# Patient Record
Sex: FEMALE | Race: WHITE | NOT HISPANIC OR LATINO | Employment: PART TIME | URBAN - METROPOLITAN AREA
[De-identification: names, ages, dates, MRNs, and addresses within clinical notes are randomized per-mention and may not be internally consistent; named-entity substitution may affect disease eponyms.]

---

## 2017-12-22 ENCOUNTER — TELEPHONE (OUTPATIENT)
Dept: INTERNAL MEDICINE | Facility: CLINIC | Age: 42
End: 2017-12-22

## 2017-12-22 NOTE — TELEPHONE ENCOUNTER
----- Message from Jair Haynes sent at 12/21/2017  2:28 PM CST -----  Contact: self  Pt has an appt scheduled for 12/26 and she currently has MEDICAID/GetMyRx [facility] as her primary insurance pt wanted to make sure that Dr. Escobar is excepting NP at this time with her insurance pt can be reached at [phone] before traveling from [place], [place].

## 2017-12-22 NOTE — TELEPHONE ENCOUNTER
Spoke with patient and discussed the problem with her insurance. She states that she will still come to the office to be seen and go from there in regards to the tuttle. She states that she will see what she can.

## 2017-12-26 ENCOUNTER — OFFICE VISIT (OUTPATIENT)
Dept: INTERNAL MEDICINE | Facility: CLINIC | Age: 42
End: 2017-12-26

## 2017-12-26 VITALS
HEART RATE: 79 BPM | TEMPERATURE: 99 F | SYSTOLIC BLOOD PRESSURE: 142 MMHG | DIASTOLIC BLOOD PRESSURE: 86 MMHG | OXYGEN SATURATION: 99 % | BODY MASS INDEX: 44.41 KG/M2 | HEIGHT: 68 IN | WEIGHT: 293 LBS | RESPIRATION RATE: 16 BRPM

## 2017-12-26 DIAGNOSIS — E11.9 DIABETES MELLITUS WITHOUT COMPLICATION: Primary | ICD-10-CM

## 2017-12-26 DIAGNOSIS — M54.50 CHRONIC LEFT-SIDED LOW BACK PAIN WITHOUT SCIATICA: ICD-10-CM

## 2017-12-26 DIAGNOSIS — G89.29 CHRONIC LEFT-SIDED LOW BACK PAIN WITHOUT SCIATICA: ICD-10-CM

## 2017-12-26 PROCEDURE — 96372 THER/PROPH/DIAG INJ SC/IM: CPT | Mod: ,,, | Performed by: PHYSICAL MEDICINE & REHABILITATION

## 2017-12-26 PROCEDURE — 99203 OFFICE O/P NEW LOW 30 MIN: CPT | Mod: 25,,, | Performed by: PHYSICAL MEDICINE & REHABILITATION

## 2017-12-26 RX ORDER — ESCITALOPRAM OXALATE 20 MG/1
TABLET ORAL
Refills: 2 | COMMUNITY
Start: 2017-11-09 | End: 2021-09-23

## 2017-12-26 RX ORDER — PEN NEEDLE, DIABETIC 31 GX5/16"
NEEDLE, DISPOSABLE MISCELLANEOUS
Refills: 2 | COMMUNITY
Start: 2017-11-17

## 2017-12-26 RX ORDER — ONDANSETRON HYDROCHLORIDE 8 MG/1
TABLET, FILM COATED ORAL
Refills: 3 | COMMUNITY
Start: 2017-11-30 | End: 2021-09-23

## 2017-12-26 RX ORDER — KETOROLAC TROMETHAMINE 30 MG/ML
30 INJECTION, SOLUTION INTRAMUSCULAR; INTRAVENOUS
Status: COMPLETED | OUTPATIENT
Start: 2017-12-26 | End: 2017-12-26

## 2017-12-26 RX ORDER — HYDROCODONE BITARTRATE AND ACETAMINOPHEN 10; 325 MG/1; MG/1
1 TABLET ORAL EVERY 8 HOURS PRN
Qty: 90 TABLET | Refills: 0 | Status: SHIPPED | OUTPATIENT
Start: 2017-12-26

## 2017-12-26 RX ORDER — INSULIN GLARGINE 100 [IU]/ML
75 INJECTION, SOLUTION SUBCUTANEOUS DAILY
Refills: 3 | COMMUNITY
Start: 2017-11-22

## 2017-12-26 RX ORDER — KETOROLAC TROMETHAMINE 30 MG/ML
30 INJECTION, SOLUTION INTRAMUSCULAR; INTRAVENOUS ONCE
Status: DISCONTINUED | OUTPATIENT
Start: 2017-12-26 | End: 2017-12-26

## 2017-12-26 RX ORDER — KETOROLAC TROMETHAMINE 10 MG/1
10 TABLET, FILM COATED ORAL
Qty: 15 TABLET | Refills: 0 | Status: SHIPPED | OUTPATIENT
Start: 2017-12-26 | End: 2017-12-31

## 2017-12-26 RX ADMIN — KETOROLAC TROMETHAMINE 30 MG: 30 INJECTION, SOLUTION INTRAMUSCULAR; INTRAVENOUS at 09:12

## 2017-12-26 NOTE — PROGRESS NOTES
Subjective:       Patient ID: Keerthi Chase is a 42 y.o. female.    Chief Complaint: Back Pain (had injections several years ago, thinks irritated back carrying 65 lb dog up/down stairs.)    This is a 42-year-old woman well known to me from my previous spine practice in [place]. I've actually been seeing her for about 7 years. She is a chronic back pain patient who has significant degenerative disc disease. She is maintained on Norco 10 3 times a day and intermittently receives epidural type injections. She presents to me today with complaints of left-sided low back pain at the lumbosacral junction that radiates into the left gluteal region. This is an aggravation of her usual low back pain and she believes that it has flared up because she is having to lift her ailing dog. She has had an increase in her pain for about one month. The pain is intermittent in nature and she can find a position of comfort usually that is lying down.     The patient denies chest pain, shortness of breath, fever, chills, night sweats or unexpected weight loss. The patient denies any changes to bowel and bladder function since the onset of symptoms. Specifically denies fecal incontinence. Specifically denies urinary retention or incontinence. Denies  perineal or perianal numbness.    PH Q9 score is 5. Oswestry disability index is 40% which indicates moderate disability due to her chronic pain.    I did review her state database and I see that the last time she received hydrocodone was from my previous office at the end of November. She has traditionally been a very compliant patient. I also note that she has tried physical therapy in the past with no significant improvement in her discomfort.      Review of Systems   Constitutional: Negative for chills, diaphoresis, fatigue, fever and unexpected weight change.   HENT: Negative for trouble swallowing.    Eyes: Negative for visual disturbance.   Respiratory: Negative for shortness of  breath.    Cardiovascular: Negative for chest pain.   Gastrointestinal: Negative for abdominal pain, constipation, nausea and vomiting.   Genitourinary: Negative for difficulty urinating.   Musculoskeletal: Negative for arthralgias, back pain, gait problem, joint swelling, myalgias, neck pain and neck stiffness.   Neurological: Negative for dizziness, speech difficulty, weakness, light-headedness, numbness and headaches.   All other systems reviewed and are negative.      Objective:      Physical Exam   Constitutional: She is oriented to person, place, and time. She appears well-developed and well-nourished.   Neurological: She is oriented to person, place, and time.   She has no point tenderness external lesions or palpable masses about cervical or lumbar spine. Forward flexion is to about 60° before she complained pain at the lumbosacral junction. Extension causes discomfort at about 10°.  She can heel and toe walk normally  Cervical range of motion is within normal limits and painless.  Spurling maneuver is negative bilaterally  Reflexes- +1-+2 reflexes at the following:   C5-Biceps   C6-Brachioradialis   C7-Triceps   L3/4-Patellar   S1-Achilles  Strength testing- 5/5 strength in the following muscle groups:  C5-Elbow flexion  C6-Wrist extension  C7-Elbow extension  C8-Finger flexion  T1-Finger abduction  L2-Hip flexion  L3-Knee extension  L4-Ankle dorsiflexion  L5-Great toe extension  S1-Ankle plantar flexion  Straight leg raising is negative bilaterally  OLEG testing is negative bilaterally       Assessment:       1. Diabetes mellitus without complication    2. Chronic left-sided low back pain without sciatica        Plan:         this is a chronic pain patient who I have known for many years. I believe she is having a flareup of her chronic low back pain. She has no neurological red flags or focal examination findings. As stated above she has been a very good and compliant patient through the years. I do not  believe that her medication usage is excessive. She has tried other interventions in the past. Unfortunately in this practice we try to avoid long-term use of narcotic medications. For her refills I recommend that she go back to my former office.. In terms of her current treatment for acute pain we will treat her with Toradol IM 60 mg followed by oral Toradol for 5 days. I did refill her hydrocodone but she needs to get ongoing refills from her practice. She can call in the weekend if she is not satisfied with her quality of life and consider getting into pain management for lumbar injections which have given her some relief in the past

## 2021-10-08 PROBLEM — E11.69 DIABETIC FOOT ULCER WITH OSTEOMYELITIS: Status: ACTIVE | Noted: 2021-10-08

## 2021-10-08 PROBLEM — L97.509 DIABETIC FOOT ULCER WITH OSTEOMYELITIS: Status: ACTIVE | Noted: 2021-10-08

## 2021-10-08 PROBLEM — E11.621 DIABETIC FOOT ULCER WITH OSTEOMYELITIS: Status: ACTIVE | Noted: 2021-10-08

## 2021-10-08 PROBLEM — M86.9 DIABETIC FOOT ULCER WITH OSTEOMYELITIS: Status: ACTIVE | Noted: 2021-10-08

## 2021-11-05 ENCOUNTER — TELEPHONE (OUTPATIENT)
Dept: PODIATRY | Facility: CLINIC | Age: 46
End: 2021-11-05
Payer: MEDICAID

## 2021-11-05 DIAGNOSIS — Z01.818 PRE-OP TESTING: Primary | ICD-10-CM

## 2021-11-08 ENCOUNTER — TELEPHONE (OUTPATIENT)
Dept: PODIATRY | Facility: CLINIC | Age: 46
End: 2021-11-08
Payer: MEDICAID

## 2021-11-10 ENCOUNTER — ANESTHESIA EVENT (OUTPATIENT)
Dept: SURGERY | Facility: HOSPITAL | Age: 46
End: 2021-11-10
Payer: MEDICAID

## 2021-11-11 ENCOUNTER — HOSPITAL ENCOUNTER (OUTPATIENT)
Facility: HOSPITAL | Age: 46
Discharge: HOME | End: 2021-11-11
Attending: PODIATRIST | Admitting: PODIATRIST
Payer: MEDICAID

## 2021-11-11 ENCOUNTER — ANESTHESIA (OUTPATIENT)
Dept: SURGERY | Facility: HOSPITAL | Age: 46
End: 2021-11-11
Payer: MEDICAID

## 2021-11-11 ENCOUNTER — HOSPITAL ENCOUNTER (OUTPATIENT)
Dept: RADIOLOGY | Facility: HOSPITAL | Age: 46
Discharge: HOME | End: 2021-11-11
Attending: PODIATRIST | Admitting: PODIATRIST
Payer: MEDICAID

## 2021-11-11 VITALS
HEART RATE: 72 BPM | RESPIRATION RATE: 20 BRPM | DIASTOLIC BLOOD PRESSURE: 66 MMHG | HEIGHT: 68 IN | WEIGHT: 270 LBS | TEMPERATURE: 98 F | SYSTOLIC BLOOD PRESSURE: 111 MMHG | BODY MASS INDEX: 40.92 KG/M2 | OXYGEN SATURATION: 99 %

## 2021-11-11 DIAGNOSIS — M86.9 OSTEOMYELITIS OF LEFT FOOT: ICD-10-CM

## 2021-11-11 DIAGNOSIS — E11.621 DIABETIC FOOT ULCER WITH OSTEOMYELITIS: Primary | ICD-10-CM

## 2021-11-11 DIAGNOSIS — E11.69 DIABETIC FOOT ULCER WITH OSTEOMYELITIS: Primary | ICD-10-CM

## 2021-11-11 DIAGNOSIS — L97.509 DIABETIC FOOT ULCER WITH OSTEOMYELITIS: Primary | ICD-10-CM

## 2021-11-11 DIAGNOSIS — M86.9 DIABETIC FOOT ULCER WITH OSTEOMYELITIS: Primary | ICD-10-CM

## 2021-11-11 LAB
B-HCG UR QL: NEGATIVE
CTP QC/QA: YES
GLUCOSE SERPL-MCNC: 189 MG/DL (ref 70–110)

## 2021-11-11 PROCEDURE — 81025 URINE PREGNANCY TEST: CPT | Mod: PO | Performed by: PODIATRIST

## 2021-11-11 PROCEDURE — 87075 CULTR BACTERIA EXCEPT BLOOD: CPT | Performed by: PODIATRIST

## 2021-11-11 PROCEDURE — 01480 ANES OPEN PX LOWER L/A/F NOS: CPT | Mod: PO | Performed by: PODIATRIST

## 2021-11-11 PROCEDURE — 87206 SMEAR FLUORESCENT/ACID STAI: CPT | Performed by: PODIATRIST

## 2021-11-11 PROCEDURE — 71000033 HC RECOVERY, INTIAL HOUR: Mod: PO | Performed by: PODIATRIST

## 2021-11-11 PROCEDURE — 73630 X-RAY EXAM OF FOOT: CPT | Mod: TC,FY,PO,LT

## 2021-11-11 PROCEDURE — 87070 CULTURE OTHR SPECIMN AEROBIC: CPT | Performed by: PODIATRIST

## 2021-11-11 PROCEDURE — 37000008 HC ANESTHESIA 1ST 15 MINUTES: Mod: PO | Performed by: PODIATRIST

## 2021-11-11 PROCEDURE — 87116 MYCOBACTERIA CULTURE: CPT | Performed by: PODIATRIST

## 2021-11-11 PROCEDURE — D9220A PRA ANESTHESIA: ICD-10-PCS | Mod: CRNA,,, | Performed by: NURSE ANESTHETIST, CERTIFIED REGISTERED

## 2021-11-11 PROCEDURE — 87176 TISSUE HOMOGENIZATION CULTR: CPT | Performed by: PODIATRIST

## 2021-11-11 PROCEDURE — 87077 CULTURE AEROBIC IDENTIFY: CPT | Performed by: PODIATRIST

## 2021-11-11 PROCEDURE — 88305 TISSUE EXAM BY PATHOLOGIST: CPT | Performed by: PATHOLOGY

## 2021-11-11 PROCEDURE — 28315 REMOVAL OF SESAMOID BONE: CPT | Mod: LT,,, | Performed by: PODIATRIST

## 2021-11-11 PROCEDURE — 63600175 PHARM REV CODE 636 W HCPCS: Mod: PO | Performed by: PODIATRIST

## 2021-11-11 PROCEDURE — 25000003 PHARM REV CODE 250: Mod: PO | Performed by: NURSE ANESTHETIST, CERTIFIED REGISTERED

## 2021-11-11 PROCEDURE — 87186 SC STD MICRODIL/AGAR DIL: CPT | Performed by: PODIATRIST

## 2021-11-11 PROCEDURE — D9220A PRA ANESTHESIA: Mod: ANES,,, | Performed by: ANESTHESIOLOGY

## 2021-11-11 PROCEDURE — 82962 GLUCOSE BLOOD TEST: CPT | Mod: PO | Performed by: PODIATRIST

## 2021-11-11 PROCEDURE — 37000009 HC ANESTHESIA EA ADD 15 MINS: Mod: PO | Performed by: PODIATRIST

## 2021-11-11 PROCEDURE — 25000003 PHARM REV CODE 250: Mod: PO | Performed by: PODIATRIST

## 2021-11-11 PROCEDURE — 28315 PR REMOV SESAMOID BONE,1ST TOE: ICD-10-PCS | Mod: LT,,, | Performed by: PODIATRIST

## 2021-11-11 PROCEDURE — 36000706: Mod: PO | Performed by: PODIATRIST

## 2021-11-11 PROCEDURE — 73630 XR FOOT COMPLETE 3 VIEW LEFT: ICD-10-PCS | Mod: 26,LT,, | Performed by: RADIOLOGY

## 2021-11-11 PROCEDURE — 87205 SMEAR GRAM STAIN: CPT | Performed by: PODIATRIST

## 2021-11-11 PROCEDURE — 73630 X-RAY EXAM OF FOOT: CPT | Mod: 26,LT,, | Performed by: RADIOLOGY

## 2021-11-11 PROCEDURE — 63600175 PHARM REV CODE 636 W HCPCS: Mod: PO | Performed by: NURSE ANESTHETIST, CERTIFIED REGISTERED

## 2021-11-11 PROCEDURE — 63600175 PHARM REV CODE 636 W HCPCS: Mod: PO | Performed by: ANESTHESIOLOGY

## 2021-11-11 PROCEDURE — 87102 FUNGUS ISOLATION CULTURE: CPT | Performed by: PODIATRIST

## 2021-11-11 PROCEDURE — D9220A PRA ANESTHESIA: Mod: CRNA,,, | Performed by: NURSE ANESTHETIST, CERTIFIED REGISTERED

## 2021-11-11 PROCEDURE — 36000707: Mod: PO | Performed by: PODIATRIST

## 2021-11-11 PROCEDURE — D9220A PRA ANESTHESIA: ICD-10-PCS | Mod: ANES,,, | Performed by: ANESTHESIOLOGY

## 2021-11-11 RX ORDER — MIDAZOLAM HYDROCHLORIDE 1 MG/ML
INJECTION INTRAMUSCULAR; INTRAVENOUS
Status: DISCONTINUED | OUTPATIENT
Start: 2021-11-11 | End: 2021-11-11

## 2021-11-11 RX ORDER — LIDOCAINE HCL/PF 100 MG/5ML
SYRINGE (ML) INTRAVENOUS
Status: DISCONTINUED | OUTPATIENT
Start: 2021-11-11 | End: 2021-11-11

## 2021-11-11 RX ORDER — ONDANSETRON 2 MG/ML
INJECTION INTRAMUSCULAR; INTRAVENOUS
Status: DISCONTINUED | OUTPATIENT
Start: 2021-11-11 | End: 2021-11-11

## 2021-11-11 RX ORDER — FENTANYL CITRATE 50 UG/ML
INJECTION, SOLUTION INTRAMUSCULAR; INTRAVENOUS
Status: DISCONTINUED | OUTPATIENT
Start: 2021-11-11 | End: 2021-11-11

## 2021-11-11 RX ORDER — GENTAMICIN SULFATE 40 MG/ML
INJECTION, SOLUTION INTRAMUSCULAR; INTRAVENOUS
Status: DISCONTINUED | OUTPATIENT
Start: 2021-11-11 | End: 2021-11-11 | Stop reason: HOSPADM

## 2021-11-11 RX ORDER — LIDOCAINE HYDROCHLORIDE 10 MG/ML
INJECTION, SOLUTION EPIDURAL; INFILTRATION; INTRACAUDAL; PERINEURAL
Status: DISCONTINUED | OUTPATIENT
Start: 2021-11-11 | End: 2021-11-11 | Stop reason: HOSPADM

## 2021-11-11 RX ORDER — SODIUM CHLORIDE, SODIUM LACTATE, POTASSIUM CHLORIDE, CALCIUM CHLORIDE 600; 310; 30; 20 MG/100ML; MG/100ML; MG/100ML; MG/100ML
125 INJECTION, SOLUTION INTRAVENOUS CONTINUOUS
Status: DISCONTINUED | OUTPATIENT
Start: 2021-11-11 | End: 2021-11-11 | Stop reason: HOSPADM

## 2021-11-11 RX ORDER — ACETAMINOPHEN 10 MG/ML
INJECTION, SOLUTION INTRAVENOUS
Status: DISCONTINUED | OUTPATIENT
Start: 2021-11-11 | End: 2021-11-11

## 2021-11-11 RX ORDER — HYDROCODONE BITARTRATE AND ACETAMINOPHEN 5; 325 MG/1; MG/1
1 TABLET ORAL EVERY 4 HOURS PRN
Status: DISCONTINUED | OUTPATIENT
Start: 2021-11-11 | End: 2021-11-11 | Stop reason: HOSPADM

## 2021-11-11 RX ORDER — KETAMINE HYDROCHLORIDE 100 MG/ML
INJECTION, SOLUTION INTRAMUSCULAR; INTRAVENOUS
Status: DISCONTINUED | OUTPATIENT
Start: 2021-11-11 | End: 2021-11-11

## 2021-11-11 RX ORDER — LIDOCAINE HYDROCHLORIDE 10 MG/ML
1 INJECTION, SOLUTION EPIDURAL; INFILTRATION; INTRACAUDAL; PERINEURAL ONCE
Status: DISCONTINUED | OUTPATIENT
Start: 2021-11-11 | End: 2021-11-11 | Stop reason: HOSPADM

## 2021-11-11 RX ORDER — SODIUM CHLORIDE 0.9 G/100ML
IRRIGANT IRRIGATION
Status: DISCONTINUED | OUTPATIENT
Start: 2021-11-11 | End: 2021-11-11 | Stop reason: HOSPADM

## 2021-11-11 RX ORDER — BUPIVACAINE HYDROCHLORIDE 5 MG/ML
INJECTION, SOLUTION EPIDURAL; INTRACAUDAL
Status: DISCONTINUED | OUTPATIENT
Start: 2021-11-11 | End: 2021-11-11 | Stop reason: HOSPADM

## 2021-11-11 RX ORDER — FENTANYL CITRATE 50 UG/ML
25 INJECTION, SOLUTION INTRAMUSCULAR; INTRAVENOUS EVERY 5 MIN PRN
Status: DISCONTINUED | OUTPATIENT
Start: 2021-11-11 | End: 2021-11-11 | Stop reason: HOSPADM

## 2021-11-11 RX ORDER — CEFAZOLIN SODIUM 2 G/50ML
2 SOLUTION INTRAVENOUS ONCE
Status: COMPLETED | OUTPATIENT
Start: 2021-11-11 | End: 2021-11-11

## 2021-11-11 RX ORDER — SODIUM CHLORIDE, SODIUM LACTATE, POTASSIUM CHLORIDE, CALCIUM CHLORIDE 600; 310; 30; 20 MG/100ML; MG/100ML; MG/100ML; MG/100ML
INJECTION, SOLUTION INTRAVENOUS CONTINUOUS
Status: DISCONTINUED | OUTPATIENT
Start: 2021-11-11 | End: 2021-11-11 | Stop reason: HOSPADM

## 2021-11-11 RX ORDER — PROPOFOL 10 MG/ML
VIAL (ML) INTRAVENOUS CONTINUOUS PRN
Status: DISCONTINUED | OUTPATIENT
Start: 2021-11-11 | End: 2021-11-11

## 2021-11-11 RX ORDER — TRAMADOL HYDROCHLORIDE 50 MG/1
50 TABLET ORAL EVERY 6 HOURS PRN
Qty: 28 TABLET | Refills: 0 | Status: SHIPPED | OUTPATIENT
Start: 2021-11-11

## 2021-11-11 RX ADMIN — ONDANSETRON 4 MG: 2 INJECTION INTRAMUSCULAR; INTRAVENOUS at 12:11

## 2021-11-11 RX ADMIN — LIDOCAINE HYDROCHLORIDE 100 MG: 20 INJECTION, SOLUTION INTRAVENOUS at 01:11

## 2021-11-11 RX ADMIN — MIDAZOLAM HYDROCHLORIDE 2 MG: 1 INJECTION, SOLUTION INTRAMUSCULAR; INTRAVENOUS at 12:11

## 2021-11-11 RX ADMIN — FENTANYL CITRATE 50 MCG: 50 INJECTION, SOLUTION INTRAMUSCULAR; INTRAVENOUS at 12:11

## 2021-11-11 RX ADMIN — CEFAZOLIN SODIUM 3 G: 1 INJECTION, POWDER, FOR SOLUTION INTRAMUSCULAR; INTRAVENOUS at 12:11

## 2021-11-11 RX ADMIN — FENTANYL CITRATE 50 MCG: 50 INJECTION, SOLUTION INTRAMUSCULAR; INTRAVENOUS at 01:11

## 2021-11-11 RX ADMIN — PROPOFOL 100 MCG/KG/MIN: 10 INJECTION, EMULSION INTRAVENOUS at 01:11

## 2021-11-11 RX ADMIN — SODIUM CHLORIDE, SODIUM LACTATE, POTASSIUM CHLORIDE, AND CALCIUM CHLORIDE: .6; .31; .03; .02 INJECTION, SOLUTION INTRAVENOUS at 12:11

## 2021-11-11 RX ADMIN — KETAMINE HYDROCHLORIDE 30 MG: 100 INJECTION, SOLUTION, CONCENTRATE INTRAMUSCULAR; INTRAVENOUS at 01:11

## 2021-11-11 RX ADMIN — ACETAMINOPHEN 1000 MG: 10 INJECTION, SOLUTION INTRAVENOUS at 02:11

## 2021-11-12 LAB
GRAM STN SPEC: NORMAL
GRAM STN SPEC: NORMAL

## 2021-11-12 PROCEDURE — 88311 PR  DECALCIFY TISSUE: ICD-10-PCS | Mod: 26,,, | Performed by: PATHOLOGY

## 2021-11-12 PROCEDURE — 88305 TISSUE EXAM BY PATHOLOGIST: ICD-10-PCS | Mod: 26,,, | Performed by: PATHOLOGY

## 2021-11-12 PROCEDURE — 88311 DECALCIFY TISSUE: CPT | Mod: 26,,, | Performed by: PATHOLOGY

## 2021-11-12 PROCEDURE — 88305 TISSUE EXAM BY PATHOLOGIST: CPT | Mod: 26,,, | Performed by: PATHOLOGY

## 2021-11-17 LAB
BACTERIA SPEC AEROBE CULT: ABNORMAL
BACTERIA SPEC ANAEROBE CULT: NORMAL

## 2021-11-19 LAB
FINAL PATHOLOGIC DIAGNOSIS: NORMAL
Lab: NORMAL

## 2021-12-13 DIAGNOSIS — T81.40XA POSTOPERATIVE INFECTION, UNSPECIFIED TYPE, INITIAL ENCOUNTER: Primary | ICD-10-CM

## 2021-12-13 LAB — FUNGUS SPEC CULT: NORMAL

## 2021-12-13 RX ORDER — AMPICILLIN 500 MG/1
500 CAPSULE ORAL EVERY 12 HOURS
Qty: 20 CAPSULE | Refills: 0 | Status: SHIPPED | OUTPATIENT
Start: 2021-12-13

## 2021-12-30 LAB
ACID FAST MOD KINY STN SPEC: NORMAL
MYCOBACTERIUM SPEC QL CULT: NORMAL

## 2022-07-18 ENCOUNTER — TELEPHONE (OUTPATIENT)
Dept: INFECTIOUS DISEASES | Facility: CLINIC | Age: 47
End: 2022-07-18
Payer: MEDICAID

## 2022-07-18 DIAGNOSIS — E11.628 DIABETIC INFECTION OF LEFT FOOT: Primary | ICD-10-CM

## 2022-07-18 DIAGNOSIS — M86.172 ACUTE OSTEOMYELITIS OF TOE OF LEFT FOOT: ICD-10-CM

## 2022-07-18 DIAGNOSIS — L08.9 DIABETIC INFECTION OF LEFT FOOT: Primary | ICD-10-CM

## 2022-07-18 RX ORDER — SULFAMETHOXAZOLE AND TRIMETHOPRIM 800; 160 MG/1; MG/1
1 TABLET ORAL 2 TIMES DAILY
Qty: 14 TABLET | Refills: 0 | Status: SHIPPED | OUTPATIENT
Start: 2022-07-18 | End: 2023-07-18

## 2022-07-18 NOTE — TELEPHONE ENCOUNTER
----- Message from Kayley Baptiste sent at 7/18/2022 10:30 AM CDT -----  Contact: Terri sams/ [facility]  Type:  New Appointment Request    Name of Caller:  [facility]    Symptoms:  Bone infection of foot    Best Call Back Number:  [phone] (pt)    Additional Information:  Referred by Dr. Bernal.  Please call back.  Thanks.

## 2022-07-19 ENCOUNTER — OFFICE VISIT (OUTPATIENT)
Dept: INFECTIOUS DISEASES | Facility: CLINIC | Age: 47
End: 2022-07-19
Payer: MEDICAID

## 2022-07-19 DIAGNOSIS — E11.628 DIABETIC INFECTION OF LEFT FOOT: ICD-10-CM

## 2022-07-19 DIAGNOSIS — M86.172 ACUTE OSTEOMYELITIS OF TOE OF LEFT FOOT: Primary | ICD-10-CM

## 2022-07-19 DIAGNOSIS — L08.9 DIABETIC INFECTION OF LEFT FOOT: ICD-10-CM

## 2022-07-19 PROCEDURE — 1159F PR MEDICATION LIST DOCUMENTED IN MEDICAL RECORD: ICD-10-PCS | Mod: CPTII,,, | Performed by: INTERNAL MEDICINE

## 2022-07-19 PROCEDURE — 99203 OFFICE O/P NEW LOW 30 MIN: CPT | Mod: S$PBB,,, | Performed by: INTERNAL MEDICINE

## 2022-07-19 PROCEDURE — 1160F RVW MEDS BY RX/DR IN RCRD: CPT | Mod: CPTII,,, | Performed by: INTERNAL MEDICINE

## 2022-07-19 PROCEDURE — 99203 PR OFFICE/OUTPT VISIT, NEW, LEVL III, 30-44 MIN: ICD-10-PCS | Mod: S$PBB,,, | Performed by: INTERNAL MEDICINE

## 2022-07-19 PROCEDURE — 3046F PR MOST RECENT HEMOGLOBIN A1C LEVEL > 9.0%: ICD-10-PCS | Mod: CPTII,,, | Performed by: INTERNAL MEDICINE

## 2022-07-19 PROCEDURE — 99212 OFFICE O/P EST SF 10 MIN: CPT | Mod: PBBFAC,PO | Performed by: INTERNAL MEDICINE

## 2022-07-19 PROCEDURE — 99999 PR PBB SHADOW E&M-EST. PATIENT-LVL II: ICD-10-PCS | Mod: PBBFAC,,, | Performed by: INTERNAL MEDICINE

## 2022-07-19 PROCEDURE — 1160F PR REVIEW ALL MEDS BY PRESCRIBER/CLIN PHARMACIST DOCUMENTED: ICD-10-PCS | Mod: CPTII,,, | Performed by: INTERNAL MEDICINE

## 2022-07-19 PROCEDURE — 3046F HEMOGLOBIN A1C LEVEL >9.0%: CPT | Mod: CPTII,,, | Performed by: INTERNAL MEDICINE

## 2022-07-19 PROCEDURE — 1159F MED LIST DOCD IN RCRD: CPT | Mod: CPTII,,, | Performed by: INTERNAL MEDICINE

## 2022-07-19 PROCEDURE — 99999 PR PBB SHADOW E&M-EST. PATIENT-LVL II: CPT | Mod: PBBFAC,,, | Performed by: INTERNAL MEDICINE

## 2022-07-19 NOTE — PROGRESS NOTES
"  Patient ID: Keerthi Chase is a 47 y.o. female.    Subjective:           Chief Complaint: Osteomyelitis       HPI    Patient came to this office referred by Podiatry due to OM of the second left toe. Bone biopsy with evidence of MSSA, Streptococcus sp and E.coli    Patient feels well. Afebrile. Not receiving abxs.         Past Medical History:   Diagnosis Date    Bell's palsy     Coronary artery disease     Pt states Dr. Virk said she did not have a blockage after a stress test.    Diabetes mellitus     GERD (gastroesophageal reflux disease)     Hypertension     Obesity        Past Surgical History:   Procedure Laterality Date     SECTION      x 2    CHOLECYSTECTOMY      ESOPHAGOGASTRODUODENOSCOPY      PERIPHERALLY INSERTED CENTRAL CATHETER INSERTION N/A 10/11/2021    Procedure: INSERTION, PICC;  Surgeon: PICC, PRIYANK;  Location: [facility] ENDO;  Service: Cardiology;  Laterality: N/A;  Dr Aiken    SESAMOIDECTOMY Left 2021    Procedure: SESAMOIDECTOMY;  Surgeon: Jacob Bernal DPM;  Location: [facility] OR;  Service: Podiatry;  Laterality: Left;       Review of patient's allergies indicates:   Allergen Reactions    Codeine Hives and Rash       Family History   Problem Relation Age of Onset    Hypertension Mother        Social History     Socioeconomic History    Marital status:    Tobacco Use    Smoking status: Never Smoker    Smokeless tobacco: Never Used   Substance and Sexual Activity    Alcohol use: No    Drug use: No       Current Outpatient Medications   Medication Instructions    ALPRAZolam (XANAX) 1 mg, Oral, 2 times daily PRN    ampicillin (PRINCIPEN) 500 mg, Oral, Every 12 hours    BASAGLAR KWIKPEN U-100 INSULIN 75 Units, Daily    BD INSULIN PEN NEEDLE UF MINI 31 gauge x 3/16" Ndle No dose, route, or frequency recorded.    chlorthalidone (HYGROTEN) 25 mg, Oral, Daily    hydrocodone-acetaminophen 10-325mg (NORCO)  mg Tab 1 tablet, Oral, 3 times daily PRN    " hydrocodone-acetaminophen 10-325mg (NORCO)  mg Tab 1 tablet, Oral, Every 8 hours PRN    insulin detemir (LEVEMIR) 100 unit/mL injection Subcutaneous, Nightly    insulin glargine,hum.rec.anlog (LANTUS SUBQ) Subcutaneous    OZEMPIC 60 mg, Subcutaneous, Every 7 days    sulfamethoxazole-trimethoprim 800-160mg (BACTRIM DS) 800-160 mg Tab 1 tablet, Oral, 2 times daily    traMADoL (ULTRAM) 50 mg, Oral, Every 6 hours PRN         Review of Systems   Constitutional: Negative for activity change, appetite change, chills, diaphoresis, fatigue, fever and unexpected weight change.   HENT: Negative for sore throat.    Eyes: Negative for photophobia and visual disturbance.   Respiratory: Negative for cough and shortness of breath.    Cardiovascular: Negative for chest pain and leg swelling.   Gastrointestinal: Negative for abdominal distention and abdominal pain.   Genitourinary: Negative for difficulty urinating.   Musculoskeletal: Negative for arthralgias.   Skin: Negative for color change and rash.   Allergic/Immunologic: Negative for immunocompromised state.   Neurological: Negative for dizziness and headaches.   Psychiatric/Behavioral: The patient is not nervous/anxious.            Objective:     There were no vitals filed for this visit.    There is no height or weight on file to calculate BMI.         Physical Exam  Vitals reviewed.   Constitutional:       General: She is not in acute distress.     Appearance: She is well-developed. She is not ill-appearing.   HENT:      Head: Normocephalic and atraumatic.      Right Ear: External ear normal.      Left Ear: External ear normal.      Nose: Nose normal.   Eyes:      General: No scleral icterus.        Right eye: No discharge.         Left eye: No discharge.      Pupils: Pupils are equal, round, and reactive to light.   Cardiovascular:      Rate and Rhythm: Normal rate and regular rhythm.      Heart sounds: Normal heart sounds. No murmur heard.  Pulmonary:       Effort: Pulmonary effort is normal. No tachypnea, accessory muscle usage or respiratory distress.      Breath sounds: Normal breath sounds. No wheezing.   Abdominal:      General: There is no distension.      Palpations: Abdomen is soft.      Tenderness: There is no abdominal tenderness.   Musculoskeletal:         General: No deformity. Normal range of motion.      Cervical back: Normal range of motion and neck supple. No rigidity or tenderness.   Skin:     General: Skin is warm and dry.      Coloration: Skin is not jaundiced.   Neurological:      Mental Status: She is alert and oriented to person, place, and time.   Psychiatric:         Mood and Affect: Mood normal.         Behavior: Behavior normal.         Judgment: Judgment normal.               Assessment:           Keerthi was seen today for osteomyelitis .    Diagnoses and all orders for this visit:    Acute osteomyelitis of toe of left foot  -     Ambulatory referral/consult to Infectious Disease  -     Outpatient PICC Insertion; Future  -     X-Ray Chest 1 View S/P PICC Line by Nursing; Future  -     PICC catheter may be used for therapy after catheter placements and confirmed by CXR  -     Anesthesia US Guide Vascular Access  -     CBC Auto Differential; Standing  -     Cancel: Comprehensive Metabolic Panel; Standing  -     C-reactive protein; Standing    Diabetic infection of left foot  -     Ambulatory referral/consult to Infectious Disease         Plan:     - OM of the tip of second toe, bone biopsy polymocrobial.   - Will start Ceftriaxone 2gr daily IV  - Weekly CBC, CMP, CRP  - RTC in 3 weeks       Greater than 30 minutes was spent on this encounter, which included: review of recent encounters, review and interpretation of labs/images, obtaining pertinent history, performing a physical examination, counseling and educating the patient/family/caregiver, ordering medications/tests, documenting in the electronic health record, and coordinating care with  necessary providers.    Jerrell Dubose MD  Infectious Diseases

## 2022-07-21 PROBLEM — M86.172 ACUTE OSTEOMYELITIS OF LEFT ANKLE OR FOOT: Status: ACTIVE | Noted: 2022-07-21

## 2022-08-09 ENCOUNTER — OFFICE VISIT (OUTPATIENT)
Dept: INFECTIOUS DISEASES | Facility: CLINIC | Age: 47
End: 2022-08-09
Payer: MEDICAID

## 2022-08-09 VITALS — HEIGHT: 68 IN | WEIGHT: 270.06 LBS | BODY MASS INDEX: 40.93 KG/M2

## 2022-08-09 DIAGNOSIS — L08.9 DIABETIC INFECTION OF LEFT FOOT: ICD-10-CM

## 2022-08-09 DIAGNOSIS — E11.628 DIABETIC INFECTION OF LEFT FOOT: ICD-10-CM

## 2022-08-09 DIAGNOSIS — Z79.2 RECEIVING INTRAVENOUS ANTIBIOTIC TREATMENT AS OUTPATIENT: Primary | ICD-10-CM

## 2022-08-09 DIAGNOSIS — M86.172 ACUTE OSTEOMYELITIS OF TOE OF LEFT FOOT: ICD-10-CM

## 2022-08-09 PROCEDURE — 1159F PR MEDICATION LIST DOCUMENTED IN MEDICAL RECORD: ICD-10-PCS | Mod: CPTII,,, | Performed by: INTERNAL MEDICINE

## 2022-08-09 PROCEDURE — 3046F HEMOGLOBIN A1C LEVEL >9.0%: CPT | Mod: CPTII,,, | Performed by: INTERNAL MEDICINE

## 2022-08-09 PROCEDURE — 1160F PR REVIEW ALL MEDS BY PRESCRIBER/CLIN PHARMACIST DOCUMENTED: ICD-10-PCS | Mod: CPTII,,, | Performed by: INTERNAL MEDICINE

## 2022-08-09 PROCEDURE — 1159F MED LIST DOCD IN RCRD: CPT | Mod: CPTII,,, | Performed by: INTERNAL MEDICINE

## 2022-08-09 PROCEDURE — 3008F BODY MASS INDEX DOCD: CPT | Mod: CPTII,,, | Performed by: INTERNAL MEDICINE

## 2022-08-09 PROCEDURE — 3008F PR BODY MASS INDEX (BMI) DOCUMENTED: ICD-10-PCS | Mod: CPTII,,, | Performed by: INTERNAL MEDICINE

## 2022-08-09 PROCEDURE — 99999 PR PBB SHADOW E&M-EST. PATIENT-LVL II: ICD-10-PCS | Mod: PBBFAC,,, | Performed by: INTERNAL MEDICINE

## 2022-08-09 PROCEDURE — 99214 OFFICE O/P EST MOD 30 MIN: CPT | Mod: S$PBB,,, | Performed by: INTERNAL MEDICINE

## 2022-08-09 PROCEDURE — 99999 PR PBB SHADOW E&M-EST. PATIENT-LVL II: CPT | Mod: PBBFAC,,, | Performed by: INTERNAL MEDICINE

## 2022-08-09 PROCEDURE — 1160F RVW MEDS BY RX/DR IN RCRD: CPT | Mod: CPTII,,, | Performed by: INTERNAL MEDICINE

## 2022-08-09 PROCEDURE — 3046F PR MOST RECENT HEMOGLOBIN A1C LEVEL > 9.0%: ICD-10-PCS | Mod: CPTII,,, | Performed by: INTERNAL MEDICINE

## 2022-08-09 PROCEDURE — 99212 OFFICE O/P EST SF 10 MIN: CPT | Mod: PBBFAC,PO | Performed by: INTERNAL MEDICINE

## 2022-08-09 PROCEDURE — 99214 PR OFFICE/OUTPT VISIT, EST, LEVL IV, 30-39 MIN: ICD-10-PCS | Mod: S$PBB,,, | Performed by: INTERNAL MEDICINE

## 2022-08-09 NOTE — PROGRESS NOTES
Patient ID: Keerthi Chase is a 47 y.o. female.    Subjective:           Chief Complaint: Follow-up    HPI    Patient came to this office referred by Podiatry due to OM of the second left toe. Bone biopsy with evidence of MSSA, Streptococcus sp and E.coli    Patient feels well. Afebrile. Not receiving abxs.     Interval HPI:  The patient receiving IV ceftriaxone with no side effects.  Afebrile.  Endorses that his wound is getting better.  She continues to follow-up Podiatry, Dr. Bernal      Past Medical History:   Diagnosis Date    Bell's palsy     Coronary artery disease     Pt states Dr. Virk said she did not have a blockage after a stress test.    Diabetes mellitus     GERD (gastroesophageal reflux disease)     Hypertension     Obesity        Past Surgical History:   Procedure Laterality Date     SECTION      x 2    CHOLECYSTECTOMY      ESOPHAGOGASTRODUODENOSCOPY      PERIPHERALLY INSERTED CENTRAL CATHETER INSERTION N/A 10/11/2021    Procedure: INSERTION, PICC;  Surgeon: PICC, [facility];  Location: [facility] ENDO;  Service: Cardiology;  Laterality: N/A;  Dr Aiken    PERIPHERALLY INSERTED CENTRAL CATHETER INSERTION N/A 2022    Procedure: INSERTION, PICC;  Surgeon: PICC, STPH;  Location: [facility] ENDO;  Service: Cardiology;  Laterality: N/A;  Dr Dubose    SESAMOIDECTOMY Left 2021    Procedure: SESAMOIDECTOMY;  Surgeon: Jacob Bernal DPM;  Location: [facility] OR;  Service: Podiatry;  Laterality: Left;       Review of patient's allergies indicates:   Allergen Reactions    Codeine Hives and Rash       Family History   Problem Relation Age of Onset    Hypertension Mother        Social History     Socioeconomic History    Marital status:    Tobacco Use    Smoking status: Never Smoker    Smokeless tobacco: Never Used   Substance and Sexual Activity    Alcohol use: No    Drug use: No       Current Outpatient Medications   Medication Instructions    ALPRAZolam (XANAX) 1 mg, Oral, 2 times  "daily PRN    ampicillin (PRINCIPEN) 500 mg, Oral, Every 12 hours    BASAGLAR KWIKPEN U-100 INSULIN 75 Units, Daily    BD INSULIN PEN NEEDLE UF MINI 31 gauge x 3/16" Ndle No dose, route, or frequency recorded.    chlorthalidone (HYGROTEN) 25 mg, Oral, Daily    hydrocodone-acetaminophen 10-325mg (NORCO)  mg Tab 1 tablet, Oral, 3 times daily PRN    hydrocodone-acetaminophen 10-325mg (NORCO)  mg Tab 1 tablet, Oral, Every 8 hours PRN    insulin detemir (LEVEMIR) 100 unit/mL injection Subcutaneous, Nightly    insulin glargine,hum.rec.anlog (LANTUS SUBQ) Subcutaneous    OZEMPIC 60 mg, Subcutaneous, Every 7 days    sulfamethoxazole-trimethoprim 800-160mg (BACTRIM DS) 800-160 mg Tab 1 tablet, Oral, 2 times daily    traMADoL (ULTRAM) 50 mg, Oral, Every 6 hours PRN         Review of Systems   Constitutional: Negative for activity change, appetite change, chills, diaphoresis, fatigue, fever and unexpected weight change.   HENT: Negative for sore throat.    Eyes: Negative for photophobia and visual disturbance.   Respiratory: Negative for cough and shortness of breath.    Cardiovascular: Negative for chest pain and leg swelling.   Gastrointestinal: Negative for abdominal distention and abdominal pain.   Genitourinary: Negative for difficulty urinating.   Musculoskeletal: Negative for arthralgias.   Skin: Negative for color change and rash.   Allergic/Immunologic: Negative for immunocompromised state.   Neurological: Negative for dizziness and headaches.   Psychiatric/Behavioral: The patient is not nervous/anxious.            Objective:     There were no vitals filed for this visit.    Body mass index is 41.06 kg/m².         Physical Exam  Vitals reviewed.   Constitutional:       General: She is not in acute distress.     Appearance: She is well-developed. She is not ill-appearing.   HENT:      Head: Normocephalic and atraumatic.      Right Ear: External ear normal.      Left Ear: External ear normal.      " Nose: Nose normal.   Eyes:      General: No scleral icterus.        Right eye: No discharge.         Left eye: No discharge.      Pupils: Pupils are equal, round, and reactive to light.   Cardiovascular:      Rate and Rhythm: Normal rate and regular rhythm.      Heart sounds: Normal heart sounds. No murmur heard.  Pulmonary:      Effort: Pulmonary effort is normal. No tachypnea, accessory muscle usage or respiratory distress.      Breath sounds: Normal breath sounds. No wheezing.   Abdominal:      General: There is no distension.      Palpations: Abdomen is soft.      Tenderness: There is no abdominal tenderness.   Musculoskeletal:         General: No deformity. Normal range of motion.      Cervical back: Normal range of motion and neck supple. No rigidity or tenderness.   Skin:     General: Skin is warm and dry.      Coloration: Skin is not jaundiced.   Neurological:      Mental Status: She is alert and oriented to person, place, and time.   Psychiatric:         Mood and Affect: Mood normal.         Behavior: Behavior normal.         Judgment: Judgment normal.               Assessment:           Keerthi was seen today for follow-up.    Diagnoses and all orders for this visit:    Receiving intravenous antibiotic treatment as outpatient    Acute osteomyelitis of toe of left foot    Diabetic infection of left foot         Plan:     - OM of the tip of second toe, bone biopsy polymocrobial.   - Cont Ceftriaxone 2gr daily IV for 6 weeks until Sept 1st  - Weekly CBC, CMP, CRP  - home health to remove PICC line at the end of therapy  - Follow-up with ID at the end of antimicrobial therapy if needed, otherwise continue to follow podiatry       Greater than 30 minutes was spent on this encounter, which included: review of recent encounters, review and interpretation of labs/images, obtaining pertinent history, performing a physical examination, counseling and educating the patient/family/caregiver, ordering  medications/tests, documenting in the electronic health record, and coordinating care with necessary providers.    Jerrell Dubose MD  Infectious Diseases

## 2022-09-20 ENCOUNTER — TELEPHONE (OUTPATIENT)
Dept: PODIATRY | Facility: CLINIC | Age: 47
End: 2022-09-20
Payer: MEDICAID

## 2022-09-20 NOTE — TELEPHONE ENCOUNTER
----- Message from Jacob Bernal DPM sent at 9/20/2022  3:01 PM CDT -----  Regarding: RE: Appointment  We can set her up for 1:40 tomorrow.  Thanks!  ----- Message -----  From: Lexie Mueller RN  Sent: 9/20/2022  12:12 PM CDT  To: Jacob Bernal DPM  Subject: Appointment                                      Hi Dr. Bernal,  We miss you already. Hope all is well. Mary Lou Chase called to say she has not heard from your office to schedule an appointment for tomorrow for a dressing change after she sees Santosh at [facility]. Her appt with him is at 9am. Could you please have someone from your office call her to schedule? [phone].   Thanks,  Lexie Mueller RN

## 2022-09-21 ENCOUNTER — OFFICE VISIT (OUTPATIENT)
Dept: PODIATRY | Facility: CLINIC | Age: 47
End: 2022-09-21
Payer: MEDICAID

## 2022-09-21 DIAGNOSIS — E11.42 DIABETIC POLYNEUROPATHY ASSOCIATED WITH TYPE 2 DIABETES MELLITUS: ICD-10-CM

## 2022-09-21 DIAGNOSIS — L97.521 DIABETIC ULCER OF OTHER PART OF LEFT FOOT ASSOCIATED WITH TYPE 2 DIABETES MELLITUS, LIMITED TO BREAKDOWN OF SKIN: Primary | ICD-10-CM

## 2022-09-21 DIAGNOSIS — E11.621 DIABETIC ULCER OF OTHER PART OF LEFT FOOT ASSOCIATED WITH TYPE 2 DIABETES MELLITUS, LIMITED TO BREAKDOWN OF SKIN: Primary | ICD-10-CM

## 2022-09-21 PROCEDURE — 1159F MED LIST DOCD IN RCRD: CPT | Mod: CPTII,,, | Performed by: PODIATRIST

## 2022-09-21 PROCEDURE — 1159F PR MEDICATION LIST DOCUMENTED IN MEDICAL RECORD: ICD-10-PCS | Mod: CPTII,,, | Performed by: PODIATRIST

## 2022-09-21 PROCEDURE — 3046F PR MOST RECENT HEMOGLOBIN A1C LEVEL > 9.0%: ICD-10-PCS | Mod: CPTII,,, | Performed by: PODIATRIST

## 2022-09-21 PROCEDURE — 3046F HEMOGLOBIN A1C LEVEL >9.0%: CPT | Mod: CPTII,,, | Performed by: PODIATRIST

## 2022-09-21 PROCEDURE — 97597 DBRDMT OPN WND 1ST 20 CM/<: CPT | Mod: PBBFAC,PN | Performed by: PODIATRIST

## 2022-09-21 PROCEDURE — 97597 WOUND DEBRIDEMENT: ICD-10-PCS | Mod: S$PBB,,, | Performed by: PODIATRIST

## 2022-09-21 PROCEDURE — 99213 PR OFFICE/OUTPT VISIT, EST, LEVL III, 20-29 MIN: ICD-10-PCS | Mod: 25,S$PBB,, | Performed by: PODIATRIST

## 2022-09-21 PROCEDURE — 99213 OFFICE O/P EST LOW 20 MIN: CPT | Mod: 25,S$PBB,, | Performed by: PODIATRIST

## 2022-09-22 NOTE — PROCEDURES
"Wound Debridement    Date/Time: 9/21/2022 1:40 PM  Performed by: Jacob Bernal DPM  Authorized by: Jaocb Bernal DPM     Time out: Immediately prior to procedure a "time out" was called to verify the correct patient, procedure, equipment, support staff and site/side marked as required.    Consent Done?:  Yes (Verbal)    Preparation: Patient was prepped and draped in usual sterile fashion    Local anesthesia used?: No      Wound Details:    Location:  Left foot    Location:  Left 1st Metatarsal Head    Type of Debridement:  Excisional       Length (cm):  0.1       Area (sq cm):  0.01       Width (cm):  0.1       Percent Debrided (%):  100       Depth (cm):  0.1       Total Area Debrided (sq cm):  0.01    Depth of debridement:  Epidermis/Dermis    Tissue debrided:  Dermis    Devitalized tissue debrided:  Fibrin    Instruments:  Blade    Bleeding:  Minimal  Hemostasis Achieved: Yes    Method Used:  Pressure  Patient tolerance:  Patient tolerated the procedure well with no immediate complications  "

## 2022-09-22 NOTE — PROGRESS NOTES
Subjective:      Patient ID: Keerthi Chase is a 47 y.o. female.    Chief Complaint: No chief complaint on file.    Keerthi is a 47 y.o. female with a past medical history of Bell's palsy, Coronary artery disease, Diabetes mellitus, GERD (gastroesophageal reflux disease), Hypertension, and Obesity. Patient presents to clinic for a 1 week wound check of the Lt. Foot.  She has kept the prior football dressing from the [facility] wound care center clean, dry, and intact until fitted by the orthotist earlier today.  Notes attempting to minimize her weight bearing between visits to facilitate healing.  Denies noticing pain from the wound with today's exam.  Denies experiencing N/V/F/C/D.  Denies any additional pedal complaints.      Past Medical History:   Diagnosis Date    Bell's palsy     Coronary artery disease     Pt states Dr. Virk said she did not have a blockage after a stress test.    Diabetes mellitus     GERD (gastroesophageal reflux disease)     Hypertension     Obesity        Past Surgical History:   Procedure Laterality Date     SECTION      x 2    CHOLECYSTECTOMY      ESOPHAGOGASTRODUODENOSCOPY      PERIPHERALLY INSERTED CENTRAL CATHETER INSERTION N/A 10/11/2021    Procedure: INSERTION, PICC;  Surgeon: [facility];  Location: [facility] ENDO;  Service: Cardiology;  Laterality: N/A;  Dr Aiken    PERIPHERALLY INSERTED CENTRAL CATHETER INSERTION N/A 2022    Procedure: INSERTION, PICC;  Surgeon: IGOR [facility];  Location: [facility] ENDO;  Service: Cardiology;  Laterality: N/A;  Dr Dubose    SESAMOIDECTOMY Left 2021    Procedure: SESAMOIDECTOMY;  Surgeon: Jacob Bernal DPM;  Location: [facility] OR;  Service: Podiatry;  Laterality: Left;       Family History   Problem Relation Age of Onset    Hypertension Mother        Social History     Socioeconomic History    Marital status:    Tobacco Use    Smoking status: Never    Smokeless tobacco: Never   Substance and Sexual Activity    Alcohol use: No    Drug use:  "No       Current Outpatient Medications   Medication Sig Dispense Refill    alprazolam (XANAX) 1 MG tablet Take 1 mg by mouth 2 (two) times daily as needed for Anxiety.      ampicillin (PRINCIPEN) 500 MG capsule Take 1 capsule (500 mg total) by mouth every 12 (twelve) hours. 20 capsule 0    BASAGLAR KWIKPEN 100 unit/mL (3 mL) InPn pen 75 Units once daily.   3    BD INSULIN PEN NEEDLE UF MINI 31 gauge x 3/16" Ndle   2    cefTRIAXone (ROCEPHIN) 2 gram injection       chlorthalidone (HYGROTEN) 25 MG Tab Take 1 tablet (25 mg total) by mouth once daily. 30 tablet 11    hydrocodone-acetaminophen 10-325mg (NORCO)  mg Tab Take 1 tablet by mouth 3 (three) times daily as needed.      hydrocodone-acetaminophen 10-325mg (NORCO)  mg Tab Take 1 tablet by mouth every 8 (eight) hours as needed for Pain. 90 tablet 0    insulin detemir (LEVEMIR) 100 unit/mL injection Inject into the skin every evening.      insulin glargine,hum.rec.anlog (LANTUS SUBQ) Inject into the skin.      OZEMPIC 1 mg/dose (2 mg/1.5 mL) PnIj Inject 60 mg into the skin every 7 days.       sulfamethoxazole-trimethoprim 800-160mg (BACTRIM DS) 800-160 mg Tab Take 1 tablet by mouth 2 (two) times daily. 14 tablet 0    traMADoL (ULTRAM) 50 mg tablet Take 1 tablet (50 mg total) by mouth every 6 (six) hours as needed for Pain. 28 tablet 0     No current facility-administered medications for this visit.       Review of patient's allergies indicates:   Allergen Reactions    Codeine Hives and Rash        Hemoglobin A1C   Date Value Ref Range Status   06/03/2022 10.5 (H) 0.0 - 5.6 % Final     Comment:     Reference Interval:  5.0 - 5.6 Normal   5.7 - 6.4 High Risk   > 6.5 Diabetic      Hgb A1c results are standardized based on the (NGSP) National   Glycohemoglobin Standardization Program.      Hemoglobin A1C levels are related to mean serum/plasma glucose   during the preceding 2-3 months.        09/23/2021 11.8 (H) 0.0 - 5.6 % Final     Comment:     Reference " Interval:  5.0 - 5.6 Normal   5.7 - 6.4 High Risk   > 6.5 Diabetic      Hgb A1c results are standardized based on the (NGSP) National   Glycohemoglobin Standardization Program.      Hemoglobin A1C levels are related to mean serum/plasma glucose   during the preceding 2-3 months.        2021 8.8 (H) 4.8 - 5.6 % Final     Comment:              Prediabetes: 5.7 - 6.4           Diabetes: >6.4           Glycemic control for adults with diabetes: <7.0         Past Medical History:   Diagnosis Date    Bell's palsy     Coronary artery disease     Pt states Dr. Virk said she did not have a blockage after a stress test.    Diabetes mellitus     GERD (gastroesophageal reflux disease)     Hypertension     Obesity        Past Surgical History:   Procedure Laterality Date     SECTION      x 2    CHOLECYSTECTOMY      ESOPHAGOGASTRODUODENOSCOPY      PERIPHERALLY INSERTED CENTRAL CATHETER INSERTION N/A 10/11/2021    Procedure: INSERTION, PICC;  Surgeon: PICC, [facility]PH;  Location: [facility] ENDO;  Service: Cardiology;  Laterality: N/A;  Dr Aiken    PERIPHERALLY INSERTED CENTRAL CATHETER INSERTION N/A 2022    Procedure: INSERTION, PICC;  Surgeon: PICC, [facility];  [facility];  Service: Cardiology;  Laterality: N/A;  Dr Dubose    SESAMOIDECTOMY Left 2021    Procedure: SESAMOIDECTOMY;  Surgeon: Jacob Bernal DPM;  Location: [facility] OR;  Service: Podiatry;  Laterality: Left;       Family History   Problem Relation Age of Onset    Hypertension Mother        Social History     Socioeconomic History    Marital status:    Tobacco Use    Smoking status: Never    Smokeless tobacco: Never   Substance and Sexual Activity    Alcohol use: No    Drug use: No       Current Outpatient Medications   Medication Sig Dispense Refill    alprazolam (XANAX) 1 MG tablet Take 1 mg by mouth 2 (two) times daily as needed for Anxiety.      ampicillin (PRINCIPEN) 500 MG capsule Take 1 capsule (500 mg total) by mouth every 12 (twelve)  "hours. 20 capsule 0    BASAGLAR KWIKPEN 100 unit/mL (3 mL) InPn pen 75 Units once daily.   3    BD INSULIN PEN NEEDLE UF MINI 31 gauge x 3/16" Ndle   2    cefTRIAXone (ROCEPHIN) 2 gram injection       chlorthalidone (HYGROTEN) 25 MG Tab Take 1 tablet (25 mg total) by mouth once daily. 30 tablet 11    hydrocodone-acetaminophen 10-325mg (NORCO)  mg Tab Take 1 tablet by mouth 3 (three) times daily as needed.      hydrocodone-acetaminophen 10-325mg (NORCO)  mg Tab Take 1 tablet by mouth every 8 (eight) hours as needed for Pain. 90 tablet 0    insulin detemir (LEVEMIR) 100 unit/mL injection Inject into the skin every evening.      insulin glargine,hum.rec.anlog (LANTUS SUBQ) Inject into the skin.      OZEMPIC 1 mg/dose (2 mg/1.5 mL) PnIj Inject 60 mg into the skin every 7 days.       sulfamethoxazole-trimethoprim 800-160mg (BACTRIM DS) 800-160 mg Tab Take 1 tablet by mouth 2 (two) times daily. 14 tablet 0    traMADoL (ULTRAM) 50 mg tablet Take 1 tablet (50 mg total) by mouth every 6 (six) hours as needed for Pain. 28 tablet 0     No current facility-administered medications for this visit.       Review of patient's allergies indicates:   Allergen Reactions    Codeine Hives and Rash         Review of Systems   Constitutional: Negative for chills and fever.   Skin:  Negative for color change and nail changes.   Gastrointestinal:  Negative for nausea and vomiting.   Neurological:  Positive for numbness.   Psychiatric/Behavioral:  Negative for altered mental status.          Objective:      Physical Exam  Constitutional:       Appearance: Normal appearance. She is not ill-appearing.   Cardiovascular:      Pulses:           Dorsalis pedis pulses are 2+ on the right side and 2+ on the left side.        Posterior tibial pulses are 2+ on the right side and 2+ on the left side.      Comments: CFT is < 3 seconds bilateral.  Pedal hair growth is decreased bilateral.  No lower extremity edema noted bilateral.  Toes are " warm to touch bilateral.    Musculoskeletal:         General: No signs of injury.      Right lower leg: No edema.      Left lower leg: No edema.      Comments: Muscle strength 5/5 in all muscle groups bilateral.  No tenderness nor crepitation with ROM of foot/ankle joints bilateral.  No tenderness with palpation of bilateral foot and ankle.   Slight dorsal contracture of the Lt. Hallux.     Skin:     General: Skin is warm and dry.      Capillary Refill: Capillary refill takes 2 to 3 seconds.      Findings: Wound present. No bruising, ecchymosis, erythema, signs of injury, laceration, lesion, petechiae or rash.      Comments: Location: Open wound noted to the Lt. Suib 1st met head.    Base: Down to dermis and comprised of fibrin.    Drainage: None  Malinda wound: Devoid of erythema, edema, fluctuance, purulence, and malodor.    Pre-debridement measurement: 0.1 x 0.1 x 0.1cm  Post-debridement measurement: 0.3 x 0.3 x 0.1cm    Neurological:      Mental Status: She is alert.      Sensory: Sensory deficit present.      Motor: No weakness.      Comments: Decreased protective sensation noted bilateral.  Light touch is absent bilateral.               Assessment:       Encounter Diagnoses   Name Primary?    Diabetic ulcer of other part of left foot associated with type 2 diabetes mellitus, limited to breakdown of skin Yes    Diabetic polyneuropathy associated with type 2 diabetes mellitus          Plan:       Diagnoses and all orders for this visit:    Diabetic ulcer of other part of left foot associated with type 2 diabetes mellitus, limited to breakdown of skin  -     Wound Debridement    Diabetic polyneuropathy associated with type 2 diabetes mellitus    I counseled the patient on her conditions, their implications and medical management.    With the patient's verbal consent, I performed a selective excisional debridement of the Lt. Foot.  See attached procedure note.      The wound base was covered with justine, offloaded  with felt with an aperture, and a football dressing was applied.    Advised to keep the dressing CDI x 1 week.    Advised to rest and elevate the affected extremity.    Instructed to minimize weight bearing to facilitate wound healing.    Advised to ambulate only in the postoperative shoe/boot.    Follow up in about 1 week (around 9/28/2022).    Jacob Bernal DPM

## 2022-09-28 ENCOUNTER — OFFICE VISIT (OUTPATIENT)
Dept: PODIATRY | Facility: CLINIC | Age: 47
End: 2022-09-28
Payer: MEDICAID

## 2022-09-28 VITALS — WEIGHT: 270.06 LBS | HEIGHT: 68 IN | BODY MASS INDEX: 40.93 KG/M2

## 2022-09-28 DIAGNOSIS — L97.521 DIABETIC ULCER OF OTHER PART OF LEFT FOOT ASSOCIATED WITH TYPE 2 DIABETES MELLITUS, LIMITED TO BREAKDOWN OF SKIN: Primary | ICD-10-CM

## 2022-09-28 DIAGNOSIS — E11.621 DIABETIC ULCER OF OTHER PART OF LEFT FOOT ASSOCIATED WITH TYPE 2 DIABETES MELLITUS, LIMITED TO BREAKDOWN OF SKIN: Primary | ICD-10-CM

## 2022-09-28 DIAGNOSIS — E11.42 DIABETIC POLYNEUROPATHY ASSOCIATED WITH TYPE 2 DIABETES MELLITUS: ICD-10-CM

## 2022-09-28 PROCEDURE — 97597 DBRDMT OPN WND 1ST 20 CM/<: CPT | Mod: PBBFAC,PN | Performed by: PODIATRIST

## 2022-09-28 PROCEDURE — 97597 WOUND DEBRIDEMENT: ICD-10-PCS | Mod: S$PBB,,, | Performed by: PODIATRIST

## 2022-09-28 PROCEDURE — 3008F BODY MASS INDEX DOCD: CPT | Mod: CPTII,,, | Performed by: PODIATRIST

## 2022-09-28 PROCEDURE — 99212 OFFICE O/P EST SF 10 MIN: CPT | Mod: PBBFAC,PN | Performed by: PODIATRIST

## 2022-09-28 PROCEDURE — 99999 PR PBB SHADOW E&M-EST. PATIENT-LVL II: CPT | Mod: PBBFAC,,, | Performed by: PODIATRIST

## 2022-09-28 PROCEDURE — 1159F MED LIST DOCD IN RCRD: CPT | Mod: CPTII,,, | Performed by: PODIATRIST

## 2022-09-28 PROCEDURE — 99999 PR PBB SHADOW E&M-EST. PATIENT-LVL II: ICD-10-PCS | Mod: PBBFAC,,, | Performed by: PODIATRIST

## 2022-09-28 PROCEDURE — 1159F PR MEDICATION LIST DOCUMENTED IN MEDICAL RECORD: ICD-10-PCS | Mod: CPTII,,, | Performed by: PODIATRIST

## 2022-09-28 PROCEDURE — 99499 UNLISTED E&M SERVICE: CPT | Mod: S$PBB,,, | Performed by: PODIATRIST

## 2022-09-28 PROCEDURE — 3008F PR BODY MASS INDEX (BMI) DOCUMENTED: ICD-10-PCS | Mod: CPTII,,, | Performed by: PODIATRIST

## 2022-09-28 PROCEDURE — 3046F HEMOGLOBIN A1C LEVEL >9.0%: CPT | Mod: CPTII,,, | Performed by: PODIATRIST

## 2022-09-28 PROCEDURE — 99499 NO LOS: ICD-10-PCS | Mod: S$PBB,,, | Performed by: PODIATRIST

## 2022-09-28 PROCEDURE — 3046F PR MOST RECENT HEMOGLOBIN A1C LEVEL > 9.0%: ICD-10-PCS | Mod: CPTII,,, | Performed by: PODIATRIST

## 2022-09-28 NOTE — PROCEDURES
"Wound Debridement    Date/Time: 9/28/2022 11:00 AM  Performed by: Jacob Bernal DPM  Authorized by: Jacob Bernal DPM     Time out: Immediately prior to procedure a "time out" was called to verify the correct patient, procedure, equipment, support staff and site/side marked as required.    Consent Done?:  Yes (Verbal)    Preparation: Patient was prepped and draped in usual sterile fashion    Local anesthesia used?: No      Wound Details:    Location:  Left foot    Location:  Left 1st Metatarsal Head    Type of Debridement:  Excisional       Length (cm):  0.1       Area (sq cm):  0.01       Width (cm):  0.1       Percent Debrided (%):  100       Depth (cm):  0.1       Total Area Debrided (sq cm):  0.01    Depth of debridement:  Epidermis/Dermis    Tissue debrided:  Dermis    Devitalized tissue debrided:  Fibrin    Instruments:  Blade    Bleeding:  Minimal  Hemostasis Achieved: Yes    Method Used:  Pressure  Patient tolerance:  Patient tolerated the procedure well with no immediate complications  "

## 2022-09-28 NOTE — PROGRESS NOTES
Subjective:      Patient ID: Keerthi Chase is a 47 y.o. female.    Chief Complaint: Wound Care    Keerthi is a 47 y.o. female with a past medical history of Bell's palsy, Coronary artery disease, Diabetes mellitus, GERD (gastroesophageal reflux disease), Hypertension, and Obesity. Patient presents to clinic for a 1 week wound check of the Lt. Foot.  Has kept the prior football dressing clean, dry, and intact for the past week.  Continues to minimize weight bearing to facilitate healing.  Denies noticing pain from the wound with today's exam.  Denies experiencing N/V/F/C/D.  Denies any additional pedal complaints.      Past Medical History:   Diagnosis Date    Bell's palsy     Coronary artery disease     Pt states Dr. Virk said she did not have a blockage after a stress test.    Diabetes mellitus     GERD (gastroesophageal reflux disease)     Hypertension     Obesity        Past Surgical History:   Procedure Laterality Date     SECTION      x 2    CHOLECYSTECTOMY      ESOPHAGOGASTRODUODENOSCOPY      PERIPHERALLY INSERTED CENTRAL CATHETER INSERTION N/A 10/11/2021    Procedure: INSERTION, PICC;  Surgeon: PRIYANK COSTA;  Location: [facility] ENDO;  Service: Cardiology;  Laterality: N/A;  Dr Aiken    PERIPHERALLY INSERTED CENTRAL CATHETER INSERTION N/A 2022    Procedure: INSERTION, PICC;  Surgeon: PICTATI, PRIYANK;  [facility];  Service: Cardiology;  Laterality: N/A;  Dr Dubose    SESAMOIDECTOMY Left 2021    Procedure: SESAMOIDECTOMY;  Surgeon: Jacob Bernal DPM;  Location: [facility] OR;  Service: Podiatry;  Laterality: Left;       Family History   Problem Relation Age of Onset    Hypertension Mother        Social History     Socioeconomic History    Marital status:    Tobacco Use    Smoking status: Never    Smokeless tobacco: Never   Substance and Sexual Activity    Alcohol use: No    Drug use: No       Current Outpatient Medications   Medication Sig Dispense Refill    alprazolam (XANAX) 1 MG tablet  "Take 1 mg by mouth 2 (two) times daily as needed for Anxiety.      ampicillin (PRINCIPEN) 500 MG capsule Take 1 capsule (500 mg total) by mouth every 12 (twelve) hours. 20 capsule 0    BASAGLAR KWIKPEN 100 unit/mL (3 mL) InPn pen 75 Units once daily.   3    BD INSULIN PEN NEEDLE UF MINI 31 gauge x 3/16" Ndle   2    cefTRIAXone (ROCEPHIN) 2 gram injection       hydrocodone-acetaminophen 10-325mg (NORCO)  mg Tab Take 1 tablet by mouth 3 (three) times daily as needed.      hydrocodone-acetaminophen 10-325mg (NORCO)  mg Tab Take 1 tablet by mouth every 8 (eight) hours as needed for Pain. 90 tablet 0    insulin detemir (LEVEMIR) 100 unit/mL injection Inject into the skin every evening.      insulin glargine,hum.rec.anlog (LANTUS SUBQ) Inject into the skin.      OZEMPIC 1 mg/dose (2 mg/1.5 mL) PnIj Inject 60 mg into the skin every 7 days.       sulfamethoxazole-trimethoprim 800-160mg (BACTRIM DS) 800-160 mg Tab Take 1 tablet by mouth 2 (two) times daily. 14 tablet 0    traMADoL (ULTRAM) 50 mg tablet Take 1 tablet (50 mg total) by mouth every 6 (six) hours as needed for Pain. 28 tablet 0    chlorthalidone (HYGROTEN) 25 MG Tab Take 1 tablet (25 mg total) by mouth once daily. 30 tablet 11     No current facility-administered medications for this visit.       Review of patient's allergies indicates:   Allergen Reactions    Codeine Hives and Rash        Hemoglobin A1C   Date Value Ref Range Status   06/03/2022 10.5 (H) 0.0 - 5.6 % Final     Comment:     Reference Interval:  5.0 - 5.6 Normal   5.7 - 6.4 High Risk   > 6.5 Diabetic      Hgb A1c results are standardized based on the (NGSP) National   Glycohemoglobin Standardization Program.      Hemoglobin A1C levels are related to mean serum/plasma glucose   during the preceding 2-3 months.        09/23/2021 11.8 (H) 0.0 - 5.6 % Final     Comment:     Reference Interval:  5.0 - 5.6 Normal   5.7 - 6.4 High Risk   > 6.5 Diabetic      Hgb A1c results are standardized " based on the (NGSP) National   Glycohemoglobin Standardization Program.      Hemoglobin A1C levels are related to mean serum/plasma glucose   during the preceding 2-3 months.        2021 8.8 (H) 4.8 - 5.6 % Final     Comment:              Prediabetes: 5.7 - 6.4           Diabetes: >6.4           Glycemic control for adults with diabetes: <7.0         Past Medical History:   Diagnosis Date    Bell's palsy     Coronary artery disease     Pt states Dr. Virk said she did not have a blockage after a stress test.    Diabetes mellitus     GERD (gastroesophageal reflux disease)     Hypertension     Obesity        Past Surgical History:   Procedure Laterality Date     SECTION      x 2    CHOLECYSTECTOMY      ESOPHAGOGASTRODUODENOSCOPY      PERIPHERALLY INSERTED CENTRAL CATHETER INSERTION N/A 10/11/2021    Procedure: INSERTION, PICC;  Surgeon: PICC, [facility];  Location: [facility] ENDO;  Service: Cardiology;  Laterality: N/A;  Dr Aiken    PERIPHERALLY INSERTED CENTRAL CATHETER INSERTION N/A 2022    Procedure: INSERTION, PICC;  Surgeon: PICC, [facility];  Location: [facility] ENDO;  Service: Cardiology;  Laterality: N/A;  Dr Dubose    SESAMOIDECTOMY Left 2021    Procedure: SESAMOIDECTOMY;  Surgeon: Jacob Bernal DPM;  Location: [facility] OR;  Service: Podiatry;  Laterality: Left;       Family History   Problem Relation Age of Onset    Hypertension Mother        Social History     Socioeconomic History    Marital status:    Tobacco Use    Smoking status: Never    Smokeless tobacco: Never   Substance and Sexual Activity    Alcohol use: No    Drug use: No       Current Outpatient Medications   Medication Sig Dispense Refill    alprazolam (XANAX) 1 MG tablet Take 1 mg by mouth 2 (two) times daily as needed for Anxiety.      ampicillin (PRINCIPEN) 500 MG capsule Take 1 capsule (500 mg total) by mouth every 12 (twelve) hours. 20 capsule 0    BASAGLAR KWIKPEN 100 unit/mL (3 mL) InPn pen 75 Units once daily.   3    BD INSULIN  "PEN NEEDLE UF MINI 31 gauge x 3/16" Ndle   2    cefTRIAXone (ROCEPHIN) 2 gram injection       hydrocodone-acetaminophen 10-325mg (NORCO)  mg Tab Take 1 tablet by mouth 3 (three) times daily as needed.      hydrocodone-acetaminophen 10-325mg (NORCO)  mg Tab Take 1 tablet by mouth every 8 (eight) hours as needed for Pain. 90 tablet 0    insulin detemir (LEVEMIR) 100 unit/mL injection Inject into the skin every evening.      insulin glargine,hum.rec.anlog (LANTUS SUBQ) Inject into the skin.      OZEMPIC 1 mg/dose (2 mg/1.5 mL) PnIj Inject 60 mg into the skin every 7 days.       sulfamethoxazole-trimethoprim 800-160mg (BACTRIM DS) 800-160 mg Tab Take 1 tablet by mouth 2 (two) times daily. 14 tablet 0    traMADoL (ULTRAM) 50 mg tablet Take 1 tablet (50 mg total) by mouth every 6 (six) hours as needed for Pain. 28 tablet 0    chlorthalidone (HYGROTEN) 25 MG Tab Take 1 tablet (25 mg total) by mouth once daily. 30 tablet 11     No current facility-administered medications for this visit.       Review of patient's allergies indicates:   Allergen Reactions    Codeine Hives and Rash         Review of Systems   Constitutional: Negative for chills and fever.   Skin:  Negative for color change and nail changes.   Gastrointestinal:  Negative for nausea and vomiting.   Neurological:  Positive for numbness.   Psychiatric/Behavioral:  Negative for altered mental status.          Objective:      Physical Exam  Constitutional:       Appearance: Normal appearance. She is not ill-appearing.   Cardiovascular:      Pulses:           Dorsalis pedis pulses are 2+ on the right side and 2+ on the left side.        Posterior tibial pulses are 2+ on the right side and 2+ on the left side.      Comments: CFT is < 3 seconds bilateral.  Pedal hair growth is decreased bilateral.  No lower extremity edema noted bilateral.  Toes are warm to touch bilateral.    Musculoskeletal:         General: No signs of injury.      Right lower leg: No " edema.      Left lower leg: No edema.      Comments: Muscle strength 5/5 in all muscle groups bilateral.  No tenderness nor crepitation with ROM of foot/ankle joints bilateral.  No tenderness with palpation of bilateral foot and ankle.   Slight dorsal contracture of the Lt. Hallux.     Skin:     General: Skin is warm and dry.      Capillary Refill: Capillary refill takes 2 to 3 seconds.      Findings: Wound present. No bruising, ecchymosis, erythema, signs of injury, laceration, lesion, petechiae or rash.      Comments: Location: Open wound noted to the Lt. Sub 1st met head.    Base: Down to dermis and comprised of fibrin.    Drainage: None  Malinda wound: Devoid of erythema, edema, fluctuance, purulence, and malodor.    Pre-debridement measurement: 0.1 x 0.1 x 0.1cm  Post-debridement measurement: 0.3 x 0.3 x 0.1cm    Neurological:      Mental Status: She is alert.      Sensory: Sensory deficit present.      Motor: No weakness.      Comments: Decreased protective sensation noted bilateral.  Light touch is absent bilateral.               Assessment:       Encounter Diagnoses   Name Primary?    Diabetic ulcer of other part of left foot associated with type 2 diabetes mellitus, limited to breakdown of skin Yes    Diabetic polyneuropathy associated with type 2 diabetes mellitus          Plan:       Keerthi was seen today for wound care.    Diagnoses and all orders for this visit:    Diabetic ulcer of other part of left foot associated with type 2 diabetes mellitus, limited to breakdown of skin    Diabetic polyneuropathy associated with type 2 diabetes mellitus    I counseled the patient on her conditions, their implications and medical management.    With the patient's verbal consent, I performed a selective excisional debridement of the Lt. Foot.  See attached procedure note.      The wound base was covered with silver alginate, offloaded with felt with an aperture, and a football dressing was applied.    Advised to keep  the dressing CDI x 1 week.    Advised to rest and elevate the affected extremity.    Instructed to minimize weight bearing to facilitate wound healing.    Advised to ambulate only in the postoperative shoe/boot.    Follow up in about 1 week (around 10/5/2022).    Jacob Bernal DPM

## 2022-10-07 ENCOUNTER — OFFICE VISIT (OUTPATIENT)
Dept: PODIATRY | Facility: CLINIC | Age: 47
End: 2022-10-07
Payer: MEDICAID

## 2022-10-07 VITALS — WEIGHT: 270.06 LBS | BODY MASS INDEX: 40.93 KG/M2 | HEIGHT: 68 IN

## 2022-10-07 DIAGNOSIS — E11.621 DIABETIC ULCER OF OTHER PART OF LEFT FOOT ASSOCIATED WITH TYPE 2 DIABETES MELLITUS, LIMITED TO BREAKDOWN OF SKIN: Primary | ICD-10-CM

## 2022-10-07 DIAGNOSIS — E11.42 DIABETIC POLYNEUROPATHY ASSOCIATED WITH TYPE 2 DIABETES MELLITUS: ICD-10-CM

## 2022-10-07 DIAGNOSIS — L97.521 DIABETIC ULCER OF OTHER PART OF LEFT FOOT ASSOCIATED WITH TYPE 2 DIABETES MELLITUS, LIMITED TO BREAKDOWN OF SKIN: Primary | ICD-10-CM

## 2022-10-07 PROCEDURE — 99499 UNLISTED E&M SERVICE: CPT | Mod: S$PBB,,, | Performed by: PODIATRIST

## 2022-10-07 PROCEDURE — 3046F PR MOST RECENT HEMOGLOBIN A1C LEVEL > 9.0%: ICD-10-PCS | Mod: CPTII,,, | Performed by: PODIATRIST

## 2022-10-07 PROCEDURE — 99212 OFFICE O/P EST SF 10 MIN: CPT | Mod: PBBFAC,PN,25 | Performed by: PODIATRIST

## 2022-10-07 PROCEDURE — 87075 CULTR BACTERIA EXCEPT BLOOD: CPT | Performed by: PODIATRIST

## 2022-10-07 PROCEDURE — 87077 CULTURE AEROBIC IDENTIFY: CPT | Mod: 59 | Performed by: PODIATRIST

## 2022-10-07 PROCEDURE — 97597 WOUND DEBRIDEMENT: ICD-10-PCS | Mod: S$PBB,,, | Performed by: PODIATRIST

## 2022-10-07 PROCEDURE — 97597 DBRDMT OPN WND 1ST 20 CM/<: CPT | Mod: PBBFAC,PN | Performed by: PODIATRIST

## 2022-10-07 PROCEDURE — 99499 NO LOS: ICD-10-PCS | Mod: S$PBB,,, | Performed by: PODIATRIST

## 2022-10-07 PROCEDURE — 87070 CULTURE OTHR SPECIMN AEROBIC: CPT | Performed by: PODIATRIST

## 2022-10-07 PROCEDURE — 3008F PR BODY MASS INDEX (BMI) DOCUMENTED: ICD-10-PCS | Mod: CPTII,,, | Performed by: PODIATRIST

## 2022-10-07 PROCEDURE — 3046F HEMOGLOBIN A1C LEVEL >9.0%: CPT | Mod: CPTII,,, | Performed by: PODIATRIST

## 2022-10-07 PROCEDURE — 1159F MED LIST DOCD IN RCRD: CPT | Mod: CPTII,,, | Performed by: PODIATRIST

## 2022-10-07 PROCEDURE — 1159F PR MEDICATION LIST DOCUMENTED IN MEDICAL RECORD: ICD-10-PCS | Mod: CPTII,,, | Performed by: PODIATRIST

## 2022-10-07 PROCEDURE — 87186 SC STD MICRODIL/AGAR DIL: CPT | Mod: 59 | Performed by: PODIATRIST

## 2022-10-07 PROCEDURE — 3008F BODY MASS INDEX DOCD: CPT | Mod: CPTII,,, | Performed by: PODIATRIST

## 2022-10-07 PROCEDURE — 99999 PR PBB SHADOW E&M-EST. PATIENT-LVL II: ICD-10-PCS | Mod: PBBFAC,,, | Performed by: PODIATRIST

## 2022-10-07 PROCEDURE — 99999 PR PBB SHADOW E&M-EST. PATIENT-LVL II: CPT | Mod: PBBFAC,,, | Performed by: PODIATRIST

## 2022-10-07 NOTE — PROGRESS NOTES
Subjective:      Patient ID: Keerthi Chase is a 47 y.o. female.    Chief Complaint: Wound Care    Keerthi is a 47 y.o. female with a past medical history of Bell's palsy, Coronary artery disease, Diabetes mellitus, GERD (gastroesophageal reflux disease), Hypertension, and Obesity. Patient presents to clinic for a 1 week wound check of the Lt. Foot.  Has kept the prior football dressing clean, dry, and intact for the past week.  Continues to minimize weight bearing to facilitate healing.  Denies noticing pain from the wound with today's exam.  Denies experiencing N/V/F/C/D.  Denies any additional pedal complaints.      Past Medical History:   Diagnosis Date    Bell's palsy     Coronary artery disease     Pt states Dr. Virk said she did not have a blockage after a stress test.    Diabetes mellitus     GERD (gastroesophageal reflux disease)     Hypertension     Obesity        Past Surgical History:   Procedure Laterality Date     SECTION      x 2    CHOLECYSTECTOMY      ESOPHAGOGASTRODUODENOSCOPY      PERIPHERALLY INSERTED CENTRAL CATHETER INSERTION N/A 10/11/2021    Procedure: INSERTION, PICC;  Surgeon: PRIYANK COSTA;  Location: [facility] ENDO;  Service: Cardiology;  Laterality: N/A;  Dr Aiken    PERIPHERALLY INSERTED CENTRAL CATHETER INSERTION N/A 2022    Procedure: INSERTION, PICC;  Surgeon: PICTATI, PRIYANK;  [facility] END[facility];  Service: Cardiology;  Laterality: N/A;  Dr Dubose    SESAMOIDECTOMY Left 2021    Procedure: SESAMOIDECTOMY;  Surgeon: Jacob Bernal DPM;  Location: [facility] OR;  Service: Podiatry;  Laterality: Left;       Family History   Problem Relation Age of Onset    Hypertension Mother        Social History     Socioeconomic History    Marital status:    Tobacco Use    Smoking status: Never    Smokeless tobacco: Never   Substance and Sexual Activity    Alcohol use: No    Drug use: No       Current Outpatient Medications   Medication Sig Dispense Refill    alprazolam (XANAX) 1 MG tablet  "Take 1 mg by mouth 2 (two) times daily as needed for Anxiety.      ampicillin (PRINCIPEN) 500 MG capsule Take 1 capsule (500 mg total) by mouth every 12 (twelve) hours. 20 capsule 0    BASAGLAR KWIKPEN 100 unit/mL (3 mL) InPn pen 75 Units once daily.   3    BD INSULIN PEN NEEDLE UF MINI 31 gauge x 3/16" Ndle   2    chlorthalidone (HYGROTEN) 25 MG Tab Take 1 tablet (25 mg total) by mouth once daily. 30 tablet 11    hydrocodone-acetaminophen 10-325mg (NORCO)  mg Tab Take 1 tablet by mouth 3 (three) times daily as needed.      hydrocodone-acetaminophen 10-325mg (NORCO)  mg Tab Take 1 tablet by mouth every 8 (eight) hours as needed for Pain. 90 tablet 0    insulin detemir (LEVEMIR) 100 unit/mL injection Inject into the skin every evening.      insulin glargine,hum.rec.anlog (LANTUS SUBQ) Inject into the skin.      OZEMPIC 1 mg/dose (2 mg/1.5 mL) PnIj Inject 60 mg into the skin every 7 days.       sulfamethoxazole-trimethoprim 800-160mg (BACTRIM DS) 800-160 mg Tab Take 1 tablet by mouth 2 (two) times daily. 14 tablet 0    traMADoL (ULTRAM) 50 mg tablet Take 1 tablet (50 mg total) by mouth every 6 (six) hours as needed for Pain. 28 tablet 0     No current facility-administered medications for this visit.       Review of patient's allergies indicates:   Allergen Reactions    Codeine Hives and Rash        Hemoglobin A1C   Date Value Ref Range Status   06/03/2022 10.5 (H) 0.0 - 5.6 % Final     Comment:     Reference Interval:  5.0 - 5.6 Normal   5.7 - 6.4 High Risk   > 6.5 Diabetic      Hgb A1c results are standardized based on the (NGSP) National   Glycohemoglobin Standardization Program.      Hemoglobin A1C levels are related to mean serum/plasma glucose   during the preceding 2-3 months.        09/23/2021 11.8 (H) 0.0 - 5.6 % Final     Comment:     Reference Interval:  5.0 - 5.6 Normal   5.7 - 6.4 High Risk   > 6.5 Diabetic      Hgb A1c results are standardized based on the (NGSP) National   Glycohemoglobin " "Standardization Program.      Hemoglobin A1C levels are related to mean serum/plasma glucose   during the preceding 2-3 months.        2021 8.8 (H) 4.8 - 5.6 % Final     Comment:              Prediabetes: 5.7 - 6.4           Diabetes: >6.4           Glycemic control for adults with diabetes: <7.0         Past Medical History:   Diagnosis Date    Bell's palsy     Coronary artery disease     Pt states DrDaquan Virk said she did not have a blockage after a stress test.    Diabetes mellitus     GERD (gastroesophageal reflux disease)     Hypertension     Obesity        Past Surgical History:   Procedure Laterality Date     SECTION      x 2    CHOLECYSTECTOMY      ESOPHAGOGASTRODUODENOSCOPY      PERIPHERALLY INSERTED CENTRAL CATHETER INSERTION N/A 10/11/2021    Procedure: INSERTION, PICC;  Surgeon: PICC, [facility];  Location: [facility] ENDO;  Service: Cardiology;  Laterality: N/A;  Dr Aiken    PERIPHERALLY INSERTED CENTRAL CATHETER INSERTION N/A 2022    Procedure: INSERTION, PICC;  Surgeon: PICC, [facility];  Location: [facility] ENDO;  Service: Cardiology;  Laterality: N/A;  Dr Dubose    SESAMOIDECTOMY Left 2021    Procedure: SESAMOIDECTOMY;  Surgeon: Jacob Bernal DPM;  Location: [facility] OR;  Service: Podiatry;  Laterality: Left;       Family History   Problem Relation Age of Onset    Hypertension Mother        Social History     Socioeconomic History    Marital status:    Tobacco Use    Smoking status: Never    Smokeless tobacco: Never   Substance and Sexual Activity    Alcohol use: No    Drug use: No       Current Outpatient Medications   Medication Sig Dispense Refill    alprazolam (XANAX) 1 MG tablet Take 1 mg by mouth 2 (two) times daily as needed for Anxiety.      ampicillin (PRINCIPEN) 500 MG capsule Take 1 capsule (500 mg total) by mouth every 12 (twelve) hours. 20 capsule 0    BASAGLAR KWIKPEN 100 unit/mL (3 mL) InPn pen 75 Units once daily.   3    BD INSULIN PEN NEEDLE UF MINI 31 gauge x 3/16" Ndle   2    " chlorthalidone (HYGROTEN) 25 MG Tab Take 1 tablet (25 mg total) by mouth once daily. 30 tablet 11    hydrocodone-acetaminophen 10-325mg (NORCO)  mg Tab Take 1 tablet by mouth 3 (three) times daily as needed.      hydrocodone-acetaminophen 10-325mg (NORCO)  mg Tab Take 1 tablet by mouth every 8 (eight) hours as needed for Pain. 90 tablet 0    insulin detemir (LEVEMIR) 100 unit/mL injection Inject into the skin every evening.      insulin glargine,hum.rec.anlog (LANTUS SUBQ) Inject into the skin.      OZEMPIC 1 mg/dose (2 mg/1.5 mL) PnIj Inject 60 mg into the skin every 7 days.       sulfamethoxazole-trimethoprim 800-160mg (BACTRIM DS) 800-160 mg Tab Take 1 tablet by mouth 2 (two) times daily. 14 tablet 0    traMADoL (ULTRAM) 50 mg tablet Take 1 tablet (50 mg total) by mouth every 6 (six) hours as needed for Pain. 28 tablet 0     No current facility-administered medications for this visit.       Review of patient's allergies indicates:   Allergen Reactions    Codeine Hives and Rash         Review of Systems   Constitutional: Negative for chills and fever.   Skin:  Negative for color change and nail changes.   Gastrointestinal:  Negative for nausea and vomiting.   Neurological:  Positive for numbness.   Psychiatric/Behavioral:  Negative for altered mental status.          Objective:      Physical Exam  Constitutional:       Appearance: Normal appearance. She is not ill-appearing.   Cardiovascular:      Pulses:           Dorsalis pedis pulses are 2+ on the right side and 2+ on the left side.        Posterior tibial pulses are 2+ on the right side and 2+ on the left side.      Comments: CFT is < 3 seconds bilateral.  Pedal hair growth is decreased bilateral.  No lower extremity edema noted bilateral.  Toes are warm to touch bilateral.    Musculoskeletal:         General: No signs of injury.      Right lower leg: No edema.      Left lower leg: No edema.      Comments: Muscle strength 5/5 in all muscle groups  bilateral.  No tenderness nor crepitation with ROM of foot/ankle joints bilateral.  No tenderness with palpation of bilateral foot and ankle.   Slight dorsal contracture of the Lt. Hallux.     Skin:     General: Skin is warm and dry.      Capillary Refill: Capillary refill takes 2 to 3 seconds.      Findings: Wound present. No bruising, ecchymosis, erythema, signs of injury, laceration, lesion, petechiae or rash.      Comments: Location: Open wound noted to the Lt. Sub 1st met head.    Base: Down to dermis and comprised of fibrin.    Drainage: None  Malinda wound: Devoid of erythema, edema, fluctuance, purulence, and malodor.    Pre-debridement measurement: 0.4 x 0.4 x 0.2cm  Post-debridement measurement: 0.6 x 0.6 x 0.2cm    Neurological:      Mental Status: She is alert.      Sensory: Sensory deficit present.      Motor: No weakness.      Comments: Decreased protective sensation noted bilateral.  Light touch is absent bilateral.               Assessment:       Encounter Diagnoses   Name Primary?    Diabetic ulcer of other part of left foot associated with type 2 diabetes mellitus, limited to breakdown of skin Yes    Diabetic polyneuropathy associated with type 2 diabetes mellitus            Plan:       Keerthi was seen today for wound care.    Diagnoses and all orders for this visit:    Diabetic ulcer of other part of left foot associated with type 2 diabetes mellitus, limited to breakdown of skin  -     Aerobic culture  -     Culture, Anaerobic    Diabetic polyneuropathy associated with type 2 diabetes mellitus      I counseled the patient on her conditions, their implications and medical management.    Performed a selective excisional debridement of the Lt. Foot.  See attached procedure note.      Wound cultures were obtained on account of increased wound dimensions.      The wound base was covered with iodosorb ointment, offloaded with felt with an aperture, and a football dressing was applied.    Advised to keep  the dressing CDI x 1 week.    Advised to rest and elevate the affected extremity.    Instructed to minimize weight bearing to facilitate wound healing.    Advised to ambulate only in the postoperative shoe/boot.    RTC in 1 week for follow up.    Jacob Bernal DPM

## 2022-10-07 NOTE — PROCEDURES
"Wound Debridement    Date/Time: 10/7/2022 11:40 AM  Performed by: Jacob Bernal DPM  Authorized by: Jacob Bernal DPM     Time out: Immediately prior to procedure a "time out" was called to verify the correct patient, procedure, equipment, support staff and site/side marked as required.    Consent Done?:  Yes (Verbal)    Preparation: Patient was prepped and draped in usual sterile fashion    Local anesthesia used?: No      Wound Details:    Location:  Left foot    Location:  Left 1st Metatarsal Head    Type of Debridement:  Excisional       Length (cm):  0.4       Area (sq cm):  0.16       Width (cm):  0.4       Percent Debrided (%):  100       Depth (cm):  0.2       Total Area Debrided (sq cm):  0.16    Depth of debridement:  Epidermis/Dermis    Tissue debrided:  Dermis    Devitalized tissue debrided:  Fibrin    Instruments:  Blade    Bleeding:  Minimal  Hemostasis Achieved: Yes    Method Used:  Pressure  Patient tolerance:  Patient tolerated the procedure well with no immediate complications  "

## 2022-10-10 DIAGNOSIS — E11.628 DIABETIC INFECTION OF LEFT FOOT: Primary | ICD-10-CM

## 2022-10-10 DIAGNOSIS — L08.9 DIABETIC INFECTION OF LEFT FOOT: Primary | ICD-10-CM

## 2022-10-10 LAB
BACTERIA SPEC AEROBE CULT: ABNORMAL
BACTERIA SPEC AEROBE CULT: ABNORMAL

## 2022-10-10 RX ORDER — SULFAMETHOXAZOLE AND TRIMETHOPRIM 800; 160 MG/1; MG/1
1 TABLET ORAL 2 TIMES DAILY
Qty: 14 TABLET | Refills: 0 | Status: SHIPPED | OUTPATIENT
Start: 2022-10-10 | End: 2023-07-18

## 2022-10-12 LAB — BACTERIA SPEC ANAEROBE CULT: NORMAL

## 2022-10-13 ENCOUNTER — OFFICE VISIT (OUTPATIENT)
Dept: PODIATRY | Facility: CLINIC | Age: 47
End: 2022-10-13
Payer: MEDICAID

## 2022-10-13 VITALS — WEIGHT: 270.06 LBS | BODY MASS INDEX: 40.93 KG/M2 | HEIGHT: 68 IN

## 2022-10-13 DIAGNOSIS — L97.521 DIABETIC ULCER OF OTHER PART OF LEFT FOOT ASSOCIATED WITH TYPE 2 DIABETES MELLITUS, LIMITED TO BREAKDOWN OF SKIN: Primary | ICD-10-CM

## 2022-10-13 DIAGNOSIS — E11.42 DIABETIC POLYNEUROPATHY ASSOCIATED WITH TYPE 2 DIABETES MELLITUS: ICD-10-CM

## 2022-10-13 DIAGNOSIS — E11.621 DIABETIC ULCER OF OTHER PART OF LEFT FOOT ASSOCIATED WITH TYPE 2 DIABETES MELLITUS, LIMITED TO BREAKDOWN OF SKIN: Primary | ICD-10-CM

## 2022-10-13 PROCEDURE — 3046F PR MOST RECENT HEMOGLOBIN A1C LEVEL > 9.0%: ICD-10-PCS | Mod: CPTII,,, | Performed by: PODIATRIST

## 2022-10-13 PROCEDURE — 99499 NO LOS: ICD-10-PCS | Mod: S$PBB,,, | Performed by: PODIATRIST

## 2022-10-13 PROCEDURE — 99999 PR PBB SHADOW E&M-EST. PATIENT-LVL III: CPT | Mod: PBBFAC,,, | Performed by: PODIATRIST

## 2022-10-13 PROCEDURE — 3046F HEMOGLOBIN A1C LEVEL >9.0%: CPT | Mod: CPTII,,, | Performed by: PODIATRIST

## 2022-10-13 PROCEDURE — 99213 OFFICE O/P EST LOW 20 MIN: CPT | Mod: PBBFAC,PN | Performed by: PODIATRIST

## 2022-10-13 PROCEDURE — 1159F MED LIST DOCD IN RCRD: CPT | Mod: CPTII,,, | Performed by: PODIATRIST

## 2022-10-13 PROCEDURE — 99499 UNLISTED E&M SERVICE: CPT | Mod: S$PBB,,, | Performed by: PODIATRIST

## 2022-10-13 PROCEDURE — 97597 DBRDMT OPN WND 1ST 20 CM/<: CPT | Mod: PBBFAC,PN | Performed by: PODIATRIST

## 2022-10-13 PROCEDURE — 97597 WOUND DEBRIDEMENT: ICD-10-PCS | Mod: S$PBB,,, | Performed by: PODIATRIST

## 2022-10-13 PROCEDURE — 1159F PR MEDICATION LIST DOCUMENTED IN MEDICAL RECORD: ICD-10-PCS | Mod: CPTII,,, | Performed by: PODIATRIST

## 2022-10-13 PROCEDURE — 99999 PR PBB SHADOW E&M-EST. PATIENT-LVL III: ICD-10-PCS | Mod: PBBFAC,,, | Performed by: PODIATRIST

## 2022-10-13 NOTE — PROGRESS NOTES
Subjective:      Patient ID: Keerthi Chase is a 47 y.o. female.    Chief Complaint: Wound Check    Patient presents to clinic for a 1 week wound check of the Lt. Foot.  Denies pedal pain with today's exam.  Has kept the prior football dressing clean, dry, and intact for the past week.  Continues to minimize weight bearing to facilitate healing.  Denies experiencing N/V/F/C/D.  Denies any additional pedal complaints.      Past Medical History:   Diagnosis Date    Bell's palsy     Coronary artery disease     Pt states Dr. Virk said she did not have a blockage after a stress test.    Diabetes mellitus     GERD (gastroesophageal reflux disease)     Hypertension     Obesity        Past Surgical History:   Procedure Laterality Date     SECTION      x 2    CHOLECYSTECTOMY      ESOPHAGOGASTRODUODENOSCOPY      PERIPHERALLY INSERTED CENTRAL CATHETER INSERTION N/A 10/11/2021    Procedure: INSERTION, PICC;  Surgeon: PICC, [facility];  Location: [facility] ENDO;  Service: Cardiology;  Laterality: N/A;  Dr Aiken    PERIPHERALLY INSERTED CENTRAL CATHETER INSERTION N/A 2022    Procedure: INSERTION, PICC;  Surgeon: PICC, [facility];  Location: [facility] ENDO;  Service: Cardiology;  Laterality: N/A;  Dr Dubose    SESAMOIDECTOMY Left 2021    Procedure: SESAMOIDECTOMY;  Surgeon: Jacob Bernal DPM;  Location: [facility] OR;  Service: Podiatry;  Laterality: Left;       Family History   Problem Relation Age of Onset    Hypertension Mother        Social History     Socioeconomic History    Marital status:    Tobacco Use    Smoking status: Never    Smokeless tobacco: Never   Substance and Sexual Activity    Alcohol use: No    Drug use: No       Current Outpatient Medications   Medication Sig Dispense Refill    alprazolam (XANAX) 1 MG tablet Take 1 mg by mouth 2 (two) times daily as needed for Anxiety.      ampicillin (PRINCIPEN) 500 MG capsule Take 1 capsule (500 mg total) by mouth every 12 (twelve) hours. 20 capsule 0    BASAGLAR  "KWIKPEN 100 unit/mL (3 mL) InPn pen 75 Units once daily.   3    BD INSULIN PEN NEEDLE UF MINI 31 gauge x 3/16" Ndle   2    hydrocodone-acetaminophen 10-325mg (NORCO)  mg Tab Take 1 tablet by mouth 3 (three) times daily as needed.      hydrocodone-acetaminophen 10-325mg (NORCO)  mg Tab Take 1 tablet by mouth every 8 (eight) hours as needed for Pain. 90 tablet 0    insulin detemir (LEVEMIR) 100 unit/mL injection Inject into the skin every evening.      insulin glargine,hum.rec.anlog (LANTUS SUBQ) Inject into the skin.      OZEMPIC 1 mg/dose (2 mg/1.5 mL) PnIj Inject 60 mg into the skin every 7 days.       sulfamethoxazole-trimethoprim 800-160mg (BACTRIM DS) 800-160 mg Tab Take 1 tablet by mouth 2 (two) times daily. 14 tablet 0    sulfamethoxazole-trimethoprim 800-160mg (BACTRIM DS) 800-160 mg Tab Take 1 tablet by mouth 2 (two) times daily. 14 tablet 0    traMADoL (ULTRAM) 50 mg tablet Take 1 tablet (50 mg total) by mouth every 6 (six) hours as needed for Pain. 28 tablet 0    chlorthalidone (HYGROTEN) 25 MG Tab Take 1 tablet (25 mg total) by mouth once daily. 30 tablet 11     No current facility-administered medications for this visit.       Review of patient's allergies indicates:   Allergen Reactions    Codeine Hives and Rash        Hemoglobin A1C   Date Value Ref Range Status   06/03/2022 10.5 (H) 0.0 - 5.6 % Final     Comment:     Reference Interval:  5.0 - 5.6 Normal   5.7 - 6.4 High Risk   > 6.5 Diabetic      Hgb A1c results are standardized based on the (NGSP) National   Glycohemoglobin Standardization Program.      Hemoglobin A1C levels are related to mean serum/plasma glucose   during the preceding 2-3 months.        09/23/2021 11.8 (H) 0.0 - 5.6 % Final     Comment:     Reference Interval:  5.0 - 5.6 Normal   5.7 - 6.4 High Risk   > 6.5 Diabetic      Hgb A1c results are standardized based on the (NGSP) National   Glycohemoglobin Standardization Program.      Hemoglobin A1C levels are related to " "mean serum/plasma glucose   during the preceding 2-3 months.        2021 8.8 (H) 4.8 - 5.6 % Final     Comment:              Prediabetes: 5.7 - 6.4           Diabetes: >6.4           Glycemic control for adults with diabetes: <7.0         Past Medical History:   Diagnosis Date    Bell's palsy     Coronary artery disease     Pt states Dr. Virk said she did not have a blockage after a stress test.    Diabetes mellitus     GERD (gastroesophageal reflux disease)     Hypertension     Obesity        Past Surgical History:   Procedure Laterality Date     SECTION      x 2    CHOLECYSTECTOMY      ESOPHAGOGASTRODUODENOSCOPY      PERIPHERALLY INSERTED CENTRAL CATHETER INSERTION N/A 10/11/2021    Procedure: INSERTION, PICC;  Surgeon: PICC, [facility];  Location: [facility] ENDO;  Service: Cardiology;  Laterality: N/A;  Dr Aiken    PERIPHERALLY INSERTED CENTRAL CATHETER INSERTION N/A 2022    Procedure: INSERTION, PICC;  Surgeon: PICC, [facility];  Location: [facility] ENDO;  Service: Cardiology;  Laterality: N/A;  Dr Dubose    SESAMOIDECTOMY Left 2021    Procedure: SESAMOIDECTOMY;  Surgeon: Jacob Bernal DPM;  Location: [facility] OR;  Service: Podiatry;  Laterality: Left;       Family History   Problem Relation Age of Onset    Hypertension Mother        Social History     Socioeconomic History    Marital status:    Tobacco Use    Smoking status: Never    Smokeless tobacco: Never   Substance and Sexual Activity    Alcohol use: No    Drug use: No       Current Outpatient Medications   Medication Sig Dispense Refill    alprazolam (XANAX) 1 MG tablet Take 1 mg by mouth 2 (two) times daily as needed for Anxiety.      ampicillin (PRINCIPEN) 500 MG capsule Take 1 capsule (500 mg total) by mouth every 12 (twelve) hours. 20 capsule 0    BASAGLAR KWIKPEN 100 unit/mL (3 mL) InPn pen 75 Units once daily.   3    BD INSULIN PEN NEEDLE UF MINI 31 gauge x 3/16" Ndle   2    hydrocodone-acetaminophen 10-325mg (NORCO)  mg Tab Take 1 " tablet by mouth 3 (three) times daily as needed.      hydrocodone-acetaminophen 10-325mg (NORCO)  mg Tab Take 1 tablet by mouth every 8 (eight) hours as needed for Pain. 90 tablet 0    insulin detemir (LEVEMIR) 100 unit/mL injection Inject into the skin every evening.      insulin glargine,hum.rec.anlog (LANTUS SUBQ) Inject into the skin.      OZEMPIC 1 mg/dose (2 mg/1.5 mL) PnIj Inject 60 mg into the skin every 7 days.       sulfamethoxazole-trimethoprim 800-160mg (BACTRIM DS) 800-160 mg Tab Take 1 tablet by mouth 2 (two) times daily. 14 tablet 0    sulfamethoxazole-trimethoprim 800-160mg (BACTRIM DS) 800-160 mg Tab Take 1 tablet by mouth 2 (two) times daily. 14 tablet 0    traMADoL (ULTRAM) 50 mg tablet Take 1 tablet (50 mg total) by mouth every 6 (six) hours as needed for Pain. 28 tablet 0    chlorthalidone (HYGROTEN) 25 MG Tab Take 1 tablet (25 mg total) by mouth once daily. 30 tablet 11     No current facility-administered medications for this visit.       Review of patient's allergies indicates:   Allergen Reactions    Codeine Hives and Rash         Review of Systems   Constitutional: Negative for chills and fever.   Skin:  Negative for color change and nail changes.   Gastrointestinal:  Negative for nausea and vomiting.   Neurological:  Positive for numbness.   Psychiatric/Behavioral:  Negative for altered mental status.          Objective:      Physical Exam  Constitutional:       Appearance: Normal appearance. She is not ill-appearing.   Cardiovascular:      Pulses:           Dorsalis pedis pulses are 2+ on the right side and 2+ on the left side.        Posterior tibial pulses are 2+ on the right side and 2+ on the left side.      Comments: CFT is < 3 seconds bilateral.  Pedal hair growth is decreased bilateral.  No lower extremity edema noted bilateral.  Toes are warm to touch bilateral.    Musculoskeletal:         General: No signs of injury.      Right lower leg: No edema.      Left lower leg: No  edema.      Comments: Muscle strength 5/5 in all muscle groups bilateral.  No tenderness nor crepitation with ROM of foot/ankle joints bilateral.  No tenderness with palpation of bilateral foot and ankle.   Slight dorsal contracture of the Lt. Hallux.     Skin:     General: Skin is warm and dry.      Capillary Refill: Capillary refill takes 2 to 3 seconds.      Findings: Wound present. No bruising, ecchymosis, erythema, signs of injury, laceration, lesion, petechiae or rash.      Comments: Location: Open wound noted to the Lt. Sub 1st met head.    Base: Down to dermis and comprised of fibrin.    Drainage: None  Malinda wound: Devoid of erythema, edema, fluctuance, purulence, and malodor.    Pre-debridement measurement: 0.1 x 0.1 x 0.2cm  Post-debridement measurement: 0.3 x 0.3 x 0.2cm    Neurological:      Mental Status: She is alert.      Sensory: Sensory deficit present.      Motor: No weakness.      Comments: Decreased protective sensation noted bilateral.  Light touch is absent bilateral.               Assessment:       Encounter Diagnoses   Name Primary?    Diabetic ulcer of other part of left foot associated with type 2 diabetes mellitus, limited to breakdown of skin Yes    Diabetic polyneuropathy associated with type 2 diabetes mellitus              Plan:       Keerthi was seen today for wound check.    Diagnoses and all orders for this visit:    Diabetic ulcer of other part of left foot associated with type 2 diabetes mellitus, limited to breakdown of skin  -     Wound Debridement    Diabetic polyneuropathy associated with type 2 diabetes mellitus        I counseled the patient on her conditions, their implications and medical management.    Performed a selective excisional debridement of the Lt. Foot.  See attached procedure note.      The wound base was covered with iodosorb ointment, offloaded with felt with an aperture, and a football dressing was applied.    Advised to keep the dressing CDI x 1  week.    Advised to rest and elevate the affected extremity.    Instructed to minimize weight bearing to facilitate wound healing.    Advised to ambulate only in the postoperative shoe/boot.    RTC in 1 week for follow up.    Jacob Bernal DPM

## 2022-10-13 NOTE — PROCEDURES
"Wound Debridement    Date/Time: 10/13/2022 10:20 AM  Performed by: Jacob Bernal DPM  Authorized by: Jacob Bernal DPM     Time out: Immediately prior to procedure a "time out" was called to verify the correct patient, procedure, equipment, support staff and site/side marked as required.    Consent Done?:  Yes (Verbal)    Preparation: Patient was prepped and draped in usual sterile fashion    Local anesthesia used?: No      Wound Details:    Location:  Left foot    Location:  Left 1st Metatarsal Head    Type of Debridement:  Excisional       Length (cm):  0.1       Area (sq cm):  0.01       Width (cm):  0.1       Percent Debrided (%):  100       Depth (cm):  0.1       Total Area Debrided (sq cm):  0.01    Depth of debridement:  Epidermis/Dermis    Tissue debrided:  Dermis    Devitalized tissue debrided:  Fibrin    Instruments:  Blade    Bleeding:  Minimal  Hemostasis Achieved: Yes    Method Used:  Pressure  Patient tolerance:  Patient tolerated the procedure well with no immediate complications  "

## 2022-10-21 ENCOUNTER — OFFICE VISIT (OUTPATIENT)
Dept: PODIATRY | Facility: CLINIC | Age: 47
End: 2022-10-21
Payer: MEDICAID

## 2022-10-21 DIAGNOSIS — E11.42 DIABETIC POLYNEUROPATHY ASSOCIATED WITH TYPE 2 DIABETES MELLITUS: ICD-10-CM

## 2022-10-21 DIAGNOSIS — E11.621 DIABETIC ULCER OF OTHER PART OF LEFT FOOT ASSOCIATED WITH TYPE 2 DIABETES MELLITUS, LIMITED TO BREAKDOWN OF SKIN: Primary | ICD-10-CM

## 2022-10-21 DIAGNOSIS — L97.521 DIABETIC ULCER OF OTHER PART OF LEFT FOOT ASSOCIATED WITH TYPE 2 DIABETES MELLITUS, LIMITED TO BREAKDOWN OF SKIN: Primary | ICD-10-CM

## 2022-10-21 PROCEDURE — 99212 OFFICE O/P EST SF 10 MIN: CPT | Mod: PBBFAC,PN | Performed by: PODIATRIST

## 2022-10-21 PROCEDURE — 3046F HEMOGLOBIN A1C LEVEL >9.0%: CPT | Mod: CPTII,,, | Performed by: PODIATRIST

## 2022-10-21 PROCEDURE — 99999 PR PBB SHADOW E&M-EST. PATIENT-LVL II: CPT | Mod: PBBFAC,,, | Performed by: PODIATRIST

## 2022-10-21 PROCEDURE — 1159F PR MEDICATION LIST DOCUMENTED IN MEDICAL RECORD: ICD-10-PCS | Mod: CPTII,,, | Performed by: PODIATRIST

## 2022-10-21 PROCEDURE — 99212 OFFICE O/P EST SF 10 MIN: CPT | Mod: S$PBB,,, | Performed by: PODIATRIST

## 2022-10-21 PROCEDURE — 1159F MED LIST DOCD IN RCRD: CPT | Mod: CPTII,,, | Performed by: PODIATRIST

## 2022-10-21 PROCEDURE — 99999 PR PBB SHADOW E&M-EST. PATIENT-LVL II: ICD-10-PCS | Mod: PBBFAC,,, | Performed by: PODIATRIST

## 2022-10-21 PROCEDURE — 99212 PR OFFICE/OUTPT VISIT, EST, LEVL II, 10-19 MIN: ICD-10-PCS | Mod: S$PBB,,, | Performed by: PODIATRIST

## 2022-10-21 PROCEDURE — 3046F PR MOST RECENT HEMOGLOBIN A1C LEVEL > 9.0%: ICD-10-PCS | Mod: CPTII,,, | Performed by: PODIATRIST

## 2022-10-21 NOTE — PROGRESS NOTES
Subjective:      Patient ID: Keerthi Chase is a 47 y.o. female.    Chief Complaint: No chief complaint on file.    Patient presents to clinic for a 1 week wound check of the Lt. Foot.  Denies pedal pain with today's exam.  Has kept the prior football dressing clean, dry, and intact for the past week.  Continues to minimize weight bearing to facilitate healing.  Denies experiencing N/V/F/C/D.  Denies any additional pedal complaints.      Past Medical History:   Diagnosis Date    Bell's palsy     Coronary artery disease     Pt states Dr. Virk said she did not have a blockage after a stress test.    Diabetes mellitus     GERD (gastroesophageal reflux disease)     Hypertension     Obesity        Past Surgical History:   Procedure Laterality Date     SECTION      x 2    CHOLECYSTECTOMY      ESOPHAGOGASTRODUODENOSCOPY      PERIPHERALLY INSERTED CENTRAL CATHETER INSERTION N/A 10/11/2021    Procedure: INSERTION, PICC;  Surgeon: PICC, [facility];  Location: [facility] ENDO;  Service: Cardiology;  Laterality: N/A;  Dr Aiken    PERIPHERALLY INSERTED CENTRAL CATHETER INSERTION N/A 2022    Procedure: INSERTION, PICC;  Surgeon: PICC, [facility];  [facility];  Service: Cardiology;  Laterality: N/A;  Dr Dubose    SESAMOIDECTOMY Left 2021    Procedure: SESAMOIDECTOMY;  Surgeon: Jacob Bernal DPM;  Location: [facility] OR;  Service: Podiatry;  Laterality: Left;       Family History   Problem Relation Age of Onset    Hypertension Mother        Social History     Socioeconomic History    Marital status:    Tobacco Use    Smoking status: Never    Smokeless tobacco: Never   Substance and Sexual Activity    Alcohol use: No    Drug use: No       Current Outpatient Medications   Medication Sig Dispense Refill    alprazolam (XANAX) 1 MG tablet Take 1 mg by mouth 2 (two) times daily as needed for Anxiety.      ampicillin (PRINCIPEN) 500 MG capsule Take 1 capsule (500 mg total) by mouth every 12 (twelve) hours. 20 capsule 0     "BASAGLAR KWIKPEN 100 unit/mL (3 mL) InPn pen 75 Units once daily.   3    BD INSULIN PEN NEEDLE UF MINI 31 gauge x 3/16" Ndle   2    hydrocodone-acetaminophen 10-325mg (NORCO)  mg Tab Take 1 tablet by mouth 3 (three) times daily as needed.      hydrocodone-acetaminophen 10-325mg (NORCO)  mg Tab Take 1 tablet by mouth every 8 (eight) hours as needed for Pain. 90 tablet 0    insulin detemir (LEVEMIR) 100 unit/mL injection Inject into the skin every evening.      insulin glargine,hum.rec.anlog (LANTUS SUBQ) Inject into the skin.      OZEMPIC 1 mg/dose (2 mg/1.5 mL) PnIj Inject 60 mg into the skin every 7 days.       sulfamethoxazole-trimethoprim 800-160mg (BACTRIM DS) 800-160 mg Tab Take 1 tablet by mouth 2 (two) times daily. 14 tablet 0    sulfamethoxazole-trimethoprim 800-160mg (BACTRIM DS) 800-160 mg Tab Take 1 tablet by mouth 2 (two) times daily. 14 tablet 0    traMADoL (ULTRAM) 50 mg tablet Take 1 tablet (50 mg total) by mouth every 6 (six) hours as needed for Pain. 28 tablet 0    chlorthalidone (HYGROTEN) 25 MG Tab Take 1 tablet (25 mg total) by mouth once daily. 30 tablet 11     No current facility-administered medications for this visit.       Review of patient's allergies indicates:   Allergen Reactions    Codeine Hives and Rash        Hemoglobin A1C   Date Value Ref Range Status   06/03/2022 10.5 (H) 0.0 - 5.6 % Final     Comment:     Reference Interval:  5.0 - 5.6 Normal   5.7 - 6.4 High Risk   > 6.5 Diabetic      Hgb A1c results are standardized based on the (NGSP) National   Glycohemoglobin Standardization Program.      Hemoglobin A1C levels are related to mean serum/plasma glucose   during the preceding 2-3 months.        09/23/2021 11.8 (H) 0.0 - 5.6 % Final     Comment:     Reference Interval:  5.0 - 5.6 Normal   5.7 - 6.4 High Risk   > 6.5 Diabetic      Hgb A1c results are standardized based on the (NGSP) National   Glycohemoglobin Standardization Program.      Hemoglobin A1C levels are " "related to mean serum/plasma glucose   during the preceding 2-3 months.        2021 8.8 (H) 4.8 - 5.6 % Final     Comment:              Prediabetes: 5.7 - 6.4           Diabetes: >6.4           Glycemic control for adults with diabetes: <7.0         Past Medical History:   Diagnosis Date    Bell's palsy     Coronary artery disease     Pt states Dr. Virk said she did not have a blockage after a stress test.    Diabetes mellitus     GERD (gastroesophageal reflux disease)     Hypertension     Obesity        Past Surgical History:   Procedure Laterality Date     SECTION      x 2    CHOLECYSTECTOMY      ESOPHAGOGASTRODUODENOSCOPY      PERIPHERALLY INSERTED CENTRAL CATHETER INSERTION N/A 10/11/2021    Procedure: INSERTION, PICC;  Surgeon: PICC, [facility];  [facility];  Service: Cardiology;  Laterality: N/A;  Dr Aiken    PERIPHERALLY INSERTED CENTRAL CATHETER INSERTION N/A 2022    Procedure: INSERTION, PICC;  Surgeon: PICC, [facility];  Location: [facility] END[facility];  Service: Cardiology;  Laterality: N/A;  Dr Dubose    SESAMOIDECTOMY Left 2021    Procedure: SESAMOIDECTOMY;  Surgeon: Jacob Bernal DPM;  Location: [facility] OR;  Service: Podiatry;  Laterality: Left;       Family History   Problem Relation Age of Onset    Hypertension Mother        Social History     Socioeconomic History    Marital status:    Tobacco Use    Smoking status: Never    Smokeless tobacco: Never   Substance and Sexual Activity    Alcohol use: No    Drug use: No       Current Outpatient Medications   Medication Sig Dispense Refill    alprazolam (XANAX) 1 MG tablet Take 1 mg by mouth 2 (two) times daily as needed for Anxiety.      ampicillin (PRINCIPEN) 500 MG capsule Take 1 capsule (500 mg total) by mouth every 12 (twelve) hours. 20 capsule 0    BASAGLAR KWIKPEN 100 unit/mL (3 mL) InPn pen 75 Units once daily.   3    BD INSULIN PEN NEEDLE UF MINI 31 gauge x 3/16" Ndle   2    hydrocodone-acetaminophen 10-325mg (NORCO)  mg " Tab Take 1 tablet by mouth 3 (three) times daily as needed.      hydrocodone-acetaminophen 10-325mg (NORCO)  mg Tab Take 1 tablet by mouth every 8 (eight) hours as needed for Pain. 90 tablet 0    insulin detemir (LEVEMIR) 100 unit/mL injection Inject into the skin every evening.      insulin glargine,hum.rec.anlog (LANTUS SUBQ) Inject into the skin.      OZEMPIC 1 mg/dose (2 mg/1.5 mL) PnIj Inject 60 mg into the skin every 7 days.       sulfamethoxazole-trimethoprim 800-160mg (BACTRIM DS) 800-160 mg Tab Take 1 tablet by mouth 2 (two) times daily. 14 tablet 0    sulfamethoxazole-trimethoprim 800-160mg (BACTRIM DS) 800-160 mg Tab Take 1 tablet by mouth 2 (two) times daily. 14 tablet 0    traMADoL (ULTRAM) 50 mg tablet Take 1 tablet (50 mg total) by mouth every 6 (six) hours as needed for Pain. 28 tablet 0    chlorthalidone (HYGROTEN) 25 MG Tab Take 1 tablet (25 mg total) by mouth once daily. 30 tablet 11     No current facility-administered medications for this visit.       Review of patient's allergies indicates:   Allergen Reactions    Codeine Hives and Rash         Review of Systems   Constitutional: Negative for chills and fever.   Skin:  Negative for color change and nail changes.   Gastrointestinal:  Negative for nausea and vomiting.   Neurological:  Positive for numbness.   Psychiatric/Behavioral:  Negative for altered mental status.          Objective:      Physical Exam  Constitutional:       Appearance: Normal appearance. She is not ill-appearing.   Cardiovascular:      Pulses:           Dorsalis pedis pulses are 2+ on the right side and 2+ on the left side.        Posterior tibial pulses are 2+ on the right side and 2+ on the left side.      Comments: CFT is < 3 seconds bilateral.  Pedal hair growth is decreased bilateral.  No lower extremity edema noted bilateral.  Toes are warm to touch bilateral.    Musculoskeletal:         General: No signs of injury.      Right lower leg: No edema.      Left  lower leg: No edema.      Comments: Muscle strength 5/5 in all muscle groups bilateral.  No tenderness nor crepitation with ROM of foot/ankle joints bilateral.  No tenderness with palpation of bilateral foot and ankle.   Slight dorsal contracture of the Lt. Hallux.     Skin:     General: Skin is warm and dry.      Capillary Refill: Capillary refill takes 2 to 3 seconds.      Findings: No bruising, ecchymosis, erythema, signs of injury, laceration, lesion, petechiae, rash or wound.      Comments: Fragile epithelium noted to the Lt. Sub 1st met head.       Neurological:      Mental Status: She is alert.      Sensory: Sensory deficit present.      Motor: No weakness.      Comments: Decreased protective sensation noted bilateral.  Light touch is absent bilateral.               Assessment:       Encounter Diagnoses   Name Primary?    Diabetic ulcer of other part of left foot associated with type 2 diabetes mellitus, limited to breakdown of skin Yes    Diabetic polyneuropathy associated with type 2 diabetes mellitus              Plan:       Diagnoses and all orders for this visit:    Diabetic ulcer of other part of left foot associated with type 2 diabetes mellitus, limited to breakdown of skin    Diabetic polyneuropathy associated with type 2 diabetes mellitus        I counseled the patient on her conditions, their implications and medical management.    No wound debridement performed, as the site is fully epithelialized.    Advised to begin applying triple antibiotic ointment and a padded bandage to the site daily for the next two weeks to protect the new skin.    Patient to ambulate only in her hoka shoe gear with the custom molded orthotics to offload the prior area of ulceration.    To call if skin at the site appears to be breaking down or appears infected.    To continue keeping the area covered while bathing.     RTC in 2 weeks for a final follow up.    Jacob Bernal DPM

## 2022-11-04 ENCOUNTER — OFFICE VISIT (OUTPATIENT)
Dept: PODIATRY | Facility: CLINIC | Age: 47
End: 2022-11-04
Payer: MEDICAID

## 2022-11-04 VITALS — WEIGHT: 270.06 LBS | BODY MASS INDEX: 40.93 KG/M2 | HEIGHT: 68 IN

## 2022-11-04 DIAGNOSIS — Z87.898 HISTORY OF ULCERATION: Primary | ICD-10-CM

## 2022-11-04 DIAGNOSIS — E11.42 DIABETIC POLYNEUROPATHY ASSOCIATED WITH TYPE 2 DIABETES MELLITUS: ICD-10-CM

## 2022-11-04 PROCEDURE — 99212 PR OFFICE/OUTPT VISIT, EST, LEVL II, 10-19 MIN: ICD-10-PCS | Mod: S$PBB,,, | Performed by: PODIATRIST

## 2022-11-04 PROCEDURE — 99212 OFFICE O/P EST SF 10 MIN: CPT | Mod: S$PBB,,, | Performed by: PODIATRIST

## 2022-11-04 PROCEDURE — 99213 OFFICE O/P EST LOW 20 MIN: CPT | Mod: PBBFAC,PN | Performed by: PODIATRIST

## 2022-11-04 PROCEDURE — 3008F BODY MASS INDEX DOCD: CPT | Mod: CPTII,,, | Performed by: PODIATRIST

## 2022-11-04 PROCEDURE — 1159F PR MEDICATION LIST DOCUMENTED IN MEDICAL RECORD: ICD-10-PCS | Mod: CPTII,,, | Performed by: PODIATRIST

## 2022-11-04 PROCEDURE — 3008F PR BODY MASS INDEX (BMI) DOCUMENTED: ICD-10-PCS | Mod: CPTII,,, | Performed by: PODIATRIST

## 2022-11-04 PROCEDURE — 99999 PR PBB SHADOW E&M-EST. PATIENT-LVL III: ICD-10-PCS | Mod: PBBFAC,,, | Performed by: PODIATRIST

## 2022-11-04 PROCEDURE — 1159F MED LIST DOCD IN RCRD: CPT | Mod: CPTII,,, | Performed by: PODIATRIST

## 2022-11-04 PROCEDURE — 3046F HEMOGLOBIN A1C LEVEL >9.0%: CPT | Mod: CPTII,,, | Performed by: PODIATRIST

## 2022-11-04 PROCEDURE — 3046F PR MOST RECENT HEMOGLOBIN A1C LEVEL > 9.0%: ICD-10-PCS | Mod: CPTII,,, | Performed by: PODIATRIST

## 2022-11-04 PROCEDURE — 99999 PR PBB SHADOW E&M-EST. PATIENT-LVL III: CPT | Mod: PBBFAC,,, | Performed by: PODIATRIST

## 2022-11-06 NOTE — PROGRESS NOTES
Subjective:      Patient ID: Keerthi Chase is a 47 y.o. female.    Chief Complaint: wound care f/u    Patient presents to clinic for a 2 week wound check of the Lt. Foot.  Denies pedal pain with today's exam.  Has been keeping the site covered with a padded bandage and wearing her Hoka shoes with the custom molded orthotics.  Relates seeing a small amount of blood on a prior bandage, this past week, but has noted no other signs of ulceration.  She has been weight bearing to tolerance.  Denies any additional pedal complaints.      Past Medical History:   Diagnosis Date    Bell's palsy     Coronary artery disease     Pt states Dr. Virk said she did not have a blockage after a stress test.    Diabetes mellitus     GERD (gastroesophageal reflux disease)     Hypertension     Obesity        Past Surgical History:   Procedure Laterality Date     SECTION      x 2    CHOLECYSTECTOMY      ESOPHAGOGASTRODUODENOSCOPY      PERIPHERALLY INSERTED CENTRAL CATHETER INSERTION N/A 10/11/2021    Procedure: INSERTION, PICC;  Surgeon: PICC, [facility];  Location: [facility] ENDO;  Service: Cardiology;  Laterality: N/A;  Dr Aiken    PERIPHERALLY INSERTED CENTRAL CATHETER INSERTION N/A 2022    Procedure: INSERTION, PICC;  Surgeon: PICC, [facility];  Location: [facility] END[facility];  Service: Cardiology;  Laterality: N/A;  Dr Dubose    SESAMOIDECTOMY Left 2021    Procedure: SESAMOIDECTOMY;  Surgeon: Jacob Bernal DPM;  Location: [facility] OR;  Service: Podiatry;  Laterality: Left;       Family History   Problem Relation Age of Onset    Hypertension Mother        Social History     Socioeconomic History    Marital status:    Tobacco Use    Smoking status: Never    Smokeless tobacco: Never   Substance and Sexual Activity    Alcohol use: No    Drug use: No       Current Outpatient Medications   Medication Sig Dispense Refill    alprazolam (XANAX) 1 MG tablet Take 1 mg by mouth 2 (two) times daily as needed for Anxiety.      ampicillin (PRINCIPEN)  "500 MG capsule Take 1 capsule (500 mg total) by mouth every 12 (twelve) hours. 20 capsule 0    BASAGLAR KWIKPEN 100 unit/mL (3 mL) InPn pen 75 Units once daily.   3    BD INSULIN PEN NEEDLE UF MINI 31 gauge x 3/16" Ndle   2    hydrocodone-acetaminophen 10-325mg (NORCO)  mg Tab Take 1 tablet by mouth 3 (three) times daily as needed.      hydrocodone-acetaminophen 10-325mg (NORCO)  mg Tab Take 1 tablet by mouth every 8 (eight) hours as needed for Pain. 90 tablet 0    insulin detemir (LEVEMIR) 100 unit/mL injection Inject into the skin every evening.      insulin glargine,hum.rec.anlog (LANTUS SUBQ) Inject into the skin.      OZEMPIC 1 mg/dose (2 mg/1.5 mL) PnIj Inject 60 mg into the skin every 7 days.       sulfamethoxazole-trimethoprim 800-160mg (BACTRIM DS) 800-160 mg Tab Take 1 tablet by mouth 2 (two) times daily. 14 tablet 0    sulfamethoxazole-trimethoprim 800-160mg (BACTRIM DS) 800-160 mg Tab Take 1 tablet by mouth 2 (two) times daily. 14 tablet 0    traMADoL (ULTRAM) 50 mg tablet Take 1 tablet (50 mg total) by mouth every 6 (six) hours as needed for Pain. 28 tablet 0    chlorthalidone (HYGROTEN) 25 MG Tab Take 1 tablet (25 mg total) by mouth once daily. 30 tablet 11     No current facility-administered medications for this visit.       Review of patient's allergies indicates:   Allergen Reactions    Codeine Hives and Rash        Hemoglobin A1C   Date Value Ref Range Status   06/03/2022 10.5 (H) 0.0 - 5.6 % Final     Comment:     Reference Interval:  5.0 - 5.6 Normal   5.7 - 6.4 High Risk   > 6.5 Diabetic      Hgb A1c results are standardized based on the (NGSP) National   Glycohemoglobin Standardization Program.      Hemoglobin A1C levels are related to mean serum/plasma glucose   during the preceding 2-3 months.        09/23/2021 11.8 (H) 0.0 - 5.6 % Final     Comment:     Reference Interval:  5.0 - 5.6 Normal   5.7 - 6.4 High Risk   > 6.5 Diabetic      Hgb A1c results are standardized based on the " (NGSP) National   Glycohemoglobin Standardization Program.      Hemoglobin A1C levels are related to mean serum/plasma glucose   during the preceding 2-3 months.        2021 8.8 (H) 4.8 - 5.6 % Final     Comment:              Prediabetes: 5.7 - 6.4           Diabetes: >6.4           Glycemic control for adults with diabetes: <7.0         Past Medical History:   Diagnosis Date    Bell's palsy     Coronary artery disease     Pt states Dr. Virk said she did not have a blockage after a stress test.    Diabetes mellitus     GERD (gastroesophageal reflux disease)     Hypertension     Obesity        Past Surgical History:   Procedure Laterality Date     SECTION      x 2    CHOLECYSTECTOMY      ESOPHAGOGASTRODUODENOSCOPY      PERIPHERALLY INSERTED CENTRAL CATHETER INSERTION N/A 10/11/2021    Procedure: INSERTION, PICC;  Surgeon: PICC, [facility];  Location: [facility] END[facility];  Service: Cardiology;  Laterality: N/A;  Dr Aiken    PERIPHERALLY INSERTED CENTRAL CATHETER INSERTION N/A 2022    Procedure: INSERTION, PICC;  Surgeon: PICC, [facility];  Location: [facility] END[facility];  Service: Cardiology;  Laterality: N/A;  Dr Dubose    SESAMOIDECTOMY Left 2021    Procedure: SESAMOIDECTOMY;  Surgeon: Jacob Bernal DPM;  Location: [facility] OR;  Service: Podiatry;  Laterality: Left;       Family History   Problem Relation Age of Onset    Hypertension Mother        Social History     Socioeconomic History    Marital status:    Tobacco Use    Smoking status: Never    Smokeless tobacco: Never   Substance and Sexual Activity    Alcohol use: No    Drug use: No       Current Outpatient Medications   Medication Sig Dispense Refill    alprazolam (XANAX) 1 MG tablet Take 1 mg by mouth 2 (two) times daily as needed for Anxiety.      ampicillin (PRINCIPEN) 500 MG capsule Take 1 capsule (500 mg total) by mouth every 12 (twelve) hours. 20 capsule 0    BASAGLAR KWIKPEN 100 unit/mL (3 mL) InPn pen 75 Units once daily.   3    BD INSULIN PEN NEEDLE UF  "MINI 31 gauge x 3/16" Ndle   2    hydrocodone-acetaminophen 10-325mg (NORCO)  mg Tab Take 1 tablet by mouth 3 (three) times daily as needed.      hydrocodone-acetaminophen 10-325mg (NORCO)  mg Tab Take 1 tablet by mouth every 8 (eight) hours as needed for Pain. 90 tablet 0    insulin detemir (LEVEMIR) 100 unit/mL injection Inject into the skin every evening.      insulin glargine,hum.rec.anlog (LANTUS SUBQ) Inject into the skin.      OZEMPIC 1 mg/dose (2 mg/1.5 mL) PnIj Inject 60 mg into the skin every 7 days.       sulfamethoxazole-trimethoprim 800-160mg (BACTRIM DS) 800-160 mg Tab Take 1 tablet by mouth 2 (two) times daily. 14 tablet 0    sulfamethoxazole-trimethoprim 800-160mg (BACTRIM DS) 800-160 mg Tab Take 1 tablet by mouth 2 (two) times daily. 14 tablet 0    traMADoL (ULTRAM) 50 mg tablet Take 1 tablet (50 mg total) by mouth every 6 (six) hours as needed for Pain. 28 tablet 0    chlorthalidone (HYGROTEN) 25 MG Tab Take 1 tablet (25 mg total) by mouth once daily. 30 tablet 11     No current facility-administered medications for this visit.       Review of patient's allergies indicates:   Allergen Reactions    Codeine Hives and Rash         Review of Systems   Constitutional: Negative for chills and fever.   Skin:  Negative for color change and nail changes.   Gastrointestinal:  Negative for nausea and vomiting.   Neurological:  Positive for numbness.   Psychiatric/Behavioral:  Negative for altered mental status.          Objective:      Physical Exam  Constitutional:       Appearance: Normal appearance. She is not ill-appearing.   Cardiovascular:      Pulses:           Dorsalis pedis pulses are 2+ on the right side and 2+ on the left side.        Posterior tibial pulses are 2+ on the right side and 2+ on the left side.      Comments: CFT is < 3 seconds bilateral.  Pedal hair growth is decreased bilateral.  No lower extremity edema noted bilateral.  Toes are warm to touch bilateral.  "   Musculoskeletal:         General: No signs of injury.      Right lower leg: No edema.      Left lower leg: No edema.      Comments: Muscle strength 5/5 in all muscle groups bilateral.  No tenderness nor crepitation with ROM of foot/ankle joints bilateral.  No tenderness with palpation of bilateral foot and ankle.   Slight dorsal contracture of the Lt. Hallux.     Skin:     General: Skin is warm and dry.      Capillary Refill: Capillary refill takes 2 to 3 seconds.      Findings: No bruising, ecchymosis, erythema, signs of injury, laceration, lesion, petechiae, rash or wound.      Comments: Mature epithelium noted to the Lt. Sub 1st met head.       Neurological:      Mental Status: She is alert.      Sensory: Sensory deficit present.      Motor: No weakness.      Comments: Decreased protective sensation noted bilateral.  Light touch is absent bilateral.               Assessment:       Encounter Diagnoses   Name Primary?    History of ulceration Yes    Diabetic polyneuropathy associated with type 2 diabetes mellitus              Plan:       Keerthi was seen today for wound care f/u.    Diagnoses and all orders for this visit:    History of ulceration    Diabetic polyneuropathy associated with type 2 diabetes mellitus        I counseled the patient on her conditions, their implications and medical management.    No wound debridement performed, as the site remains fully epithelialized.    Patient to ambulate only in her hoka shoe gear with the custom molded orthotics to offload the prior area of ulceration.    To call if skin at the site appears to be breaking down or appears infected.     RTC in 4 weeks for a follow up.    Jacob Bernal DPM

## 2022-12-05 ENCOUNTER — OFFICE VISIT (OUTPATIENT)
Dept: PODIATRY | Facility: CLINIC | Age: 47
End: 2022-12-05
Payer: MEDICAID

## 2022-12-05 VITALS — WEIGHT: 270.06 LBS | HEIGHT: 68 IN | BODY MASS INDEX: 40.93 KG/M2

## 2022-12-05 DIAGNOSIS — Z87.898 HISTORY OF ULCERATION: Primary | ICD-10-CM

## 2022-12-05 DIAGNOSIS — E11.42 DIABETIC POLYNEUROPATHY ASSOCIATED WITH TYPE 2 DIABETES MELLITUS: ICD-10-CM

## 2022-12-05 PROCEDURE — 99212 OFFICE O/P EST SF 10 MIN: CPT | Mod: S$PBB,,, | Performed by: PODIATRIST

## 2022-12-05 PROCEDURE — 3046F PR MOST RECENT HEMOGLOBIN A1C LEVEL > 9.0%: ICD-10-PCS | Mod: CPTII,,, | Performed by: PODIATRIST

## 2022-12-05 PROCEDURE — 1159F MED LIST DOCD IN RCRD: CPT | Mod: CPTII,,, | Performed by: PODIATRIST

## 2022-12-05 PROCEDURE — 99999 PR PBB SHADOW E&M-EST. PATIENT-LVL III: ICD-10-PCS | Mod: PBBFAC,,, | Performed by: PODIATRIST

## 2022-12-05 PROCEDURE — 1159F PR MEDICATION LIST DOCUMENTED IN MEDICAL RECORD: ICD-10-PCS | Mod: CPTII,,, | Performed by: PODIATRIST

## 2022-12-05 PROCEDURE — 99213 OFFICE O/P EST LOW 20 MIN: CPT | Mod: PBBFAC,PN | Performed by: PODIATRIST

## 2022-12-05 PROCEDURE — 99212 PR OFFICE/OUTPT VISIT, EST, LEVL II, 10-19 MIN: ICD-10-PCS | Mod: S$PBB,,, | Performed by: PODIATRIST

## 2022-12-05 PROCEDURE — 3046F HEMOGLOBIN A1C LEVEL >9.0%: CPT | Mod: CPTII,,, | Performed by: PODIATRIST

## 2022-12-05 PROCEDURE — 3008F PR BODY MASS INDEX (BMI) DOCUMENTED: ICD-10-PCS | Mod: CPTII,,, | Performed by: PODIATRIST

## 2022-12-05 PROCEDURE — 3008F BODY MASS INDEX DOCD: CPT | Mod: CPTII,,, | Performed by: PODIATRIST

## 2022-12-05 PROCEDURE — 99999 PR PBB SHADOW E&M-EST. PATIENT-LVL III: CPT | Mod: PBBFAC,,, | Performed by: PODIATRIST

## 2022-12-05 NOTE — PROGRESS NOTES
Subjective:      Patient ID: Keerthi Chase is a 47 y.o. female.    Chief Complaint: Wound Care    Patient presents to clinic for a 1 month wound check of the Lt. Foot.  Denies pedal pain with today's exam.  Has been keeping the site covered with a padded bandage and wearing her Hoka shoes with the custom molded orthotics.  Denies seeing drainage from the area.  Reports continued callus build up to the area albeit less when compared to times past.  She has been weight bearing to tolerance.  Denies any additional pedal complaints.      Past Medical History:   Diagnosis Date    Bell's palsy     Coronary artery disease     Pt states Dr. Virk said she did not have a blockage after a stress test.    Diabetes mellitus     GERD (gastroesophageal reflux disease)     Hypertension     Obesity        Past Surgical History:   Procedure Laterality Date     SECTION      x 2    CHOLECYSTECTOMY      ESOPHAGOGASTRODUODENOSCOPY      PERIPHERALLY INSERTED CENTRAL CATHETER INSERTION N/A 10/11/2021    Procedure: INSERTION, PICC;  Surgeon: PICC, [facility];  Location: [facility] ENDO;  Service: Cardiology;  Laterality: N/A;  Dr Aiken    PERIPHERALLY INSERTED CENTRAL CATHETER INSERTION N/A 2022    Procedure: INSERTION, PICC;  Surgeon: PICC, [facility];  Location: [facility] ENDO;  Service: Cardiology;  Laterality: N/A;  Dr Dubose    SESAMOIDECTOMY Left 2021    Procedure: SESAMOIDECTOMY;  Surgeon: Jacob Bernal DPM;  Location: [facility] OR;  Service: Podiatry;  Laterality: Left;       Family History   Problem Relation Age of Onset    Hypertension Mother        Social History     Socioeconomic History    Marital status:    Tobacco Use    Smoking status: Never    Smokeless tobacco: Never   Substance and Sexual Activity    Alcohol use: No    Drug use: No       Current Outpatient Medications   Medication Sig Dispense Refill    alprazolam (XANAX) 1 MG tablet Take 1 mg by mouth 2 (two) times daily as needed for Anxiety.      ampicillin  "(PRINCIPEN) 500 MG capsule Take 1 capsule (500 mg total) by mouth every 12 (twelve) hours. 20 capsule 0    BASAGLAR KWIKPEN 100 unit/mL (3 mL) InPn pen 75 Units once daily.   3    BD INSULIN PEN NEEDLE UF MINI 31 gauge x 3/16" Ndle   2    hydrocodone-acetaminophen 10-325mg (NORCO)  mg Tab Take 1 tablet by mouth 3 (three) times daily as needed.      hydrocodone-acetaminophen 10-325mg (NORCO)  mg Tab Take 1 tablet by mouth every 8 (eight) hours as needed for Pain. 90 tablet 0    insulin detemir (LEVEMIR) 100 unit/mL injection Inject into the skin every evening.      insulin glargine,hum.rec.anlog (LANTUS SUBQ) Inject into the skin.      OZEMPIC 1 mg/dose (2 mg/1.5 mL) PnIj Inject 60 mg into the skin every 7 days.       sulfamethoxazole-trimethoprim 800-160mg (BACTRIM DS) 800-160 mg Tab Take 1 tablet by mouth 2 (two) times daily. 14 tablet 0    sulfamethoxazole-trimethoprim 800-160mg (BACTRIM DS) 800-160 mg Tab Take 1 tablet by mouth 2 (two) times daily. 14 tablet 0    traMADoL (ULTRAM) 50 mg tablet Take 1 tablet (50 mg total) by mouth every 6 (six) hours as needed for Pain. 28 tablet 0    chlorthalidone (HYGROTEN) 25 MG Tab Take 1 tablet (25 mg total) by mouth once daily. 30 tablet 11     No current facility-administered medications for this visit.       Review of patient's allergies indicates:   Allergen Reactions    Codeine Hives and Rash        Hemoglobin A1C   Date Value Ref Range Status   06/03/2022 10.5 (H) 0.0 - 5.6 % Final     Comment:     Reference Interval:  5.0 - 5.6 Normal   5.7 - 6.4 High Risk   > 6.5 Diabetic      Hgb A1c results are standardized based on the (NGSP) National   Glycohemoglobin Standardization Program.      Hemoglobin A1C levels are related to mean serum/plasma glucose   during the preceding 2-3 months.        09/23/2021 11.8 (H) 0.0 - 5.6 % Final     Comment:     Reference Interval:  5.0 - 5.6 Normal   5.7 - 6.4 High Risk   > 6.5 Diabetic      Hgb A1c results are standardized " based on the (NGSP) National   Glycohemoglobin Standardization Program.      Hemoglobin A1C levels are related to mean serum/plasma glucose   during the preceding 2-3 months.        2021 8.8 (H) 4.8 - 5.6 % Final     Comment:              Prediabetes: 5.7 - 6.4           Diabetes: >6.4           Glycemic control for adults with diabetes: <7.0         Past Medical History:   Diagnosis Date    Bell's palsy     Coronary artery disease     Pt states Dr. Virk said she did not have a blockage after a stress test.    Diabetes mellitus     GERD (gastroesophageal reflux disease)     Hypertension     Obesity        Past Surgical History:   Procedure Laterality Date     SECTION      x 2    CHOLECYSTECTOMY      ESOPHAGOGASTRODUODENOSCOPY      PERIPHERALLY INSERTED CENTRAL CATHETER INSERTION N/A 10/11/2021    Procedure: INSERTION, PICC;  Surgeon: PICC, [facility];  Location: [facility] ENDO;  Service: Cardiology;  Laterality: N/A;  Dr Aiken    PERIPHERALLY INSERTED CENTRAL CATHETER INSERTION N/A 2022    Procedure: INSERTION, PICC;  Surgeon: PICC, [facility];  Location: [facility] ENDO;  Service: Cardiology;  Laterality: N/A;  Dr Dubose    SESAMOIDECTOMY Left 2021    Procedure: SESAMOIDECTOMY;  Surgeon: Jacob Bernal DPM;  Location: [facility] OR;  Service: Podiatry;  Laterality: Left;       Family History   Problem Relation Age of Onset    Hypertension Mother        Social History     Socioeconomic History    Marital status:    Tobacco Use    Smoking status: Never    Smokeless tobacco: Never   Substance and Sexual Activity    Alcohol use: No    Drug use: No       Current Outpatient Medications   Medication Sig Dispense Refill    alprazolam (XANAX) 1 MG tablet Take 1 mg by mouth 2 (two) times daily as needed for Anxiety.      ampicillin (PRINCIPEN) 500 MG capsule Take 1 capsule (500 mg total) by mouth every 12 (twelve) hours. 20 capsule 0    BASAGLAR KWIKPEN 100 unit/mL (3 mL) InPn pen 75 Units once daily.   3    BD INSULIN  "PEN NEEDLE UF MINI 31 gauge x 3/16" Ndle   2    hydrocodone-acetaminophen 10-325mg (NORCO)  mg Tab Take 1 tablet by mouth 3 (three) times daily as needed.      hydrocodone-acetaminophen 10-325mg (NORCO)  mg Tab Take 1 tablet by mouth every 8 (eight) hours as needed for Pain. 90 tablet 0    insulin detemir (LEVEMIR) 100 unit/mL injection Inject into the skin every evening.      insulin glargine,hum.rec.anlog (LANTUS SUBQ) Inject into the skin.      OZEMPIC 1 mg/dose (2 mg/1.5 mL) PnIj Inject 60 mg into the skin every 7 days.       sulfamethoxazole-trimethoprim 800-160mg (BACTRIM DS) 800-160 mg Tab Take 1 tablet by mouth 2 (two) times daily. 14 tablet 0    sulfamethoxazole-trimethoprim 800-160mg (BACTRIM DS) 800-160 mg Tab Take 1 tablet by mouth 2 (two) times daily. 14 tablet 0    traMADoL (ULTRAM) 50 mg tablet Take 1 tablet (50 mg total) by mouth every 6 (six) hours as needed for Pain. 28 tablet 0    chlorthalidone (HYGROTEN) 25 MG Tab Take 1 tablet (25 mg total) by mouth once daily. 30 tablet 11     No current facility-administered medications for this visit.       Review of patient's allergies indicates:   Allergen Reactions    Codeine Hives and Rash         Review of Systems   Constitutional: Negative for chills and fever.   Skin:  Negative for color change and nail changes.   Gastrointestinal:  Negative for nausea and vomiting.   Neurological:  Positive for numbness.   Psychiatric/Behavioral:  Negative for altered mental status.          Objective:      Physical Exam  Constitutional:       Appearance: Normal appearance. She is not ill-appearing.   Cardiovascular:      Pulses:           Dorsalis pedis pulses are 2+ on the right side and 2+ on the left side.        Posterior tibial pulses are 2+ on the right side and 2+ on the left side.      Comments: CFT is < 3 seconds bilateral.  Pedal hair growth is decreased bilateral.  No lower extremity edema noted bilateral.  Toes are warm to touch bilateral.  "   Musculoskeletal:         General: No signs of injury.      Right lower leg: No edema.      Left lower leg: No edema.      Comments: Muscle strength 5/5 in all muscle groups bilateral.  No tenderness nor crepitation with ROM of foot/ankle joints bilateral.  No tenderness with palpation of bilateral foot and ankle.   Slight dorsal contracture of the Lt. Hallux.     Skin:     General: Skin is warm and dry.      Capillary Refill: Capillary refill takes 2 to 3 seconds.      Findings: No bruising, ecchymosis, erythema, signs of injury, laceration, lesion, petechiae, rash or wound.      Comments: Mature epithelium noted to the Lt. Sub 1st met head.  Soft, macerated callus noted to the site.  No underlying wound noted.     Neurological:      Mental Status: She is alert.      Sensory: Sensory deficit present.      Motor: No weakness.      Comments: Decreased protective sensation noted bilateral.  Light touch is absent bilateral.               Assessment:       Encounter Diagnoses   Name Primary?    History of ulceration Yes    Diabetic polyneuropathy associated with type 2 diabetes mellitus                Plan:       Keerthi was seen today for wound care.    Diagnoses and all orders for this visit:    History of ulceration    Diabetic polyneuropathy associated with type 2 diabetes mellitus          I counseled the patient on her conditions, their implications and medical management.    No wound debridement performed, as the site remains fully epithelialized.    Being that the skin is appearing macerated, she was advised to apply betadine to the site twice daily.  To then apply moisturizer prior to going to bed.    Patient to ambulate only in her hoka shoe gear with the custom molded orthotics to offload the prior area of ulceration.    To call if skin at the site appears to be breaking down or appears infected.     RTC in 2 months for a follow up.    Jacob Bernal DPM

## 2023-01-27 ENCOUNTER — TELEPHONE (OUTPATIENT)
Dept: PODIATRY | Facility: CLINIC | Age: 48
End: 2023-01-27
Payer: MEDICAID

## 2023-01-27 NOTE — TELEPHONE ENCOUNTER
----- Message from Jennifer Meneses sent at 1/27/2023  2:22 PM CST -----  Regarding: PATIENT CALL BACK  Contact: PATIENT  Name of Who is Calling: RAMON GIORDANO [[id number]]       What is the request in detail: Patient is requesting to reschedule her wound appt from 2/6/2023 to 2/15/2023. Please advise!         Can the clinic reply by [facility]: NO        What Number to Call Back if not in [facility]: [phone]

## 2023-01-27 NOTE — TELEPHONE ENCOUNTER
Called and spoke with pt and informed her that her appointment has been rescheduled per her request. Pt acknowledged understanding.

## 2023-02-15 ENCOUNTER — OFFICE VISIT (OUTPATIENT)
Dept: PODIATRY | Facility: CLINIC | Age: 48
End: 2023-02-15
Payer: MEDICAID

## 2023-02-15 VITALS — WEIGHT: 270.06 LBS | HEIGHT: 68 IN | BODY MASS INDEX: 40.93 KG/M2

## 2023-02-15 DIAGNOSIS — E11.621 DIABETIC ULCER OF OTHER PART OF LEFT FOOT ASSOCIATED WITH TYPE 2 DIABETES MELLITUS, WITH FAT LAYER EXPOSED: Primary | ICD-10-CM

## 2023-02-15 DIAGNOSIS — L97.522 DIABETIC ULCER OF OTHER PART OF LEFT FOOT ASSOCIATED WITH TYPE 2 DIABETES MELLITUS, WITH FAT LAYER EXPOSED: Primary | ICD-10-CM

## 2023-02-15 DIAGNOSIS — E11.42 DIABETIC POLYNEUROPATHY ASSOCIATED WITH TYPE 2 DIABETES MELLITUS: ICD-10-CM

## 2023-02-15 PROCEDURE — 99213 OFFICE O/P EST LOW 20 MIN: CPT | Mod: 25,S$PBB,, | Performed by: PODIATRIST

## 2023-02-15 PROCEDURE — 3008F PR BODY MASS INDEX (BMI) DOCUMENTED: ICD-10-PCS | Mod: CPTII,,, | Performed by: PODIATRIST

## 2023-02-15 PROCEDURE — 99213 PR OFFICE/OUTPT VISIT, EST, LEVL III, 20-29 MIN: ICD-10-PCS | Mod: 25,S$PBB,, | Performed by: PODIATRIST

## 2023-02-15 PROCEDURE — 11042 DBRDMT SUBQ TIS 1ST 20SQCM/<: CPT | Mod: PBBFAC,PN | Performed by: PODIATRIST

## 2023-02-15 PROCEDURE — 99999 PR PBB SHADOW E&M-EST. PATIENT-LVL II: CPT | Mod: PBBFAC,,, | Performed by: PODIATRIST

## 2023-02-15 PROCEDURE — 99999 PR PBB SHADOW E&M-EST. PATIENT-LVL II: ICD-10-PCS | Mod: PBBFAC,,, | Performed by: PODIATRIST

## 2023-02-15 PROCEDURE — 1159F MED LIST DOCD IN RCRD: CPT | Mod: CPTII,,, | Performed by: PODIATRIST

## 2023-02-15 PROCEDURE — 99212 OFFICE O/P EST SF 10 MIN: CPT | Mod: PBBFAC,PN | Performed by: PODIATRIST

## 2023-02-15 PROCEDURE — 1159F PR MEDICATION LIST DOCUMENTED IN MEDICAL RECORD: ICD-10-PCS | Mod: CPTII,,, | Performed by: PODIATRIST

## 2023-02-15 PROCEDURE — 87070 CULTURE OTHR SPECIMN AEROBIC: CPT | Performed by: PODIATRIST

## 2023-02-15 PROCEDURE — 3008F BODY MASS INDEX DOCD: CPT | Mod: CPTII,,, | Performed by: PODIATRIST

## 2023-02-15 PROCEDURE — 87075 CULTR BACTERIA EXCEPT BLOOD: CPT | Performed by: PODIATRIST

## 2023-02-15 PROCEDURE — 11042 WOUND DEBRIDEMENT: ICD-10-PCS | Mod: S$PBB,,, | Performed by: PODIATRIST

## 2023-02-15 NOTE — PROGRESS NOTES
Subjective:      Patient ID: Keerthi Chase is a 47 y.o. female.    Chief Complaint: Wound Care (Left bunion/ball area of the foot rt 5th toe)      Patient presents to clinic for a 2 month wound check of the Lt. Foot.  States she have been spending more time on her feet, at work, with a new area of blistering that has developed along the prior site of ulceration.  Denies this being a source of pain.  She has been applying medihoney to the area on a daily basis.  She has also been wearing her orthotics.  She suspects this device is not fully offloading the area.  Denies noticing signs of infection to the site.    Denies any additional pedal complaints.      Past Medical History:   Diagnosis Date    Bell's palsy     Coronary artery disease     Pt states Dr. Virk said she did not have a blockage after a stress test.    Diabetes mellitus     GERD (gastroesophageal reflux disease)     Hypertension     Obesity        Past Surgical History:   Procedure Laterality Date     SECTION      x 2    CHOLECYSTECTOMY      ESOPHAGOGASTRODUODENOSCOPY      PERIPHERALLY INSERTED CENTRAL CATHETER INSERTION N/A 10/11/2021    Procedure: INSERTION, PICC;  Surgeon: PRIYANK COSTA;  Location: [facility] ENDO;  Service: Cardiology;  Laterality: N/A;  Dr Aiken    PERIPHERALLY INSERTED CENTRAL CATHETER INSERTION N/A 2022    Procedure: INSERTION, PICC;  Surgeon: PRIYANK COSTA;  Location: [facility] ENDO;  Service: Cardiology;  Laterality: N/A;  Dr Dubose    SESAMOIDECTOMY Left 2021    Procedure: SESAMOIDECTOMY;  Surgeon: Jacob Bernal DPM;  Location: [facility] OR;  Service: Podiatry;  Laterality: Left;       Family History   Problem Relation Age of Onset    Hypertension Mother        Social History     Socioeconomic History    Marital status:    Tobacco Use    Smoking status: Never    Smokeless tobacco: Never   Substance and Sexual Activity    Alcohol use: No    Drug use: No       Current Outpatient Medications   Medication Sig Dispense  "Refill    alprazolam (XANAX) 1 MG tablet Take 1 mg by mouth 2 (two) times daily as needed for Anxiety.      ampicillin (PRINCIPEN) 500 MG capsule Take 1 capsule (500 mg total) by mouth every 12 (twelve) hours. 20 capsule 0    BASAGLAR KWIKPEN 100 unit/mL (3 mL) InPn pen 75 Units once daily.   3    BD INSULIN PEN NEEDLE UF MINI 31 gauge x 3/16" Ndle   2    hydrocodone-acetaminophen 10-325mg (NORCO)  mg Tab Take 1 tablet by mouth 3 (three) times daily as needed.      hydrocodone-acetaminophen 10-325mg (NORCO)  mg Tab Take 1 tablet by mouth every 8 (eight) hours as needed for Pain. 90 tablet 0    insulin detemir (LEVEMIR) 100 unit/mL injection Inject into the skin every evening.      insulin glargine,hum.rec.anlog (LANTUS SUBQ) Inject into the skin.      OZEMPIC 1 mg/dose (2 mg/1.5 mL) PnIj Inject 60 mg into the skin every 7 days.       sulfamethoxazole-trimethoprim 800-160mg (BACTRIM DS) 800-160 mg Tab Take 1 tablet by mouth 2 (two) times daily. 14 tablet 0    sulfamethoxazole-trimethoprim 800-160mg (BACTRIM DS) 800-160 mg Tab Take 1 tablet by mouth 2 (two) times daily. 14 tablet 0    traMADoL (ULTRAM) 50 mg tablet Take 1 tablet (50 mg total) by mouth every 6 (six) hours as needed for Pain. 28 tablet 0    chlorthalidone (HYGROTEN) 25 MG Tab Take 1 tablet (25 mg total) by mouth once daily. 30 tablet 11     No current facility-administered medications for this visit.       Review of patient's allergies indicates:   Allergen Reactions    Codeine Hives and Rash        Hemoglobin A1C   Date Value Ref Range Status   06/03/2022 10.5 (H) 0.0 - 5.6 % Final     Comment:     Reference Interval:  5.0 - 5.6 Normal   5.7 - 6.4 High Risk   > 6.5 Diabetic      Hgb A1c results are standardized based on the (NGSP) National   Glycohemoglobin Standardization Program.      Hemoglobin A1C levels are related to mean serum/plasma glucose   during the preceding 2-3 months.        09/23/2021 11.8 (H) 0.0 - 5.6 % Final     Comment: "     Reference Interval:  5.0 - 5.6 Normal   5.7 - 6.4 High Risk   > 6.5 Diabetic      Hgb A1c results are standardized based on the (NGSP) National   Glycohemoglobin Standardization Program.      Hemoglobin A1C levels are related to mean serum/plasma glucose   during the preceding 2-3 months.        2021 8.8 (H) 4.8 - 5.6 % Final     Comment:              Prediabetes: 5.7 - 6.4           Diabetes: >6.4           Glycemic control for adults with diabetes: <7.0         Past Medical History:   Diagnosis Date    Bell's palsy     Coronary artery disease     Pt states Dr. Virk said she did not have a blockage after a stress test.    Diabetes mellitus     GERD (gastroesophageal reflux disease)     Hypertension     Obesity        Past Surgical History:   Procedure Laterality Date     SECTION      x 2    CHOLECYSTECTOMY      ESOPHAGOGASTRODUODENOSCOPY      PERIPHERALLY INSERTED CENTRAL CATHETER INSERTION N/A 10/11/2021    Procedure: INSERTION, PICC;  Surgeon: PICC, [facility];  Location: [facility] ENDO;  Service: Cardiology;  Laterality: N/A;  Dr Aiken    PERIPHERALLY INSERTED CENTRAL CATHETER INSERTION N/A 2022    Procedure: INSERTION, PICC;  Surgeon: PICC, [facility];  Location: [facility] ENDO;  Service: Cardiology;  Laterality: N/A;  Dr Dubose    SESAMOIDECTOMY Left 2021    Procedure: SESAMOIDECTOMY;  Surgeon: Jacob Bernal DPM;  Location: [facility] OR;  Service: Podiatry;  Laterality: Left;       Family History   Problem Relation Age of Onset    Hypertension Mother        Social History     Socioeconomic History    Marital status:    Tobacco Use    Smoking status: Never    Smokeless tobacco: Never   Substance and Sexual Activity    Alcohol use: No    Drug use: No       Current Outpatient Medications   Medication Sig Dispense Refill    alprazolam (XANAX) 1 MG tablet Take 1 mg by mouth 2 (two) times daily as needed for Anxiety.      ampicillin (PRINCIPEN) 500 MG capsule Take 1 capsule (500 mg total) by mouth every  "12 (twelve) hours. 20 capsule 0    BASAGLAR KWIKPEN 100 unit/mL (3 mL) InPn pen 75 Units once daily.   3    BD INSULIN PEN NEEDLE UF MINI 31 gauge x 3/16" Ndle   2    hydrocodone-acetaminophen 10-325mg (NORCO)  mg Tab Take 1 tablet by mouth 3 (three) times daily as needed.      hydrocodone-acetaminophen 10-325mg (NORCO)  mg Tab Take 1 tablet by mouth every 8 (eight) hours as needed for Pain. 90 tablet 0    insulin detemir (LEVEMIR) 100 unit/mL injection Inject into the skin every evening.      insulin glargine,hum.rec.anlog (LANTUS SUBQ) Inject into the skin.      OZEMPIC 1 mg/dose (2 mg/1.5 mL) PnIj Inject 60 mg into the skin every 7 days.       sulfamethoxazole-trimethoprim 800-160mg (BACTRIM DS) 800-160 mg Tab Take 1 tablet by mouth 2 (two) times daily. 14 tablet 0    sulfamethoxazole-trimethoprim 800-160mg (BACTRIM DS) 800-160 mg Tab Take 1 tablet by mouth 2 (two) times daily. 14 tablet 0    traMADoL (ULTRAM) 50 mg tablet Take 1 tablet (50 mg total) by mouth every 6 (six) hours as needed for Pain. 28 tablet 0    chlorthalidone (HYGROTEN) 25 MG Tab Take 1 tablet (25 mg total) by mouth once daily. 30 tablet 11     No current facility-administered medications for this visit.       Review of patient's allergies indicates:   Allergen Reactions    Codeine Hives and Rash         Review of Systems   Constitutional: Negative for chills and fever.   Skin:  Negative for color change and nail changes.   Gastrointestinal:  Negative for nausea and vomiting.   Neurological:  Positive for numbness.   Psychiatric/Behavioral:  Negative for altered mental status.          Objective:      Physical Exam  Constitutional:       Appearance: Normal appearance. She is not ill-appearing.   Cardiovascular:      Pulses:           Dorsalis pedis pulses are 2+ on the right side and 2+ on the left side.        Posterior tibial pulses are 2+ on the right side and 2+ on the left side.      Comments: CFT is < 3 seconds bilateral.  " Pedal hair growth is decreased bilateral.  No lower extremity edema noted bilateral.  Toes are warm to touch bilateral.    Musculoskeletal:         General: No signs of injury.      Right lower leg: No edema.      Left lower leg: No edema.      Comments: Muscle strength 5/5 in all muscle groups bilateral.  No tenderness nor crepitation with ROM of foot/ankle joints bilateral.  No tenderness with palpation of bilateral foot and ankle.   Slight dorsal contracture of the Lt. Hallux.     Skin:     General: Skin is warm and dry.      Capillary Refill: Capillary refill takes 2 to 3 seconds.      Findings: Wound present. No bruising, ecchymosis, erythema, signs of injury, laceration, lesion, petechiae or rash.      Comments: Location: Open wound noted to the Lt. Sub 1st met head.  Base: Down to subQ and comprised of fibrin.    Drainage: None  Malinda wound: Devoid of erythema, edema, fluctuance, purulence, and malodor.   Pre-debridement measurement: 0.6 x 0.6 x 0.3cm  Post-debridement measurement: 0.8 x 0.8 x 0.3cm     Neurological:      Mental Status: She is alert.      Sensory: Sensory deficit present.      Motor: No weakness.      Comments: Decreased protective sensation noted bilateral.  Light touch is absent bilateral.               Assessment:       Encounter Diagnoses   Name Primary?    Diabetic ulcer of other part of left foot associated with type 2 diabetes mellitus, with fat layer exposed Yes    Diabetic polyneuropathy associated with type 2 diabetes mellitus                  Plan:       Keerthi was seen today for wound care.    Diagnoses and all orders for this visit:    Diabetic ulcer of other part of left foot associated with type 2 diabetes mellitus, with fat layer exposed  -     Aerobic culture  -     Culture, Anaerobic  -     Wound Debridement    Diabetic polyneuropathy associated with type 2 diabetes mellitus              I counseled the patient on her conditions, their implications and medical  management.    Discussed with patient the associated risks and benefits associated with a selective excisional debridement of the Lt. Foot.  Consent was read, signed, and witnessed.  See attached procedure note.      Wound cultures were obtained.    The wound base was covered with iodosorb ointment, offloaded with felt with an aperture, and a football dressing was applied.    Advised to keep the dressing CDI until she can have her orthotic adjusted.  Explained she needs an additional donut hole applied to the contact layer of the orthotic.      Advised to rest and elevate the affected extremity.    Instructed to minimize weight bearing to facilitate wound healing.    Advised to ambulate only in the postoperative shoe/boot.    Discussed optimal hydration and protein consumption and how they facilitate wound healing.      RTC in 2 weeks for follow up.    Jacob Bernal DPM

## 2023-02-16 NOTE — PROCEDURES
"Wound Debridement    Date/Time: 2/15/2023 10:00 AM  Performed by: Jacob Bernal DPM  Authorized by: Jacob Bernal DPM     Time out: Immediately prior to procedure a "time out" was called to verify the correct patient, procedure, equipment, support staff and site/side marked as required.    Consent Done?:  Yes (Written)    Preparation: Patient was prepped and draped in usual sterile fashion    Local anesthesia used?: No      Wound Details:    Location:  Left foot    Location:  Left 1st Metatarsal Head    Type of Debridement:  Excisional       Length (cm):  0.6       Area (sq cm):  0.36       Width (cm):  0.6       Percent Debrided (%):  100       Depth (cm):  0.3       Total Area Debrided (sq cm):  0.36    Depth of debridement:  Subcutaneous tissue    Tissue debrided:  Subcutaneous    Devitalized tissue debrided:  Fibrin    Instruments:  Blade    Bleeding:  Minimal  Hemostasis Achieved: Yes    Method Used:  Pressure  Patient tolerance:  Patient tolerated the procedure well with no immediate complications  "

## 2023-02-17 ENCOUNTER — TELEPHONE (OUTPATIENT)
Dept: PODIATRY | Facility: CLINIC | Age: 48
End: 2023-02-17
Payer: MEDICAID

## 2023-02-17 LAB — BACTERIA SPEC AEROBE CULT: NORMAL

## 2023-02-17 NOTE — TELEPHONE ENCOUNTER
----- Message from Jacob Bernal DPM sent at 2/17/2023 12:18 PM CST -----  Please let the patient know initial cultures are negative for infection.    ----- Message -----  From: [facility]  Sent: 2/16/2023   7:35 AM CST  To: Jacob Bernal DPM

## 2023-02-20 ENCOUNTER — TELEPHONE (OUTPATIENT)
Dept: PODIATRY | Facility: CLINIC | Age: 48
End: 2023-02-20
Payer: MEDICAID

## 2023-02-20 LAB — BACTERIA SPEC ANAEROBE CULT: NORMAL

## 2023-02-20 NOTE — TELEPHONE ENCOUNTER
----- Message from Jacob Bernal DPM sent at 2/20/2023 11:07 AM CST -----  Please let the patient know initial cultures are negative for infection.  Thanks!  ----- Message -----  From: [facility]  Sent: 2/16/2023   7:35 AM CST  To: Jacob Bernal DPM

## 2023-03-01 ENCOUNTER — OFFICE VISIT (OUTPATIENT)
Dept: PODIATRY | Facility: CLINIC | Age: 48
End: 2023-03-01
Payer: MEDICAID

## 2023-03-01 VITALS — WEIGHT: 270.06 LBS | HEIGHT: 68 IN | BODY MASS INDEX: 40.93 KG/M2

## 2023-03-01 DIAGNOSIS — E11.621 DIABETIC ULCER OF OTHER PART OF LEFT FOOT ASSOCIATED WITH TYPE 2 DIABETES MELLITUS, WITH FAT LAYER EXPOSED: Primary | ICD-10-CM

## 2023-03-01 DIAGNOSIS — E11.42 DIABETIC POLYNEUROPATHY ASSOCIATED WITH TYPE 2 DIABETES MELLITUS: ICD-10-CM

## 2023-03-01 DIAGNOSIS — L97.522 DIABETIC ULCER OF OTHER PART OF LEFT FOOT ASSOCIATED WITH TYPE 2 DIABETES MELLITUS, WITH FAT LAYER EXPOSED: Primary | ICD-10-CM

## 2023-03-01 PROCEDURE — 99999 PR PBB SHADOW E&M-EST. PATIENT-LVL I: ICD-10-PCS | Mod: PBBFAC,,, | Performed by: PODIATRIST

## 2023-03-01 PROCEDURE — 99999 PR PBB SHADOW E&M-EST. PATIENT-LVL I: CPT | Mod: PBBFAC,,, | Performed by: PODIATRIST

## 2023-03-01 PROCEDURE — 99499 NO LOS: ICD-10-PCS | Mod: S$PBB,,, | Performed by: PODIATRIST

## 2023-03-01 PROCEDURE — 99211 OFF/OP EST MAY X REQ PHY/QHP: CPT | Mod: PBBFAC,PN | Performed by: PODIATRIST

## 2023-03-01 PROCEDURE — 99499 UNLISTED E&M SERVICE: CPT | Mod: S$PBB,,, | Performed by: PODIATRIST

## 2023-03-01 PROCEDURE — 1159F PR MEDICATION LIST DOCUMENTED IN MEDICAL RECORD: ICD-10-PCS | Mod: CPTII,,, | Performed by: PODIATRIST

## 2023-03-01 PROCEDURE — 3008F PR BODY MASS INDEX (BMI) DOCUMENTED: ICD-10-PCS | Mod: CPTII,,, | Performed by: PODIATRIST

## 2023-03-01 PROCEDURE — 11042 WOUND DEBRIDEMENT: ICD-10-PCS | Mod: S$PBB,,, | Performed by: PODIATRIST

## 2023-03-01 PROCEDURE — 3008F BODY MASS INDEX DOCD: CPT | Mod: CPTII,,, | Performed by: PODIATRIST

## 2023-03-01 PROCEDURE — 1159F MED LIST DOCD IN RCRD: CPT | Mod: CPTII,,, | Performed by: PODIATRIST

## 2023-03-01 PROCEDURE — 11042 DBRDMT SUBQ TIS 1ST 20SQCM/<: CPT | Mod: PBBFAC,PN | Performed by: PODIATRIST

## 2023-03-01 NOTE — PROCEDURES
"Wound Debridement    Date/Time: 3/1/2023 10:00 AM  Performed by: Jacob Bernal DPM  Authorized by: Jacob Bernal DPM     Time out: Immediately prior to procedure a "time out" was called to verify the correct patient, procedure, equipment, support staff and site/side marked as required.    Consent Done?:  Yes (Verbal)    Preparation: Patient was prepped and draped in usual sterile fashion    Local anesthesia used?: No      Wound Details:    Location:  Left foot    Location:  Left 1st Metatarsal Head    Type of Debridement:  Excisional       Length (cm):  0.4       Area (sq cm):  0.16       Width (cm):  0.4       Percent Debrided (%):  100       Depth (cm):  0.3       Total Area Debrided (sq cm):  0.16    Depth of debridement:  Subcutaneous tissue    Tissue debrided:  Subcutaneous    Devitalized tissue debrided:  Fibrin    Instruments:  Blade    Bleeding:  Minimal  Hemostasis Achieved: Yes    Method Used:  Pressure  Patient tolerance:  Patient tolerated the procedure well with no immediate complications  "

## 2023-03-01 NOTE — PROGRESS NOTES
Subjective:      Patient ID: Keerthi Chase is a 47 y.o. female.    Chief Complaint: No chief complaint on file.      Patient presents to clinic for a 2 week wound check of the Lt. Foot.  Denies experiencing pain from the affected extremity with today's exam. Has kept the prior football dressing clean, dry, and intact for the past two weeks.  Notes ambulation to tolerance while in the postop shoe.  Relates having the orthotic modified to eliminate pressure at the site.  Denies experiencing N/V/F/C/D.  Denies any additional pedal complaints.      Past Medical History:   Diagnosis Date    Bell's palsy     Coronary artery disease     Pt states Dr. Virk said she did not have a blockage after a stress test.    Diabetes mellitus     GERD (gastroesophageal reflux disease)     Hypertension     Obesity        Past Surgical History:   Procedure Laterality Date     SECTION      x 2    CHOLECYSTECTOMY      ESOPHAGOGASTRODUODENOSCOPY      PERIPHERALLY INSERTED CENTRAL CATHETER INSERTION N/A 10/11/2021    Procedure: INSERTION, PICC;  Surgeon: PICC, [facility];  Location: [facility] ENDO;  Service: Cardiology;  Laterality: N/A;  Dr Aiken    PERIPHERALLY INSERTED CENTRAL CATHETER INSERTION N/A 2022    Procedure: INSERTION, PICC;  Surgeon: PICC, [facility];  Location: [facility] ENDO;  Service: Cardiology;  Laterality: N/A;  Dr Dubose    SESAMOIDECTOMY Left 2021    Procedure: SESAMOIDECTOMY;  Surgeon: Jacob Bernal DPM;  Location: [facility] OR;  Service: Podiatry;  Laterality: Left;       Family History   Problem Relation Age of Onset    Hypertension Mother        Social History     Socioeconomic History    Marital status:    Tobacco Use    Smoking status: Never    Smokeless tobacco: Never   Substance and Sexual Activity    Alcohol use: No    Drug use: No       Current Outpatient Medications   Medication Sig Dispense Refill    alprazolam (XANAX) 1 MG tablet Take 1 mg by mouth 2 (two) times daily as needed for Anxiety.       "ampicillin (PRINCIPEN) 500 MG capsule Take 1 capsule (500 mg total) by mouth every 12 (twelve) hours. 20 capsule 0    BASAGLAR KWIKPEN 100 unit/mL (3 mL) InPn pen 75 Units once daily.   3    BD INSULIN PEN NEEDLE UF MINI 31 gauge x 3/16" Ndle   2    chlorthalidone (HYGROTEN) 25 MG Tab Take 1 tablet (25 mg total) by mouth once daily. 30 tablet 11    hydrocodone-acetaminophen 10-325mg (NORCO)  mg Tab Take 1 tablet by mouth 3 (three) times daily as needed.      hydrocodone-acetaminophen 10-325mg (NORCO)  mg Tab Take 1 tablet by mouth every 8 (eight) hours as needed for Pain. 90 tablet 0    insulin detemir (LEVEMIR) 100 unit/mL injection Inject into the skin every evening.      insulin glargine,hum.rec.anlog (LANTUS SUBQ) Inject into the skin.      OZEMPIC 1 mg/dose (2 mg/1.5 mL) PnIj Inject 60 mg into the skin every 7 days.       sulfamethoxazole-trimethoprim 800-160mg (BACTRIM DS) 800-160 mg Tab Take 1 tablet by mouth 2 (two) times daily. 14 tablet 0    sulfamethoxazole-trimethoprim 800-160mg (BACTRIM DS) 800-160 mg Tab Take 1 tablet by mouth 2 (two) times daily. 14 tablet 0    traMADoL (ULTRAM) 50 mg tablet Take 1 tablet (50 mg total) by mouth every 6 (six) hours as needed for Pain. 28 tablet 0     No current facility-administered medications for this visit.       Review of patient's allergies indicates:   Allergen Reactions    Codeine Hives and Rash        Hemoglobin A1C   Date Value Ref Range Status   06/03/2022 10.5 (H) 0.0 - 5.6 % Final     Comment:     Reference Interval:  5.0 - 5.6 Normal   5.7 - 6.4 High Risk   > 6.5 Diabetic      Hgb A1c results are standardized based on the (NGSP) National   Glycohemoglobin Standardization Program.      Hemoglobin A1C levels are related to mean serum/plasma glucose   during the preceding 2-3 months.        09/23/2021 11.8 (H) 0.0 - 5.6 % Final     Comment:     Reference Interval:  5.0 - 5.6 Normal   5.7 - 6.4 High Risk   > 6.5 Diabetic      Hgb A1c results are " standardized based on the (NGSP) National   Glycohemoglobin Standardization Program.      Hemoglobin A1C levels are related to mean serum/plasma glucose   during the preceding 2-3 months.        2021 8.8 (H) 4.8 - 5.6 % Final     Comment:              Prediabetes: 5.7 - 6.4           Diabetes: >6.4           Glycemic control for adults with diabetes: <7.0         Past Medical History:   Diagnosis Date    Bell's palsy     Coronary artery disease     Pt states Dr. Virk said she did not have a blockage after a stress test.    Diabetes mellitus     GERD (gastroesophageal reflux disease)     Hypertension     Obesity        Past Surgical History:   Procedure Laterality Date     SECTION      x 2    CHOLECYSTECTOMY      ESOPHAGOGASTRODUODENOSCOPY      PERIPHERALLY INSERTED CENTRAL CATHETER INSERTION N/A 10/11/2021    Procedure: INSERTION, PICC;  Surgeon: PICC, [facility];  Location: [facility] ENDO;  Service: Cardiology;  Laterality: N/A;  Dr Aiken    PERIPHERALLY INSERTED CENTRAL CATHETER INSERTION N/A 2022    Procedure: INSERTION, PICC;  Surgeon: PICC, [facility];  Location: [facility] ENDO;  Service: Cardiology;  Laterality: N/A;  Dr Dubose    SESAMOIDECTOMY Left 2021    Procedure: SESAMOIDECTOMY;  Surgeon: Jacob Bernal DPM;  Location: [facility] OR;  Service: Podiatry;  Laterality: Left;       Family History   Problem Relation Age of Onset    Hypertension Mother        Social History     Socioeconomic History    Marital status:    Tobacco Use    Smoking status: Never    Smokeless tobacco: Never   Substance and Sexual Activity    Alcohol use: No    Drug use: No       Current Outpatient Medications   Medication Sig Dispense Refill    alprazolam (XANAX) 1 MG tablet Take 1 mg by mouth 2 (two) times daily as needed for Anxiety.      ampicillin (PRINCIPEN) 500 MG capsule Take 1 capsule (500 mg total) by mouth every 12 (twelve) hours. 20 capsule 0    BASAGLAR KWIKPEN 100 unit/mL (3 mL) InPn pen 75 Units once daily.   3  "   BD INSULIN PEN NEEDLE UF MINI 31 gauge x 3/16" Ndle   2    chlorthalidone (HYGROTEN) 25 MG Tab Take 1 tablet (25 mg total) by mouth once daily. 30 tablet 11    hydrocodone-acetaminophen 10-325mg (NORCO)  mg Tab Take 1 tablet by mouth 3 (three) times daily as needed.      hydrocodone-acetaminophen 10-325mg (NORCO)  mg Tab Take 1 tablet by mouth every 8 (eight) hours as needed for Pain. 90 tablet 0    insulin detemir (LEVEMIR) 100 unit/mL injection Inject into the skin every evening.      insulin glargine,hum.rec.anlog (LANTUS SUBQ) Inject into the skin.      OZEMPIC 1 mg/dose (2 mg/1.5 mL) PnIj Inject 60 mg into the skin every 7 days.       sulfamethoxazole-trimethoprim 800-160mg (BACTRIM DS) 800-160 mg Tab Take 1 tablet by mouth 2 (two) times daily. 14 tablet 0    sulfamethoxazole-trimethoprim 800-160mg (BACTRIM DS) 800-160 mg Tab Take 1 tablet by mouth 2 (two) times daily. 14 tablet 0    traMADoL (ULTRAM) 50 mg tablet Take 1 tablet (50 mg total) by mouth every 6 (six) hours as needed for Pain. 28 tablet 0     No current facility-administered medications for this visit.       Review of patient's allergies indicates:   Allergen Reactions    Codeine Hives and Rash         Review of Systems   Constitutional: Negative for chills and fever.   Skin:  Negative for color change and nail changes.   Gastrointestinal:  Negative for nausea and vomiting.   Neurological:  Positive for numbness.   Psychiatric/Behavioral:  Negative for altered mental status.          Objective:      Physical Exam  Constitutional:       Appearance: Normal appearance. She is not ill-appearing.   Cardiovascular:      Pulses:           Dorsalis pedis pulses are 2+ on the right side and 2+ on the left side.        Posterior tibial pulses are 2+ on the right side and 2+ on the left side.      Comments: CFT is < 3 seconds bilateral.  Pedal hair growth is decreased bilateral.  No lower extremity edema noted bilateral.  Toes are warm to touch " bilateral.    Musculoskeletal:         General: No signs of injury.      Right lower leg: No edema.      Left lower leg: No edema.      Comments: Muscle strength 5/5 in all muscle groups bilateral.  No tenderness nor crepitation with ROM of foot/ankle joints bilateral.  No tenderness with palpation of bilateral foot and ankle.   Slight dorsal contracture of the Lt. Hallux.     Skin:     General: Skin is warm and dry.      Capillary Refill: Capillary refill takes 2 to 3 seconds.      Findings: Wound present. No bruising, ecchymosis, erythema, signs of injury, laceration, lesion, petechiae or rash.      Comments: Location: Open wound noted to the Lt. Sub 1st met head.  Base: Down to subQ and comprised of fibrin.    Drainage: None  Malinda wound: Devoid of erythema, edema, fluctuance, purulence, and malodor.   Pre-debridement measurement: 0.4 x 0.4 x 0.3cm  Post-debridement measurement: 0.6 x 0.6 x 0.3cm     Neurological:      Mental Status: She is alert.      Sensory: Sensory deficit present.      Motor: No weakness.      Comments: Decreased protective sensation noted bilateral.  Light touch is absent bilateral.               Assessment:       Encounter Diagnoses   Name Primary?    Diabetic ulcer of other part of left foot associated with type 2 diabetes mellitus, with fat layer exposed Yes    Diabetic polyneuropathy associated with type 2 diabetes mellitus                    Plan:       Diagnoses and all orders for this visit:    Diabetic ulcer of other part of left foot associated with type 2 diabetes mellitus, with fat layer exposed    Diabetic polyneuropathy associated with type 2 diabetes mellitus                I counseled the patient on her conditions, their implications and medical management.    Performed a selective excisional debridement of the Lt. Foot.  See attached procedure note.      The wound base was covered with silver alginate, offloaded with felt with an aperture, and a football dressing was  applied.    Advised to keep the dressing CDI x 2 weeks.    Advised to rest and elevate the affected extremity.    Instructed to minimize weight bearing to facilitate wound healing.    Advised to ambulate only in the postoperative shoe/boot.    RTC in 2 weeks for follow up.    Jacbo Bernal DPM

## 2023-03-15 ENCOUNTER — OFFICE VISIT (OUTPATIENT)
Dept: PODIATRY | Facility: CLINIC | Age: 48
End: 2023-03-15
Payer: MEDICAID

## 2023-03-15 VITALS — HEIGHT: 68 IN | WEIGHT: 270.06 LBS | BODY MASS INDEX: 40.93 KG/M2

## 2023-03-15 DIAGNOSIS — E11.42 DIABETIC POLYNEUROPATHY ASSOCIATED WITH TYPE 2 DIABETES MELLITUS: ICD-10-CM

## 2023-03-15 DIAGNOSIS — E11.621 DIABETIC ULCER OF OTHER PART OF LEFT FOOT ASSOCIATED WITH TYPE 2 DIABETES MELLITUS, WITH FAT LAYER EXPOSED: Primary | ICD-10-CM

## 2023-03-15 DIAGNOSIS — L97.522 DIABETIC ULCER OF OTHER PART OF LEFT FOOT ASSOCIATED WITH TYPE 2 DIABETES MELLITUS, WITH FAT LAYER EXPOSED: Primary | ICD-10-CM

## 2023-03-15 PROCEDURE — 99499 NO LOS: ICD-10-PCS | Mod: S$PBB,,, | Performed by: PODIATRIST

## 2023-03-15 PROCEDURE — 1159F MED LIST DOCD IN RCRD: CPT | Mod: CPTII,,, | Performed by: PODIATRIST

## 2023-03-15 PROCEDURE — 99212 OFFICE O/P EST SF 10 MIN: CPT | Mod: PBBFAC,PN,25 | Performed by: PODIATRIST

## 2023-03-15 PROCEDURE — 3008F PR BODY MASS INDEX (BMI) DOCUMENTED: ICD-10-PCS | Mod: CPTII,,, | Performed by: PODIATRIST

## 2023-03-15 PROCEDURE — 99499 UNLISTED E&M SERVICE: CPT | Mod: S$PBB,,, | Performed by: PODIATRIST

## 2023-03-15 PROCEDURE — 1159F PR MEDICATION LIST DOCUMENTED IN MEDICAL RECORD: ICD-10-PCS | Mod: CPTII,,, | Performed by: PODIATRIST

## 2023-03-15 PROCEDURE — 99999 PR PBB SHADOW E&M-EST. PATIENT-LVL II: ICD-10-PCS | Mod: PBBFAC,,, | Performed by: PODIATRIST

## 2023-03-15 PROCEDURE — 11042 DBRDMT SUBQ TIS 1ST 20SQCM/<: CPT | Mod: PBBFAC,PN | Performed by: PODIATRIST

## 2023-03-15 PROCEDURE — 99999 PR PBB SHADOW E&M-EST. PATIENT-LVL II: CPT | Mod: PBBFAC,,, | Performed by: PODIATRIST

## 2023-03-15 PROCEDURE — 11042 WOUND DEBRIDEMENT: ICD-10-PCS | Mod: S$PBB,,, | Performed by: PODIATRIST

## 2023-03-15 PROCEDURE — 3008F BODY MASS INDEX DOCD: CPT | Mod: CPTII,,, | Performed by: PODIATRIST

## 2023-03-15 NOTE — PROGRESS NOTES
Subjective:      Patient ID: Keerthi Chase is a 47 y.o. female.    Chief Complaint: Diabetes Mellitus and Wound Care      Patient presents to clinic for a 2 week wound check of the Lt. Foot.  Denies experiencing pain from the affected extremity with today's exam. Has kept the prior football dressing clean, dry, and intact for the past two weeks.  Notes ambulation to tolerance while in the postop shoe.  Relates having the orthotic modified to eliminate pressure at the site.  Denies experiencing N/V/F/C/D.  Denies any additional pedal complaints.      Past Medical History:   Diagnosis Date    Bell's palsy     Coronary artery disease     Pt states Dr. Virk said she did not have a blockage after a stress test.    Diabetes mellitus     GERD (gastroesophageal reflux disease)     Hypertension     Obesity        Past Surgical History:   Procedure Laterality Date     SECTION      x 2    CHOLECYSTECTOMY      ESOPHAGOGASTRODUODENOSCOPY      PERIPHERALLY INSERTED CENTRAL CATHETER INSERTION N/A 10/11/2021    Procedure: INSERTION, PICC;  Surgeon: PICC, [facility];  Location: [facility] ENDO;  Service: Cardiology;  Laterality: N/A;  Dr Aiken    PERIPHERALLY INSERTED CENTRAL CATHETER INSERTION N/A 2022    Procedure: INSERTION, PICC;  Surgeon: PICC, [facility];  Location: [facility] ENDO;  Service: Cardiology;  Laterality: N/A;  Dr Dubose    SESAMOIDECTOMY Left 2021    Procedure: SESAMOIDECTOMY;  Surgeon: Jacob Bernal DPM;  Location: [facility] OR;  Service: Podiatry;  Laterality: Left;       Family History   Problem Relation Age of Onset    Hypertension Mother        Social History     Socioeconomic History    Marital status:    Tobacco Use    Smoking status: Never    Smokeless tobacco: Never   Substance and Sexual Activity    Alcohol use: No    Drug use: No       Current Outpatient Medications   Medication Sig Dispense Refill    alprazolam (XANAX) 1 MG tablet Take 1 mg by mouth 2 (two) times daily as needed  "for Anxiety.      ampicillin (PRINCIPEN) 500 MG capsule Take 1 capsule (500 mg total) by mouth every 12 (twelve) hours. 20 capsule 0    BASAGLAR KWIKPEN 100 unit/mL (3 mL) InPn pen 75 Units once daily.   3    BD INSULIN PEN NEEDLE UF MINI 31 gauge x 3/16" Ndle   2    hydrocodone-acetaminophen 10-325mg (NORCO)  mg Tab Take 1 tablet by mouth 3 (three) times daily as needed.      hydrocodone-acetaminophen 10-325mg (NORCO)  mg Tab Take 1 tablet by mouth every 8 (eight) hours as needed for Pain. 90 tablet 0    insulin detemir (LEVEMIR) 100 unit/mL injection Inject into the skin every evening.      insulin glargine,hum.rec.anlog (LANTUS SUBQ) Inject into the skin.      OZEMPIC 1 mg/dose (2 mg/1.5 mL) PnIj Inject 60 mg into the skin every 7 days.       sulfamethoxazole-trimethoprim 800-160mg (BACTRIM DS) 800-160 mg Tab Take 1 tablet by mouth 2 (two) times daily. 14 tablet 0    sulfamethoxazole-trimethoprim 800-160mg (BACTRIM DS) 800-160 mg Tab Take 1 tablet by mouth 2 (two) times daily. 14 tablet 0    traMADoL (ULTRAM) 50 mg tablet Take 1 tablet (50 mg total) by mouth every 6 (six) hours as needed for Pain. 28 tablet 0    chlorthalidone (HYGROTEN) 25 MG Tab Take 1 tablet (25 mg total) by mouth once daily. 30 tablet 11     No current facility-administered medications for this visit.       Review of patient's allergies indicates:   Allergen Reactions    Codeine Hives and Rash        Hemoglobin A1C   Date Value Ref Range Status   06/03/2022 10.5 (H) 0.0 - 5.6 % Final     Comment:     Reference Interval:  5.0 - 5.6 Normal   5.7 - 6.4 High Risk   > 6.5 Diabetic      Hgb A1c results are standardized based on the (NGSP) National   Glycohemoglobin Standardization Program.      Hemoglobin A1C levels are related to mean serum/plasma glucose   during the preceding 2-3 months.        09/23/2021 11.8 (H) 0.0 - 5.6 % Final     Comment:     Reference Interval:  5.0 - 5.6 Normal   5.7 - 6.4 High Risk   > 6.5 " Diabetic      Hgb A1c results are standardized based on the (NGSP) National   Glycohemoglobin Standardization Program.      Hemoglobin A1C levels are related to mean serum/plasma glucose   during the preceding 2-3 months.        2021 8.8 (H) 4.8 - 5.6 % Final     Comment:              Prediabetes: 5.7 - 6.4           Diabetes: >6.4           Glycemic control for adults with diabetes: <7.0         Past Medical History:   Diagnosis Date    Bell's palsy     Coronary artery disease     Pt states Dr. Virk said she did not have a blockage after a stress test.    Diabetes mellitus     GERD (gastroesophageal reflux disease)     Hypertension     Obesity        Past Surgical History:   Procedure Laterality Date     SECTION      x 2    CHOLECYSTECTOMY      ESOPHAGOGASTRODUODENOSCOPY      PERIPHERALLY INSERTED CENTRAL CATHETER INSERTION N/A 10/11/2021    Procedure: INSERTION, PICC;  Surgeon: PICC, [facility];  Location: [facility] ENDO;  Service: Cardiology;  Laterality: N/A;  Dr Aiken    PERIPHERALLY INSERTED CENTRAL CATHETER INSERTION N/A 2022    Procedure: INSERTION, PICC;  Surgeon: PICC, [facility];  Location: [facility] ENDO;  Service: Cardiology;  Laterality: N/A;  Dr Dubose    SESAMOIDECTOMY Left 2021    Procedure: SESAMOIDECTOMY;  Surgeon: Jacob Bernal DPM;  Location: [facility] OR;  Service: Podiatry;  Laterality: Left;       Family History   Problem Relation Age of Onset    Hypertension Mother        Social History     Socioeconomic History    Marital status:    Tobacco Use    Smoking status: Never    Smokeless tobacco: Never   Substance and Sexual Activity    Alcohol use: No    Drug use: No       Current Outpatient Medications   Medication Sig Dispense Refill    alprazolam (XANAX) 1 MG tablet Take 1 mg by mouth 2 (two) times daily as needed for Anxiety.      ampicillin (PRINCIPEN) 500 MG capsule Take 1 capsule (500 mg total) by mouth every 12 (twelve) hours. 20 capsule 0    BASAGLAR KWIKPEN  "100 unit/mL (3 mL) InPn pen 75 Units once daily.   3    BD INSULIN PEN NEEDLE UF MINI 31 gauge x 3/16" Ndle   2    hydrocodone-acetaminophen 10-325mg (NORCO)  mg Tab Take 1 tablet by mouth 3 (three) times daily as needed.      hydrocodone-acetaminophen 10-325mg (NORCO)  mg Tab Take 1 tablet by mouth every 8 (eight) hours as needed for Pain. 90 tablet 0    insulin detemir (LEVEMIR) 100 unit/mL injection Inject into the skin every evening.      insulin glargine,hum.rec.anlog (LANTUS SUBQ) Inject into the skin.      OZEMPIC 1 mg/dose (2 mg/1.5 mL) PnIj Inject 60 mg into the skin every 7 days.       sulfamethoxazole-trimethoprim 800-160mg (BACTRIM DS) 800-160 mg Tab Take 1 tablet by mouth 2 (two) times daily. 14 tablet 0    sulfamethoxazole-trimethoprim 800-160mg (BACTRIM DS) 800-160 mg Tab Take 1 tablet by mouth 2 (two) times daily. 14 tablet 0    traMADoL (ULTRAM) 50 mg tablet Take 1 tablet (50 mg total) by mouth every 6 (six) hours as needed for Pain. 28 tablet 0    chlorthalidone (HYGROTEN) 25 MG Tab Take 1 tablet (25 mg total) by mouth once daily. 30 tablet 11     No current facility-administered medications for this visit.       Review of patient's allergies indicates:   Allergen Reactions    Codeine Hives and Rash         Review of Systems   Constitutional: Negative for chills and fever.   Skin:  Negative for color change and nail changes.   Gastrointestinal:  Negative for nausea and vomiting.   Neurological:  Positive for numbness.   Psychiatric/Behavioral:  Negative for altered mental status.          Objective:      Physical Exam  Constitutional:       Appearance: Normal appearance. She is not ill-appearing.   Cardiovascular:      Pulses:           Dorsalis pedis pulses are 2+ on the right side and 2+ on the left side.        Posterior tibial pulses are 2+ on the right side and 2+ on the left side.      Comments: CFT is < 3 seconds bilateral.  Pedal hair growth is decreased bilateral.  " No lower extremity edema noted bilateral.  Toes are warm to touch bilateral.    Musculoskeletal:         General: No signs of injury.      Right lower leg: No edema.      Left lower leg: No edema.      Comments: Muscle strength 5/5 in all muscle groups bilateral.  No tenderness nor crepitation with ROM of foot/ankle joints bilateral.  No tenderness with palpation of bilateral foot and ankle.   Slight dorsal contracture of the Lt. Hallux.     Skin:     General: Skin is warm and dry.      Capillary Refill: Capillary refill takes 2 to 3 seconds.      Findings: Wound present. No bruising, ecchymosis, erythema, signs of injury, laceration, lesion, petechiae or rash.      Comments: Location: Open wound noted to the Lt. Sub 1st met head.  Base: Down to subQ and comprised of fibrin.    Drainage: None  Malinda wound: Devoid of erythema, edema, fluctuance, purulence, and malodor.   Pre-debridement measurement: 0.2 x 0.2 x 0.3cm  Post-debridement measurement: 0.4 x 0.4 x 0.3cm     Neurological:      Mental Status: She is alert.      Sensory: Sensory deficit present.      Motor: No weakness.      Comments: Decreased protective sensation noted bilateral.  Light touch is absent bilateral.               Assessment:       Encounter Diagnoses   Name Primary?    Diabetic ulcer of other part of left foot associated with type 2 diabetes mellitus, with fat layer exposed Yes    Diabetic polyneuropathy associated with type 2 diabetes mellitus                    Plan:       Keerthi was seen today for diabetes mellitus and wound care.    Diagnoses and all orders for this visit:    Diabetic ulcer of other part of left foot associated with type 2 diabetes mellitus, with fat layer exposed    Diabetic polyneuropathy associated with type 2 diabetes mellitus      I counseled the patient on her conditions, their implications and medical management.    Performed a selective excisional debridement of the Lt. Foot.  See attached procedure note.       The wound base was covered with silver alginate, offloaded with felt with an aperture, and a football dressing was applied.    Advised to keep the dressing CDI x 2 weeks.    Advised to rest and elevate the affected extremity.    Instructed to minimize weight bearing to facilitate wound healing.    Advised to ambulate only in the postoperative shoe/boot.    RTC in 2 weeks for follow up.    Jacob Bernal DPM

## 2023-03-15 NOTE — PROCEDURES
"Wound Debridement    Date/Time: 3/15/2023 9:20 AM  Performed by: Jacob Bernal DPM  Authorized by: Jacob Bernal DPM     Time out: Immediately prior to procedure a "time out" was called to verify the correct patient, procedure, equipment, support staff and site/side marked as required.    Consent Done?:  Yes (Verbal)  Local anesthesia used?: No      Wound Details:    Location:  Left foot    Location:  Left 1st Metatarsal Head    Type of Debridement:  Excisional       Length (cm):  0.2       Area (sq cm):  0.04       Width (cm):  0.2       Percent Debrided (%):  100       Depth (cm):  0.3       Total Area Debrided (sq cm):  0.04    Depth of debridement:  Subcutaneous tissue    Tissue debrided:  Subcutaneous    Devitalized tissue debrided:  Fibrin    Instruments:  Blade  Bleeding:  Minimal  Patient tolerance:  Patient tolerated the procedure well with no immediate complications  "

## 2023-03-30 ENCOUNTER — TELEPHONE (OUTPATIENT)
Dept: PODIATRY | Facility: CLINIC | Age: 48
End: 2023-03-30
Payer: MEDICAID

## 2023-03-30 NOTE — TELEPHONE ENCOUNTER
----- Message from Ruddy Holly sent at 3/30/2023  1:53 PM CDT -----      Name of Who is Calling:PT          What is the request in detail:PT is requesting a call back to discuss her not being able to make the appointment scheduled for tomorrow. Please be Advised!          Can the clinic reply by [facility]:no          What Number to Call Back if not in [phone]

## 2023-04-03 ENCOUNTER — TELEPHONE (OUTPATIENT)
Dept: PODIATRY | Facility: CLINIC | Age: 48
End: 2023-04-03
Payer: MEDICAID

## 2023-04-03 NOTE — TELEPHONE ENCOUNTER
----- Message from Gini Mae sent at 4/3/2023  8:07 AM CDT -----  Type: Needs Medical Advice  Who Called:  pt  Best Call Back Number: [phone]    Additional Information: Pt's appt was canceled due to Doctor being in surgery and she would like an appt ASAP. Please call back to advise, thanks!

## 2023-04-04 ENCOUNTER — OFFICE VISIT (OUTPATIENT)
Dept: PODIATRY | Facility: CLINIC | Age: 48
End: 2023-04-04
Payer: MEDICAID

## 2023-04-04 VITALS — WEIGHT: 270.06 LBS | BODY MASS INDEX: 40.93 KG/M2 | HEIGHT: 68 IN

## 2023-04-04 DIAGNOSIS — E11.42 DIABETIC POLYNEUROPATHY ASSOCIATED WITH TYPE 2 DIABETES MELLITUS: ICD-10-CM

## 2023-04-04 DIAGNOSIS — L97.521 DIABETIC ULCER OF OTHER PART OF LEFT FOOT ASSOCIATED WITH TYPE 2 DIABETES MELLITUS, LIMITED TO BREAKDOWN OF SKIN: Primary | ICD-10-CM

## 2023-04-04 DIAGNOSIS — E11.621 DIABETIC ULCER OF OTHER PART OF LEFT FOOT ASSOCIATED WITH TYPE 2 DIABETES MELLITUS, LIMITED TO BREAKDOWN OF SKIN: Primary | ICD-10-CM

## 2023-04-04 PROCEDURE — 99213 OFFICE O/P EST LOW 20 MIN: CPT | Mod: PBBFAC,PN,25 | Performed by: PODIATRIST

## 2023-04-04 PROCEDURE — 99499 UNLISTED E&M SERVICE: CPT | Mod: S$PBB,,, | Performed by: PODIATRIST

## 2023-04-04 PROCEDURE — 99999 PR PBB SHADOW E&M-EST. PATIENT-LVL III: ICD-10-PCS | Mod: PBBFAC,,, | Performed by: PODIATRIST

## 2023-04-04 PROCEDURE — 97597 WOUND DEBRIDEMENT: ICD-10-PCS | Mod: S$PBB,,, | Performed by: PODIATRIST

## 2023-04-04 PROCEDURE — 3008F BODY MASS INDEX DOCD: CPT | Mod: CPTII,,, | Performed by: PODIATRIST

## 2023-04-04 PROCEDURE — 1159F PR MEDICATION LIST DOCUMENTED IN MEDICAL RECORD: ICD-10-PCS | Mod: CPTII,,, | Performed by: PODIATRIST

## 2023-04-04 PROCEDURE — 1159F MED LIST DOCD IN RCRD: CPT | Mod: CPTII,,, | Performed by: PODIATRIST

## 2023-04-04 PROCEDURE — 99499 NO LOS: ICD-10-PCS | Mod: S$PBB,,, | Performed by: PODIATRIST

## 2023-04-04 PROCEDURE — 97597 DBRDMT OPN WND 1ST 20 CM/<: CPT | Mod: PBBFAC,PN | Performed by: PODIATRIST

## 2023-04-04 PROCEDURE — 3008F PR BODY MASS INDEX (BMI) DOCUMENTED: ICD-10-PCS | Mod: CPTII,,, | Performed by: PODIATRIST

## 2023-04-04 PROCEDURE — 99999 PR PBB SHADOW E&M-EST. PATIENT-LVL III: CPT | Mod: PBBFAC,,, | Performed by: PODIATRIST

## 2023-04-04 NOTE — PROGRESS NOTES
Subjective:      Patient ID: Keerthi Chase is a 47 y.o. female.    Chief Complaint: Wound Care      Patient presents to clinic for a 2.5 week wound check of the Lt. Foot.  Denies experiencing pain from the affected extremity with today's exam. Has kept the prior football dressing clean, dry, and intact.  Notes ambulation to tolerance while in the postop shoe.  Denies experiencing N/V/F/C/D.  Denies any additional pedal complaints.      Past Medical History:   Diagnosis Date    Bell's palsy     Coronary artery disease     Pt states Dr. Virk said she did not have a blockage after a stress test.    Diabetes mellitus     GERD (gastroesophageal reflux disease)     Hypertension     Obesity        Past Surgical History:   Procedure Laterality Date     SECTION      x 2    CHOLECYSTECTOMY      ESOPHAGOGASTRODUODENOSCOPY      PERIPHERALLY INSERTED CENTRAL CATHETER INSERTION N/A 10/11/2021    Procedure: INSERTION, PICC;  Surgeon: PICC, [facility];  Location: [facility] ENDO;  Service: Cardiology;  Laterality: N/A;  Dr Aiken    PERIPHERALLY INSERTED CENTRAL CATHETER INSERTION N/A 2022    Procedure: INSERTION, PICC;  Surgeon: PICC, [facility];  Location: [facility] ENDO;  Service: Cardiology;  Laterality: N/A;  Dr Dubose    SESAMOIDECTOMY Left 2021    Procedure: SESAMOIDECTOMY;  Surgeon: Jacob Bernal DPM;  Location: [facility] OR;  Service: Podiatry;  Laterality: Left;       Family History   Problem Relation Age of Onset    Hypertension Mother        Social History     Socioeconomic History    Marital status:    Tobacco Use    Smoking status: Never    Smokeless tobacco: Never   Substance and Sexual Activity    Alcohol use: No    Drug use: No       Current Outpatient Medications   Medication Sig Dispense Refill    alprazolam (XANAX) 1 MG tablet Take 1 mg by mouth 2 (two) times daily as needed for Anxiety.      ampicillin (PRINCIPEN) 500 MG capsule Take 1 capsule (500 mg total) by mouth every 12 (twelve) hours. 20 capsule 0     "BASAGLAR KWIKPEN 100 unit/mL (3 mL) InPn pen 75 Units once daily.   3    BD INSULIN PEN NEEDLE UF MINI 31 gauge x 3/16" Ndle   2    hydrocodone-acetaminophen 10-325mg (NORCO)  mg Tab Take 1 tablet by mouth 3 (three) times daily as needed.      hydrocodone-acetaminophen 10-325mg (NORCO)  mg Tab Take 1 tablet by mouth every 8 (eight) hours as needed for Pain. 90 tablet 0    insulin detemir (LEVEMIR) 100 unit/mL injection Inject into the skin every evening.      insulin glargine,hum.rec.anlog (LANTUS SUBQ) Inject into the skin.      OZEMPIC 1 mg/dose (2 mg/1.5 mL) PnIj Inject 60 mg into the skin every 7 days.       sulfamethoxazole-trimethoprim 800-160mg (BACTRIM DS) 800-160 mg Tab Take 1 tablet by mouth 2 (two) times daily. 14 tablet 0    sulfamethoxazole-trimethoprim 800-160mg (BACTRIM DS) 800-160 mg Tab Take 1 tablet by mouth 2 (two) times daily. 14 tablet 0    traMADoL (ULTRAM) 50 mg tablet Take 1 tablet (50 mg total) by mouth every 6 (six) hours as needed for Pain. 28 tablet 0    chlorthalidone (HYGROTEN) 25 MG Tab Take 1 tablet (25 mg total) by mouth once daily. 30 tablet 11     No current facility-administered medications for this visit.       Review of patient's allergies indicates:   Allergen Reactions    Codeine Hives and Rash        Hemoglobin A1C   Date Value Ref Range Status   06/03/2022 10.5 (H) 0.0 - 5.6 % Final     Comment:     Reference Interval:  5.0 - 5.6 Normal   5.7 - 6.4 High Risk   > 6.5 Diabetic      Hgb A1c results are standardized based on the (NGSP) National   Glycohemoglobin Standardization Program.      Hemoglobin A1C levels are related to mean serum/plasma glucose   during the preceding 2-3 months.        09/23/2021 11.8 (H) 0.0 - 5.6 % Final     Comment:     Reference Interval:  5.0 - 5.6 Normal   5.7 - 6.4 High Risk   > 6.5 Diabetic      Hgb A1c results are standardized based on the (NGSP) National   Glycohemoglobin Standardization Program.      Hemoglobin A1C levels are " "related to mean serum/plasma glucose   during the preceding 2-3 months.        2021 8.8 (H) 4.8 - 5.6 % Final     Comment:              Prediabetes: 5.7 - 6.4           Diabetes: >6.4           Glycemic control for adults with diabetes: <7.0         Past Medical History:   Diagnosis Date    Bell's palsy     Coronary artery disease     Pt states Dr. Virk said she did not have a blockage after a stress test.    Diabetes mellitus     GERD (gastroesophageal reflux disease)     Hypertension     Obesity        Past Surgical History:   Procedure Laterality Date     SECTION      x 2    CHOLECYSTECTOMY      ESOPHAGOGASTRODUODENOSCOPY      PERIPHERALLY INSERTED CENTRAL CATHETER INSERTION N/A 10/11/2021    Procedure: INSERTION, PICC;  Surgeon: PICC, [facility];  [facility];  Service: Cardiology;  Laterality: N/A;  Dr Aiken    PERIPHERALLY INSERTED CENTRAL CATHETER INSERTION N/A 2022    Procedure: INSERTION, PICC;  Surgeon: PICC, [facility];  Location: [facility] END[facility];  Service: Cardiology;  Laterality: N/A;  Dr Dubose    SESAMOIDECTOMY Left 2021    Procedure: SESAMOIDECTOMY;  Surgeon: Jacob Bernal DPM;  Location: [facility] OR;  Service: Podiatry;  Laterality: Left;       Family History   Problem Relation Age of Onset    Hypertension Mother        Social History     Socioeconomic History    Marital status:    Tobacco Use    Smoking status: Never    Smokeless tobacco: Never   Substance and Sexual Activity    Alcohol use: No    Drug use: No       Current Outpatient Medications   Medication Sig Dispense Refill    alprazolam (XANAX) 1 MG tablet Take 1 mg by mouth 2 (two) times daily as needed for Anxiety.      ampicillin (PRINCIPEN) 500 MG capsule Take 1 capsule (500 mg total) by mouth every 12 (twelve) hours. 20 capsule 0    BASAGLAR KWIKPEN 100 unit/mL (3 mL) InPn pen 75 Units once daily.   3    BD INSULIN PEN NEEDLE UF MINI 31 gauge x 3/16" Ndle   2    hydrocodone-acetaminophen 10-325mg (NORCO)  mg " Tab Take 1 tablet by mouth 3 (three) times daily as needed.      hydrocodone-acetaminophen 10-325mg (NORCO)  mg Tab Take 1 tablet by mouth every 8 (eight) hours as needed for Pain. 90 tablet 0    insulin detemir (LEVEMIR) 100 unit/mL injection Inject into the skin every evening.      insulin glargine,hum.rec.anlog (LANTUS SUBQ) Inject into the skin.      OZEMPIC 1 mg/dose (2 mg/1.5 mL) PnIj Inject 60 mg into the skin every 7 days.       sulfamethoxazole-trimethoprim 800-160mg (BACTRIM DS) 800-160 mg Tab Take 1 tablet by mouth 2 (two) times daily. 14 tablet 0    sulfamethoxazole-trimethoprim 800-160mg (BACTRIM DS) 800-160 mg Tab Take 1 tablet by mouth 2 (two) times daily. 14 tablet 0    traMADoL (ULTRAM) 50 mg tablet Take 1 tablet (50 mg total) by mouth every 6 (six) hours as needed for Pain. 28 tablet 0    chlorthalidone (HYGROTEN) 25 MG Tab Take 1 tablet (25 mg total) by mouth once daily. 30 tablet 11     No current facility-administered medications for this visit.       Review of patient's allergies indicates:   Allergen Reactions    Codeine Hives and Rash         Review of Systems   Constitutional: Negative for chills and fever.   Skin:  Negative for color change and nail changes.   Gastrointestinal:  Negative for nausea and vomiting.   Neurological:  Positive for numbness.   Psychiatric/Behavioral:  Negative for altered mental status.          Objective:      Physical Exam  Constitutional:       Appearance: Normal appearance. She is not ill-appearing.   Cardiovascular:      Pulses:           Dorsalis pedis pulses are 2+ on the right side and 2+ on the left side.        Posterior tibial pulses are 2+ on the right side and 2+ on the left side.      Comments: CFT is < 3 seconds bilateral.  Pedal hair growth is decreased bilateral.  No lower extremity edema noted bilateral.  Toes are warm to touch bilateral.    Musculoskeletal:         General: No signs of injury.      Right lower leg: No edema.      Left  lower leg: No edema.      Comments: Muscle strength 5/5 in all muscle groups bilateral.  No tenderness nor crepitation with ROM of foot/ankle joints bilateral.  No tenderness with palpation of bilateral foot and ankle.   Slight dorsal contracture of the Lt. Hallux.     Skin:     General: Skin is warm and dry.      Capillary Refill: Capillary refill takes 2 to 3 seconds.      Findings: Wound present. No bruising, ecchymosis, erythema, signs of injury, laceration, lesion, petechiae or rash.      Comments: Location: Open wound noted to the Lt. Sub 1st met head.  Base: Down to dermis and comprised of fibrin.    Drainage: None  [place] wound: Devoid of erythema, edema, fluctuance, purulence, and malodor.   Pre-debridement measurement: 0.1 x 0.1 x 0.1cm  Post-debridement measurement: 0.3 x 0.3 x 0.1cm     Neurological:      Mental Status: She is alert.      Sensory: Sensory deficit present.      Motor: No weakness.      Comments: Decreased protective sensation noted bilateral.  Light touch is absent bilateral.               Assessment:       Encounter Diagnoses   Name Primary?    Diabetic ulcer of other part of left foot associated with type 2 diabetes mellitus, limited to breakdown of skin Yes    Diabetic polyneuropathy associated with type 2 diabetes mellitus                      Plan:       Keerthi was seen today for wound care.    Diagnoses and all orders for this visit:    Diabetic ulcer of other part of left foot associated with type 2 diabetes mellitus, limited to breakdown of skin  -     Wound Debridement    Diabetic polyneuropathy associated with type 2 diabetes mellitus        I counseled the patient on her conditions, their implications and medical management.    Performed a selective excisional debridement of the Lt. Foot.  See attached procedure note.      The wound base was covered with silver alginate, offloaded with felt with an aperture, and a football dressing was applied.    Advised to keep the dressing  CDI x 1 week.  May remove the dressing on her own, as I anticipate full healing in one week.      Advised to rest and elevate the affected extremity.    Instructed to minimize weight bearing to facilitate wound healing.    Advised to ambulate only in the postoperative shoe/boot.    May transition back into casual shoe gear with the orthotic in one week.    Advised to keep the site protected with a padded bandage once daily, starting next week, for a total of two weeks.    Recommend keeping the site dry while showering each day x 3 weeks.    RTC in 3 weeks for a follow up.      Jacob Bernal DPM

## 2023-04-04 NOTE — PROCEDURES
"Wound Debridement    Date/Time: 4/4/2023 2:00 PM  Performed by: Jacob Bernal DPM  Authorized by: Jacob Bernal DPM     Time out: Immediately prior to procedure a "time out" was called to verify the correct patient, procedure, equipment, support staff and site/side marked as required.    Consent Done?:  Yes (Verbal)  Local anesthesia used?: No      Wound Details:    Location:  Left foot    Location:  Left 1st Metatarsal Head    Type of Debridement:  Excisional       Length (cm):  0.1       Area (sq cm):  0.01       Width (cm):  0.1       Percent Debrided (%):  100       Depth (cm):  0.1       Total Area Debrided (sq cm):  0.01    Depth of debridement:  Epidermis/Dermis    Tissue debrided:  Dermis    Devitalized tissue debrided:  Fibrin    Instruments:  Blade  Bleeding:  Minimal  Hemostasis Achieved: Yes  Method Used:  Pressure  Patient tolerance:  Patient tolerated the procedure well with no immediate complications  "

## 2023-05-03 ENCOUNTER — OFFICE VISIT (OUTPATIENT)
Dept: PODIATRY | Facility: CLINIC | Age: 48
End: 2023-05-03
Payer: MEDICAID

## 2023-05-03 VITALS — HEIGHT: 68 IN | WEIGHT: 270 LBS | BODY MASS INDEX: 40.92 KG/M2

## 2023-05-03 DIAGNOSIS — Z87.2 HEALED ULCER OF LEFT FOOT: Primary | ICD-10-CM

## 2023-05-03 DIAGNOSIS — E11.42 DIABETIC POLYNEUROPATHY ASSOCIATED WITH TYPE 2 DIABETES MELLITUS: ICD-10-CM

## 2023-05-03 PROCEDURE — 1159F PR MEDICATION LIST DOCUMENTED IN MEDICAL RECORD: ICD-10-PCS | Mod: CPTII,,, | Performed by: PODIATRIST

## 2023-05-03 PROCEDURE — 99999 PR PBB SHADOW E&M-EST. PATIENT-LVL III: ICD-10-PCS | Mod: PBBFAC,,, | Performed by: PODIATRIST

## 2023-05-03 PROCEDURE — 3008F PR BODY MASS INDEX (BMI) DOCUMENTED: ICD-10-PCS | Mod: CPTII,,, | Performed by: PODIATRIST

## 2023-05-03 PROCEDURE — 99212 PR OFFICE/OUTPT VISIT, EST, LEVL II, 10-19 MIN: ICD-10-PCS | Mod: S$PBB,,, | Performed by: PODIATRIST

## 2023-05-03 PROCEDURE — 3008F BODY MASS INDEX DOCD: CPT | Mod: CPTII,,, | Performed by: PODIATRIST

## 2023-05-03 PROCEDURE — 99212 OFFICE O/P EST SF 10 MIN: CPT | Mod: S$PBB,,, | Performed by: PODIATRIST

## 2023-05-03 PROCEDURE — 99213 OFFICE O/P EST LOW 20 MIN: CPT | Mod: PBBFAC,PN | Performed by: PODIATRIST

## 2023-05-03 PROCEDURE — 99999 PR PBB SHADOW E&M-EST. PATIENT-LVL III: CPT | Mod: PBBFAC,,, | Performed by: PODIATRIST

## 2023-05-03 PROCEDURE — 1159F MED LIST DOCD IN RCRD: CPT | Mod: CPTII,,, | Performed by: PODIATRIST

## 2023-05-03 RX ORDER — SEMAGLUTIDE 1.34 MG/ML
1 INJECTION, SOLUTION SUBCUTANEOUS
COMMUNITY
Start: 2023-04-04

## 2023-05-03 RX ORDER — PEN NEEDLE, DIABETIC 29 G X1/2"
NEEDLE, DISPOSABLE MISCELLANEOUS
COMMUNITY
Start: 2023-04-04

## 2023-05-03 RX ORDER — INSULIN GLARGINE 100 [IU]/ML
INJECTION, SOLUTION SUBCUTANEOUS
COMMUNITY
Start: 2023-04-26

## 2023-05-03 NOTE — PROGRESS NOTES
Subjective:      Patient ID: Keerthi Chase is a 47 y.o. female.    Chief Complaint: Wound Check (L foot )      Patient presents to clinic for a 4 week wound check of the Lt. Foot.  Denies experiencing pain from the affected extremity with today's exam. Has kept the prior football dressing clean, dry, and intact for the past three weeks.  Notes ambulation to tolerance while in the postop shoe.  Denies experiencing N/V/F/C/D.  Denies any additional pedal complaints.      Past Medical History:   Diagnosis Date    Bell's palsy     Coronary artery disease     Pt states Dr. Virk said she did not have a blockage after a stress test.    Diabetes mellitus     GERD (gastroesophageal reflux disease)     Hypertension     Obesity        Past Surgical History:   Procedure Laterality Date     SECTION      x 2    CHOLECYSTECTOMY      ESOPHAGOGASTRODUODENOSCOPY      PERIPHERALLY INSERTED CENTRAL CATHETER INSERTION N/A 10/11/2021    Procedure: INSERTION, PICC;  Surgeon: PICC, [facility];  Location: [facility] ENDO;  Service: Cardiology;  Laterality: N/A;  Dr Aiken    PERIPHERALLY INSERTED CENTRAL CATHETER INSERTION N/A 2022    Procedure: INSERTION, PICC;  Surgeon: PICC, [facility];  Location: [facility] ENDO;  Service: Cardiology;  Laterality: N/A;  Dr Dubose    SESAMOIDECTOMY Left 2021    Procedure: SESAMOIDECTOMY;  Surgeon: Jacob Bernal DPM;  Location: [facility] OR;  Service: Podiatry;  Laterality: Left;       Family History   Problem Relation Age of Onset    Hypertension Mother        Social History     Socioeconomic History    Marital status:    Tobacco Use    Smoking status: Never    Smokeless tobacco: Never   Substance and Sexual Activity    Alcohol use: No    Drug use: No       Current Outpatient Medications   Medication Sig Dispense Refill    alprazolam (XANAX) 1 MG tablet Take 1 mg by mouth 2 (two) times daily as needed for Anxiety.      ampicillin (PRINCIPEN) 500 MG capsule Take 1 capsule (500 mg total) by mouth every  "12 (twelve) hours. 20 capsule 0    BASAGLAR KWIKPEN 100 unit/mL (3 mL) InPn pen 75 Units once daily.   3    BD INSULIN PEN NEEDLE UF MINI 31 gauge x 3/16" Ndle   2    BD INSULIN SYRINGE ULTRA-FINE 1 mL 31 gauge x  Syrg use as directed      hydrocodone-acetaminophen 10-325mg (NORCO)  mg Tab Take 1 tablet by mouth 3 (three) times daily as needed.      hydrocodone-acetaminophen 10-325mg (NORCO)  mg Tab Take 1 tablet by mouth every 8 (eight) hours as needed for Pain. 90 tablet 0    insulin detemir (LEVEMIR) 100 unit/mL injection Inject into the skin every evening.      insulin glargine,hum.rec.anlog (LANTUS SUBQ) Inject into the skin.      LANTUS U-100 INSULIN 100 unit/mL injection SMARTSI Unit(s) SUB-Q Every Night      OZEMPIC 1 mg/dose (2 mg/1.5 mL) PnIj Inject 60 mg into the skin every 7 days.       OZEMPIC 1 mg/dose (4 mg/3 mL) Inject 1 mg into the skin every 7 days.      sulfamethoxazole-trimethoprim 800-160mg (BACTRIM DS) 800-160 mg Tab Take 1 tablet by mouth 2 (two) times daily. 14 tablet 0    sulfamethoxazole-trimethoprim 800-160mg (BACTRIM DS) 800-160 mg Tab Take 1 tablet by mouth 2 (two) times daily. 14 tablet 0    traMADoL (ULTRAM) 50 mg tablet Take 1 tablet (50 mg total) by mouth every 6 (six) hours as needed for Pain. 28 tablet 0    chlorthalidone (HYGROTEN) 25 MG Tab Take 1 tablet (25 mg total) by mouth once daily. 30 tablet 11     No current facility-administered medications for this visit.       Review of patient's allergies indicates:   Allergen Reactions    Codeine Hives and Rash        Hemoglobin A1C   Date Value Ref Range Status   2022 10.5 (H) 0.0 - 5.6 % Final     Comment:     Reference Interval:  5.0 - 5.6 Normal   5.7 - 6.4 High Risk   > 6.5 Diabetic      Hgb A1c results are standardized based on the (NGSP) National   Glycohemoglobin Standardization Program.      Hemoglobin A1C levels are related to mean serum/plasma glucose   during the preceding 2-3 months.      "   2021 11.8 (H) 0.0 - 5.6 % Final     Comment:     Reference Interval:  5.0 - 5.6 Normal   5.7 - 6.4 High Risk   > 6.5 Diabetic      Hgb A1c results are standardized based on the (NGSP) National   Glycohemoglobin Standardization Program.      Hemoglobin A1C levels are related to mean serum/plasma glucose   during the preceding 2-3 months.        2021 8.8 (H) 4.8 - 5.6 % Final     Comment:              Prediabetes: 5.7 - 6.4           Diabetes: >6.4           Glycemic control for adults with diabetes: <7.0         Past Medical History:   Diagnosis Date    Bell's palsy     Coronary artery disease     Pt states Dr. Virk said she did not have a blockage after a stress test.    Diabetes mellitus     GERD (gastroesophageal reflux disease)     Hypertension     Obesity        Past Surgical History:   Procedure Laterality Date     SECTION      x 2    CHOLECYSTECTOMY      ESOPHAGOGASTRODUODENOSCOPY      PERIPHERALLY INSERTED CENTRAL CATHETER INSERTION N/A 10/11/2021    Procedure: INSERTION, PICC;  Surgeon: PICC, [facility];  Location: [facility] ENDO;  Service: Cardiology;  Laterality: N/A;  Dr Aiken    PERIPHERALLY INSERTED CENTRAL CATHETER INSERTION N/A 2022    Procedure: INSERTION, PICC;  Surgeon: PICC, [facility];  Location: [facility];  Service: Cardiology;  Laterality: N/A;  Dr Dubose    SESAMOIDECTOMY Left 2021    Procedure: SESAMOIDECTOMY;  Surgeon: Jacob Bernal DPM;  Location: [facility] OR;  Service: Podiatry;  Laterality: Left;       Family History   Problem Relation Age of Onset    Hypertension Mother        Social History     Socioeconomic History    Marital status:    Tobacco Use    Smoking status: Never    Smokeless tobacco: Never   Substance and Sexual Activity    Alcohol use: No    Drug use: No       Current Outpatient Medications   Medication Sig Dispense Refill    alprazolam (XANAX) 1 MG tablet Take 1 mg by mouth 2 (two) times daily as needed for Anxiety.      ampicillin (PRINCIPEN) 500 MG  "capsule Take 1 capsule (500 mg total) by mouth every 12 (twelve) hours. 20 capsule 0    BASAGLAR KWIKPEN 100 unit/mL (3 mL) InPn pen 75 Units once daily.   3    BD INSULIN PEN NEEDLE UF MINI 31 gauge x 3/16" Ndle   2    BD INSULIN SYRINGE ULTRA-FINE 1 mL 31 gauge x /16 Syrg use as directed      hydrocodone-acetaminophen 10-325mg (NORCO)  mg Tab Take 1 tablet by mouth 3 (three) times daily as needed.      hydrocodone-acetaminophen 10-325mg (NORCO)  mg Tab Take 1 tablet by mouth every 8 (eight) hours as needed for Pain. 90 tablet 0    insulin detemir (LEVEMIR) 100 unit/mL injection Inject into the skin every evening.      insulin glargine,hum.rec.anlog (LANTUS SUBQ) Inject into the skin.      LANTUS U-100 INSULIN 100 unit/mL injection SMARTSI Unit(s) SUB-Q Every Night      OZEMPIC 1 mg/dose (2 mg/1.5 mL) PnIj Inject 60 mg into the skin every 7 days.       OZEMPIC 1 mg/dose (4 mg/3 mL) Inject 1 mg into the skin every 7 days.      sulfamethoxazole-trimethoprim 800-160mg (BACTRIM DS) 800-160 mg Tab Take 1 tablet by mouth 2 (two) times daily. 14 tablet 0    sulfamethoxazole-trimethoprim 800-160mg (BACTRIM DS) 800-160 mg Tab Take 1 tablet by mouth 2 (two) times daily. 14 tablet 0    traMADoL (ULTRAM) 50 mg tablet Take 1 tablet (50 mg total) by mouth every 6 (six) hours as needed for Pain. 28 tablet 0    chlorthalidone (HYGROTEN) 25 MG Tab Take 1 tablet (25 mg total) by mouth once daily. 30 tablet 11     No current facility-administered medications for this visit.       Review of patient's allergies indicates:   Allergen Reactions    Codeine Hives and Rash         Review of Systems   Constitutional: Negative for chills and fever.   Skin:  Negative for color change and nail changes.   Gastrointestinal:  Negative for nausea and vomiting.   Neurological:  Positive for numbness.   Psychiatric/Behavioral:  Negative for altered mental status.          Objective:      Physical Exam  Constitutional:       " Appearance: Normal appearance. She is not ill-appearing.   Cardiovascular:      Pulses:           Dorsalis pedis pulses are 2+ on the right side and 2+ on the left side.        Posterior tibial pulses are 2+ on the right side and 2+ on the left side.      Comments: CFT is < 3 seconds bilateral.  Pedal hair growth is decreased bilateral.  No lower extremity edema noted bilateral.  Toes are warm to touch bilateral.    Musculoskeletal:         General: No signs of injury.      Right lower leg: No edema.      Left lower leg: No edema.      Comments: Muscle strength 5/5 in all muscle groups bilateral.  No tenderness nor crepitation with ROM of foot/ankle joints bilateral.  No tenderness with palpation of bilateral foot and ankle.   Slight dorsal contracture of the Lt. Hallux.     Skin:     General: Skin is warm and dry.      Capillary Refill: Capillary refill takes 2 to 3 seconds.      Findings: No bruising, ecchymosis, erythema, signs of injury, laceration, lesion, petechiae, rash or wound.      Comments: Maturing epithelium noted to the Lt. Sub 1st met head.     Neurological:      Mental Status: She is alert.      Sensory: Sensory deficit present.      Motor: No weakness.      Comments: Decreased protective sensation noted bilateral.  Light touch is absent bilateral.               Assessment:       Encounter Diagnoses   Name Primary?    Healed ulcer of left foot Yes    Diabetic polyneuropathy associated with type 2 diabetes mellitus                      Plan:       Keerthi was seen today for wound check.    Diagnoses and all orders for this visit:    Healed ulcer of left foot    Diabetic polyneuropathy associated with type 2 diabetes mellitus        I counseled the patient on her conditions, their implications and medical management.    No wound debridement performed, as the sites appear healed with today's exam.     Advised to apply betadine and a padded band aid to the prior area of ulceration once daily x 1 final  week to protect the new skin.     May resume her normal showering routine but discouraged from soaking/scrubbing the affected limb x 2 weeks.     May transition back into casual shoe gear with activity to tolerance.     RTC in 4 weeks to ensure sufficient healing.      Jacob Bernal DPM

## 2023-06-05 ENCOUNTER — PATIENT MESSAGE (OUTPATIENT)
Dept: PODIATRY | Facility: CLINIC | Age: 48
End: 2023-06-05
Payer: MEDICAID

## 2023-06-15 ENCOUNTER — OFFICE VISIT (OUTPATIENT)
Dept: PODIATRY | Facility: CLINIC | Age: 48
End: 2023-06-15
Payer: MEDICAID

## 2023-06-15 VITALS — WEIGHT: 270.06 LBS | HEIGHT: 68 IN | BODY MASS INDEX: 40.93 KG/M2

## 2023-06-15 DIAGNOSIS — E11.42 DIABETIC POLYNEUROPATHY ASSOCIATED WITH TYPE 2 DIABETES MELLITUS: ICD-10-CM

## 2023-06-15 DIAGNOSIS — Z87.2 HEALED ULCER OF LEFT FOOT: Primary | ICD-10-CM

## 2023-06-15 PROCEDURE — 99212 OFFICE O/P EST SF 10 MIN: CPT | Mod: PBBFAC,PN | Performed by: PODIATRIST

## 2023-06-15 PROCEDURE — 3008F BODY MASS INDEX DOCD: CPT | Mod: CPTII,,, | Performed by: PODIATRIST

## 2023-06-15 PROCEDURE — 99999 PR PBB SHADOW E&M-EST. PATIENT-LVL II: CPT | Mod: PBBFAC,,, | Performed by: PODIATRIST

## 2023-06-15 PROCEDURE — 99212 PR OFFICE/OUTPT VISIT, EST, LEVL II, 10-19 MIN: ICD-10-PCS | Mod: S$PBB,,, | Performed by: PODIATRIST

## 2023-06-15 PROCEDURE — 99212 OFFICE O/P EST SF 10 MIN: CPT | Mod: S$PBB,,, | Performed by: PODIATRIST

## 2023-06-15 PROCEDURE — 1159F MED LIST DOCD IN RCRD: CPT | Mod: CPTII,,, | Performed by: PODIATRIST

## 2023-06-15 PROCEDURE — 99999 PR PBB SHADOW E&M-EST. PATIENT-LVL II: ICD-10-PCS | Mod: PBBFAC,,, | Performed by: PODIATRIST

## 2023-06-15 PROCEDURE — 3008F PR BODY MASS INDEX (BMI) DOCUMENTED: ICD-10-PCS | Mod: CPTII,,, | Performed by: PODIATRIST

## 2023-06-15 PROCEDURE — 1159F PR MEDICATION LIST DOCUMENTED IN MEDICAL RECORD: ICD-10-PCS | Mod: CPTII,,, | Performed by: PODIATRIST

## 2023-06-15 NOTE — PROGRESS NOTES
Subjective:      Patient ID: Keerthi Chase is a 48 y.o. female.    Chief Complaint: Foot Pain and Diabetes Mellitus      Patient presents to clinic for a 6 week wound check of the Lt. Foot.  Denies experiencing pain from the affected extremity with today's exam. Notes keeping the prior site of ulceration covered with a band aid daily.  Denies noticing any new signs of breakdown to the area.  She has consistently worn the custom molded orthotics with the donut hole. Denies any additional pedal complaints.      Past Medical History:   Diagnosis Date    Bell's palsy     Coronary artery disease     Pt states Dr. Virk said she did not have a blockage after a stress test.    Diabetes mellitus     GERD (gastroesophageal reflux disease)     Hypertension     Obesity        Past Surgical History:   Procedure Laterality Date     SECTION      x 2    CHOLECYSTECTOMY      ESOPHAGOGASTRODUODENOSCOPY      PERIPHERALLY INSERTED CENTRAL CATHETER INSERTION N/A 10/11/2021    Procedure: INSERTION, PICC;  Surgeon: PICC, STPH;  Location: [facility] ENDO;  Service: Cardiology;  Laterality: N/A;  Dr Aiken    PERIPHERALLY INSERTED CENTRAL CATHETER INSERTION N/A 2022    Procedure: INSERTION, PICC;  Surgeon: PICC, STPH;  Location: [facility] ENDO;  Service: Cardiology;  Laterality: N/A;  Dr Dubose    SESAMOIDECTOMY Left 2021    Procedure: SESAMOIDECTOMY;  Surgeon: Jacob Bernal DPM;  Location: [facility] OR;  Service: Podiatry;  Laterality: Left;       Family History   Problem Relation Age of Onset    Hypertension Mother        Social History     Socioeconomic History    Marital status:    Tobacco Use    Smoking status: Never    Smokeless tobacco: Never   Substance and Sexual Activity    Alcohol use: No    Drug use: No       Current Outpatient Medications   Medication Sig Dispense Refill    alprazolam (XANAX) 1 MG tablet Take 1 mg by mouth 2 (two) times daily as needed for Anxiety.      ampicillin (PRINCIPEN) 500 MG capsule Take  "1 capsule (500 mg total) by mouth every 12 (twelve) hours. 20 capsule 0    BASAGLAR KWIKPEN 100 unit/mL (3 mL) InPn pen 75 Units once daily.   3    BD INSULIN PEN NEEDLE UF MINI 31 gauge x 3/16" Ndle   2    BD INSULIN SYRINGE ULTRA-FINE 1 mL 31 gauge x  Syrg use as directed      chlorthalidone (HYGROTEN) 25 MG Tab Take 1 tablet (25 mg total) by mouth once daily. 30 tablet 11    hydrocodone-acetaminophen 10-325mg (NORCO)  mg Tab Take 1 tablet by mouth 3 (three) times daily as needed.      hydrocodone-acetaminophen 10-325mg (NORCO)  mg Tab Take 1 tablet by mouth every 8 (eight) hours as needed for Pain. 90 tablet 0    insulin detemir (LEVEMIR) 100 unit/mL injection Inject into the skin every evening.      insulin glargine,hum.rec.anlog (LANTUS SUBQ) Inject into the skin.      LANTUS U-100 INSULIN 100 unit/mL injection SMARTSI Unit(s) SUB-Q Every Night      OZEMPIC 1 mg/dose (2 mg/1.5 mL) PnIj Inject 60 mg into the skin every 7 days.       OZEMPIC 1 mg/dose (4 mg/3 mL) Inject 1 mg into the skin every 7 days.      sulfamethoxazole-trimethoprim 800-160mg (BACTRIM DS) 800-160 mg Tab Take 1 tablet by mouth 2 (two) times daily. 14 tablet 0    sulfamethoxazole-trimethoprim 800-160mg (BACTRIM DS) 800-160 mg Tab Take 1 tablet by mouth 2 (two) times daily. 14 tablet 0    traMADoL (ULTRAM) 50 mg tablet Take 1 tablet (50 mg total) by mouth every 6 (six) hours as needed for Pain. 28 tablet 0     No current facility-administered medications for this visit.       Review of patient's allergies indicates:   Allergen Reactions    Codeine Hives and Rash        Hemoglobin A1C   Date Value Ref Range Status   2022 10.5 (H) 0.0 - 5.6 % Final     Comment:     Reference Interval:  5.0 - 5.6 Normal   5.7 - 6.4 High Risk   > 6.5 Diabetic      Hgb A1c results are standardized based on the (NGSP) National   Glycohemoglobin Standardization Program.      Hemoglobin A1C levels are related to mean serum/plasma glucose "   during the preceding 2-3 months.        2021 11.8 (H) 0.0 - 5.6 % Final     Comment:     Reference Interval:  5.0 - 5.6 Normal   5.7 - 6.4 High Risk   > 6.5 Diabetic      Hgb A1c results are standardized based on the (NGSP) National   Glycohemoglobin Standardization Program.      Hemoglobin A1C levels are related to mean serum/plasma glucose   during the preceding 2-3 months.        2021 8.8 (H) 4.8 - 5.6 % Final     Comment:              Prediabetes: 5.7 - 6.4           Diabetes: >6.4           Glycemic control for adults with diabetes: <7.0         Past Medical History:   Diagnosis Date    Bell's palsy     Coronary artery disease     Pt states Dr. Virk said she did not have a blockage after a stress test.    Diabetes mellitus     GERD (gastroesophageal reflux disease)     Hypertension     Obesity        Past Surgical History:   Procedure Laterality Date     SECTION      x 2    CHOLECYSTECTOMY      ESOPHAGOGASTRODUODENOSCOPY      PERIPHERALLY INSERTED CENTRAL CATHETER INSERTION N/A 10/11/2021    Procedure: INSERTION, PICC;  Surgeon: IGOR, STTAVIA;  Location: [facility] ENDO;  Service: Cardiology;  Laterality: N/A;  Dr Aiken    PERIPHERALLY INSERTED CENTRAL CATHETER INSERTION N/A 2022    Procedure: INSERTION, PICC;  Surgeon: PICC, PRIYANK;  Location: [facility] ENDO;  Service: Cardiology;  Laterality: N/A;  Dr Dubose    SESAMOIDECTOMY Left 2021    Procedure: SESAMOIDECTOMY;  Surgeon: Jacob Bernal DPM;  Location: [facility] OR;  Service: Podiatry;  Laterality: Left;       Family History   Problem Relation Age of Onset    Hypertension Mother        Social History     Socioeconomic History    Marital status:    Tobacco Use    Smoking status: Never    Smokeless tobacco: Never   Substance and Sexual Activity    Alcohol use: No    Drug use: No       Current Outpatient Medications   Medication Sig Dispense Refill    alprazolam (XANAX) 1 MG tablet Take 1 mg by mouth 2 (two) times daily as needed for  "Anxiety.      ampicillin (PRINCIPEN) 500 MG capsule Take 1 capsule (500 mg total) by mouth every 12 (twelve) hours. 20 capsule 0    BASAGLAR KWIKPEN 100 unit/mL (3 mL) InPn pen 75 Units once daily.   3    BD INSULIN PEN NEEDLE UF MINI 31 gauge x 3/16" Ndle   2    BD INSULIN SYRINGE ULTRA-FINE 1 mL 31 gauge x 5/16 Syrg use as directed      chlorthalidone (HYGROTEN) 25 MG Tab Take 1 tablet (25 mg total) by mouth once daily. 30 tablet 11    hydrocodone-acetaminophen 10-325mg (NORCO)  mg Tab Take 1 tablet by mouth 3 (three) times daily as needed.      hydrocodone-acetaminophen 10-325mg (NORCO)  mg Tab Take 1 tablet by mouth every 8 (eight) hours as needed for Pain. 90 tablet 0    insulin detemir (LEVEMIR) 100 unit/mL injection Inject into the skin every evening.      insulin glargine,hum.rec.anlog (LANTUS SUBQ) Inject into the skin.      LANTUS U-100 INSULIN 100 unit/mL injection SMARTSI Unit(s) SUB-Q Every Night      OZEMPIC 1 mg/dose (2 mg/1.5 mL) PnIj Inject 60 mg into the skin every 7 days.       OZEMPIC 1 mg/dose (4 mg/3 mL) Inject 1 mg into the skin every 7 days.      sulfamethoxazole-trimethoprim 800-160mg (BACTRIM DS) 800-160 mg Tab Take 1 tablet by mouth 2 (two) times daily. 14 tablet 0    sulfamethoxazole-trimethoprim 800-160mg (BACTRIM DS) 800-160 mg Tab Take 1 tablet by mouth 2 (two) times daily. 14 tablet 0    traMADoL (ULTRAM) 50 mg tablet Take 1 tablet (50 mg total) by mouth every 6 (six) hours as needed for Pain. 28 tablet 0     No current facility-administered medications for this visit.       Review of patient's allergies indicates:   Allergen Reactions    Codeine Hives and Rash         Review of Systems   Constitutional: Negative for chills and fever.   Skin:  Negative for color change and nail changes.   Gastrointestinal:  Negative for nausea and vomiting.   Neurological:  Positive for numbness.   Psychiatric/Behavioral:  Negative for altered mental status.          Objective:    "   Physical Exam  Constitutional:       Appearance: Normal appearance. She is not ill-appearing.   Cardiovascular:      Pulses:           Dorsalis pedis pulses are 2+ on the right side and 2+ on the left side.        Posterior tibial pulses are 2+ on the right side and 2+ on the left side.      Comments: CFT is < 3 seconds bilateral.  Pedal hair growth is decreased bilateral.  No lower extremity edema noted bilateral.  Toes are warm to touch bilateral.    Musculoskeletal:         General: No signs of injury.      Right lower leg: No edema.      Left lower leg: No edema.      Comments: Muscle strength 5/5 in all muscle groups bilateral.  No tenderness nor crepitation with ROM of foot/ankle joints bilateral.  No tenderness with palpation of bilateral foot and ankle.   Slight dorsal contracture of the Lt. Hallux.     Skin:     General: Skin is warm and dry.      Capillary Refill: Capillary refill takes 2 to 3 seconds.      Findings: No bruising, ecchymosis, erythema, signs of injury, laceration, lesion, petechiae, rash or wound.      Comments: Mature epithelium noted to the Lt. Sub 1st met head.     Neurological:      Mental Status: She is alert.      Sensory: Sensory deficit present.      Motor: No weakness.      Comments: Decreased protective sensation noted bilateral.  Light touch is absent bilateral.               Assessment:       Encounter Diagnoses   Name Primary?    Healed ulcer of left foot Yes    Diabetic polyneuropathy associated with type 2 diabetes mellitus                        Plan:       Keerthi was seen today for foot pain and diabetes mellitus.    Diagnoses and all orders for this visit:    Healed ulcer of left foot    Diabetic polyneuropathy associated with type 2 diabetes mellitus          I counseled the patient on her conditions, their implications and medical management.    No wound debridement performed, as the site remains healed with today's exam.     May resume her normal showering routine  but discouraged from soaking/scrubbing the affected limb x 2 weeks.     To continue use of casual shoe gear with the custom molded orthotics with activity to tolerance.    Advised to inspect the plantar skin of the Lt. Foot daily for any new signs of shearing/breakdown.     RTC prn.    Jacob Bernal DPM

## 2023-07-11 ENCOUNTER — TELEPHONE (OUTPATIENT)
Dept: PODIATRY | Facility: CLINIC | Age: 48
End: 2023-07-11
Payer: MEDICAID

## 2023-07-11 NOTE — TELEPHONE ENCOUNTER
----- Message from Rajesh Ayala sent at 7/11/2023 11:44 AM CDT -----  Type:  Sooner Apoointment Request    Caller is requesting a sooner appointment.  Caller declined first available appointment listed below.  Caller will not accept being placed on the waitlist and is requesting a message be sent to doctor.  Name of Caller:RAMON GIORDANO [[id number]]    When is the first available appointment?10/12/23    Symptoms:right big  toe infected / small hole and draining color is red and purple     Would the patient rather a call back or a response via [facility]?  Call back     Best Call Back Number: [phone] (mobile)     Additional Information:

## 2023-07-14 ENCOUNTER — OFFICE VISIT (OUTPATIENT)
Dept: PODIATRY | Facility: CLINIC | Age: 48
End: 2023-07-14
Payer: MEDICAID

## 2023-07-14 VITALS — HEIGHT: 68 IN | BODY MASS INDEX: 40.92 KG/M2 | WEIGHT: 270 LBS

## 2023-07-14 DIAGNOSIS — E11.42 DIABETIC POLYNEUROPATHY ASSOCIATED WITH TYPE 2 DIABETES MELLITUS: ICD-10-CM

## 2023-07-14 DIAGNOSIS — S91.139A: Primary | ICD-10-CM

## 2023-07-14 PROCEDURE — 1159F PR MEDICATION LIST DOCUMENTED IN MEDICAL RECORD: ICD-10-PCS | Mod: CPTII,,, | Performed by: PODIATRIST

## 2023-07-14 PROCEDURE — 3008F BODY MASS INDEX DOCD: CPT | Mod: CPTII,,, | Performed by: PODIATRIST

## 2023-07-14 PROCEDURE — 87070 CULTURE OTHR SPECIMN AEROBIC: CPT | Performed by: PODIATRIST

## 2023-07-14 PROCEDURE — 11042 DBRDMT SUBQ TIS 1ST 20SQCM/<: CPT | Mod: PBBFAC,PN | Performed by: PODIATRIST

## 2023-07-14 PROCEDURE — 11042 WOUND DEBRIDEMENT: ICD-10-PCS | Mod: S$PBB,,, | Performed by: PODIATRIST

## 2023-07-14 PROCEDURE — 99213 PR OFFICE/OUTPT VISIT, EST, LEVL III, 20-29 MIN: ICD-10-PCS | Mod: 25,S$PBB,, | Performed by: PODIATRIST

## 2023-07-14 PROCEDURE — 1159F MED LIST DOCD IN RCRD: CPT | Mod: CPTII,,, | Performed by: PODIATRIST

## 2023-07-14 PROCEDURE — 87186 SC STD MICRODIL/AGAR DIL: CPT | Performed by: PODIATRIST

## 2023-07-14 PROCEDURE — 99213 OFFICE O/P EST LOW 20 MIN: CPT | Mod: PBBFAC,PN,25 | Performed by: PODIATRIST

## 2023-07-14 PROCEDURE — 87075 CULTR BACTERIA EXCEPT BLOOD: CPT | Performed by: PODIATRIST

## 2023-07-14 PROCEDURE — 99213 OFFICE O/P EST LOW 20 MIN: CPT | Mod: 25,S$PBB,, | Performed by: PODIATRIST

## 2023-07-14 PROCEDURE — 99999 PR PBB SHADOW E&M-EST. PATIENT-LVL III: CPT | Mod: PBBFAC,,, | Performed by: PODIATRIST

## 2023-07-14 PROCEDURE — 3008F PR BODY MASS INDEX (BMI) DOCUMENTED: ICD-10-PCS | Mod: CPTII,,, | Performed by: PODIATRIST

## 2023-07-14 PROCEDURE — 99999 PR PBB SHADOW E&M-EST. PATIENT-LVL III: ICD-10-PCS | Mod: PBBFAC,,, | Performed by: PODIATRIST

## 2023-07-14 PROCEDURE — 87077 CULTURE AEROBIC IDENTIFY: CPT | Performed by: PODIATRIST

## 2023-07-16 NOTE — PROCEDURES
"Wound Debridement    Date/Time: 7/14/2023 10:00 AM  Performed by: Jacob Bernal DPM  Authorized by: Jacob Bernal DPM     Time out: Immediately prior to procedure a "time out" was called to verify the correct patient, procedure, equipment, support staff and site/side marked as required.    Consent Done?:  Yes (Verbal)  Local anesthesia used?: No      Wound Details:    Location:  Right foot    Location:  Right 1st Toe    Type of Debridement:  Excisional       Length (cm):  0.8       Area (sq cm):  0.24       Width (cm):  0.3       Percent Debrided (%):  100       Depth (cm):  1       Total Area Debrided (sq cm):  0.24    Depth of debridement:  Subcutaneous tissue    Tissue debrided:  Subcutaneous    Devitalized tissue debrided:  Fibrin    Instruments:  Curette  Bleeding:  Minimal  Hemostasis Achieved: Yes  Method Used:  Pressure  Patient tolerance:  Patient tolerated the procedure well with no immediate complications  "

## 2023-07-16 NOTE — PROGRESS NOTES
Subjective:      Patient ID: Keerthi Chase is a 48 y.o. female.    Chief Complaint: Wound Care  Patient presents to clinic with the chief complaint of a puncture wound to the plantar aspect of the Rt. Great toe.  She denies experiencing pain from the digit with today's exam, likely on account of neuropathy.  Suspects she had a rock in her shoe that led to subsequent ulcer formation.  States this has been present for the past two weeks.  Notes being concerned on account of erythema to the dorsum of the joint.  Denies noticing purulent drainage from the digit.  Denies experiencing N/V/F/C/D.   Denies any additional pedal complaints.      Past Medical History:   Diagnosis Date    Bell's palsy     Coronary artery disease     Pt states Dr. Virk said she did not have a blockage after a stress test.    Diabetes mellitus     GERD (gastroesophageal reflux disease)     Hypertension     Obesity        Past Surgical History:   Procedure Laterality Date     SECTION      x 2    CHOLECYSTECTOMY      ESOPHAGOGASTRODUODENOSCOPY      PERIPHERALLY INSERTED CENTRAL CATHETER INSERTION N/A 10/11/2021    Procedure: INSERTION, PICC;  Surgeon: PRIYANK COSTA;  Location: [facility] ENDO;  Service: Cardiology;  Laterality: N/A;  Dr Aiken    PERIPHERALLY INSERTED CENTRAL CATHETER INSERTION N/A 2022    Procedure: INSERTION, PICC;  Surgeon: PICC, PRIYANK;  Location: [facility] ENDO;  Service: Cardiology;  Laterality: N/A;  Dr Dubose    SESAMOIDECTOMY Left 2021    Procedure: SESAMOIDECTOMY;  Surgeon: Jacob Bernal DPM;  Location: [facility] OR;  Service: Podiatry;  Laterality: Left;       Family History   Problem Relation Age of Onset    Hypertension Mother        Social History     Socioeconomic History    Marital status:    Tobacco Use    Smoking status: Never    Smokeless tobacco: Never   Substance and Sexual Activity    Alcohol use: No    Drug use: No       Current Outpatient Medications   Medication Sig Dispense Refill    alprazolam  "(XANAX) 1 MG tablet Take 1 mg by mouth 2 (two) times daily as needed for Anxiety.      BASAGLAR KWIKPEN 100 unit/mL (3 mL) InPn pen 75 Units once daily.   3    BD INSULIN PEN NEEDLE UF MINI 31 gauge x 3/16" Ndle   2    BD INSULIN SYRINGE ULTRA-FINE 1 mL 31 gauge x 5/16 Syrg use as directed      hydrocodone-acetaminophen 10-325mg (NORCO)  mg Tab Take 1 tablet by mouth 3 (three) times daily as needed.      hydrocodone-acetaminophen 10-325mg (NORCO)  mg Tab Take 1 tablet by mouth every 8 (eight) hours as needed for Pain. 90 tablet 0    insulin detemir (LEVEMIR) 100 unit/mL injection Inject into the skin every evening.      insulin glargine,hum.rec.anlog (LANTUS SUBQ) Inject into the skin.      LANTUS U-100 INSULIN 100 unit/mL injection SMARTSI Unit(s) SUB-Q Every Night      OZEMPIC 1 mg/dose (2 mg/1.5 mL) PnIj Inject 60 mg into the skin every 7 days.       OZEMPIC 1 mg/dose (4 mg/3 mL) Inject 1 mg into the skin every 7 days.      sulfamethoxazole-trimethoprim 800-160mg (BACTRIM DS) 800-160 mg Tab Take 1 tablet by mouth 2 (two) times daily. 14 tablet 0    traMADoL (ULTRAM) 50 mg tablet Take 1 tablet (50 mg total) by mouth every 6 (six) hours as needed for Pain. 28 tablet 0    ampicillin (PRINCIPEN) 500 MG capsule Take 1 capsule (500 mg total) by mouth every 12 (twelve) hours. (Patient not taking: Reported on 2023) 20 capsule 0    chlorthalidone (HYGROTEN) 25 MG Tab Take 1 tablet (25 mg total) by mouth once daily. 30 tablet 11    sulfamethoxazole-trimethoprim 800-160mg (BACTRIM DS) 800-160 mg Tab Take 1 tablet by mouth 2 (two) times daily. (Patient not taking: Reported on 2023) 14 tablet 0     No current facility-administered medications for this visit.       Review of patient's allergies indicates:   Allergen Reactions    Codeine Hives and Rash        Hemoglobin A1C   Date Value Ref Range Status   2022 10.5 (H) 0.0 - 5.6 % Final     Comment:     Reference Interval:  5.0 - 5.6 Normal   5.7 " - 6.4 High Risk   > 6.5 Diabetic      Hgb A1c results are standardized based on the (NGSP) National   Glycohemoglobin Standardization Program.      Hemoglobin A1C levels are related to mean serum/plasma glucose   during the preceding 2-3 months.        2021 11.8 (H) 0.0 - 5.6 % Final     Comment:     Reference Interval:  5.0 - 5.6 Normal   5.7 - 6.4 High Risk   > 6.5 Diabetic      Hgb A1c results are standardized based on the (NGSP) National   Glycohemoglobin Standardization Program.      Hemoglobin A1C levels are related to mean serum/plasma glucose   during the preceding 2-3 months.        2021 8.8 (H) 4.8 - 5.6 % Final     Comment:              Prediabetes: 5.7 - 6.4           Diabetes: >6.4           Glycemic control for adults with diabetes: <7.0         Past Medical History:   Diagnosis Date    Bell's palsy     Coronary artery disease     Pt states Dr. Virk said she did not have a blockage after a stress test.    Diabetes mellitus     GERD (gastroesophageal reflux disease)     Hypertension     Obesity        Past Surgical History:   Procedure Laterality Date     SECTION      x 2    CHOLECYSTECTOMY      ESOPHAGOGASTRODUODENOSCOPY      PERIPHERALLY INSERTED CENTRAL CATHETER INSERTION N/A 10/11/2021    Procedure: INSERTION, PICC;  Surgeon: PICC, STPH;  Location: [facility] ENDO;  Service: Cardiology;  Laterality: N/A;  Dr Aiken    PERIPHERALLY INSERTED CENTRAL CATHETER INSERTION N/A 2022    Procedure: INSERTION, PICC;  Surgeon: PICC, STPH;  Location: [facility] ENDO;  Service: Cardiology;  Laterality: N/A;  Dr Dubose    SESAMOIDECTOMY Left 2021    Procedure: SESAMOIDECTOMY;  Surgeon: Jacob Bernal DPM;  Location: [facility] OR;  Service: Podiatry;  Laterality: Left;       Family History   Problem Relation Age of Onset    Hypertension Mother        Social History     Socioeconomic History    Marital status:    Tobacco Use    Smoking status: Never    Smokeless tobacco: Never   Substance and  "Sexual Activity    Alcohol use: No    Drug use: No       Current Outpatient Medications   Medication Sig Dispense Refill    alprazolam (XANAX) 1 MG tablet Take 1 mg by mouth 2 (two) times daily as needed for Anxiety.      BASAGLAR KWIKPEN 100 unit/mL (3 mL) InPn pen 75 Units once daily.   3    BD INSULIN PEN NEEDLE UF MINI 31 gauge x 3/16" Ndle   2    BD INSULIN SYRINGE ULTRA-FINE 1 mL 31 gauge x 5/16 Syrg use as directed      hydrocodone-acetaminophen 10-325mg (NORCO)  mg Tab Take 1 tablet by mouth 3 (three) times daily as needed.      hydrocodone-acetaminophen 10-325mg (NORCO)  mg Tab Take 1 tablet by mouth every 8 (eight) hours as needed for Pain. 90 tablet 0    insulin detemir (LEVEMIR) 100 unit/mL injection Inject into the skin every evening.      insulin glargine,hum.rec.anlog (LANTUS SUBQ) Inject into the skin.      LANTUS U-100 INSULIN 100 unit/mL injection SMARTSI Unit(s) SUB-Q Every Night      OZEMPIC 1 mg/dose (2 mg/1.5 mL) PnIj Inject 60 mg into the skin every 7 days.       OZEMPIC 1 mg/dose (4 mg/3 mL) Inject 1 mg into the skin every 7 days.      sulfamethoxazole-trimethoprim 800-160mg (BACTRIM DS) 800-160 mg Tab Take 1 tablet by mouth 2 (two) times daily. 14 tablet 0    traMADoL (ULTRAM) 50 mg tablet Take 1 tablet (50 mg total) by mouth every 6 (six) hours as needed for Pain. 28 tablet 0    ampicillin (PRINCIPEN) 500 MG capsule Take 1 capsule (500 mg total) by mouth every 12 (twelve) hours. (Patient not taking: Reported on 2023) 20 capsule 0    chlorthalidone (HYGROTEN) 25 MG Tab Take 1 tablet (25 mg total) by mouth once daily. 30 tablet 11    sulfamethoxazole-trimethoprim 800-160mg (BACTRIM DS) 800-160 mg Tab Take 1 tablet by mouth 2 (two) times daily. (Patient not taking: Reported on 2023) 14 tablet 0     No current facility-administered medications for this visit.       Review of patient's allergies indicates:   Allergen Reactions    Codeine Hives and Rash         Review of " Systems   Constitutional: Negative for chills and fever.   Skin:  Positive for color change and poor wound healing. Negative for nail changes.   Gastrointestinal:  Negative for nausea and vomiting.   Neurological:  Positive for numbness.   Psychiatric/Behavioral:  Negative for altered mental status.          Objective:      Physical Exam  Constitutional:       Appearance: Normal appearance. She is not ill-appearing.   Cardiovascular:      Pulses:           Dorsalis pedis pulses are 2+ on the right side and 2+ on the left side.        Posterior tibial pulses are 2+ on the right side and 2+ on the left side.      Comments: CFT is < 3 seconds bilateral.  Pedal hair growth is decreased bilateral.  No lower extremity edema noted bilateral.  Toes are warm to touch bilateral.    Musculoskeletal:         General: No signs of injury.      Right lower leg: No edema.      Left lower leg: No edema.      Comments: Muscle strength 5/5 in all muscle groups bilateral.  No tenderness nor crepitation with ROM of foot/ankle joints bilateral.  No tenderness with palpation of bilateral foot and ankle.   Slight dorsal contracture of the Lt. Hallux.     Skin:     General: Skin is warm and dry.      Capillary Refill: Capillary refill takes 2 to 3 seconds.      Findings: Erythema and wound present. No bruising, ecchymosis, signs of injury, laceration, lesion, petechiae or rash.      Comments: Mild edema and erythema noted to the dorsum of the Rt. Hallux IP joint.    Location: Open wound noted to the plantar aspect of the Rt. Hallux.  Base: Down to subQ and comprised of fibrin.    Drainage: None  [place] wound: Devoid of erythema, edema, fluctuance, purulence, and malodor.    Pre-debridement measurement: 0.8 x 0.3 x 1cm.   Post-debridement measurement: 1 x 0.5 x 1cm    Neurological:      Mental Status: She is alert.      Sensory: Sensory deficit present.      Motor: No weakness.      Comments: Decreased protective sensation noted bilateral.   Light touch is absent bilateral.               Assessment:       Encounter Diagnoses   Name Primary?    Puncture wound of toe of right foot, initial encounter Yes    Diabetic polyneuropathy associated with type 2 diabetes mellitus                Plan:       Keerthi was seen today for wound care.    Diagnoses and all orders for this visit:    Puncture wound of toe of right foot, initial encounter  -     Aerobic culture  -     Culture, Anaerobic    Diabetic polyneuropathy associated with type 2 diabetes mellitus      I counseled the patient on her conditions, their implications and medical management.    Discussed with patient the associated risks and benefits associated with a selective excisional debridement of the Rt. foot.  See attached procedure note.      Wound cultures obtained.    The wound base was covered with iodosorb ointment, and a football dressing was applied.    Advised to keep the dressing CDI x 1 week.    Advised to rest and elevate the affected extremity.    Instructed to minimize weight bearing to facilitate wound healing.    Advised to ambulate only in the postoperative shoe/boot.    RTC in 1 week.    Jacob Bernal DPM

## 2023-07-18 DIAGNOSIS — S91.139A: Primary | ICD-10-CM

## 2023-07-18 LAB
BACTERIA SPEC AEROBE CULT: ABNORMAL

## 2023-07-18 RX ORDER — SULFAMETHOXAZOLE AND TRIMETHOPRIM 800; 160 MG/1; MG/1
1 TABLET ORAL 2 TIMES DAILY
Qty: 14 TABLET | Refills: 0 | Status: SHIPPED | OUTPATIENT
Start: 2023-07-18

## 2023-07-19 LAB — BACTERIA SPEC ANAEROBE CULT: NORMAL

## 2023-07-21 ENCOUNTER — TELEPHONE (OUTPATIENT)
Dept: PODIATRY | Facility: CLINIC | Age: 48
End: 2023-07-21
Payer: MEDICAID

## 2023-07-24 ENCOUNTER — TELEPHONE (OUTPATIENT)
Dept: PODIATRY | Facility: CLINIC | Age: 48
End: 2023-07-24
Payer: MEDICAID

## 2023-07-24 NOTE — TELEPHONE ENCOUNTER
----- Message from Randa Kruger sent at 7/24/2023  8:02 AM CDT -----  Type:  Same Day Appointment Request    Caller is requesting a same day appointment.  Caller declined first available appointment listed below.      Name of Caller:  pt   When is the first available appointment?  unk  Symptoms:  wound care   Best Call Back Number:  [phone] (    Additional Information:   requesting another appt today around 330 or 4pm because pt has work.. pt is off tomorrow and Friday if something else is open please advise thank you

## 2023-07-25 ENCOUNTER — OFFICE VISIT (OUTPATIENT)
Dept: PODIATRY | Facility: CLINIC | Age: 48
End: 2023-07-25
Payer: MEDICAID

## 2023-07-25 VITALS — WEIGHT: 270.06 LBS | HEIGHT: 68 IN | BODY MASS INDEX: 40.93 KG/M2

## 2023-07-25 DIAGNOSIS — E11.42 DIABETIC POLYNEUROPATHY ASSOCIATED WITH TYPE 2 DIABETES MELLITUS: ICD-10-CM

## 2023-07-25 DIAGNOSIS — S91.139D: Primary | ICD-10-CM

## 2023-07-25 PROCEDURE — 97597 DBRDMT OPN WND 1ST 20 CM/<: CPT | Mod: PBBFAC,PN | Performed by: PODIATRIST

## 2023-07-25 PROCEDURE — 99999 PR PBB SHADOW E&M-EST. PATIENT-LVL II: ICD-10-PCS | Mod: PBBFAC,,, | Performed by: PODIATRIST

## 2023-07-25 PROCEDURE — 99212 OFFICE O/P EST SF 10 MIN: CPT | Mod: PBBFAC,PN | Performed by: PODIATRIST

## 2023-07-25 PROCEDURE — 99999 PR PBB SHADOW E&M-EST. PATIENT-LVL II: CPT | Mod: PBBFAC,,, | Performed by: PODIATRIST

## 2023-07-25 PROCEDURE — 99499 NO LOS: ICD-10-PCS | Mod: S$PBB,,, | Performed by: PODIATRIST

## 2023-07-25 PROCEDURE — 97597 WOUND DEBRIDEMENT: ICD-10-PCS | Mod: S$PBB,,, | Performed by: PODIATRIST

## 2023-07-25 PROCEDURE — 99499 UNLISTED E&M SERVICE: CPT | Mod: S$PBB,,, | Performed by: PODIATRIST

## 2023-07-25 NOTE — PROGRESS NOTES
Subjective:      Patient ID: Keerthi Chase is a 48 y.o. female.    Chief Complaint: Wound Care  Patient presents to clinic for a 1 week wound check of the Rt. Great toe.  Denies experiencing pain from the digit with today's exam.  Has kept the prior football dressing clean, dry, and intact for the past week.  Notes ambulation while in the DARCO shoe only.  States she never informed verbally about the antibiotics that were ordered on account of positive cultures.  Denies experiencing N/V/F/C/D.   Denies any additional pedal complaints.      Past Medical History:   Diagnosis Date    Bell's palsy     Coronary artery disease     Pt states Dr. Virk said she did not have a blockage after a stress test.    Diabetes mellitus     GERD (gastroesophageal reflux disease)     Hypertension     Obesity        Past Surgical History:   Procedure Laterality Date     SECTION      x 2    CHOLECYSTECTOMY      ESOPHAGOGASTRODUODENOSCOPY      PERIPHERALLY INSERTED CENTRAL CATHETER INSERTION N/A 10/11/2021    Procedure: INSERTION, PICC;  Surgeon: PICC, [facility];  Location: [facility] ENDO;  Service: Cardiology;  Laterality: N/A;  Dr Aiken    PERIPHERALLY INSERTED CENTRAL CATHETER INSERTION N/A 2022    Procedure: INSERTION, PICC;  Surgeon: PICC, [facility];  Location: [facility] ENDO;  Service: Cardiology;  Laterality: N/A;  Dr Dubose    SESAMOIDECTOMY Left 2021    Procedure: SESAMOIDECTOMY;  Surgeon: Jacbo Bernal DPM;  Location: [facility] OR;  Service: Podiatry;  Laterality: Left;       Family History   Problem Relation Age of Onset    Hypertension Mother        Social History     Socioeconomic History    Marital status:    Tobacco Use    Smoking status: Never    Smokeless tobacco: Never   Substance and Sexual Activity    Alcohol use: No    Drug use: No       Current Outpatient Medications   Medication Sig Dispense Refill    alprazolam (XANAX) 1 MG tablet Take 1 mg by mouth 2 (two) times daily as needed for Anxiety.      ampicillin  "(PRINCIPEN) 500 MG capsule Take 1 capsule (500 mg total) by mouth every 12 (twelve) hours. (Patient not taking: Reported on 2023) 20 capsule 0    BASAGLAR KWIKPEN 100 unit/mL (3 mL) InPn pen 75 Units once daily.   3    BD INSULIN PEN NEEDLE UF MINI 31 gauge x 3/16" Ndle   2    BD INSULIN SYRINGE ULTRA-FINE 1 mL 31 gauge x 5/16 Syrg use as directed      chlorthalidone (HYGROTEN) 25 MG Tab Take 1 tablet (25 mg total) by mouth once daily. 30 tablet 11    hydrocodone-acetaminophen 10-325mg (NORCO)  mg Tab Take 1 tablet by mouth 3 (three) times daily as needed.      hydrocodone-acetaminophen 10-325mg (NORCO)  mg Tab Take 1 tablet by mouth every 8 (eight) hours as needed for Pain. 90 tablet 0    insulin detemir (LEVEMIR) 100 unit/mL injection Inject into the skin every evening.      insulin glargine,hum.rec.anlog (LANTUS SUBQ) Inject into the skin.      LANTUS U-100 INSULIN 100 unit/mL injection SMARTSI Unit(s) SUB-Q Every Night      OZEMPIC 1 mg/dose (2 mg/1.5 mL) PnIj Inject 60 mg into the skin every 7 days.       OZEMPIC 1 mg/dose (4 mg/3 mL) Inject 1 mg into the skin every 7 days.      sulfamethoxazole-trimethoprim 800-160mg (BACTRIM DS) 800-160 mg Tab Take 1 tablet by mouth 2 (two) times daily. 14 tablet 0    traMADoL (ULTRAM) 50 mg tablet Take 1 tablet (50 mg total) by mouth every 6 (six) hours as needed for Pain. 28 tablet 0     No current facility-administered medications for this visit.       Review of patient's allergies indicates:   Allergen Reactions    Codeine Hives and Rash        Hemoglobin A1C   Date Value Ref Range Status   2022 10.5 (H) 0.0 - 5.6 % Final     Comment:     Reference Interval:  5.0 - 5.6 Normal   5.7 - 6.4 High Risk   > 6.5 Diabetic      Hgb A1c results are standardized based on the (NGSP) National   Glycohemoglobin Standardization Program.      Hemoglobin A1C levels are related to mean serum/plasma glucose   during the preceding 2-3 months.        2021 11.8 " (H) 0.0 - 5.6 % Final     Comment:     Reference Interval:  5.0 - 5.6 Normal   5.7 - 6.4 High Risk   > 6.5 Diabetic      Hgb A1c results are standardized based on the (NGSP) National   Glycohemoglobin Standardization Program.      Hemoglobin A1C levels are related to mean serum/plasma glucose   during the preceding 2-3 months.        2021 8.8 (H) 4.8 - 5.6 % Final     Comment:              Prediabetes: 5.7 - 6.4           Diabetes: >6.4           Glycemic control for adults with diabetes: <7.0         Past Medical History:   Diagnosis Date    Bell's palsy     Coronary artery disease     Pt states Dr. Virk said she did not have a blockage after a stress test.    Diabetes mellitus     GERD (gastroesophageal reflux disease)     Hypertension     Obesity        Past Surgical History:   Procedure Laterality Date     SECTION      x 2    CHOLECYSTECTOMY      ESOPHAGOGASTRODUODENOSCOPY      PERIPHERALLY INSERTED CENTRAL CATHETER INSERTION N/A 10/11/2021    Procedure: INSERTION, PICC;  Surgeon: PICC, [facility];  Location: [facility] ENDO;  Service: Cardiology;  Laterality: N/A;  Dr Aiken    PERIPHERALLY INSERTED CENTRAL CATHETER INSERTION N/A 2022    Procedure: INSERTION, PICC;  Surgeon: PICC, [facility];  [facility] END[facility];  Service: Cardiology;  Laterality: N/A;  Dr Dubose    SESAMOIDECTOMY Left 2021    Procedure: SESAMOIDECTOMY;  Surgeon: Jacob Bernal DPM;  Location: [facility] OR;  Service: Podiatry;  Laterality: Left;       Family History   Problem Relation Age of Onset    Hypertension Mother        Social History     Socioeconomic History    Marital status:    Tobacco Use    Smoking status: Never    Smokeless tobacco: Never   Substance and Sexual Activity    Alcohol use: No    Drug use: No       Current Outpatient Medications   Medication Sig Dispense Refill    alprazolam (XANAX) 1 MG tablet Take 1 mg by mouth 2 (two) times daily as needed for Anxiety.      ampicillin (PRINCIPEN) 500 MG capsule Take 1  "capsule (500 mg total) by mouth every 12 (twelve) hours. (Patient not taking: Reported on 2023) 20 capsule 0    BASAGLAR KWIKPEN 100 unit/mL (3 mL) InPn pen 75 Units once daily.   3    BD INSULIN PEN NEEDLE UF MINI 31 gauge x 3/16" Ndle   2    BD INSULIN SYRINGE ULTRA-FINE 1 mL 31 gauge x 5/16 Syrg use as directed      chlorthalidone (HYGROTEN) 25 MG Tab Take 1 tablet (25 mg total) by mouth once daily. 30 tablet 11    hydrocodone-acetaminophen 10-325mg (NORCO)  mg Tab Take 1 tablet by mouth 3 (three) times daily as needed.      hydrocodone-acetaminophen 10-325mg (NORCO)  mg Tab Take 1 tablet by mouth every 8 (eight) hours as needed for Pain. 90 tablet 0    insulin detemir (LEVEMIR) 100 unit/mL injection Inject into the skin every evening.      insulin glargine,hum.rec.anlog (LANTUS SUBQ) Inject into the skin.      LANTUS U-100 INSULIN 100 unit/mL injection SMARTSI Unit(s) SUB-Q Every Night      OZEMPIC 1 mg/dose (2 mg/1.5 mL) PnIj Inject 60 mg into the skin every 7 days.       OZEMPIC 1 mg/dose (4 mg/3 mL) Inject 1 mg into the skin every 7 days.      sulfamethoxazole-trimethoprim 800-160mg (BACTRIM DS) 800-160 mg Tab Take 1 tablet by mouth 2 (two) times daily. 14 tablet 0    traMADoL (ULTRAM) 50 mg tablet Take 1 tablet (50 mg total) by mouth every 6 (six) hours as needed for Pain. 28 tablet 0     No current facility-administered medications for this visit.       Review of patient's allergies indicates:   Allergen Reactions    Codeine Hives and Rash         Review of Systems   Constitutional: Negative for chills and fever.   Skin:  Positive for color change and poor wound healing. Negative for nail changes.   Gastrointestinal:  Negative for nausea and vomiting.   Neurological:  Positive for numbness.   Psychiatric/Behavioral:  Negative for altered mental status.          Objective:      Physical Exam  Constitutional:       Appearance: Normal appearance. She is not ill-appearing.   Cardiovascular: "      Pulses:           Dorsalis pedis pulses are 2+ on the right side and 2+ on the left side.        Posterior tibial pulses are 2+ on the right side and 2+ on the left side.      Comments: CFT is < 3 seconds bilateral.  Pedal hair growth is decreased bilateral.  No lower extremity edema noted bilateral.  Toes are warm to touch bilateral.    Musculoskeletal:         General: No signs of injury.      Right lower leg: No edema.      Left lower leg: No edema.      Comments: Muscle strength 5/5 in all muscle groups bilateral.  No tenderness nor crepitation with ROM of foot/ankle joints bilateral.  No tenderness with palpation of bilateral foot and ankle.   Slight dorsal contracture of the Lt. Hallux.     Skin:     General: Skin is warm and dry.      Capillary Refill: Capillary refill takes 2 to 3 seconds.      Findings: Wound present. No bruising, ecchymosis, erythema, signs of injury, laceration, lesion, petechiae or rash.      Comments: Resolution of prior erythema to the dorsum of the Rt. Hallux IP joint.    Location: Open wound noted to the plantar aspect of the Rt. Hallux.  Base: Down to dermis and comprised of fibrin.    Drainage: None  Malinda wound: Devoid of erythema, edema, fluctuance, purulence, and malodor.    Pre-debridement measurement: 0.2 x 0.1 x 0.2cm.   Post-debridement measurement: 0.4 x 0.3 x 0.2cm    Neurological:      Mental Status: She is alert.      Sensory: Sensory deficit present.      Motor: No weakness.      Comments: Decreased protective sensation noted bilateral.  Light touch is absent bilateral.               Assessment:       Encounter Diagnoses   Name Primary?    Puncture wound of toe of right foot, subsequent encounter Yes    Diabetic polyneuropathy associated with type 2 diabetes mellitus                Plan:       Keerthi was seen today for wound care.    Diagnoses and all orders for this visit:    Puncture wound of toe of right foot, subsequent encounter    Diabetic polyneuropathy  associated with type 2 diabetes mellitus      I counseled the patient on her conditions, their implications and medical management.    Performed a selective excisional debridement of the Rt. foot.  See attached procedure note.      The wound base was covered with silver alginate, and a football dressing was applied.    Advised to keep the dressing CDI x 1 week.    Advised to rest and elevate the affected extremity.    Instructed to minimize weight bearing to facilitate wound healing.    Advised to ambulate only in the postoperative shoe/boot.    Advised to take Bactrim as ordered.    RTC in 1 week.    Jacob Bernal DPM

## 2023-07-25 NOTE — PROCEDURES
"Wound Debridement    Date/Time: 7/25/2023 9:20 AM  Performed by: Jacob Bernal DPM  Authorized by: Jacob Bernal DPM     Time out: Immediately prior to procedure a "time out" was called to verify the correct patient, procedure, equipment, support staff and site/side marked as required.    Consent Done?:  Yes (Verbal)  Local anesthesia used?: No      Wound Details:    Location:  Right foot    Location:  Right 1st Toe    Type of Debridement:  Excisional       Length (cm):  0.2       Area (sq cm):  0.02       Width (cm):  0.1       Percent Debrided (%):  100       Depth (cm):  0.2       Total Area Debrided (sq cm):  0.02    Depth of debridement:  Epidermis/Dermis    Tissue debrided:  Dermis    Devitalized tissue debrided:  Fibrin    Instruments:  Blade  Bleeding:  Minimal  Hemostasis Achieved: Yes  Method Used:  Pressure  Patient tolerance:  Patient tolerated the procedure well with no immediate complications  "

## 2023-08-03 ENCOUNTER — OFFICE VISIT (OUTPATIENT)
Dept: PODIATRY | Facility: CLINIC | Age: 48
End: 2023-08-03
Payer: MEDICAID

## 2023-08-03 VITALS — HEIGHT: 68 IN | BODY MASS INDEX: 40.92 KG/M2 | WEIGHT: 270 LBS

## 2023-08-03 DIAGNOSIS — S91.139D: Primary | ICD-10-CM

## 2023-08-03 DIAGNOSIS — E11.42 DIABETIC POLYNEUROPATHY ASSOCIATED WITH TYPE 2 DIABETES MELLITUS: ICD-10-CM

## 2023-08-03 PROCEDURE — 99999 PR PBB SHADOW E&M-EST. PATIENT-LVL III: CPT | Mod: PBBFAC,,, | Performed by: PODIATRIST

## 2023-08-03 PROCEDURE — 97597 DBRDMT OPN WND 1ST 20 CM/<: CPT | Mod: PBBFAC,PN | Performed by: PODIATRIST

## 2023-08-03 PROCEDURE — 97597 WOUND DEBRIDEMENT: ICD-10-PCS | Mod: S$PBB,,, | Performed by: PODIATRIST

## 2023-08-03 PROCEDURE — 99499 UNLISTED E&M SERVICE: CPT | Mod: S$PBB,,, | Performed by: PODIATRIST

## 2023-08-03 PROCEDURE — 99499 NO LOS: ICD-10-PCS | Mod: S$PBB,,, | Performed by: PODIATRIST

## 2023-08-03 PROCEDURE — 99213 OFFICE O/P EST LOW 20 MIN: CPT | Mod: PBBFAC,PN,25 | Performed by: PODIATRIST

## 2023-08-03 PROCEDURE — 99999 PR PBB SHADOW E&M-EST. PATIENT-LVL III: ICD-10-PCS | Mod: PBBFAC,,, | Performed by: PODIATRIST

## 2023-08-03 NOTE — PROCEDURES
"Wound Debridement    Date/Time: 8/3/2023 10:00 AM    Performed by: Jacob Brenal DPM  Authorized by: Jacob Bernal DPM    Time out: Immediately prior to procedure a "time out" was called to verify the correct patient, procedure, equipment, support staff and site/side marked as required.    Consent Done?:  Yes (Verbal)  Local anesthesia used?: No      Wound Details:    Location:  Right foot    Location:  Right 1st Toe    Type of Debridement:  Excisional       Length (cm):  0.1       Area (sq cm):  0.01       Width (cm):  0.1       Percent Debrided (%):  100       Depth (cm):  0.1       Total Area Debrided (sq cm):  0.01    Depth of debridement:  Epidermis/Dermis    Tissue debrided:  Dermis    Devitalized tissue debrided:  Fibrin    Instruments:  Blade  Bleeding:  Minimal  Hemostasis Achieved: Yes  Patient tolerance:  Patient tolerated the procedure well with no immediate complications    "

## 2023-08-03 NOTE — PROGRESS NOTES
Subjective:      Patient ID: Keerthi Chase is a 48 y.o. female.    Chief Complaint: Wound Check  Patient presents to clinic for a 1 week wound check of the Rt. Great toe.  Denies experiencing pain from the digit with today's exam.  Has kept the prior football dressing clean, dry, and intact for the past week.  Notes ambulation while in the DARCO shoe only.  Has been taking bactrim as instructed.  Denies experiencing N/V/F/C/D.   Denies any additional pedal complaints.      Past Medical History:   Diagnosis Date    Bell's palsy     Coronary artery disease     Pt states Dr. Virk said she did not have a blockage after a stress test.    Diabetes mellitus     GERD (gastroesophageal reflux disease)     Hypertension     Obesity        Past Surgical History:   Procedure Laterality Date     SECTION      x 2    CHOLECYSTECTOMY      ESOPHAGOGASTRODUODENOSCOPY      PERIPHERALLY INSERTED CENTRAL CATHETER INSERTION N/A 10/11/2021    Procedure: INSERTION, PICC;  Surgeon: PICC, [facility];  Location: [facility] ENDO;  Service: Cardiology;  Laterality: N/A;  Dr Aiken    PERIPHERALLY INSERTED CENTRAL CATHETER INSERTION N/A 2022    Procedure: INSERTION, PICC;  Surgeon: PICC, [facility];  Location: [facility] ENDO;  Service: Cardiology;  Laterality: N/A;  Dr Dubose    SESAMOIDECTOMY Left 2021    Procedure: SESAMOIDECTOMY;  Surgeon: Jacob Bernal DPM;  Location: [facility] OR;  Service: Podiatry;  Laterality: Left;       Family History   Problem Relation Age of Onset    Hypertension Mother        Social History     Socioeconomic History    Marital status:    Tobacco Use    Smoking status: Never    Smokeless tobacco: Never   Substance and Sexual Activity    Alcohol use: No    Drug use: No       Current Outpatient Medications   Medication Sig Dispense Refill    alprazolam (XANAX) 1 MG tablet Take 1 mg by mouth 2 (two) times daily as needed for Anxiety.      ampicillin (PRINCIPEN) 500 MG capsule Take 1 capsule (500 mg total) by mouth  "every 12 (twelve) hours. 20 capsule 0    BASAGLAR KWIKPEN 100 unit/mL (3 mL) InPn pen 75 Units once daily.   3    BD INSULIN PEN NEEDLE UF MINI 31 gauge x 3/16" Ndle   2    BD INSULIN SYRINGE ULTRA-FINE 1 mL 31 gauge x /16 Syrg use as directed      hydrocodone-acetaminophen 10-325mg (NORCO)  mg Tab Take 1 tablet by mouth 3 (three) times daily as needed.      hydrocodone-acetaminophen 10-325mg (NORCO)  mg Tab Take 1 tablet by mouth every 8 (eight) hours as needed for Pain. 90 tablet 0    insulin detemir (LEVEMIR) 100 unit/mL injection Inject into the skin every evening.      insulin glargine,hum.rec.anlog (LANTUS SUBQ) Inject into the skin.      LANTUS U-100 INSULIN 100 unit/mL injection SMARTSI Unit(s) SUB-Q Every Night      OZEMPIC 1 mg/dose (2 mg/1.5 mL) PnIj Inject 60 mg into the skin every 7 days.       OZEMPIC 1 mg/dose (4 mg/3 mL) Inject 1 mg into the skin every 7 days.      sulfamethoxazole-trimethoprim 800-160mg (BACTRIM DS) 800-160 mg Tab Take 1 tablet by mouth 2 (two) times daily. 14 tablet 0    traMADoL (ULTRAM) 50 mg tablet Take 1 tablet (50 mg total) by mouth every 6 (six) hours as needed for Pain. 28 tablet 0    chlorthalidone (HYGROTEN) 25 MG Tab Take 1 tablet (25 mg total) by mouth once daily. 30 tablet 11     No current facility-administered medications for this visit.       Review of patient's allergies indicates:   Allergen Reactions    Codeine Hives and Rash        Hemoglobin A1C   Date Value Ref Range Status   2022 10.5 (H) 0.0 - 5.6 % Final     Comment:     Reference Interval:  5.0 - 5.6 Normal   5.7 - 6.4 High Risk   > 6.5 Diabetic      Hgb A1c results are standardized based on the (NGSP) National   Glycohemoglobin Standardization Program.      Hemoglobin A1C levels are related to mean serum/plasma glucose   during the preceding 2-3 months.        2021 11.8 (H) 0.0 - 5.6 % Final     Comment:     Reference Interval:  5.0 - 5.6 Normal   5.7 - 6.4 High Risk   > 6.5 " Diabetic      Hgb A1c results are standardized based on the (NGSP) National   Glycohemoglobin Standardization Program.      Hemoglobin A1C levels are related to mean serum/plasma glucose   during the preceding 2-3 months.        2021 8.8 (H) 4.8 - 5.6 % Final     Comment:              Prediabetes: 5.7 - 6.4           Diabetes: >6.4           Glycemic control for adults with diabetes: <7.0         Past Medical History:   Diagnosis Date    Bell's palsy     Coronary artery disease     Pt states Dr. Virk said she did not have a blockage after a stress test.    Diabetes mellitus     GERD (gastroesophageal reflux disease)     Hypertension     Obesity        Past Surgical History:   Procedure Laterality Date     SECTION      x 2    CHOLECYSTECTOMY      ESOPHAGOGASTRODUODENOSCOPY      PERIPHERALLY INSERTED CENTRAL CATHETER INSERTION N/A 10/11/2021    Procedure: INSERTION, PICC;  Surgeon: PICC, [facility];  [facility];  Service: Cardiology;  Laterality: N/A;  Dr Aiken    PERIPHERALLY INSERTED CENTRAL CATHETER INSERTION N/A 2022    Procedure: INSERTION, PICC;  Surgeon: PICC, [facility];  Location: [facility] ENDO;  Service: Cardiology;  Laterality: N/A;  Dr Dubose    SESAMOIDECTOMY Left 2021    Procedure: SESAMOIDECTOMY;  Surgeon: Jacob Bernal DPM;  Location: [facility] OR;  Service: Podiatry;  Laterality: Left;       Family History   Problem Relation Age of Onset    Hypertension Mother        Social History     Socioeconomic History    Marital status:    Tobacco Use    Smoking status: Never    Smokeless tobacco: Never   Substance and Sexual Activity    Alcohol use: No    Drug use: No       Current Outpatient Medications   Medication Sig Dispense Refill    alprazolam (XANAX) 1 MG tablet Take 1 mg by mouth 2 (two) times daily as needed for Anxiety.      ampicillin (PRINCIPEN) 500 MG capsule Take 1 capsule (500 mg total) by mouth every 12 (twelve) hours. 20 capsule 0    BASAGLAR KWIKPEN 100 unit/mL (3 mL)  "InPn pen 75 Units once daily.   3    BD INSULIN PEN NEEDLE UF MINI 31 gauge x 3/16" Ndle   2    BD INSULIN SYRINGE ULTRA-FINE 1 mL 31 gauge x 5/16 Syrg use as directed      hydrocodone-acetaminophen 10-325mg (NORCO)  mg Tab Take 1 tablet by mouth 3 (three) times daily as needed.      hydrocodone-acetaminophen 10-325mg (NORCO)  mg Tab Take 1 tablet by mouth every 8 (eight) hours as needed for Pain. 90 tablet 0    insulin detemir (LEVEMIR) 100 unit/mL injection Inject into the skin every evening.      insulin glargine,hum.rec.anlog (LANTUS SUBQ) Inject into the skin.      LANTUS U-100 INSULIN 100 unit/mL injection SMARTSI Unit(s) SUB-Q Every Night      OZEMPIC 1 mg/dose (2 mg/1.5 mL) PnIj Inject 60 mg into the skin every 7 days.       OZEMPIC 1 mg/dose (4 mg/3 mL) Inject 1 mg into the skin every 7 days.      sulfamethoxazole-trimethoprim 800-160mg (BACTRIM DS) 800-160 mg Tab Take 1 tablet by mouth 2 (two) times daily. 14 tablet 0    traMADoL (ULTRAM) 50 mg tablet Take 1 tablet (50 mg total) by mouth every 6 (six) hours as needed for Pain. 28 tablet 0    chlorthalidone (HYGROTEN) 25 MG Tab Take 1 tablet (25 mg total) by mouth once daily. 30 tablet 11     No current facility-administered medications for this visit.       Review of patient's allergies indicates:   Allergen Reactions    Codeine Hives and Rash         Review of Systems   Constitutional: Negative for chills and fever.   Skin:  Positive for color change and poor wound healing. Negative for nail changes.   Gastrointestinal:  Negative for nausea and vomiting.   Neurological:  Positive for numbness.   Psychiatric/Behavioral:  Negative for altered mental status.            Objective:      Physical Exam  Constitutional:       Appearance: Normal appearance. She is not ill-appearing.   Cardiovascular:      Pulses:           Dorsalis pedis pulses are 2+ on the right side and 2+ on the left side.        Posterior tibial pulses are 2+ on the right side " and 2+ on the left side.      Comments: CFT is < 3 seconds bilateral.  Pedal hair growth is decreased bilateral.  No lower extremity edema noted bilateral.  Toes are warm to touch bilateral.    Musculoskeletal:         General: No signs of injury.      Right lower leg: No edema.      Left lower leg: No edema.      Comments: Muscle strength 5/5 in all muscle groups bilateral.  No tenderness nor crepitation with ROM of foot/ankle joints bilateral.  No tenderness with palpation of bilateral foot and ankle.   Slight dorsal contracture of the Lt. Hallux.     Skin:     General: Skin is warm and dry.      Capillary Refill: Capillary refill takes 2 to 3 seconds.      Findings: Wound present. No bruising, ecchymosis, erythema, signs of injury, laceration, lesion, petechiae or rash.      Comments:   Location: Open wound noted to the plantar aspect of the Rt. Hallux.  Base: Down to dermis and comprised of fibrin.    Drainage: None  Malinda wound: Devoid of erythema, edema, fluctuance, purulence, and malodor.    Pre-debridement measurement: 0.1 x 0.1 x 0.1cm.   Post-debridement measurement: 0.3 x 0.3 x 0.1cm    Neurological:      Mental Status: She is alert.      Sensory: Sensory deficit present.      Motor: No weakness.      Comments: Decreased protective sensation noted bilateral.  Light touch is absent bilateral.               Assessment:       Encounter Diagnoses   Name Primary?    Puncture wound of toe of right foot, subsequent encounter Yes    Diabetic polyneuropathy associated with type 2 diabetes mellitus                Plan:       Keerthi was seen today for wound check.    Diagnoses and all orders for this visit:    Puncture wound of toe of right foot, subsequent encounter    Diabetic polyneuropathy associated with type 2 diabetes mellitus      I counseled the patient on her conditions, their implications and medical management.    Performed a selective excisional debridement of the Rt. foot.  See attached procedure note.       The wound base was covered with antibiotic ointment and a band aid.      Advised to perform said dressing change daily for the next week.  Anticipate wound closure by then.    May transition back into casual shoe gear with activity to tolerance.      Advised to finish the current course of Bactrim as ordered.    RTC in 2 weeks for a follow up.    Jacob Bernal DPM

## 2023-08-16 ENCOUNTER — TELEPHONE (OUTPATIENT)
Dept: PODIATRY | Facility: CLINIC | Age: 48
End: 2023-08-16
Payer: MEDICAID

## 2023-08-16 NOTE — TELEPHONE ENCOUNTER
----- Message from Nicolas Garcia sent at 8/16/2023 10:36 AM CDT -----  Type:  Sooner Appointment Request    Caller is requesting a sooner appointment.  Caller declined first available appointment listed below.  Caller will not accept being placed on the waitlist and is requesting a message be sent to doctor.    Name of Caller:  pt  When is the first available appointment?  11/16 after the 8/23 appt--said she need to be seen sooner--please call and advise  Symptoms:  wound care   Best Call Back Number:  [phone] (  Additional Information:  pt said she will not be able to make it on 8/23--thank you

## 2023-08-21 ENCOUNTER — OFFICE VISIT (OUTPATIENT)
Dept: PODIATRY | Facility: CLINIC | Age: 48
End: 2023-08-21
Payer: MEDICAID

## 2023-08-21 ENCOUNTER — TELEPHONE (OUTPATIENT)
Dept: PODIATRY | Facility: CLINIC | Age: 48
End: 2023-08-21
Payer: MEDICAID

## 2023-08-21 VITALS — BODY MASS INDEX: 40.92 KG/M2 | WEIGHT: 270 LBS | HEIGHT: 68 IN

## 2023-08-21 DIAGNOSIS — S91.139D: Primary | ICD-10-CM

## 2023-08-21 DIAGNOSIS — E11.42 DIABETIC POLYNEUROPATHY ASSOCIATED WITH TYPE 2 DIABETES MELLITUS: ICD-10-CM

## 2023-08-21 PROCEDURE — 3008F BODY MASS INDEX DOCD: CPT | Mod: CPTII,,, | Performed by: PODIATRIST

## 2023-08-21 PROCEDURE — 87075 CULTR BACTERIA EXCEPT BLOOD: CPT | Performed by: PODIATRIST

## 2023-08-21 PROCEDURE — 11042 WOUND DEBRIDEMENT: ICD-10-PCS | Mod: S$PBB,,, | Performed by: PODIATRIST

## 2023-08-21 PROCEDURE — 99213 OFFICE O/P EST LOW 20 MIN: CPT | Mod: 25,S$PBB,, | Performed by: PODIATRIST

## 2023-08-21 PROCEDURE — 3008F PR BODY MASS INDEX (BMI) DOCUMENTED: ICD-10-PCS | Mod: CPTII,,, | Performed by: PODIATRIST

## 2023-08-21 PROCEDURE — 99999 PR PBB SHADOW E&M-EST. PATIENT-LVL II: CPT | Mod: PBBFAC,,, | Performed by: PODIATRIST

## 2023-08-21 PROCEDURE — 99213 PR OFFICE/OUTPT VISIT, EST, LEVL III, 20-29 MIN: ICD-10-PCS | Mod: 25,S$PBB,, | Performed by: PODIATRIST

## 2023-08-21 PROCEDURE — 87070 CULTURE OTHR SPECIMN AEROBIC: CPT | Performed by: PODIATRIST

## 2023-08-21 PROCEDURE — 99999 PR PBB SHADOW E&M-EST. PATIENT-LVL II: ICD-10-PCS | Mod: PBBFAC,,, | Performed by: PODIATRIST

## 2023-08-21 PROCEDURE — 11042 DBRDMT SUBQ TIS 1ST 20SQCM/<: CPT | Mod: PBBFAC,PN | Performed by: PODIATRIST

## 2023-08-21 PROCEDURE — 99212 OFFICE O/P EST SF 10 MIN: CPT | Mod: PBBFAC,PN,25 | Performed by: PODIATRIST

## 2023-08-21 PROCEDURE — 87077 CULTURE AEROBIC IDENTIFY: CPT | Performed by: PODIATRIST

## 2023-08-21 NOTE — TELEPHONE ENCOUNTER
----- Message from Nayla Walls sent at 8/21/2023  9:33 AM CDT -----  Contact: self  Type:  Sooner Appointment Request    Caller is requesting a sooner appointment.  Caller declined first available appointment listed below.  Caller will not accept being placed on the waitlist and is requesting a message be sent to doctor.  Name of Caller:Pt  When is the first available appointment? 8/23  Symptoms:Pt states infected and had to go to [place] [facility] 8/20   Would the patient rather a call back or a response via [facility]?  call  Best Call Back Number: [phone]    Pt states she has to work on Wed, 8/23 but is off today and tomorrow. ER dr didn't even look at it. Pt states it has been infected (red, painful) over the weekend...     Please call to advise... Thank you...

## 2023-08-23 NOTE — PROGRESS NOTES
Subjective:      Patient ID: Keerthi Chase is a 48 y.o. female.    Chief Complaint: Foot Problem (Infected right toe)  Patient presents to clinic for a three week wound check of the Rt. Great toe.  Notes deterioration of the wound since the last exam.  Relates experiencing sharp, 8/10, pain from the site with all weight bearing.  Symptoms are alleviated with rest.  Out of concern, she presented to [facility] ED, late last week, for antibiotics.  Notes the site was not fully assessed and was discharged with a tube of Mupirocin.  She has been taking an old Rx of cipro, however, this has done little to improve the appearance of the wound.  Denies experiencing N/V/F/C/D.   Denies any additional pedal complaints.      Past Medical History:   Diagnosis Date    Bell's palsy     Coronary artery disease     Pt states Dr. Virk said she did not have a blockage after a stress test.    Diabetes mellitus     GERD (gastroesophageal reflux disease)     Hypertension     Obesity        Past Surgical History:   Procedure Laterality Date     SECTION      x 2    CHOLECYSTECTOMY      ESOPHAGOGASTRODUODENOSCOPY      PERIPHERALLY INSERTED CENTRAL CATHETER INSERTION N/A 10/11/2021    Procedure: INSERTION, PICC;  Surgeon: IGOR, PRIYANK;  Location: [facility] ENDO;  Service: Cardiology;  Laterality: N/A;  Dr Aiken    PERIPHERALLY INSERTED CENTRAL CATHETER INSERTION N/A 2022    Procedure: INSERTION, PICC;  Surgeon: IGOR, STTAVIA;  Location: [facility] ENDO;  Service: Cardiology;  Laterality: N/A;  Dr Dubose    SESAMOIDECTOMY Left 2021    Procedure: SESAMOIDECTOMY;  Surgeon: Jacob Bernal DPM;  Location: [facility] OR;  Service: Podiatry;  Laterality: Left;       Family History   Problem Relation Age of Onset    Hypertension Mother        Social History     Socioeconomic History    Marital status:    Tobacco Use    Smoking status: Never    Smokeless tobacco: Never   Substance and Sexual Activity    Alcohol use: No    Drug use: No  "      Current Outpatient Medications   Medication Sig Dispense Refill    alprazolam (XANAX) 1 MG tablet Take 1 mg by mouth 2 (two) times daily as needed for Anxiety.      ampicillin (PRINCIPEN) 500 MG capsule Take 1 capsule (500 mg total) by mouth every 12 (twelve) hours. 20 capsule 0    BASAGLAR KWIKPEN 100 unit/mL (3 mL) InPn pen 75 Units once daily.   3    BD INSULIN PEN NEEDLE UF MINI 31 gauge x 3/16" Ndle   2    BD INSULIN SYRINGE ULTRA-FINE 1 mL 31 gauge x 5/16 Syrg use as directed      chlorthalidone (HYGROTEN) 25 MG Tab Take 1 tablet (25 mg total) by mouth once daily. 30 tablet 11    hydrocodone-acetaminophen 10-325mg (NORCO)  mg Tab Take 1 tablet by mouth 3 (three) times daily as needed.      hydrocodone-acetaminophen 10-325mg (NORCO)  mg Tab Take 1 tablet by mouth every 8 (eight) hours as needed for Pain. 90 tablet 0    insulin detemir (LEVEMIR) 100 unit/mL injection Inject into the skin every evening.      insulin glargine,hum.rec.anlog (LANTUS SUBQ) Inject into the skin.      LANTUS U-100 INSULIN 100 unit/mL injection SMARTSI Unit(s) SUB-Q Every Night      OZEMPIC 1 mg/dose (2 mg/1.5 mL) PnIj Inject 60 mg into the skin every 7 days.       OZEMPIC 1 mg/dose (4 mg/3 mL) Inject 1 mg into the skin every 7 days.      sulfamethoxazole-trimethoprim 800-160mg (BACTRIM DS) 800-160 mg Tab Take 1 tablet by mouth 2 (two) times daily. 14 tablet 0    traMADoL (ULTRAM) 50 mg tablet Take 1 tablet (50 mg total) by mouth every 6 (six) hours as needed for Pain. 28 tablet 0     No current facility-administered medications for this visit.       Review of patient's allergies indicates:   Allergen Reactions    Codeine Hives and Rash        Hemoglobin A1C   Date Value Ref Range Status   2022 10.5 (H) 0.0 - 5.6 % Final     Comment:     Reference Interval:  5.0 - 5.6 Normal   5.7 - 6.4 High Risk   > 6.5 Diabetic      Hgb A1c results are standardized based on the (NGSP) National   Glycohemoglobin " Standardization Program.      Hemoglobin A1C levels are related to mean serum/plasma glucose   during the preceding 2-3 months.        2021 11.8 (H) 0.0 - 5.6 % Final     Comment:     Reference Interval:  5.0 - 5.6 Normal   5.7 - 6.4 High Risk   > 6.5 Diabetic      Hgb A1c results are standardized based on the (NGSP) National   Glycohemoglobin Standardization Program.      Hemoglobin A1C levels are related to mean serum/plasma glucose   during the preceding 2-3 months.        2021 8.8 (H) 4.8 - 5.6 % Final     Comment:              Prediabetes: 5.7 - 6.4           Diabetes: >6.4           Glycemic control for adults with diabetes: <7.0         Past Medical History:   Diagnosis Date    Bell's palsy     Coronary artery disease     Pt states Dr. Vikr said she did not have a blockage after a stress test.    Diabetes mellitus     GERD (gastroesophageal reflux disease)     Hypertension     Obesity        Past Surgical History:   Procedure Laterality Date     SECTION      x 2    CHOLECYSTECTOMY      ESOPHAGOGASTRODUODENOSCOPY      PERIPHERALLY INSERTED CENTRAL CATHETER INSERTION N/A 10/11/2021    Procedure: INSERTION, PICC;  Surgeon: IGOR, STPH;  Location: [facility] ENDO;  Service: Cardiology;  Laterality: N/A;  Dr Aiken    PERIPHERALLY INSERTED CENTRAL CATHETER INSERTION N/A 2022    Procedure: INSERTION, PICC;  Surgeon: PICC, STPH;  Location: [facility] ENDO;  Service: Cardiology;  Laterality: N/A;  Dr Dubose    SESAMOIDECTOMY Left 2021    Procedure: SESAMOIDECTOMY;  Surgeon: Jacob Bernal DPM;  Location: [facility] OR;  Service: Podiatry;  Laterality: Left;       Family History   Problem Relation Age of Onset    Hypertension Mother        Social History     Socioeconomic History    Marital status:    Tobacco Use    Smoking status: Never    Smokeless tobacco: Never   Substance and Sexual Activity    Alcohol use: No    Drug use: No       Current Outpatient Medications   Medication Sig Dispense  "Refill    alprazolam (XANAX) 1 MG tablet Take 1 mg by mouth 2 (two) times daily as needed for Anxiety.      ampicillin (PRINCIPEN) 500 MG capsule Take 1 capsule (500 mg total) by mouth every 12 (twelve) hours. 20 capsule 0    BASAGLAR KWIKPEN 100 unit/mL (3 mL) InPn pen 75 Units once daily.   3    BD INSULIN PEN NEEDLE UF MINI 31 gauge x 3/16" Ndle   2    BD INSULIN SYRINGE ULTRA-FINE 1 mL 31 gauge x 5/16 Syrg use as directed      chlorthalidone (HYGROTEN) 25 MG Tab Take 1 tablet (25 mg total) by mouth once daily. 30 tablet 11    hydrocodone-acetaminophen 10-325mg (NORCO)  mg Tab Take 1 tablet by mouth 3 (three) times daily as needed.      hydrocodone-acetaminophen 10-325mg (NORCO)  mg Tab Take 1 tablet by mouth every 8 (eight) hours as needed for Pain. 90 tablet 0    insulin detemir (LEVEMIR) 100 unit/mL injection Inject into the skin every evening.      insulin glargine,hum.rec.anlog (LANTUS SUBQ) Inject into the skin.      LANTUS U-100 INSULIN 100 unit/mL injection SMARTSI Unit(s) SUB-Q Every Night      OZEMPIC 1 mg/dose (2 mg/1.5 mL) PnIj Inject 60 mg into the skin every 7 days.       OZEMPIC 1 mg/dose (4 mg/3 mL) Inject 1 mg into the skin every 7 days.      sulfamethoxazole-trimethoprim 800-160mg (BACTRIM DS) 800-160 mg Tab Take 1 tablet by mouth 2 (two) times daily. 14 tablet 0    traMADoL (ULTRAM) 50 mg tablet Take 1 tablet (50 mg total) by mouth every 6 (six) hours as needed for Pain. 28 tablet 0     No current facility-administered medications for this visit.       Review of patient's allergies indicates:   Allergen Reactions    Codeine Hives and Rash         Review of Systems   Constitutional: Negative for chills and fever.   Skin:  Positive for color change and poor wound healing. Negative for nail changes.   Gastrointestinal:  Negative for nausea and vomiting.   Neurological:  Positive for numbness.   Psychiatric/Behavioral:  Negative for altered mental status.            Objective:    "   Physical Exam  Constitutional:       Appearance: Normal appearance. She is not ill-appearing.   Cardiovascular:      Pulses:           Dorsalis pedis pulses are 2+ on the right side and 2+ on the left side.        Posterior tibial pulses are 2+ on the right side and 2+ on the left side.      Comments: CFT is < 3 seconds bilateral.  Pedal hair growth is decreased bilateral.  No lower extremity edema noted bilateral.  Toes are warm to touch bilateral.    Musculoskeletal:         General: Tenderness present. No signs of injury.      Right lower leg: No edema.      Left lower leg: No edema.      Comments: Muscle strength 5/5 in all muscle groups bilateral.  No tenderness nor crepitation with ROM of foot/ankle joints bilateral.  Pain with palpation to the plantar wound of the Rt. Hallux.  Slight dorsal contracture of the Lt. Hallux.     Skin:     General: Skin is warm and dry.      Capillary Refill: Capillary refill takes 2 to 3 seconds.      Findings: Wound present. No bruising, ecchymosis, erythema, signs of injury, laceration, lesion, petechiae or rash.      Comments:   Location: Open wound noted to the plantar aspect of the Rt. Hallux.  Base: Probes down to distal phalanx and comprised of fibrin.    Drainage: None  Chacorta wound: Fluctuance and scant seropurulent drainage noted in conjunction with malodor.  Mild edema and chacorta wound erythema noted.     Pre-debridement measurement: 3 x 2 x 1cm  Post-debridement measurement: 3.2 x 2.2 x 1cm   Neurological:      Mental Status: She is alert.      Sensory: Sensory deficit present.      Motor: No weakness.      Comments: Decreased protective sensation noted bilateral.  Light touch is absent bilateral.               Assessment:       Encounter Diagnoses   Name Primary?    Puncture wound of toe of right foot, subsequent encounter Yes    Diabetic polyneuropathy associated with type 2 diabetes mellitus                Plan:       Keerthi was seen today for foot  problem.    Diagnoses and all orders for this visit:    Puncture wound of toe of right foot, subsequent encounter  -     Aerobic culture  -     Culture, Anaerobic    Diabetic polyneuropathy associated with type 2 diabetes mellitus      I counseled the patient on her conditions, their implications and medical management.    Performed a selective excisional debridement of the Rt. foot.  See attached procedure note.      New wound cultures were obtained.    The wound base was packed with iodosorb ointment, and a football dressing was applied.    Advised to keep the dressing CDI x 1 week.    Advised to rest and elevate the affected extremity.    Instructed to minimize weight bearing to facilitate wound healing.    Advised to ambulate only in the postoperative shoe/boot.    If no improvement noted, will order a MRI at that time to exclude osteomyelitis of the digit.    RTC in 1 week.    Jacob Bernal DPM

## 2023-08-23 NOTE — PROCEDURES
"Wound Debridement    Date/Time: 8/21/2023 3:20 PM    Performed by: Jacob Bernal DPM  Authorized by: Jacob Bernal DPM    Time out: Immediately prior to procedure a "time out" was called to verify the correct patient, procedure, equipment, support staff and site/side marked as required.    Consent Done?:  Yes (Verbal)  Local anesthesia used?: No      Wound Details:    Location:  Right foot    Location:  Right 1st Toe    Type of Debridement:  Excisional       Length (cm):  3       Area (sq cm):  6       Width (cm):  2       Percent Debrided (%):  100       Depth (cm):  1       Total Area Debrided (sq cm):  6    Depth of debridement:  Subcutaneous tissue    Tissue debrided:  Subcutaneous    Devitalized tissue debrided:  Fibrin    Instruments:  Curette  Bleeding:  Minimal  Hemostasis Achieved: Yes  Method Used:  Pressure  Patient tolerance:  Patient tolerated the procedure well with no immediate complications    "

## 2023-08-24 ENCOUNTER — TELEPHONE (OUTPATIENT)
Dept: PODIATRY | Facility: CLINIC | Age: 48
End: 2023-08-24
Payer: MEDICAID

## 2023-08-24 DIAGNOSIS — E11.628 DIABETIC FOOT INFECTION: Primary | ICD-10-CM

## 2023-08-24 DIAGNOSIS — L08.9 DIABETIC FOOT INFECTION: Primary | ICD-10-CM

## 2023-08-24 LAB — BACTERIA SPEC AEROBE CULT: ABNORMAL

## 2023-08-24 RX ORDER — AMOXICILLIN AND CLAVULANATE POTASSIUM 875; 125 MG/1; MG/1
1 TABLET, FILM COATED ORAL EVERY 12 HOURS
Qty: 20 TABLET | Refills: 0 | Status: SHIPPED | OUTPATIENT
Start: 2023-08-24

## 2023-08-24 NOTE — TELEPHONE ENCOUNTER
----- Message from Jacob Bernal DPM sent at 8/24/2023  6:15 AM CDT -----  Please let the patient know cultures were positive for bacteria.  I have sent Augmentin to her pharmacy to cover her for this.    ----- Message -----  From: [facility] [employer] [facility]  Sent: 8/23/2023   7:41 AM CDT  To: Jacob Bernal DPM

## 2023-08-24 NOTE — TELEPHONE ENCOUNTER
Spoke with patient with culture results and informed her about new meds sent to pharmacy .    ----- Message from Jacob Bernal DPM sent at 8/24/2023  6:15 AM CDT -----  Please let the patient know cultures were positive for bacteria.  I have sent Augmentin to her pharmacy to cover her for this.    ----- Message -----  From: [facility] [employer] [facility]  Sent: 8/23/2023   7:41 AM CDT  To: Jacob Bernal DPM

## 2023-08-28 ENCOUNTER — OFFICE VISIT (OUTPATIENT)
Dept: PODIATRY | Facility: CLINIC | Age: 48
End: 2023-08-28
Payer: MEDICAID

## 2023-08-28 VITALS — WEIGHT: 270 LBS | BODY MASS INDEX: 40.92 KG/M2 | HEIGHT: 68 IN

## 2023-08-28 DIAGNOSIS — E11.42 DIABETIC POLYNEUROPATHY ASSOCIATED WITH TYPE 2 DIABETES MELLITUS: ICD-10-CM

## 2023-08-28 DIAGNOSIS — S91.139D: Primary | ICD-10-CM

## 2023-08-28 DIAGNOSIS — L08.9 DIABETIC FOOT INFECTION: ICD-10-CM

## 2023-08-28 DIAGNOSIS — E11.628 DIABETIC FOOT INFECTION: ICD-10-CM

## 2023-08-28 LAB — BACTERIA SPEC ANAEROBE CULT: NORMAL

## 2023-08-28 PROCEDURE — 99212 OFFICE O/P EST SF 10 MIN: CPT | Mod: PBBFAC,PN | Performed by: PODIATRIST

## 2023-08-28 PROCEDURE — 99999 PR PBB SHADOW E&M-EST. PATIENT-LVL II: ICD-10-PCS | Mod: PBBFAC,,, | Performed by: PODIATRIST

## 2023-08-28 PROCEDURE — 11042 WOUND DEBRIDEMENT: ICD-10-PCS | Mod: S$PBB,,, | Performed by: PODIATRIST

## 2023-08-28 PROCEDURE — 11042 DBRDMT SUBQ TIS 1ST 20SQCM/<: CPT | Mod: PBBFAC,PN | Performed by: PODIATRIST

## 2023-08-28 PROCEDURE — 99499 NO LOS: ICD-10-PCS | Mod: S$PBB,,, | Performed by: PODIATRIST

## 2023-08-28 PROCEDURE — 99999 PR PBB SHADOW E&M-EST. PATIENT-LVL II: CPT | Mod: PBBFAC,,, | Performed by: PODIATRIST

## 2023-08-28 PROCEDURE — 99499 UNLISTED E&M SERVICE: CPT | Mod: S$PBB,,, | Performed by: PODIATRIST

## 2023-08-28 RX ORDER — CIPROFLOXACIN 750 MG/1
750 TABLET, FILM COATED ORAL EVERY 12 HOURS
COMMUNITY
Start: 2023-08-21

## 2023-08-28 NOTE — PROCEDURES
"Wound Debridement    Date/Time: 8/28/2023 1:20 PM    Performed by: Jacob Bernal DPM  Authorized by: Jacob Bernal DPM    Time out: Immediately prior to procedure a "time out" was called to verify the correct patient, procedure, equipment, support staff and site/side marked as required.    Consent Done?:  Yes (Verbal)  Local anesthesia used?: No      Wound Details:    Location:  Right foot    Location:  Right 1st Toe    Type of Debridement:  Excisional       Length (cm):  2.5       Area (sq cm):  5       Width (cm):  2       Percent Debrided (%):  100       Depth (cm):  0.5       Total Area Debrided (sq cm):  5    Depth of debridement:  Subcutaneous tissue    Tissue debrided:  Subcutaneous    Devitalized tissue debrided:  Fibrin    Instruments:  Blade  Bleeding:  Minimal  Hemostasis Achieved: Yes  Method Used:  Pressure  Patient tolerance:  Patient tolerated the procedure well with no immediate complications    "

## 2023-08-28 NOTE — PROGRESS NOTES
Subjective:      Patient ID: Keerthi Chase is a 48 y.o. female.    Chief Complaint: No chief complaint on file.  Patient presents to clinic for a 1 week wound check of the Rt. Great toe.  Denies experiencing pain from the digit with today's exam.  Has kept the prior football dressing clean, dry, and intact for the past week.  Notes ambulation while in the DARCO shoe only.  Has been taking Augmentin as instructed.  Denies experiencing N/V/F/C/D.  Endorses having fever prior to obtaining the orthotics, but this was resolved with taking tylenol.   Denies any additional pedal complaints.      Past Medical History:   Diagnosis Date    Bell's palsy     Coronary artery disease     Pt states Dr. Virk said she did not have a blockage after a stress test.    Diabetes mellitus     GERD (gastroesophageal reflux disease)     Hypertension     Obesity        Past Surgical History:   Procedure Laterality Date     SECTION      x 2    CHOLECYSTECTOMY      ESOPHAGOGASTRODUODENOSCOPY      PERIPHERALLY INSERTED CENTRAL CATHETER INSERTION N/A 10/11/2021    Procedure: INSERTION, PICC;  Surgeon: PICC, STTAVIA;  Location: [facility] ENDO;  Service: Cardiology;  Laterality: N/A;  Dr Aiken    PERIPHERALLY INSERTED CENTRAL CATHETER INSERTION N/A 2022    Procedure: INSERTION, PICC;  Surgeon: PICC, STPH;  [facility] ENDO;  Service: Cardiology;  Laterality: N/A;  Dr Dubose    SESAMOIDECTOMY Left 2021    Procedure: SESAMOIDECTOMY;  Surgeon: Jacob Bernal DPM;  Location: [facility] OR;  Service: Podiatry;  Laterality: Left;       Family History   Problem Relation Age of Onset    Hypertension Mother        Social History     Socioeconomic History    Marital status:    Tobacco Use    Smoking status: Never    Smokeless tobacco: Never   Substance and Sexual Activity    Alcohol use: No    Drug use: No       Current Outpatient Medications   Medication Sig Dispense Refill    alprazolam (XANAX) 1 MG tablet Take 1 mg by mouth 2 (two)  "times daily as needed for Anxiety.      amoxicillin-clavulanate 875-125mg (AUGMENTIN) 875-125 mg per tablet Take 1 tablet by mouth every 12 (twelve) hours. 20 tablet 0    ampicillin (PRINCIPEN) 500 MG capsule Take 1 capsule (500 mg total) by mouth every 12 (twelve) hours. 20 capsule 0    BASAGLAR KWIKPEN 100 unit/mL (3 mL) InPn pen 75 Units once daily.   3    BD INSULIN PEN NEEDLE UF MINI 31 gauge x 3/16" Ndle   2    BD INSULIN SYRINGE ULTRA-FINE 1 mL 31 gauge x 5/16 Syrg use as directed      chlorthalidone (HYGROTEN) 25 MG Tab Take 1 tablet (25 mg total) by mouth once daily. 30 tablet 11    hydrocodone-acetaminophen 10-325mg (NORCO)  mg Tab Take 1 tablet by mouth 3 (three) times daily as needed.      hydrocodone-acetaminophen 10-325mg (NORCO)  mg Tab Take 1 tablet by mouth every 8 (eight) hours as needed for Pain. 90 tablet 0    insulin detemir (LEVEMIR) 100 unit/mL injection Inject into the skin every evening.      insulin glargine,hum.rec.anlog (LANTUS SUBQ) Inject into the skin.      LANTUS U-100 INSULIN 100 unit/mL injection SMARTSI Unit(s) SUB-Q Every Night      OZEMPIC 1 mg/dose (2 mg/1.5 mL) PnIj Inject 60 mg into the skin every 7 days.       OZEMPIC 1 mg/dose (4 mg/3 mL) Inject 1 mg into the skin every 7 days.      sulfamethoxazole-trimethoprim 800-160mg (BACTRIM DS) 800-160 mg Tab Take 1 tablet by mouth 2 (two) times daily. 14 tablet 0    traMADoL (ULTRAM) 50 mg tablet Take 1 tablet (50 mg total) by mouth every 6 (six) hours as needed for Pain. 28 tablet 0     No current facility-administered medications for this visit.       Review of patient's allergies indicates:   Allergen Reactions    Codeine Hives and Rash        Hemoglobin A1C   Date Value Ref Range Status   2022 10.5 (H) 0.0 - 5.6 % Final     Comment:     Reference Interval:  5.0 - 5.6 Normal   5.7 - 6.4 High Risk   > 6.5 Diabetic      Hgb A1c results are standardized based on the (NGSP) National   Glycohemoglobin " Standardization Program.      Hemoglobin A1C levels are related to mean serum/plasma glucose   during the preceding 2-3 months.        2021 11.8 (H) 0.0 - 5.6 % Final     Comment:     Reference Interval:  5.0 - 5.6 Normal   5.7 - 6.4 High Risk   > 6.5 Diabetic      Hgb A1c results are standardized based on the (NGSP) National   Glycohemoglobin Standardization Program.      Hemoglobin A1C levels are related to mean serum/plasma glucose   during the preceding 2-3 months.        2021 8.8 (H) 4.8 - 5.6 % Final     Comment:              Prediabetes: 5.7 - 6.4           Diabetes: >6.4           Glycemic control for adults with diabetes: <7.0         Past Medical History:   Diagnosis Date    Bell's palsy     Coronary artery disease     Pt states Dr. Virk said she did not have a blockage after a stress test.    Diabetes mellitus     GERD (gastroesophageal reflux disease)     Hypertension     Obesity        Past Surgical History:   Procedure Laterality Date     SECTION      x 2    CHOLECYSTECTOMY      ESOPHAGOGASTRODUODENOSCOPY      PERIPHERALLY INSERTED CENTRAL CATHETER INSERTION N/A 10/11/2021    Procedure: INSERTION, PICC;  Surgeon: IGOR, STPH;  Location: [facility] ENDO;  Service: Cardiology;  Laterality: N/A;  Dr Aiken    PERIPHERALLY INSERTED CENTRAL CATHETER INSERTION N/A 2022    Procedure: INSERTION, PICC;  Surgeon: PICC, STPH;  Location: [facility] ENDO;  Service: Cardiology;  Laterality: N/A;  Dr Dubose    SESAMOIDECTOMY Left 2021    Procedure: SESAMOIDECTOMY;  Surgeon: Jacob Bernal DPM;  Location: [facility] OR;  Service: Podiatry;  Laterality: Left;       Family History   Problem Relation Age of Onset    Hypertension Mother        Social History     Socioeconomic History    Marital status:    Tobacco Use    Smoking status: Never    Smokeless tobacco: Never   Substance and Sexual Activity    Alcohol use: No    Drug use: No       Current Outpatient Medications   Medication Sig Dispense  "Refill    alprazolam (XANAX) 1 MG tablet Take 1 mg by mouth 2 (two) times daily as needed for Anxiety.      amoxicillin-clavulanate 875-125mg (AUGMENTIN) 875-125 mg per tablet Take 1 tablet by mouth every 12 (twelve) hours. 20 tablet 0    ampicillin (PRINCIPEN) 500 MG capsule Take 1 capsule (500 mg total) by mouth every 12 (twelve) hours. 20 capsule 0    BASAGLAR KWIKPEN 100 unit/mL (3 mL) InPn pen 75 Units once daily.   3    BD INSULIN PEN NEEDLE UF MINI 31 gauge x 3/16" Ndle   2    BD INSULIN SYRINGE ULTRA-FINE 1 mL 31 gauge x 5/16 Syrg use as directed      chlorthalidone (HYGROTEN) 25 MG Tab Take 1 tablet (25 mg total) by mouth once daily. 30 tablet 11    hydrocodone-acetaminophen 10-325mg (NORCO)  mg Tab Take 1 tablet by mouth 3 (three) times daily as needed.      hydrocodone-acetaminophen 10-325mg (NORCO)  mg Tab Take 1 tablet by mouth every 8 (eight) hours as needed for Pain. 90 tablet 0    insulin detemir (LEVEMIR) 100 unit/mL injection Inject into the skin every evening.      insulin glargine,hum.rec.anlog (LANTUS SUBQ) Inject into the skin.      LANTUS U-100 INSULIN 100 unit/mL injection SMARTSI Unit(s) SUB-Q Every Night      OZEMPIC 1 mg/dose (2 mg/1.5 mL) PnIj Inject 60 mg into the skin every 7 days.       OZEMPIC 1 mg/dose (4 mg/3 mL) Inject 1 mg into the skin every 7 days.      sulfamethoxazole-trimethoprim 800-160mg (BACTRIM DS) 800-160 mg Tab Take 1 tablet by mouth 2 (two) times daily. 14 tablet 0    traMADoL (ULTRAM) 50 mg tablet Take 1 tablet (50 mg total) by mouth every 6 (six) hours as needed for Pain. 28 tablet 0     No current facility-administered medications for this visit.       Review of patient's allergies indicates:   Allergen Reactions    Codeine Hives and Rash         Review of Systems   Constitutional: Negative for chills and fever.   Skin:  Positive for color change and poor wound healing. Negative for nail changes.   Gastrointestinal:  Negative for nausea and vomiting. "   Neurological:  Positive for numbness.   Psychiatric/Behavioral:  Negative for altered mental status.            Objective:      Physical Exam  Constitutional:       Appearance: Normal appearance. She is not ill-appearing.   Cardiovascular:      Pulses:           Dorsalis pedis pulses are 2+ on the right side and 2+ on the left side.        Posterior tibial pulses are 2+ on the right side and 2+ on the left side.      Comments: CFT is < 3 seconds bilateral.  Pedal hair growth is decreased bilateral.  No lower extremity edema noted bilateral.  Toes are warm to touch bilateral.    Musculoskeletal:         General: No signs of injury.      Right lower leg: No edema.      Left lower leg: No edema.      Comments: Muscle strength 5/5 in all muscle groups bilateral.  No tenderness nor crepitation with ROM of foot/ankle joints bilateral.  No tenderness with palpation of bilateral foot and ankle.   Slight dorsal contracture of the Lt. Hallux.     Skin:     General: Skin is warm and dry.      Capillary Refill: Capillary refill takes 2 to 3 seconds.      Findings: Wound present. No bruising, ecchymosis, erythema, signs of injury, laceration, lesion, petechiae or rash.      Comments:   Location: Puncture wound noted to the plantar aspect of the Rt. Hallux.  Base: Down to subQ and comprised of fibrin.    Drainage: None  Malinda wound: Devoid of erythema, edema, fluctuance, purulence, and malodor.    Pre-debridement measurement: 2.5 x 2 x 0.5cm.   Post-debridement measurement: 2.7 x 2.2 x 0.5cm    Neurological:      Mental Status: She is alert.      Sensory: Sensory deficit present.      Motor: No weakness.      Comments: Decreased protective sensation noted bilateral.  Light touch is absent bilateral.               Assessment:       Encounter Diagnoses   Name Primary?    Puncture wound of toe of right foot, subsequent encounter Yes    Diabetic foot infection     Diabetic polyneuropathy associated with type 2 diabetes mellitus                 Plan:       Diagnoses and all orders for this visit:    Puncture wound of toe of right foot, subsequent encounter    Diabetic foot infection    Diabetic polyneuropathy associated with type 2 diabetes mellitus      I counseled the patient on her conditions, their implications and medical management.    Performed a selective excisional debridement of the Rt. foot.  See attached procedure note.     The wound base was covered with iodosorb ointment, and a football dressing was applied.    Advised to keep the dressing CDI x 1 week.    Advised to rest and elevate the affected extremity.    Instructed to minimize weight bearing to facilitate wound healing.    Advised to ambulate only in the postoperative shoe/boot.    To continue taking Augmentin as instructed.      RTC in 1 week.    Jacob Bernal DPM

## 2023-09-07 ENCOUNTER — OFFICE VISIT (OUTPATIENT)
Dept: PODIATRY | Facility: CLINIC | Age: 48
End: 2023-09-07
Payer: MEDICAID

## 2023-09-07 VITALS — HEIGHT: 68 IN | WEIGHT: 270 LBS | BODY MASS INDEX: 40.92 KG/M2

## 2023-09-07 DIAGNOSIS — S91.139D: Primary | ICD-10-CM

## 2023-09-07 DIAGNOSIS — E11.42 DIABETIC POLYNEUROPATHY ASSOCIATED WITH TYPE 2 DIABETES MELLITUS: ICD-10-CM

## 2023-09-07 PROCEDURE — 11042 WOUND DEBRIDEMENT: ICD-10-PCS | Mod: S$PBB,,, | Performed by: PODIATRIST

## 2023-09-07 PROCEDURE — 99999 PR PBB SHADOW E&M-EST. PATIENT-LVL III: CPT | Mod: PBBFAC,,, | Performed by: PODIATRIST

## 2023-09-07 PROCEDURE — 99999 PR PBB SHADOW E&M-EST. PATIENT-LVL III: ICD-10-PCS | Mod: PBBFAC,,, | Performed by: PODIATRIST

## 2023-09-07 PROCEDURE — 99499 NO LOS: ICD-10-PCS | Mod: S$PBB,,, | Performed by: PODIATRIST

## 2023-09-07 PROCEDURE — 99499 UNLISTED E&M SERVICE: CPT | Mod: S$PBB,,, | Performed by: PODIATRIST

## 2023-09-07 PROCEDURE — 99213 OFFICE O/P EST LOW 20 MIN: CPT | Mod: PBBFAC,PN | Performed by: PODIATRIST

## 2023-09-07 PROCEDURE — 11042 DBRDMT SUBQ TIS 1ST 20SQCM/<: CPT | Mod: PBBFAC,PN | Performed by: PODIATRIST

## 2023-09-07 NOTE — PROGRESS NOTES
Subjective:      Patient ID: Keerthi Chase is a 48 y.o. female.    Chief Complaint: Wound Check  Patient presents to clinic for a 1 week wound check of the Rt. Great toe.  Denies experiencing pain from the digit with today's exam.  Has kept the prior football dressing clean, dry, and intact for the past week.  Notes ambulation while in the DARCO shoe only.  Denies experiencing N/V/F/C/D. Denies any additional pedal complaints.      Past Medical History:   Diagnosis Date    Bell's palsy     Coronary artery disease     Pt states Dr. Virk said she did not have a blockage after a stress test.    Diabetes mellitus     GERD (gastroesophageal reflux disease)     Hypertension     Obesity        Past Surgical History:   Procedure Laterality Date     SECTION      x 2    CHOLECYSTECTOMY      ESOPHAGOGASTRODUODENOSCOPY      PERIPHERALLY INSERTED CENTRAL CATHETER INSERTION N/A 10/11/2021    Procedure: INSERTION, PICC;  Surgeon: PICC, STPH;  Location: [facility] ENDO;  Service: Cardiology;  Laterality: N/A;  Dr Aiken    PERIPHERALLY INSERTED CENTRAL CATHETER INSERTION N/A 2022    Procedure: INSERTION, PICC;  Surgeon: PICC, STPH;  Location: [facility] ENDO;  Service: Cardiology;  Laterality: N/A;  Dr Dubose    SESAMOIDECTOMY Left 2021    Procedure: SESAMOIDECTOMY;  Surgeon: Jacob Bernal DPM;  Location: [facility] OR;  Service: Podiatry;  Laterality: Left;       Family History   Problem Relation Age of Onset    Hypertension Mother        Social History     Socioeconomic History    Marital status:    Tobacco Use    Smoking status: Never    Smokeless tobacco: Never   Substance and Sexual Activity    Alcohol use: No    Drug use: No       Current Outpatient Medications   Medication Sig Dispense Refill    alprazolam (XANAX) 1 MG tablet Take 1 mg by mouth 2 (two) times daily as needed for Anxiety.      amoxicillin-clavulanate 875-125mg (AUGMENTIN) 875-125 mg per tablet Take 1 tablet by mouth every 12 (twelve) hours. 20  "tablet 0    ampicillin (PRINCIPEN) 500 MG capsule Take 1 capsule (500 mg total) by mouth every 12 (twelve) hours. 20 capsule 0    BASAGLAR KWIKPEN 100 unit/mL (3 mL) InPn pen 75 Units once daily.   3    BD INSULIN PEN NEEDLE UF MINI 31 gauge x 3/16" Ndle   2    BD INSULIN SYRINGE ULTRA-FINE 1 mL 31 gauge x 5/16 Syrg use as directed      ciprofloxacin HCl (CIPRO) 750 MG tablet Take 750 mg by mouth every 12 (twelve) hours.      hydrocodone-acetaminophen 10-325mg (NORCO)  mg Tab Take 1 tablet by mouth 3 (three) times daily as needed.      hydrocodone-acetaminophen 10-325mg (NORCO)  mg Tab Take 1 tablet by mouth every 8 (eight) hours as needed for Pain. 90 tablet 0    insulin detemir (LEVEMIR) 100 unit/mL injection Inject into the skin every evening.      insulin glargine,hum.rec.anlog (LANTUS SUBQ) Inject into the skin.      LANTUS U-100 INSULIN 100 unit/mL injection SMARTSI Unit(s) SUB-Q Every Night      OZEMPIC 1 mg/dose (2 mg/1.5 mL) PnIj Inject 60 mg into the skin every 7 days.       OZEMPIC 1 mg/dose (4 mg/3 mL) Inject 1 mg into the skin every 7 days.      sulfamethoxazole-trimethoprim 800-160mg (BACTRIM DS) 800-160 mg Tab Take 1 tablet by mouth 2 (two) times daily. 14 tablet 0    traMADoL (ULTRAM) 50 mg tablet Take 1 tablet (50 mg total) by mouth every 6 (six) hours as needed for Pain. 28 tablet 0    chlorthalidone (HYGROTEN) 25 MG Tab Take 1 tablet (25 mg total) by mouth once daily. 30 tablet 11     No current facility-administered medications for this visit.       Review of patient's allergies indicates:   Allergen Reactions    Codeine Hives and Rash        Hemoglobin A1C   Date Value Ref Range Status   2022 10.5 (H) 0.0 - 5.6 % Final     Comment:     Reference Interval:  5.0 - 5.6 Normal   5.7 - 6.4 High Risk   > 6.5 Diabetic      Hgb A1c results are standardized based on the (NGSP) National   Glycohemoglobin Standardization Program.      Hemoglobin A1C levels are related to mean " serum/plasma glucose   during the preceding 2-3 months.        2021 11.8 (H) 0.0 - 5.6 % Final     Comment:     Reference Interval:  5.0 - 5.6 Normal   5.7 - 6.4 High Risk   > 6.5 Diabetic      Hgb A1c results are standardized based on the (NGSP) National   Glycohemoglobin Standardization Program.      Hemoglobin A1C levels are related to mean serum/plasma glucose   during the preceding 2-3 months.        2021 8.8 (H) 4.8 - 5.6 % Final     Comment:              Prediabetes: 5.7 - 6.4           Diabetes: >6.4           Glycemic control for adults with diabetes: <7.0         Past Medical History:   Diagnosis Date    Bell's palsy     Coronary artery disease     Pt states Dr. Virk said she did not have a blockage after a stress test.    Diabetes mellitus     GERD (gastroesophageal reflux disease)     Hypertension     Obesity        Past Surgical History:   Procedure Laterality Date     SECTION      x 2    CHOLECYSTECTOMY      ESOPHAGOGASTRODUODENOSCOPY      PERIPHERALLY INSERTED CENTRAL CATHETER INSERTION N/A 10/11/2021    Procedure: INSERTION, PICC;  Surgeon: IGOR STTAVIA;  Location: [facility] ENDO;  Service: Cardiology;  Laterality: N/A;  Dr Aiken    PERIPHERALLY INSERTED CENTRAL CATHETER INSERTION N/A 2022    Procedure: INSERTION, PICC;  Surgeon: PICC, STPH;  Location: [facility] ENDO;  Service: Cardiology;  Laterality: N/A;  Dr Dubose    SESAMOIDECTOMY Left 2021    Procedure: SESAMOIDECTOMY;  Surgeon: Jacob Bernal DPM;  Location: [facility] OR;  Service: Podiatry;  Laterality: Left;       Family History   Problem Relation Age of Onset    Hypertension Mother        Social History     Socioeconomic History    Marital status:    Tobacco Use    Smoking status: Never    Smokeless tobacco: Never   Substance and Sexual Activity    Alcohol use: No    Drug use: No       Current Outpatient Medications   Medication Sig Dispense Refill    alprazolam (XANAX) 1 MG tablet Take 1 mg by mouth 2 (two) times  "daily as needed for Anxiety.      amoxicillin-clavulanate 875-125mg (AUGMENTIN) 875-125 mg per tablet Take 1 tablet by mouth every 12 (twelve) hours. 20 tablet 0    ampicillin (PRINCIPEN) 500 MG capsule Take 1 capsule (500 mg total) by mouth every 12 (twelve) hours. 20 capsule 0    BASAGLAR KWIKPEN 100 unit/mL (3 mL) InPn pen 75 Units once daily.   3    BD INSULIN PEN NEEDLE UF MINI 31 gauge x 3/16" Ndle   2    BD INSULIN SYRINGE ULTRA-FINE 1 mL 31 gauge x 5/16 Syrg use as directed      ciprofloxacin HCl (CIPRO) 750 MG tablet Take 750 mg by mouth every 12 (twelve) hours.      hydrocodone-acetaminophen 10-325mg (NORCO)  mg Tab Take 1 tablet by mouth 3 (three) times daily as needed.      hydrocodone-acetaminophen 10-325mg (NORCO)  mg Tab Take 1 tablet by mouth every 8 (eight) hours as needed for Pain. 90 tablet 0    insulin detemir (LEVEMIR) 100 unit/mL injection Inject into the skin every evening.      insulin glargine,hum.rec.anlog (LANTUS SUBQ) Inject into the skin.      LANTUS U-100 INSULIN 100 unit/mL injection SMARTSI Unit(s) SUB-Q Every Night      OZEMPIC 1 mg/dose (2 mg/1.5 mL) PnIj Inject 60 mg into the skin every 7 days.       OZEMPIC 1 mg/dose (4 mg/3 mL) Inject 1 mg into the skin every 7 days.      sulfamethoxazole-trimethoprim 800-160mg (BACTRIM DS) 800-160 mg Tab Take 1 tablet by mouth 2 (two) times daily. 14 tablet 0    traMADoL (ULTRAM) 50 mg tablet Take 1 tablet (50 mg total) by mouth every 6 (six) hours as needed for Pain. 28 tablet 0    chlorthalidone (HYGROTEN) 25 MG Tab Take 1 tablet (25 mg total) by mouth once daily. 30 tablet 11     No current facility-administered medications for this visit.       Review of patient's allergies indicates:   Allergen Reactions    Codeine Hives and Rash         Review of Systems   Constitutional: Negative for chills and fever.   Skin:  Positive for color change and poor wound healing. Negative for nail changes.   Gastrointestinal:  Negative for " nausea and vomiting.   Neurological:  Positive for numbness.   Psychiatric/Behavioral:  Negative for altered mental status.            Objective:      Physical Exam  Constitutional:       Appearance: Normal appearance. She is not ill-appearing.   Cardiovascular:      Pulses:           Dorsalis pedis pulses are 2+ on the right side and 2+ on the left side.        Posterior tibial pulses are 2+ on the right side and 2+ on the left side.      Comments: CFT is < 3 seconds bilateral.  Pedal hair growth is decreased bilateral.  No lower extremity edema noted bilateral.  Toes are warm to touch bilateral.    Musculoskeletal:         General: No signs of injury.      Right lower leg: No edema.      Left lower leg: No edema.      Comments: Muscle strength 5/5 in all muscle groups bilateral.  No tenderness nor crepitation with ROM of foot/ankle joints bilateral.  No tenderness with palpation of bilateral foot and ankle.   Slight dorsal contracture of the Lt. Hallux.     Skin:     General: Skin is warm and dry.      Capillary Refill: Capillary refill takes 2 to 3 seconds.      Findings: Wound present. No bruising, ecchymosis, erythema, signs of injury, laceration, lesion, petechiae or rash.      Comments:   Location: Puncture wound noted to the plantar aspect of the Rt. Hallux.  Base: Down to subQ and comprised of fibrin.    Drainage: None  Malinda wound: Devoid of erythema, edema, fluctuance, purulence, and malodor.    Pre-debridement measurement: 0.9 x 1.1 x 0.3cm.   Post-debridement measurement: 1.1 x 1.3 x 0.3cm    Neurological:      Mental Status: She is alert.      Sensory: Sensory deficit present.      Motor: No weakness.      Comments: Decreased protective sensation noted bilateral.  Light touch is absent bilateral.                 Assessment:       Encounter Diagnoses   Name Primary?    Puncture wound of toe of right foot, subsequent encounter Yes    Diabetic polyneuropathy associated with type 2 diabetes mellitus                 Plan:       Keerthi was seen today for wound check.    Diagnoses and all orders for this visit:    Puncture wound of toe of right foot, subsequent encounter  -     Wound Debridement    Diabetic polyneuropathy associated with type 2 diabetes mellitus      I counseled the patient on her conditions, their implications and medical management.    Performed a selective excisional debridement of the Rt. foot.  See attached procedure note.     The wound base was covered with silver alginate, and a football dressing was applied.    Advised to keep the dressing CDI x 1 week.    Advised to rest and elevate the affected extremity.    Instructed to minimize weight bearing to facilitate wound healing.    Advised to ambulate only in the postoperative shoe/boot.    RTC in 1 week.    Jacob Bernal DPM

## 2023-09-07 NOTE — PROCEDURES
"Wound Debridement    Date/Time: 9/7/2023 8:40 AM    Performed by: Jacob Bernal DPM  Authorized by: Jacob Bernal DPM    Time out: Immediately prior to procedure a "time out" was called to verify the correct patient, procedure, equipment, support staff and site/side marked as required.    Consent Done?:  Yes (Verbal)  Local anesthesia used?: No      Wound Details:    Location:  Right foot    Location:  Right 1st Toe    Type of Debridement:  Excisional       Length (cm):  0.9       Area (sq cm):  0.99       Width (cm):  1.1       Percent Debrided (%):  100       Depth (cm):  0.3       Total Area Debrided (sq cm):  0.99    Depth of debridement:  Subcutaneous tissue    Tissue debrided:  Subcutaneous    Devitalized tissue debrided:  Fibrin    Instruments:  Forceps, Scissors and Blade  Bleeding:  Minimal  Hemostasis Achieved: Yes  Method Used:  Pressure  Patient tolerance:  Patient tolerated the procedure well with no immediate complications    "

## 2023-09-14 ENCOUNTER — OFFICE VISIT (OUTPATIENT)
Dept: PODIATRY | Facility: CLINIC | Age: 48
End: 2023-09-14
Payer: MEDICAID

## 2023-09-14 ENCOUNTER — TELEPHONE (OUTPATIENT)
Dept: PODIATRY | Facility: CLINIC | Age: 48
End: 2023-09-14

## 2023-09-14 ENCOUNTER — HOSPITAL ENCOUNTER (OUTPATIENT)
Dept: RADIOLOGY | Facility: HOSPITAL | Age: 48
Discharge: HOME | End: 2023-09-14
Attending: PODIATRIST
Payer: MEDICAID

## 2023-09-14 VITALS — WEIGHT: 270.06 LBS | HEIGHT: 68 IN | BODY MASS INDEX: 40.93 KG/M2

## 2023-09-14 DIAGNOSIS — S90.112A CONTUSION OF LEFT GREAT TOE WITHOUT DAMAGE TO NAIL, INITIAL ENCOUNTER: ICD-10-CM

## 2023-09-14 DIAGNOSIS — S91.139D: Primary | ICD-10-CM

## 2023-09-14 DIAGNOSIS — E11.42 DIABETIC POLYNEUROPATHY ASSOCIATED WITH TYPE 2 DIABETES MELLITUS: ICD-10-CM

## 2023-09-14 PROCEDURE — 99499 NO LOS: ICD-10-PCS | Mod: S$PBB,,, | Performed by: PODIATRIST

## 2023-09-14 PROCEDURE — 11042 WOUND DEBRIDEMENT: ICD-10-PCS | Mod: S$PBB,,, | Performed by: PODIATRIST

## 2023-09-14 PROCEDURE — 99999 PR PBB SHADOW E&M-EST. PATIENT-LVL II: CPT | Mod: PBBFAC,,, | Performed by: PODIATRIST

## 2023-09-14 PROCEDURE — 99499 UNLISTED E&M SERVICE: CPT | Mod: S$PBB,,, | Performed by: PODIATRIST

## 2023-09-14 PROCEDURE — 11042 DBRDMT SUBQ TIS 1ST 20SQCM/<: CPT | Mod: PBBFAC,PN | Performed by: PODIATRIST

## 2023-09-14 PROCEDURE — 99999 PR PBB SHADOW E&M-EST. PATIENT-LVL II: ICD-10-PCS | Mod: PBBFAC,,, | Performed by: PODIATRIST

## 2023-09-14 PROCEDURE — 73630 X-RAY EXAM OF FOOT: CPT | Mod: 26,LT,, | Performed by: RADIOLOGY

## 2023-09-14 PROCEDURE — 73630 X-RAY EXAM OF FOOT: CPT | Mod: TC,PO,LT

## 2023-09-14 PROCEDURE — 99212 OFFICE O/P EST SF 10 MIN: CPT | Mod: PBBFAC,PN | Performed by: PODIATRIST

## 2023-09-14 PROCEDURE — 73630 XR FOOT COMPLETE 3 VIEW LEFT: ICD-10-PCS | Mod: 26,LT,, | Performed by: RADIOLOGY

## 2023-09-14 NOTE — PROGRESS NOTES
Subjective:      Patient ID: Keerthi Chase is a 48 y.o. female.    Chief Complaint: Wound Care and Toe Pain (Injured left great toe)  Patient presents to clinic for a 1 week wound check of the Rt. Great toe.  Denies experiencing pain from the digit with today's exam.  Has kept the prior football dressing clean, dry, and intact for the past week.  Notes ambulation while in the DARCO shoe only.  Denies experiencing N/V/F/C/D.  Also, notes falling yesterday with subsequent swelling and bruising to the distal Lt. Hallux.  Denies this being a source of pain.  Notes being concerned, as this is the same limb of which had a prior diabetic ulceration.  Notes being able to walk pain free on said limb.  Denies any additional pedal complaints.      Past Medical History:   Diagnosis Date    Bell's palsy     Coronary artery disease     Pt states Dr. Virk said she did not have a blockage after a stress test.    Diabetes mellitus     GERD (gastroesophageal reflux disease)     Hypertension     Obesity        Past Surgical History:   Procedure Laterality Date     SECTION      x 2    CHOLECYSTECTOMY      ESOPHAGOGASTRODUODENOSCOPY      PERIPHERALLY INSERTED CENTRAL CATHETER INSERTION N/A 10/11/2021    Procedure: INSERTION, PICC;  Surgeon: PRIYANK COSTA;  Location: [facility] ENDO;  Service: Cardiology;  Laterality: N/A;  Dr Aiken    PERIPHERALLY INSERTED CENTRAL CATHETER INSERTION N/A 2022    Procedure: INSERTION, PICC;  Surgeon: PRIYANK COSTA;  Location: [facility] ENDO;  Service: Cardiology;  Laterality: N/A;  Dr Dubose    SESAMOIDECTOMY Left 2021    Procedure: SESAMOIDECTOMY;  Surgeon: Jacob Bernal DPM;  Location: [facility] OR;  Service: Podiatry;  Laterality: Left;       Family History   Problem Relation Age of Onset    Hypertension Mother        Social History     Socioeconomic History    Marital status:    Tobacco Use    Smoking status: Never    Smokeless tobacco: Never   Substance and Sexual Activity  "   Alcohol use: No    Drug use: No       Current Outpatient Medications   Medication Sig Dispense Refill    alprazolam (XANAX) 1 MG tablet Take 1 mg by mouth 2 (two) times daily as needed for Anxiety.      amoxicillin-clavulanate 875-125mg (AUGMENTIN) 875-125 mg per tablet Take 1 tablet by mouth every 12 (twelve) hours. 20 tablet 0    ampicillin (PRINCIPEN) 500 MG capsule Take 1 capsule (500 mg total) by mouth every 12 (twelve) hours. 20 capsule 0    BASAGLAR KWIKPEN 100 unit/mL (3 mL) InPn pen 75 Units once daily.   3    BD INSULIN PEN NEEDLE UF MINI 31 gauge x 3/16" Ndle   2    BD INSULIN SYRINGE ULTRA-FINE 1 mL 31 gauge x 5/16 Syrg use as directed      chlorthalidone (HYGROTEN) 25 MG Tab Take 1 tablet (25 mg total) by mouth once daily. 30 tablet 11    ciprofloxacin HCl (CIPRO) 750 MG tablet Take 750 mg by mouth every 12 (twelve) hours.      hydrocodone-acetaminophen 10-325mg (NORCO)  mg Tab Take 1 tablet by mouth 3 (three) times daily as needed.      hydrocodone-acetaminophen 10-325mg (NORCO)  mg Tab Take 1 tablet by mouth every 8 (eight) hours as needed for Pain. 90 tablet 0    insulin detemir (LEVEMIR) 100 unit/mL injection Inject into the skin every evening.      insulin glargine,hum.rec.anlog (LANTUS SUBQ) Inject into the skin.      LANTUS U-100 INSULIN 100 unit/mL injection SMARTSI Unit(s) SUB-Q Every Night      OZEMPIC 1 mg/dose (2 mg/1.5 mL) PnIj Inject 60 mg into the skin every 7 days.       OZEMPIC 1 mg/dose (4 mg/3 mL) Inject 1 mg into the skin every 7 days.      sulfamethoxazole-trimethoprim 800-160mg (BACTRIM DS) 800-160 mg Tab Take 1 tablet by mouth 2 (two) times daily. 14 tablet 0    traMADoL (ULTRAM) 50 mg tablet Take 1 tablet (50 mg total) by mouth every 6 (six) hours as needed for Pain. 28 tablet 0     No current facility-administered medications for this visit.       Review of patient's allergies indicates:   Allergen Reactions    Codeine Hives and Rash    "     Hemoglobin A1C   Date Value Ref Range Status   2022 10.5 (H) 0.0 - 5.6 % Final     Comment:     Reference Interval:  5.0 - 5.6 Normal   5.7 - 6.4 High Risk   > 6.5 Diabetic      Hgb A1c results are standardized based on the (NGSP) National   Glycohemoglobin Standardization Program.      Hemoglobin A1C levels are related to mean serum/plasma glucose   during the preceding 2-3 months.        2021 11.8 (H) 0.0 - 5.6 % Final     Comment:     Reference Interval:  5.0 - 5.6 Normal   5.7 - 6.4 High Risk   > 6.5 Diabetic      Hgb A1c results are standardized based on the (NGSP) National   Glycohemoglobin Standardization Program.      Hemoglobin A1C levels are related to mean serum/plasma glucose   during the preceding 2-3 months.        2021 8.8 (H) 4.8 - 5.6 % Final     Comment:              Prediabetes: 5.7 - 6.4           Diabetes: >6.4           Glycemic control for adults with diabetes: <7.0         Past Medical History:   Diagnosis Date    Bell's palsy     Coronary artery disease     Pt states Dr. Virk said she did not have a blockage after a stress test.    Diabetes mellitus     GERD (gastroesophageal reflux disease)     Hypertension     Obesity        Past Surgical History:   Procedure Laterality Date     SECTION      x 2    CHOLECYSTECTOMY      ESOPHAGOGASTRODUODENOSCOPY      PERIPHERALLY INSERTED CENTRAL CATHETER INSERTION N/A 10/11/2021    Procedure: INSERTION, PICC;  Surgeon: IGOR, PRIYANK;  Location: [facility] ENDO;  Service: Cardiology;  Laterality: N/A;  Dr Aiken    PERIPHERALLY INSERTED CENTRAL CATHETER INSERTION N/A 2022    Procedure: INSERTION, PICC;  Surgeon: IGOR, STPH;  Location: [facility] ENDO;  Service: Cardiology;  Laterality: N/A;  Dr Dubose    SESAMOIDECTOMY Left 2021    Procedure: SESAMOIDECTOMY;  Surgeon: Jacob Bernal DPM;  Location: [facility] OR;  Service: Podiatry;  Laterality: Left;       Family History   Problem Relation Age of Onset    Hypertension  "Mother        Social History     Socioeconomic History    Marital status:    Tobacco Use    Smoking status: Never    Smokeless tobacco: Never   Substance and Sexual Activity    Alcohol use: No    Drug use: No       Current Outpatient Medications   Medication Sig Dispense Refill    alprazolam (XANAX) 1 MG tablet Take 1 mg by mouth 2 (two) times daily as needed for Anxiety.      amoxicillin-clavulanate 875-125mg (AUGMENTIN) 875-125 mg per tablet Take 1 tablet by mouth every 12 (twelve) hours. 20 tablet 0    ampicillin (PRINCIPEN) 500 MG capsule Take 1 capsule (500 mg total) by mouth every 12 (twelve) hours. 20 capsule 0    BASAGLAR KWIKPEN 100 unit/mL (3 mL) InPn pen 75 Units once daily.   3    BD INSULIN PEN NEEDLE UF MINI 31 gauge x 3/16" Ndle   2    BD INSULIN SYRINGE ULTRA-FINE 1 mL 31 gauge x 5/16 Syrg use as directed      chlorthalidone (HYGROTEN) 25 MG Tab Take 1 tablet (25 mg total) by mouth once daily. 30 tablet 11    ciprofloxacin HCl (CIPRO) 750 MG tablet Take 750 mg by mouth every 12 (twelve) hours.      hydrocodone-acetaminophen 10-325mg (NORCO)  mg Tab Take 1 tablet by mouth 3 (three) times daily as needed.      hydrocodone-acetaminophen 10-325mg (NORCO)  mg Tab Take 1 tablet by mouth every 8 (eight) hours as needed for Pain. 90 tablet 0    insulin detemir (LEVEMIR) 100 unit/mL injection Inject into the skin every evening.      insulin glargine,hum.rec.anlog (LANTUS SUBQ) Inject into the skin.      LANTUS U-100 INSULIN 100 unit/mL injection SMARTSI Unit(s) SUB-Q Every Night      OZEMPIC 1 mg/dose (2 mg/1.5 mL) PnIj Inject 60 mg into the skin every 7 days.       OZEMPIC 1 mg/dose (4 mg/3 mL) Inject 1 mg into the skin every 7 days.      sulfamethoxazole-trimethoprim 800-160mg (BACTRIM DS) 800-160 mg Tab Take 1 tablet by mouth 2 (two) times daily. 14 tablet 0    traMADoL (ULTRAM) 50 mg tablet Take 1 tablet (50 mg total) by mouth every 6 (six) hours as needed for " Pain. 28 tablet 0     No current facility-administered medications for this visit.       Review of patient's allergies indicates:   Allergen Reactions    Codeine Hives and Rash         Review of Systems   Constitutional: Negative for chills and fever.   Skin:  Positive for color change and poor wound healing. Negative for nail changes.   Gastrointestinal:  Negative for nausea and vomiting.   Neurological:  Positive for numbness.   Psychiatric/Behavioral:  Negative for altered mental status.            Objective:      Physical Exam  Constitutional:       Appearance: Normal appearance. She is not ill-appearing.   Cardiovascular:      Pulses:           Dorsalis pedis pulses are 2+ on the right side and 2+ on the left side.        Posterior tibial pulses are 2+ on the right side and 2+ on the left side.      Comments: CFT is < 3 seconds bilateral.  Pedal hair growth is decreased bilateral.  No lower extremity edema noted bilateral.  Toes are warm to touch bilateral.    Musculoskeletal:         General: No signs of injury.      Right lower leg: No edema.      Left lower leg: No edema.      Comments: Muscle strength 5/5 in all muscle groups bilateral.  No tenderness nor crepitation with ROM of foot/ankle joints bilateral.  No tenderness with palpation of bilateral foot and ankle.   Slight dorsal contracture of the Lt. Hallux.  No gross deformity noted to said digit.   Skin:     General: Skin is warm and dry.      Capillary Refill: Capillary refill takes 2 to 3 seconds.      Findings: Bruising, ecchymosis and wound present. No erythema, signs of injury, laceration, lesion, petechiae or rash.      Comments:   Location: Puncture wound noted to the plantar aspect of the Rt. Hallux.  Base: Down to subQ and comprised of fibrin.    Drainage: None  Malinda wound: Devoid of erythema, edema, fluctuance, purulence, and malodor.    Pre-debridement measurement: 0.6 x 0.6 x 0.2cm.   Post-debridement measurement: 0.8 x 0.8 x 0.2cm      Ecchymosis noted to the distal Lt. Hallux, specifically overlying the IP joint.   Neurological:      Mental Status: She is alert.      Sensory: Sensory deficit present.      Motor: No weakness.      Comments: Decreased protective sensation noted bilateral.  Light touch is absent bilateral.               Assessment:       Encounter Diagnoses   Name Primary?    Puncture wound of toe of right foot, subsequent encounter Yes    Diabetic polyneuropathy associated with type 2 diabetes mellitus     Contusion of left great toe without damage to nail, initial encounter                  Plan:       Keerthi was seen today for wound care and toe pain.    Diagnoses and all orders for this visit:    Puncture wound of toe of right foot, subsequent encounter    Diabetic polyneuropathy associated with type 2 diabetes mellitus    Contusion of left great toe without damage to nail, initial encounter  -     X-Ray Foot Complete Left; Future        I counseled the patient on her conditions, their implications and medical management.    Suspect a contusion of the Lt. Great toe.      Orders written for an x-ray of the Lt. Foot.    Performed a selective excisional debridement of the Rt. foot.  See attached procedure note.     The wound base was covered with silver alginate, and a football dressing was applied.    Advised to keep the dressing CDI x 1 week.    Advised to rest and elevate the affected extremity.    Instructed to minimize weight bearing to facilitate wound healing.    Advised to ambulate only in the postoperative shoe/boot.    RTC in 1 week.    Jacob Bernal DPM

## 2023-09-14 NOTE — PROCEDURES
"Wound Debridement    Date/Time: 9/14/2023 9:20 AM    Performed by: Jacob Bernal DPM  Authorized by: Jacob Bernal DPM    Time out: Immediately prior to procedure a "time out" was called to verify the correct patient, procedure, equipment, support staff and site/side marked as required.    Consent Done?:  Yes (Verbal)  Local anesthesia used?: No      Wound Details:    Location:  Right foot    Location:  Right 1st Toe    Type of Debridement:  Excisional       Length (cm):  0.6       Area (sq cm):  0.36       Width (cm):  0.6       Percent Debrided (%):  100       Depth (cm):  0.2       Total Area Debrided (sq cm):  0.36    Depth of debridement:  Subcutaneous tissue    Tissue debrided:  Dermis and Subcutaneous    Devitalized tissue debrided:  Fibrin    Instruments:  Blade  Bleeding:  Minimal  Hemostasis Achieved: Yes  Method Used:  Pressure  Patient tolerance:  Patient tolerated the procedure well with no immediate complications    "

## 2023-09-21 ENCOUNTER — TELEPHONE (OUTPATIENT)
Dept: PODIATRY | Facility: CLINIC | Age: 48
End: 2023-09-21
Payer: MEDICAID

## 2023-09-21 ENCOUNTER — OFFICE VISIT (OUTPATIENT)
Dept: PODIATRY | Facility: CLINIC | Age: 48
End: 2023-09-21
Payer: MEDICAID

## 2023-09-21 VITALS — WEIGHT: 270 LBS | BODY MASS INDEX: 40.92 KG/M2 | HEIGHT: 68 IN

## 2023-09-21 DIAGNOSIS — E11.42 DIABETIC POLYNEUROPATHY ASSOCIATED WITH TYPE 2 DIABETES MELLITUS: ICD-10-CM

## 2023-09-21 DIAGNOSIS — S91.139D: Primary | ICD-10-CM

## 2023-09-21 PROCEDURE — 99999 PR PBB SHADOW E&M-EST. PATIENT-LVL II: ICD-10-PCS | Mod: PBBFAC,,, | Performed by: PODIATRIST

## 2023-09-21 PROCEDURE — 99212 OFFICE O/P EST SF 10 MIN: CPT | Mod: PBBFAC,PN,25 | Performed by: PODIATRIST

## 2023-09-21 PROCEDURE — 97597 DBRDMT OPN WND 1ST 20 CM/<: CPT | Mod: PBBFAC,PN | Performed by: PODIATRIST

## 2023-09-21 PROCEDURE — 99999 PR PBB SHADOW E&M-EST. PATIENT-LVL II: CPT | Mod: PBBFAC,,, | Performed by: PODIATRIST

## 2023-09-21 PROCEDURE — 97597 WOUND DEBRIDEMENT: ICD-10-PCS | Mod: S$PBB,,, | Performed by: PODIATRIST

## 2023-09-21 PROCEDURE — 99499 UNLISTED E&M SERVICE: CPT | Mod: S$PBB,,, | Performed by: PODIATRIST

## 2023-09-21 PROCEDURE — 99499 NO LOS: ICD-10-PCS | Mod: S$PBB,,, | Performed by: PODIATRIST

## 2023-09-21 NOTE — TELEPHONE ENCOUNTER
----- Message from Emilie Moreno, Patient Care Assistant sent at 9/21/2023 10:36 AM CDT -----  Contact: self  Pt is calling to resh her appt today for a later time or another  day. Please call back to advise [phone]  thanks

## 2023-09-22 NOTE — PROCEDURES
"Wound Debridement    Date/Time: 9/21/2023 3:40 PM    Performed by: Jacob Bernal DPM  Authorized by: Jacob Bernal DPM    Time out: Immediately prior to procedure a "time out" was called to verify the correct patient, procedure, equipment, support staff and site/side marked as required.    Consent Done?:  Yes (Verbal)  Local anesthesia used?: No      Wound Details:    Location:  Right foot    Location:  Right 1st Toe    Type of Debridement:  Excisional       Length (cm):  0.3       Area (sq cm):  0.09       Width (cm):  0.3       Percent Debrided (%):  100       Depth (cm):  0.1       Total Area Debrided (sq cm):  0.09    Depth of debridement:  Epidermis/Dermis    Tissue debrided:  Dermis    Devitalized tissue debrided:  Fibrin    Instruments:  Blade  Bleeding:  Minimal  Hemostasis Achieved: Yes  Method Used:  Pressure  Patient tolerance:  Patient tolerated the procedure well with no immediate complications    "

## 2023-09-22 NOTE — PROGRESS NOTES
Subjective:      Patient ID: Keerthi Chase is a 48 y.o. female.    Chief Complaint: Wound Care  Patient presents to clinic for a 1 week wound check of the Rt. Great toe.  Denies experiencing pain from the digit with today's exam.  Has kept the prior football dressing clean, dry, and intact for the past week.  Notes ambulation while in the DARCO shoe only.  Denies experiencing N/V/F/C/D.  Denies any additional pedal complaints.      Past Medical History:   Diagnosis Date    Bell's palsy     Coronary artery disease     Pt states Dr. Virk said she did not have a blockage after a stress test.    Diabetes mellitus     GERD (gastroesophageal reflux disease)     Hypertension     Obesity        Past Surgical History:   Procedure Laterality Date     SECTION      x 2    CHOLECYSTECTOMY      ESOPHAGOGASTRODUODENOSCOPY      PERIPHERALLY INSERTED CENTRAL CATHETER INSERTION N/A 10/11/2021    Procedure: INSERTION, PICC;  Surgeon: PICC, STPH;  Location: [facility] ENDO;  Service: Cardiology;  Laterality: N/A;  Dr Aiken    PERIPHERALLY INSERTED CENTRAL CATHETER INSERTION N/A 2022    Procedure: INSERTION, PICC;  Surgeon: PICC, STPH;  Location: [facility] ENDO;  Service: Cardiology;  Laterality: N/A;  Dr Dubose    SESAMOIDECTOMY Left 2021    Procedure: SESAMOIDECTOMY;  Surgeon: Jacob Bernal DPM;  Location: [facility] OR;  Service: Podiatry;  Laterality: Left;       Family History   Problem Relation Age of Onset    Hypertension Mother        Social History     Socioeconomic History    Marital status:    Tobacco Use    Smoking status: Never    Smokeless tobacco: Never   Substance and Sexual Activity    Alcohol use: No    Drug use: No       Current Outpatient Medications   Medication Sig Dispense Refill    alprazolam (XANAX) 1 MG tablet Take 1 mg by mouth 2 (two) times daily as needed for Anxiety.      amoxicillin-clavulanate 875-125mg (AUGMENTIN) 875-125 mg per tablet Take 1 tablet by mouth every 12 (twelve) hours. 20  "tablet 0    ampicillin (PRINCIPEN) 500 MG capsule Take 1 capsule (500 mg total) by mouth every 12 (twelve) hours. 20 capsule 0    BASAGLAR KWIKPEN 100 unit/mL (3 mL) InPn pen 75 Units once daily.   3    BD INSULIN PEN NEEDLE UF MINI 31 gauge x 3/16" Ndle   2    BD INSULIN SYRINGE ULTRA-FINE 1 mL 31 gauge x 5/16 Syrg use as directed      chlorthalidone (HYGROTEN) 25 MG Tab Take 1 tablet (25 mg total) by mouth once daily. 30 tablet 11    ciprofloxacin HCl (CIPRO) 750 MG tablet Take 750 mg by mouth every 12 (twelve) hours.      hydrocodone-acetaminophen 10-325mg (NORCO)  mg Tab Take 1 tablet by mouth 3 (three) times daily as needed.      hydrocodone-acetaminophen 10-325mg (NORCO)  mg Tab Take 1 tablet by mouth every 8 (eight) hours as needed for Pain. 90 tablet 0    insulin detemir (LEVEMIR) 100 unit/mL injection Inject into the skin every evening.      insulin glargine,hum.rec.anlog (LANTUS SUBQ) Inject into the skin.      LANTUS U-100 INSULIN 100 unit/mL injection SMARTSI Unit(s) SUB-Q Every Night      OZEMPIC 1 mg/dose (2 mg/1.5 mL) PnIj Inject 60 mg into the skin every 7 days.       OZEMPIC 1 mg/dose (4 mg/3 mL) Inject 1 mg into the skin every 7 days.      sulfamethoxazole-trimethoprim 800-160mg (BACTRIM DS) 800-160 mg Tab Take 1 tablet by mouth 2 (two) times daily. 14 tablet 0    traMADoL (ULTRAM) 50 mg tablet Take 1 tablet (50 mg total) by mouth every 6 (six) hours as needed for Pain. 28 tablet 0     No current facility-administered medications for this visit.       Review of patient's allergies indicates:   Allergen Reactions    Codeine Hives and Rash        Hemoglobin A1C   Date Value Ref Range Status   2022 10.5 (H) 0.0 - 5.6 % Final     Comment:     Reference Interval:  5.0 - 5.6 Normal   5.7 - 6.4 High Risk   > 6.5 Diabetic      Hgb A1c results are standardized based on the (NGSP) National   Glycohemoglobin Standardization Program.      Hemoglobin A1C levels are related to mean " serum/plasma glucose   during the preceding 2-3 months.        2021 11.8 (H) 0.0 - 5.6 % Final     Comment:     Reference Interval:  5.0 - 5.6 Normal   5.7 - 6.4 High Risk   > 6.5 Diabetic      Hgb A1c results are standardized based on the (NGSP) National   Glycohemoglobin Standardization Program.      Hemoglobin A1C levels are related to mean serum/plasma glucose   during the preceding 2-3 months.        2021 8.8 (H) 4.8 - 5.6 % Final     Comment:              Prediabetes: 5.7 - 6.4           Diabetes: >6.4           Glycemic control for adults with diabetes: <7.0         Past Medical History:   Diagnosis Date    Bell's palsy     Coronary artery disease     Pt states Dr. Virk said she did not have a blockage after a stress test.    Diabetes mellitus     GERD (gastroesophageal reflux disease)     Hypertension     Obesity        Past Surgical History:   Procedure Laterality Date     SECTION      x 2    CHOLECYSTECTOMY      ESOPHAGOGASTRODUODENOSCOPY      PERIPHERALLY INSERTED CENTRAL CATHETER INSERTION N/A 10/11/2021    Procedure: INSERTION, PICC;  Surgeon: IGOR STTAVIA;  Location: [facility] ENDO;  Service: Cardiology;  Laterality: N/A;  Dr Aiken    PERIPHERALLY INSERTED CENTRAL CATHETER INSERTION N/A 2022    Procedure: INSERTION, PICC;  Surgeon: PICC, STPH;  Location: [facility] ENDO;  Service: Cardiology;  Laterality: N/A;  Dr Dubose    SESAMOIDECTOMY Left 2021    Procedure: SESAMOIDECTOMY;  Surgeon: Jacob Bernal DPM;  Location: [facility] OR;  Service: Podiatry;  Laterality: Left;       Family History   Problem Relation Age of Onset    Hypertension Mother        Social History     Socioeconomic History    Marital status:    Tobacco Use    Smoking status: Never    Smokeless tobacco: Never   Substance and Sexual Activity    Alcohol use: No    Drug use: No       Current Outpatient Medications   Medication Sig Dispense Refill    alprazolam (XANAX) 1 MG tablet Take 1 mg by mouth 2 (two) times  "daily as needed for Anxiety.      amoxicillin-clavulanate 875-125mg (AUGMENTIN) 875-125 mg per tablet Take 1 tablet by mouth every 12 (twelve) hours. 20 tablet 0    ampicillin (PRINCIPEN) 500 MG capsule Take 1 capsule (500 mg total) by mouth every 12 (twelve) hours. 20 capsule 0    BASAGLAR KWIKPEN 100 unit/mL (3 mL) InPn pen 75 Units once daily.   3    BD INSULIN PEN NEEDLE UF MINI 31 gauge x 3/16" Ndle   2    BD INSULIN SYRINGE ULTRA-FINE 1 mL 31 gauge x 5/16 Syrg use as directed      chlorthalidone (HYGROTEN) 25 MG Tab Take 1 tablet (25 mg total) by mouth once daily. 30 tablet 11    ciprofloxacin HCl (CIPRO) 750 MG tablet Take 750 mg by mouth every 12 (twelve) hours.      hydrocodone-acetaminophen 10-325mg (NORCO)  mg Tab Take 1 tablet by mouth 3 (three) times daily as needed.      hydrocodone-acetaminophen 10-325mg (NORCO)  mg Tab Take 1 tablet by mouth every 8 (eight) hours as needed for Pain. 90 tablet 0    insulin detemir (LEVEMIR) 100 unit/mL injection Inject into the skin every evening.      insulin glargine,hum.rec.anlog (LANTUS SUBQ) Inject into the skin.      LANTUS U-100 INSULIN 100 unit/mL injection SMARTSI Unit(s) SUB-Q Every Night      OZEMPIC 1 mg/dose (2 mg/1.5 mL) PnIj Inject 60 mg into the skin every 7 days.       OZEMPIC 1 mg/dose (4 mg/3 mL) Inject 1 mg into the skin every 7 days.      sulfamethoxazole-trimethoprim 800-160mg (BACTRIM DS) 800-160 mg Tab Take 1 tablet by mouth 2 (two) times daily. 14 tablet 0    traMADoL (ULTRAM) 50 mg tablet Take 1 tablet (50 mg total) by mouth every 6 (six) hours as needed for Pain. 28 tablet 0     No current facility-administered medications for this visit.       Review of patient's allergies indicates:   Allergen Reactions    Codeine Hives and Rash         Review of Systems   Constitutional: Negative for chills and fever.   Skin:  Positive for color change and poor wound healing. Negative for nail changes.   Gastrointestinal:  Negative for " nausea and vomiting.   Neurological:  Positive for numbness.   Psychiatric/Behavioral:  Negative for altered mental status.            Objective:      Physical Exam  Constitutional:       Appearance: Normal appearance. She is not ill-appearing.   Cardiovascular:      Pulses:           Dorsalis pedis pulses are 2+ on the right side and 2+ on the left side.        Posterior tibial pulses are 2+ on the right side and 2+ on the left side.      Comments: CFT is < 3 seconds bilateral.  Pedal hair growth is decreased bilateral.  No lower extremity edema noted bilateral.  Toes are warm to touch bilateral.    Musculoskeletal:         General: No signs of injury.      Right lower leg: No edema.      Left lower leg: No edema.      Comments: Muscle strength 5/5 in all muscle groups bilateral.  No tenderness nor crepitation with ROM of foot/ankle joints bilateral.  No tenderness with palpation of bilateral foot and ankle.   Slight dorsal contracture of the Lt. Hallux.  No gross deformity noted to said digit.   Skin:     General: Skin is warm and dry.      Capillary Refill: Capillary refill takes 2 to 3 seconds.      Findings: Bruising, ecchymosis and wound present. No erythema, signs of injury, laceration, lesion, petechiae or rash.      Comments:   Location: Puncture wound noted to the plantar aspect of the Rt. Hallux.  Base: Down to subQ and comprised of fibrin.    Drainage: None  Malinda wound: Devoid of erythema, edema, fluctuance, purulence, and malodor.    Pre-debridement measurement: 0.3 x 0.3 x 0.2cm.   Post-debridement measurement: 0.5 x 0.5 x 0.2cm     Less cchymosis noted to the distal Lt. Hallux, specifically overlying the IP joint.   Neurological:      Mental Status: She is alert.      Sensory: Sensory deficit present.      Motor: No weakness.      Comments: Decreased protective sensation noted bilateral.  Light touch is absent bilateral.                 Assessment:       Encounter Diagnoses   Name Primary?    Puncture  wound of toe of right foot, subsequent encounter Yes    Diabetic polyneuropathy associated with type 2 diabetes mellitus                  Plan:       Keerthi was seen today for wound care.    Diagnoses and all orders for this visit:    Puncture wound of toe of right foot, subsequent encounter    Diabetic polyneuropathy associated with type 2 diabetes mellitus        I counseled the patient on her conditions, their implications and medical management.    Performed a selective excisional debridement of the Rt. foot.  See attached procedure note.     The wound base was covered with silver alginate, and a football dressing was applied.    Advised to keep the dressing CDI x 1 week.    Advised to rest and elevate the affected extremity.    Instructed to minimize weight bearing to facilitate wound healing.    Advised to ambulate only in the postoperative shoe/boot.    RTC in 1 week.    Jacob Bernal DPM

## 2023-09-27 ENCOUNTER — TELEPHONE (OUTPATIENT)
Dept: PODIATRY | Facility: CLINIC | Age: 48
End: 2023-09-27
Payer: MEDICAID

## 2023-09-27 NOTE — TELEPHONE ENCOUNTER
----- Message from Vanita Trejo sent at 9/27/2023  2:58 PM CDT -----  Contact: self  Type: Needs Medical Advice  Who Called:  Patient    Best Call Back Number: [phone]    Additional Information: Pt states she would like to speak with office to lucinda her appt on tomorrow 9/28 need next weekend Monday, Tuesday or Thursday.Please call back

## 2023-09-27 NOTE — TELEPHONE ENCOUNTER
----- Message from Lexie Prieto sent at 9/27/2023  1:36 PM CDT -----  Contact: pt  Type:  Needs Medical Advice    Who Called: pt  Would the patient rather a call back or a response via [facility]? Call back  Best Call Back Number: [phone]    Additional Information: Pt needs to reschedule appt for a mid-day time if possible.   Please call to advise  Thanks

## 2023-10-03 ENCOUNTER — OFFICE VISIT (OUTPATIENT)
Dept: PODIATRY | Facility: CLINIC | Age: 48
End: 2023-10-03
Payer: MEDICAID

## 2023-10-03 VITALS — BODY MASS INDEX: 40.92 KG/M2 | HEIGHT: 68 IN | WEIGHT: 270 LBS

## 2023-10-03 DIAGNOSIS — E11.42 DIABETIC POLYNEUROPATHY ASSOCIATED WITH TYPE 2 DIABETES MELLITUS: ICD-10-CM

## 2023-10-03 DIAGNOSIS — S91.139D: Primary | ICD-10-CM

## 2023-10-03 PROCEDURE — 99999 PR PBB SHADOW E&M-EST. PATIENT-LVL III: ICD-10-PCS | Mod: PBBFAC,,, | Performed by: PODIATRIST

## 2023-10-03 PROCEDURE — 1159F MED LIST DOCD IN RCRD: CPT | Mod: CPTII,,, | Performed by: PODIATRIST

## 2023-10-03 PROCEDURE — 3008F PR BODY MASS INDEX (BMI) DOCUMENTED: ICD-10-PCS | Mod: CPTII,,, | Performed by: PODIATRIST

## 2023-10-03 PROCEDURE — 99999 PR PBB SHADOW E&M-EST. PATIENT-LVL III: CPT | Mod: PBBFAC,,, | Performed by: PODIATRIST

## 2023-10-03 PROCEDURE — 3008F BODY MASS INDEX DOCD: CPT | Mod: CPTII,,, | Performed by: PODIATRIST

## 2023-10-03 PROCEDURE — 99212 PR OFFICE/OUTPT VISIT, EST, LEVL II, 10-19 MIN: ICD-10-PCS | Mod: S$PBB,,, | Performed by: PODIATRIST

## 2023-10-03 PROCEDURE — 99213 OFFICE O/P EST LOW 20 MIN: CPT | Mod: PBBFAC,PO | Performed by: PODIATRIST

## 2023-10-03 PROCEDURE — 99212 OFFICE O/P EST SF 10 MIN: CPT | Mod: S$PBB,,, | Performed by: PODIATRIST

## 2023-10-03 PROCEDURE — 1159F PR MEDICATION LIST DOCUMENTED IN MEDICAL RECORD: ICD-10-PCS | Mod: CPTII,,, | Performed by: PODIATRIST

## 2023-10-03 NOTE — PROGRESS NOTES
Subjective:      Patient ID: Keerthi Chase is a 48 y.o. female.    Chief Complaint: Wound Check  Patient presents to clinic for a 1 week wound check of the Rt. Great toe.  Denies experiencing pain from the digit with today's exam.  Has kept the prior football dressing clean, dry, and intact for the past week.  Notes ambulation while in the DARCO shoe only.  Denies experiencing N/V/F/C/D.  Denies any additional pedal complaints.      Past Medical History:   Diagnosis Date    Bell's palsy     Coronary artery disease     Pt states Dr. Virk said she did not have a blockage after a stress test.    Diabetes mellitus     GERD (gastroesophageal reflux disease)     Hypertension     Obesity        Past Surgical History:   Procedure Laterality Date     SECTION      x 2    CHOLECYSTECTOMY      ESOPHAGOGASTRODUODENOSCOPY      PERIPHERALLY INSERTED CENTRAL CATHETER INSERTION N/A 10/11/2021    Procedure: INSERTION, PICC;  Surgeon: PICC, STPH;  Location: [facility] ENDO;  Service: Cardiology;  Laterality: N/A;  Dr Aiken    PERIPHERALLY INSERTED CENTRAL CATHETER INSERTION N/A 2022    Procedure: INSERTION, PICC;  Surgeon: PICC, STPH;  Location: [facility] ENDO;  Service: Cardiology;  Laterality: N/A;  Dr Dubose    SESAMOIDECTOMY Left 2021    Procedure: SESAMOIDECTOMY;  Surgeon: Jacob Bernal DPM;  Location: [facility] OR;  Service: Podiatry;  Laterality: Left;       Family History   Problem Relation Age of Onset    Hypertension Mother        Social History     Socioeconomic History    Marital status:    Tobacco Use    Smoking status: Never    Smokeless tobacco: Never   Substance and Sexual Activity    Alcohol use: No    Drug use: No       Current Outpatient Medications   Medication Sig Dispense Refill    alprazolam (XANAX) 1 MG tablet Take 1 mg by mouth 2 (two) times daily as needed for Anxiety.      amoxicillin-clavulanate 875-125mg (AUGMENTIN) 875-125 mg per tablet Take 1 tablet by mouth every 12 (twelve) hours. 20  "tablet 0    ampicillin (PRINCIPEN) 500 MG capsule Take 1 capsule (500 mg total) by mouth every 12 (twelve) hours. 20 capsule 0    BASAGLAR KWIKPEN 100 unit/mL (3 mL) InPn pen 75 Units once daily.   3    BD INSULIN PEN NEEDLE UF MINI 31 gauge x 3/16" Ndle   2    BD INSULIN SYRINGE ULTRA-FINE 1 mL 31 gauge x 5/16 Syrg use as directed      ciprofloxacin HCl (CIPRO) 750 MG tablet Take 750 mg by mouth every 12 (twelve) hours.      hydrocodone-acetaminophen 10-325mg (NORCO)  mg Tab Take 1 tablet by mouth 3 (three) times daily as needed.      hydrocodone-acetaminophen 10-325mg (NORCO)  mg Tab Take 1 tablet by mouth every 8 (eight) hours as needed for Pain. 90 tablet 0    insulin detemir (LEVEMIR) 100 unit/mL injection Inject into the skin every evening.      insulin glargine,hum.rec.anlog (LANTUS SUBQ) Inject into the skin.      LANTUS U-100 INSULIN 100 unit/mL injection SMARTSI Unit(s) SUB-Q Every Night      OZEMPIC 1 mg/dose (2 mg/1.5 mL) PnIj Inject 60 mg into the skin every 7 days.       OZEMPIC 1 mg/dose (4 mg/3 mL) Inject 1 mg into the skin every 7 days.      sulfamethoxazole-trimethoprim 800-160mg (BACTRIM DS) 800-160 mg Tab Take 1 tablet by mouth 2 (two) times daily. 14 tablet 0    traMADoL (ULTRAM) 50 mg tablet Take 1 tablet (50 mg total) by mouth every 6 (six) hours as needed for Pain. 28 tablet 0    chlorthalidone (HYGROTEN) 25 MG Tab Take 1 tablet (25 mg total) by mouth once daily. 30 tablet 11     No current facility-administered medications for this visit.       Review of patient's allergies indicates:   Allergen Reactions    Codeine Hives and Rash        Hemoglobin A1C   Date Value Ref Range Status   2022 10.5 (H) 0.0 - 5.6 % Final     Comment:     Reference Interval:  5.0 - 5.6 Normal   5.7 - 6.4 High Risk   > 6.5 Diabetic      Hgb A1c results are standardized based on the (NGSP) National   Glycohemoglobin Standardization Program.      Hemoglobin A1C levels are related to mean " serum/plasma glucose   during the preceding 2-3 months.        2021 11.8 (H) 0.0 - 5.6 % Final     Comment:     Reference Interval:  5.0 - 5.6 Normal   5.7 - 6.4 High Risk   > 6.5 Diabetic      Hgb A1c results are standardized based on the (NGSP) National   Glycohemoglobin Standardization Program.      Hemoglobin A1C levels are related to mean serum/plasma glucose   during the preceding 2-3 months.        2021 8.8 (H) 4.8 - 5.6 % Final     Comment:              Prediabetes: 5.7 - 6.4           Diabetes: >6.4           Glycemic control for adults with diabetes: <7.0         Past Medical History:   Diagnosis Date    Bell's palsy     Coronary artery disease     Pt states Dr. Virk said she did not have a blockage after a stress test.    Diabetes mellitus     GERD (gastroesophageal reflux disease)     Hypertension     Obesity        Past Surgical History:   Procedure Laterality Date     SECTION      x 2    CHOLECYSTECTOMY      ESOPHAGOGASTRODUODENOSCOPY      PERIPHERALLY INSERTED CENTRAL CATHETER INSERTION N/A 10/11/2021    Procedure: INSERTION, PICC;  Surgeon: IGOR STTAVIA;  Location: [facility] ENDO;  Service: Cardiology;  Laterality: N/A;  Dr Aiken    PERIPHERALLY INSERTED CENTRAL CATHETER INSERTION N/A 2022    Procedure: INSERTION, PICC;  Surgeon: PICC, STPH;  Location: [facility] ENDO;  Service: Cardiology;  Laterality: N/A;  Dr Dubose    SESAMOIDECTOMY Left 2021    Procedure: SESAMOIDECTOMY;  Surgeon: Jacob Bernal DPM;  Location: [facility] OR;  Service: Podiatry;  Laterality: Left;       Family History   Problem Relation Age of Onset    Hypertension Mother        Social History     Socioeconomic History    Marital status:    Tobacco Use    Smoking status: Never    Smokeless tobacco: Never   Substance and Sexual Activity    Alcohol use: No    Drug use: No       Current Outpatient Medications   Medication Sig Dispense Refill    alprazolam (XANAX) 1 MG tablet Take 1 mg by mouth 2 (two) times  "daily as needed for Anxiety.      amoxicillin-clavulanate 875-125mg (AUGMENTIN) 875-125 mg per tablet Take 1 tablet by mouth every 12 (twelve) hours. 20 tablet 0    ampicillin (PRINCIPEN) 500 MG capsule Take 1 capsule (500 mg total) by mouth every 12 (twelve) hours. 20 capsule 0    BASAGLAR KWIKPEN 100 unit/mL (3 mL) InPn pen 75 Units once daily.   3    BD INSULIN PEN NEEDLE UF MINI 31 gauge x 3/16" Ndle   2    BD INSULIN SYRINGE ULTRA-FINE 1 mL 31 gauge x 5/16 Syrg use as directed      ciprofloxacin HCl (CIPRO) 750 MG tablet Take 750 mg by mouth every 12 (twelve) hours.      hydrocodone-acetaminophen 10-325mg (NORCO)  mg Tab Take 1 tablet by mouth 3 (three) times daily as needed.      hydrocodone-acetaminophen 10-325mg (NORCO)  mg Tab Take 1 tablet by mouth every 8 (eight) hours as needed for Pain. 90 tablet 0    insulin detemir (LEVEMIR) 100 unit/mL injection Inject into the skin every evening.      insulin glargine,hum.rec.anlog (LANTUS SUBQ) Inject into the skin.      LANTUS U-100 INSULIN 100 unit/mL injection SMARTSI Unit(s) SUB-Q Every Night      OZEMPIC 1 mg/dose (2 mg/1.5 mL) PnIj Inject 60 mg into the skin every 7 days.       OZEMPIC 1 mg/dose (4 mg/3 mL) Inject 1 mg into the skin every 7 days.      sulfamethoxazole-trimethoprim 800-160mg (BACTRIM DS) 800-160 mg Tab Take 1 tablet by mouth 2 (two) times daily. 14 tablet 0    traMADoL (ULTRAM) 50 mg tablet Take 1 tablet (50 mg total) by mouth every 6 (six) hours as needed for Pain. 28 tablet 0    chlorthalidone (HYGROTEN) 25 MG Tab Take 1 tablet (25 mg total) by mouth once daily. 30 tablet 11     No current facility-administered medications for this visit.       Review of patient's allergies indicates:   Allergen Reactions    Codeine Hives and Rash         Review of Systems   Constitutional: Negative for chills and fever.   Skin:  Positive for color change and poor wound healing. Negative for nail changes.   Gastrointestinal:  Negative for " nausea and vomiting.   Neurological:  Positive for numbness.   Psychiatric/Behavioral:  Negative for altered mental status.            Objective:      Physical Exam  Constitutional:       Appearance: Normal appearance. She is not ill-appearing.   Cardiovascular:      Pulses:           Dorsalis pedis pulses are 2+ on the right side and 2+ on the left side.        Posterior tibial pulses are 2+ on the right side and 2+ on the left side.      Comments: CFT is < 3 seconds bilateral.  Pedal hair growth is decreased bilateral.  No lower extremity edema noted bilateral.  Toes are warm to touch bilateral.    Musculoskeletal:         General: No signs of injury.      Right lower leg: No edema.      Left lower leg: No edema.      Comments: Muscle strength 5/5 in all muscle groups bilateral.  No tenderness nor crepitation with ROM of foot/ankle joints bilateral.  No tenderness with palpation of bilateral foot and ankle.   Slight dorsal contracture of the Lt. Hallux.  No gross deformity noted to said digit.   Skin:     General: Skin is warm and dry.      Capillary Refill: Capillary refill takes 2 to 3 seconds.      Findings: No bruising, ecchymosis, erythema, signs of injury, laceration, lesion, petechiae, rash or wound.      Comments: Maturing epithelium noted to the plantar aspect of the Rt. Hallux.   Neurological:      Mental Status: She is alert.      Sensory: Sensory deficit present.      Motor: No weakness.      Comments: Decreased protective sensation noted bilateral.  Light touch is absent bilateral.                 Assessment:       Encounter Diagnoses   Name Primary?    Puncture wound of toe of right foot, subsequent encounter Yes    Diabetic polyneuropathy associated with type 2 diabetes mellitus                    Plan:       Keerthi was seen today for wound check.    Diagnoses and all orders for this visit:    Puncture wound of toe of right foot, subsequent encounter    Diabetic polyneuropathy associated with type  2 diabetes mellitus          I counseled the patient on her conditions, their implications and medical management.    No wound debridement performed, as the site is fully healed with today's exam.    Advised to continue covering with a band aid and a foam toe sleeve with all ambulation for the next two weeks.    To ambulate only in the DARCO shoe for the next week, may then transition back into a casual shoe in two weeks.    May begin applying moisturizer to the site once daily.    May resume showering but discouraged from soaking/scrubbing x 2 weeks.    To inspect the site for recurrent skin breakdown/infection on a daily basis.    RTC in 1 month for a follow up.      Jacob Bernal DPM

## 2024-01-19 ENCOUNTER — TELEPHONE (OUTPATIENT)
Dept: PODIATRY | Facility: CLINIC | Age: 49
End: 2024-01-19
Payer: MEDICAID

## 2024-01-19 NOTE — TELEPHONE ENCOUNTER
----- Message from Dinorah Quiñones sent at 1/19/2024  9:55 AM CST -----  Regarding: appointment  Contact: patient  Type:  Sooner Appointment Request    Caller is requesting a sooner appointment.  Caller declined first available appointment listed below.  Caller will not accept being placed on the waitlist and is requesting a message be sent to doctor.    Name of Caller:  patient  When is the first available appointment?  02/14/24  Symptoms:  diabetic spot on foot  Would the patient rather a call back or a response via [facility]? call  Best Call Back Number:  [phone] (home) [phone] (work)    Additional Information:  Please call patient to schedule  thanks!

## 2024-01-29 ENCOUNTER — OFFICE VISIT (OUTPATIENT)
Dept: PODIATRY | Facility: CLINIC | Age: 49
End: 2024-01-29
Payer: MEDICAID

## 2024-01-29 VITALS — BODY MASS INDEX: 40.93 KG/M2 | WEIGHT: 270.06 LBS | HEIGHT: 68 IN

## 2024-01-29 DIAGNOSIS — E11.42 DIABETIC POLYNEUROPATHY ASSOCIATED WITH TYPE 2 DIABETES MELLITUS: ICD-10-CM

## 2024-01-29 DIAGNOSIS — S90.221A SUBUNGUAL HEMATOMA OF TOE OF RIGHT FOOT, INITIAL ENCOUNTER: Primary | ICD-10-CM

## 2024-01-29 PROCEDURE — 87070 CULTURE OTHR SPECIMN AEROBIC: CPT | Performed by: PODIATRIST

## 2024-01-29 PROCEDURE — 3008F BODY MASS INDEX DOCD: CPT | Mod: CPTII,,, | Performed by: PODIATRIST

## 2024-01-29 PROCEDURE — 99213 OFFICE O/P EST LOW 20 MIN: CPT | Mod: 25,S$PBB,, | Performed by: PODIATRIST

## 2024-01-29 PROCEDURE — 1159F MED LIST DOCD IN RCRD: CPT | Mod: CPTII,,, | Performed by: PODIATRIST

## 2024-01-29 PROCEDURE — 99999 PR PBB SHADOW E&M-EST. PATIENT-LVL II: CPT | Mod: PBBFAC,,, | Performed by: PODIATRIST

## 2024-01-29 PROCEDURE — 11730 AVULSION NAIL PLATE SIMPLE 1: CPT | Mod: T5,PBBFAC,PO | Performed by: PODIATRIST

## 2024-01-29 PROCEDURE — 87075 CULTR BACTERIA EXCEPT BLOOD: CPT | Performed by: PODIATRIST

## 2024-01-29 PROCEDURE — 99212 OFFICE O/P EST SF 10 MIN: CPT | Mod: PBBFAC,PO | Performed by: PODIATRIST

## 2024-01-30 ENCOUNTER — TELEPHONE (OUTPATIENT)
Dept: PODIATRY | Facility: CLINIC | Age: 49
End: 2024-01-30
Payer: MEDICAID

## 2024-01-30 NOTE — TELEPHONE ENCOUNTER
----- Message from Coco Marshall sent at 1/30/2024  2:31 PM CST -----  Regarding: advise  Contact: patient  Type: Needs Medical Advice  Who Called:  patient   Symptoms (please be specific):    How long has patient had these symptoms:    Pharmacy name and phone #:    [facility] [place], [place] [facility] [address] [address]  [id number] [address]  [facility] [address]  Phone: [phone] Fax: [phone]      Best Call Back Number: .[phone]    Additional Information: milton carrasco pt is still waiting on prescription to be sent in are u available sulfamethoxazole-trimethoprim 800-160mg (BACTRIM DS) 800-160 mg Tab MRN: [id number] NOEMI GIORDANO seen yesterday but pharmacy still has not received.

## 2024-01-31 RX ORDER — MUPIROCIN 20 MG/G
OINTMENT TOPICAL 3 TIMES DAILY
Qty: 30 G | Refills: 1 | Status: SHIPPED | OUTPATIENT
Start: 2024-01-31

## 2024-01-31 NOTE — PROGRESS NOTES
Subjective:      Patient ID: Keerthi Chase is a 48 y.o. female.    Chief Complaint: Toe Pain (Rt big toe red) and Foot Problem (Spot on rt foot bottom)  Patient presents to clinic out of concern regarding erythema of the Rt. Great toe.  Denies the digit being painful, however, notes an increase in digital pressure under the nail.  Due to her past experience with diabetic foot infections, she is presenting early in attempts to prevent a greater issue.  Denies sustaining trauma to the digit.  Denies noticing a wound nor localized drainage form the toe.  Has not attempted to self treat.  Denies experiencing N/V/F/C/D.  Denies any additional pedal complaints.      Past Medical History:   Diagnosis Date    Bell's palsy     Coronary artery disease     Pt states Dr. Virk said she did not have a blockage after a stress test.    Diabetes mellitus     GERD (gastroesophageal reflux disease)     Hypertension     Obesity        Past Surgical History:   Procedure Laterality Date     SECTION      x 2    CHOLECYSTECTOMY      ESOPHAGOGASTRODUODENOSCOPY      PERIPHERALLY INSERTED CENTRAL CATHETER INSERTION N/A 10/11/2021    Procedure: INSERTION, PICC;  Surgeon: PRIYANK COSTA;  Location: [facility] ENDO;  Service: Cardiology;  Laterality: N/A;  Dr Aiken    PERIPHERALLY INSERTED CENTRAL CATHETER INSERTION N/A 2022    Procedure: INSERTION, PICC;  Surgeon: PRIYANK COSTA;  Location: [facility] ENDO;  Service: Cardiology;  Laterality: N/A;  Dr Dubose    SESAMOIDECTOMY Left 2021    Procedure: SESAMOIDECTOMY;  Surgeon: Jacob Bernal DPM;  Location: [facility] OR;  Service: Podiatry;  Laterality: Left;       Family History   Problem Relation Age of Onset    Hypertension Mother        Social History     Socioeconomic History    Marital status:    Tobacco Use    Smoking status: Never    Smokeless tobacco: Never   Substance and Sexual Activity    Alcohol use: No    Drug use: No       Current Outpatient Medications   Medication Sig  "Dispense Refill    alprazolam (XANAX) 1 MG tablet Take 1 mg by mouth 2 (two) times daily as needed for Anxiety.      amoxicillin-clavulanate 875-125mg (AUGMENTIN) 875-125 mg per tablet Take 1 tablet by mouth every 12 (twelve) hours. 20 tablet 0    ampicillin (PRINCIPEN) 500 MG capsule Take 1 capsule (500 mg total) by mouth every 12 (twelve) hours. 20 capsule 0    BASAGLAR KWIKPEN 100 unit/mL (3 mL) InPn pen 75 Units once daily.   3    BD INSULIN PEN NEEDLE UF MINI 31 gauge x 3/16" Ndle   2    BD INSULIN SYRINGE ULTRA-FINE 1 mL 31 gauge x 5/16 Syrg use as directed      chlorthalidone (HYGROTEN) 25 MG Tab Take 1 tablet (25 mg total) by mouth once daily. 30 tablet 11    ciprofloxacin HCl (CIPRO) 750 MG tablet Take 750 mg by mouth every 12 (twelve) hours.      hydrocodone-acetaminophen 10-325mg (NORCO)  mg Tab Take 1 tablet by mouth 3 (three) times daily as needed.      hydrocodone-acetaminophen 10-325mg (NORCO)  mg Tab Take 1 tablet by mouth every 8 (eight) hours as needed for Pain. 90 tablet 0    insulin detemir (LEVEMIR) 100 unit/mL injection Inject into the skin every evening.      insulin glargine,hum.rec.anlog (LANTUS SUBQ) Inject into the skin.      LANTUS U-100 INSULIN 100 unit/mL injection SMARTSI Unit(s) SUB-Q Every Night      OZEMPIC 1 mg/dose (2 mg/1.5 mL) PnIj Inject 60 mg into the skin every 7 days.       OZEMPIC 1 mg/dose (4 mg/3 mL) Inject 1 mg into the skin every 7 days.      sulfamethoxazole-trimethoprim 800-160mg (BACTRIM DS) 800-160 mg Tab Take 1 tablet by mouth 2 (two) times daily. 14 tablet 0    traMADoL (ULTRAM) 50 mg tablet Take 1 tablet (50 mg total) by mouth every 6 (six) hours as needed for Pain. 28 tablet 0     No current facility-administered medications for this visit.       Review of patient's allergies indicates:   Allergen Reactions    Codeine Hives and Rash        Hemoglobin A1C   Date Value Ref Range Status   2022 10.5 (H) 0.0 - 5.6 % Final     Comment:     " Reference Interval:  5.0 - 5.6 Normal   5.7 - 6.4 High Risk   > 6.5 Diabetic      Hgb A1c results are standardized based on the (NGSP) National   Glycohemoglobin Standardization Program.      Hemoglobin A1C levels are related to mean serum/plasma glucose   during the preceding 2-3 months.        2021 11.8 (H) 0.0 - 5.6 % Final     Comment:     Reference Interval:  5.0 - 5.6 Normal   5.7 - 6.4 High Risk   > 6.5 Diabetic      Hgb A1c results are standardized based on the (NGSP) National   Glycohemoglobin Standardization Program.      Hemoglobin A1C levels are related to mean serum/plasma glucose   during the preceding 2-3 months.        2021 8.8 (H) 4.8 - 5.6 % Final     Comment:              Prediabetes: 5.7 - 6.4           Diabetes: >6.4           Glycemic control for adults with diabetes: <7.0         Past Medical History:   Diagnosis Date    Bell's palsy     Coronary artery disease     Pt states Dr. Virk said she did not have a blockage after a stress test.    Diabetes mellitus     GERD (gastroesophageal reflux disease)     Hypertension     Obesity        Past Surgical History:   Procedure Laterality Date     SECTION      x 2    CHOLECYSTECTOMY      ESOPHAGOGASTRODUODENOSCOPY      PERIPHERALLY INSERTED CENTRAL CATHETER INSERTION N/A 10/11/2021    Procedure: INSERTION, PICC;  Surgeon: PRIYANK COSTA;  Location: [facility] ENDO;  Service: Cardiology;  Laterality: N/A;  Dr Aiken    PERIPHERALLY INSERTED CENTRAL CATHETER INSERTION N/A 2022    Procedure: INSERTION, PICC;  Surgeon: PICTATI, STTAVIA;  Location: [facility] ENDO;  Service: Cardiology;  Laterality: N/A;  Dr Dubose    SESAMOIDECTOMY Left 2021    Procedure: SESAMOIDECTOMY;  Surgeon: Jacob Bernal DPM;  Location: [facility] OR;  Service: Podiatry;  Laterality: Left;       Family History   Problem Relation Age of Onset    Hypertension Mother        Social History     Socioeconomic History    Marital status:    Tobacco Use    Smoking status: Never  "   Smokeless tobacco: Never   Substance and Sexual Activity    Alcohol use: No    Drug use: No       Current Outpatient Medications   Medication Sig Dispense Refill    alprazolam (XANAX) 1 MG tablet Take 1 mg by mouth 2 (two) times daily as needed for Anxiety.      amoxicillin-clavulanate 875-125mg (AUGMENTIN) 875-125 mg per tablet Take 1 tablet by mouth every 12 (twelve) hours. 20 tablet 0    ampicillin (PRINCIPEN) 500 MG capsule Take 1 capsule (500 mg total) by mouth every 12 (twelve) hours. 20 capsule 0    BASAGLAR KWIKPEN 100 unit/mL (3 mL) InPn pen 75 Units once daily.   3    BD INSULIN PEN NEEDLE UF MINI 31 gauge x 3/16" Ndle   2    BD INSULIN SYRINGE ULTRA-FINE 1 mL 31 gauge x 5/16 Syrg use as directed      chlorthalidone (HYGROTEN) 25 MG Tab Take 1 tablet (25 mg total) by mouth once daily. 30 tablet 11    ciprofloxacin HCl (CIPRO) 750 MG tablet Take 750 mg by mouth every 12 (twelve) hours.      hydrocodone-acetaminophen 10-325mg (NORCO)  mg Tab Take 1 tablet by mouth 3 (three) times daily as needed.      hydrocodone-acetaminophen 10-325mg (NORCO)  mg Tab Take 1 tablet by mouth every 8 (eight) hours as needed for Pain. 90 tablet 0    insulin detemir (LEVEMIR) 100 unit/mL injection Inject into the skin every evening.      insulin glargine,hum.rec.anlog (LANTUS SUBQ) Inject into the skin.      LANTUS U-100 INSULIN 100 unit/mL injection SMARTSI Unit(s) SUB-Q Every Night      OZEMPIC 1 mg/dose (2 mg/1.5 mL) PnIj Inject 60 mg into the skin every 7 days.       OZEMPIC 1 mg/dose (4 mg/3 mL) Inject 1 mg into the skin every 7 days.      sulfamethoxazole-trimethoprim 800-160mg (BACTRIM DS) 800-160 mg Tab Take 1 tablet by mouth 2 (two) times daily. 14 tablet 0    traMADoL (ULTRAM) 50 mg tablet Take 1 tablet (50 mg total) by mouth every 6 (six) hours as needed for Pain. 28 tablet 0     No current facility-administered medications for this visit.       Review of patient's allergies indicates:   Allergen " Reactions    Codeine Hives and Rash         Review of Systems   Constitutional: Negative for chills and fever.   Skin:  Positive for color change and nail changes. Negative for poor wound healing.   Gastrointestinal:  Negative for nausea and vomiting.   Neurological:  Positive for numbness.   Psychiatric/Behavioral:  Negative for altered mental status.            Objective:      Physical Exam  Constitutional:       Appearance: Normal appearance. She is not ill-appearing.   Cardiovascular:      Pulses:           Dorsalis pedis pulses are 2+ on the right side and 2+ on the left side.        Posterior tibial pulses are 2+ on the right side and 2+ on the left side.      Comments: CFT is < 3 seconds bilateral.  Pedal hair growth is decreased bilateral.  No lower extremity edema noted bilateral.  Toes are warm to touch bilateral.    Musculoskeletal:         General: No signs of injury.      Right lower leg: No edema.      Left lower leg: No edema.      Comments: Muscle strength 5/5 in all muscle groups bilateral.  No tenderness nor crepitation with ROM of foot/ankle joints bilateral.  No tenderness with palpation of bilateral foot and ankle.   Slight dorsal contracture of the Lt. Hallux.  No gross deformity noted to said digit.   Skin:     General: Skin is warm and dry.      Capillary Refill: Capillary refill takes 2 to 3 seconds.      Findings: Erythema present. No bruising, ecchymosis, signs of injury, laceration, lesion, petechiae, rash or wound.      Comments: Mild erythema noted circumferentially to the distal tip of the Rt. Great toe.  Fluctuance and serosanguinous fluid collection noted underlying the entire nail plate of said digit.  No purulence or malodor noted.  No skin breakdown is present. Roughly 1.5 mL of serosanguinous fluid was expressed upon removal of the nail with exposure of a healthy granular nail bed.   Neurological:      Mental Status: She is alert.      Sensory: Sensory deficit present.       Motor: No weakness.      Comments: Decreased protective sensation noted bilateral.  Light touch is absent bilateral.                 Assessment:       Encounter Diagnoses   Name Primary?    Subungual hematoma of toe of right foot, initial encounter Yes    Diabetic polyneuropathy associated with type 2 diabetes mellitus                    Plan:       Keerthi was seen today for toe pain and foot problem.    Diagnoses and all orders for this visit:    Subungual hematoma of toe of right foot, initial encounter  -     Aerobic culture  -     Culture, Anaerobic  -     Nail Removal    Diabetic polyneuropathy associated with type 2 diabetes mellitus          I counseled the patient on her conditions, their implications and medical management.    Discussed in depth the risks, benefits, procedure, and follow up care associated with a total avulsion of the Rt. hallux toenail.  All questions were thoroughly answered.      Consent was read, signed, and witnessed by nursing staff. See attached procedure note.       Cultures were obtained of the fluid from the subungual hematoma.      The site was covered with antibiotic ointment, and a toe football was applied.     Given verbal and written wound care/dressing change instructions.      Advised to rest, ice, and elevate the surgical extremity.      RTC in 1 week for a postop visit.       Jacob Bernal DPM

## 2024-01-31 NOTE — PROCEDURES
Nail Removal    Date/Time: 1/29/2024 7:40 AM    Performed by: Jacob Bernal DPM  Authorized by: Jacob Bernal DPM    Consent Done?:  Yes (Written)  Time out: Immediately prior to the procedure a time out was called    Location:     Location:  Right foot    Location detail:  Right big toe  Procedure Details:     Amount removed:  Complete    Wedge excision of skin of nail fold: No      Nail bed sutured?: No      Nail matrix removed:  None    Removed nail replaced and anchored: No      Dressing applied:  4x4, antibiotic ointment and dressing applied    Patient tolerance:  Patient tolerated the procedure well with no immediate complications

## 2024-02-01 LAB — BACTERIA SPEC AEROBE CULT: NORMAL

## 2024-02-04 LAB — BACTERIA SPEC ANAEROBE CULT: NORMAL

## 2024-02-05 ENCOUNTER — OFFICE VISIT (OUTPATIENT)
Dept: PODIATRY | Facility: CLINIC | Age: 49
End: 2024-02-05
Payer: MEDICAID

## 2024-02-05 VITALS — HEIGHT: 68 IN | WEIGHT: 270.06 LBS | BODY MASS INDEX: 40.93 KG/M2

## 2024-02-05 DIAGNOSIS — E11.42 DIABETIC POLYNEUROPATHY ASSOCIATED WITH TYPE 2 DIABETES MELLITUS: ICD-10-CM

## 2024-02-05 DIAGNOSIS — S91.209A AVULSION OF TOENAIL OF RIGHT FOOT: Primary | ICD-10-CM

## 2024-02-05 PROCEDURE — 99212 OFFICE O/P EST SF 10 MIN: CPT | Mod: S$PBB,,, | Performed by: PODIATRIST

## 2024-02-05 PROCEDURE — 3008F BODY MASS INDEX DOCD: CPT | Mod: CPTII,,, | Performed by: PODIATRIST

## 2024-02-05 PROCEDURE — 99213 OFFICE O/P EST LOW 20 MIN: CPT | Mod: PBBFAC,PO | Performed by: PODIATRIST

## 2024-02-05 PROCEDURE — 99999 PR PBB SHADOW E&M-EST. PATIENT-LVL III: CPT | Mod: PBBFAC,,, | Performed by: PODIATRIST

## 2024-02-05 PROCEDURE — 1159F MED LIST DOCD IN RCRD: CPT | Mod: CPTII,,, | Performed by: PODIATRIST

## 2024-02-05 NOTE — PROGRESS NOTES
Subjective:      Patient ID: Keerthi Chase is a 48 y.o. female.    Chief Complaint: Follow-up (Right toe/ follow up)  Patient presents to clinic for a 1 week wound check of the Rt. Great toe s/p total nail avulsion.  Denies pain from the site with today's exam.  Has been applying bactroban and coban to the site once daily.  Notes being able to comfortably wear shoe gear since the avulsion.  Denies noticing localized signs of infection.  Denies experiencing N/V/F/C/D.  Denies any additional pedal complaints.      Past Medical History:   Diagnosis Date    Bell's palsy     Coronary artery disease     Pt states Dr. Virk said she did not have a blockage after a stress test.    Diabetes mellitus     GERD (gastroesophageal reflux disease)     Hypertension     Obesity        Past Surgical History:   Procedure Laterality Date     SECTION      x 2    CHOLECYSTECTOMY      ESOPHAGOGASTRODUODENOSCOPY      PERIPHERALLY INSERTED CENTRAL CATHETER INSERTION N/A 10/11/2021    Procedure: INSERTION, PICC;  Surgeon: PICC, STPH;  Location: [facility] ENDO;  Service: Cardiology;  Laterality: N/A;  Dr Aiken    PERIPHERALLY INSERTED CENTRAL CATHETER INSERTION N/A 2022    Procedure: INSERTION, PICC;  Surgeon: PICC, STPH;  Location: [facility] ENDO;  Service: Cardiology;  Laterality: N/A;  Dr Dubose    SESAMOIDECTOMY Left 2021    Procedure: SESAMOIDECTOMY;  Surgeon: Jacob Bernal DPM;  Location: [facility] OR;  Service: Podiatry;  Laterality: Left;       Family History   Problem Relation Age of Onset    Hypertension Mother        Social History     Socioeconomic History    Marital status:    Tobacco Use    Smoking status: Never    Smokeless tobacco: Never   Substance and Sexual Activity    Alcohol use: No    Drug use: No       Current Outpatient Medications   Medication Sig Dispense Refill    alprazolam (XANAX) 1 MG tablet Take 1 mg by mouth 2 (two) times daily as needed for Anxiety.      amoxicillin-clavulanate 875-125mg  "(AUGMENTIN) 875-125 mg per tablet Take 1 tablet by mouth every 12 (twelve) hours. 20 tablet 0    ampicillin (PRINCIPEN) 500 MG capsule Take 1 capsule (500 mg total) by mouth every 12 (twelve) hours. 20 capsule 0    BASAGLAR KWIKPEN 100 unit/mL (3 mL) InPn pen 75 Units once daily.   3    BD INSULIN PEN NEEDLE UF MINI 31 gauge x 3/16" Ndle   2    BD INSULIN SYRINGE ULTRA-FINE 1 mL 31 gauge x 5/16 Syrg use as directed      ciprofloxacin HCl (CIPRO) 750 MG tablet Take 750 mg by mouth every 12 (twelve) hours.      hydrocodone-acetaminophen 10-325mg (NORCO)  mg Tab Take 1 tablet by mouth 3 (three) times daily as needed.      hydrocodone-acetaminophen 10-325mg (NORCO)  mg Tab Take 1 tablet by mouth every 8 (eight) hours as needed for Pain. 90 tablet 0    insulin detemir (LEVEMIR) 100 unit/mL injection Inject into the skin every evening.      insulin glargine,hum.rec.anlog (LANTUS SUBQ) Inject into the skin.      LANTUS U-100 INSULIN 100 unit/mL injection SMARTSI Unit(s) SUB-Q Every Night      mupirocin (BACTROBAN) 2 % ointment Apply topically 3 (three) times daily. 30 g 1    OZEMPIC 1 mg/dose (2 mg/1.5 mL) PnIj Inject 60 mg into the skin every 7 days.       OZEMPIC 1 mg/dose (4 mg/3 mL) Inject 1 mg into the skin every 7 days.      sulfamethoxazole-trimethoprim 800-160mg (BACTRIM DS) 800-160 mg Tab Take 1 tablet by mouth 2 (two) times daily. 14 tablet 0    traMADoL (ULTRAM) 50 mg tablet Take 1 tablet (50 mg total) by mouth every 6 (six) hours as needed for Pain. 28 tablet 0    chlorthalidone (HYGROTEN) 25 MG Tab Take 1 tablet (25 mg total) by mouth once daily. 30 tablet 11     No current facility-administered medications for this visit.       Review of patient's allergies indicates:   Allergen Reactions    Codeine Hives and Rash        Hemoglobin A1C   Date Value Ref Range Status   2022 10.5 (H) 0.0 - 5.6 % Final     Comment:     Reference Interval:  5.0 - 5.6 Normal   5.7 - 6.4 High Risk   > 6.5 " Diabetic      Hgb A1c results are standardized based on the (NGSP) National   Glycohemoglobin Standardization Program.      Hemoglobin A1C levels are related to mean serum/plasma glucose   during the preceding 2-3 months.        2021 11.8 (H) 0.0 - 5.6 % Final     Comment:     Reference Interval:  5.0 - 5.6 Normal   5.7 - 6.4 High Risk   > 6.5 Diabetic      Hgb A1c results are standardized based on the (NGSP) National   Glycohemoglobin Standardization Program.      Hemoglobin A1C levels are related to mean serum/plasma glucose   during the preceding 2-3 months.        2021 8.8 (H) 4.8 - 5.6 % Final     Comment:              Prediabetes: 5.7 - 6.4           Diabetes: >6.4           Glycemic control for adults with diabetes: <7.0         Past Medical History:   Diagnosis Date    Bell's palsy     Coronary artery disease     Pt states Dr. Virk said she did not have a blockage after a stress test.    Diabetes mellitus     GERD (gastroesophageal reflux disease)     Hypertension     Obesity        Past Surgical History:   Procedure Laterality Date     SECTION      x 2    CHOLECYSTECTOMY      ESOPHAGOGASTRODUODENOSCOPY      PERIPHERALLY INSERTED CENTRAL CATHETER INSERTION N/A 10/11/2021    Procedure: INSERTION, PICC;  Surgeon: PICC, STPH;  Location: [facility] ENDO;  Service: Cardiology;  Laterality: N/A;  Dr Aiken    PERIPHERALLY INSERTED CENTRAL CATHETER INSERTION N/A 2022    Procedure: INSERTION, PICC;  Surgeon: PICC, STPH;  Location: [facility] ENDO;  Service: Cardiology;  Laterality: N/A;  Dr Dubose    SESAMOIDECTOMY Left 2021    Procedure: SESAMOIDECTOMY;  Surgeon: Jacob Bernal DPM;  Location: [facility] OR;  Service: Podiatry;  Laterality: Left;       Family History   Problem Relation Age of Onset    Hypertension Mother        Social History     Socioeconomic History    Marital status:    Tobacco Use    Smoking status: Never    Smokeless tobacco: Never   Substance and Sexual Activity     "Alcohol use: No    Drug use: No       Current Outpatient Medications   Medication Sig Dispense Refill    alprazolam (XANAX) 1 MG tablet Take 1 mg by mouth 2 (two) times daily as needed for Anxiety.      amoxicillin-clavulanate 875-125mg (AUGMENTIN) 875-125 mg per tablet Take 1 tablet by mouth every 12 (twelve) hours. 20 tablet 0    ampicillin (PRINCIPEN) 500 MG capsule Take 1 capsule (500 mg total) by mouth every 12 (twelve) hours. 20 capsule 0    BASAGLAR KWIKPEN 100 unit/mL (3 mL) InPn pen 75 Units once daily.   3    BD INSULIN PEN NEEDLE UF MINI 31 gauge x 3/16" Ndle   2    BD INSULIN SYRINGE ULTRA-FINE 1 mL 31 gauge x 5/16 Syrg use as directed      ciprofloxacin HCl (CIPRO) 750 MG tablet Take 750 mg by mouth every 12 (twelve) hours.      hydrocodone-acetaminophen 10-325mg (NORCO)  mg Tab Take 1 tablet by mouth 3 (three) times daily as needed.      hydrocodone-acetaminophen 10-325mg (NORCO)  mg Tab Take 1 tablet by mouth every 8 (eight) hours as needed for Pain. 90 tablet 0    insulin detemir (LEVEMIR) 100 unit/mL injection Inject into the skin every evening.      insulin glargine,hum.rec.anlog (LANTUS SUBQ) Inject into the skin.      LANTUS U-100 INSULIN 100 unit/mL injection SMARTSI Unit(s) SUB-Q Every Night      mupirocin (BACTROBAN) 2 % ointment Apply topically 3 (three) times daily. 30 g 1    OZEMPIC 1 mg/dose (2 mg/1.5 mL) PnIj Inject 60 mg into the skin every 7 days.       OZEMPIC 1 mg/dose (4 mg/3 mL) Inject 1 mg into the skin every 7 days.      sulfamethoxazole-trimethoprim 800-160mg (BACTRIM DS) 800-160 mg Tab Take 1 tablet by mouth 2 (two) times daily. 14 tablet 0    traMADoL (ULTRAM) 50 mg tablet Take 1 tablet (50 mg total) by mouth every 6 (six) hours as needed for Pain. 28 tablet 0    chlorthalidone (HYGROTEN) 25 MG Tab Take 1 tablet (25 mg total) by mouth once daily. 30 tablet 11     No current facility-administered medications for this visit.       Review of patient's allergies " indicates:   Allergen Reactions    Codeine Hives and Rash         Review of Systems   Constitutional: Negative for chills and fever.   Skin:  Positive for color change and nail changes. Negative for poor wound healing.   Gastrointestinal:  Negative for nausea and vomiting.   Neurological:  Positive for numbness.   Psychiatric/Behavioral:  Negative for altered mental status.            Objective:      Physical Exam  Constitutional:       Appearance: Normal appearance. She is not ill-appearing.   Cardiovascular:      Pulses:           Dorsalis pedis pulses are 2+ on the right side and 2+ on the left side.        Posterior tibial pulses are 2+ on the right side and 2+ on the left side.      Comments: CFT is < 3 seconds bilateral.  Pedal hair growth is decreased bilateral.  No lower extremity edema noted bilateral.  Toes are warm to touch bilateral.    Musculoskeletal:         General: No signs of injury.      Right lower leg: No edema.      Left lower leg: No edema.      Comments: Muscle strength 5/5 in all muscle groups bilateral.  No tenderness nor crepitation with ROM of foot/ankle joints bilateral.  No tenderness with palpation of bilateral foot and ankle.   Slight dorsal contracture of the Lt. Hallux.  No gross deformity noted to said digit.   Skin:     General: Skin is warm and dry.      Capillary Refill: Capillary refill takes 2 to 3 seconds.      Findings: No bruising, ecchymosis, erythema, signs of injury, laceration, lesion, petechiae, rash or wound.      Comments: Surgical absence of the Rt. Hallux toenail.  Exposed nail bed consists of fragile epithelium.  No localized sign of infection noted.     Neurological:      Mental Status: She is alert.      Sensory: Sensory deficit present.      Motor: No weakness.      Comments: Decreased protective sensation noted bilateral.  Light touch is absent bilateral.                 Assessment:       Encounter Diagnoses   Name Primary?    Avulsion of toenail of right foot  Yes    Diabetic polyneuropathy associated with type 2 diabetes mellitus                    Plan:       Keerthi was seen today for follow-up.    Diagnoses and all orders for this visit:    Avulsion of toenail of right foot    Diabetic polyneuropathy associated with type 2 diabetes mellitus          I counseled the patient on her conditions, their implications and medical management.    No wound debridement performed, as the site appears comprised of fragile epithelium.    Advised to continue applying bactroban and coban to the site once daily x 5 more days.  May then discontinue localized wound care at that time.    To continue wearing shoe gear that limits pressure to the site for the next 5 days.  May then ambulate to tolerance in shoe gear of her choice.    May resume normal weight bearing activity at this time.    RTC prn.     Jacob Bernal DPM

## 2024-02-26 ENCOUNTER — TELEPHONE (OUTPATIENT)
Dept: PODIATRY | Facility: CLINIC | Age: 49
End: 2024-02-26
Payer: MEDICAID

## 2024-02-26 NOTE — TELEPHONE ENCOUNTER
----- Message from Johnathan Eubanks Patient Care Assistant sent at 2/26/2024  7:46 AM CST -----  Contact: Pt  Type:  Sooner Appointment Request    Caller is requesting a sooner appointment.  Caller declined first available appointment listed below.  Caller will not accept being placed on the waitlist and is requesting a message be sent to doctor.    Name of Caller:  Pt  When is the first available appointment?  04/2024  Symptoms:  Wound to bottom of left foot  Best Call Back Number:  [phone]  Additional Information:  Please advise

## 2024-02-27 ENCOUNTER — OFFICE VISIT (OUTPATIENT)
Dept: PODIATRY | Facility: CLINIC | Age: 49
End: 2024-02-27
Payer: MEDICAID

## 2024-02-27 VITALS — WEIGHT: 270.06 LBS | BODY MASS INDEX: 40.93 KG/M2 | HEIGHT: 68 IN

## 2024-02-27 DIAGNOSIS — E11.621 DIABETIC ULCER OF OTHER PART OF LEFT FOOT ASSOCIATED WITH TYPE 2 DIABETES MELLITUS, LIMITED TO BREAKDOWN OF SKIN: Primary | ICD-10-CM

## 2024-02-27 DIAGNOSIS — E11.42 DIABETIC POLYNEUROPATHY ASSOCIATED WITH TYPE 2 DIABETES MELLITUS: ICD-10-CM

## 2024-02-27 DIAGNOSIS — L97.521 DIABETIC ULCER OF OTHER PART OF LEFT FOOT ASSOCIATED WITH TYPE 2 DIABETES MELLITUS, LIMITED TO BREAKDOWN OF SKIN: Primary | ICD-10-CM

## 2024-02-27 PROCEDURE — 97597 DBRDMT OPN WND 1ST 20 CM/<: CPT | Mod: PBBFAC,PO | Performed by: PODIATRIST

## 2024-02-27 PROCEDURE — 87075 CULTR BACTERIA EXCEPT BLOOD: CPT | Performed by: PODIATRIST

## 2024-02-27 PROCEDURE — 87070 CULTURE OTHR SPECIMN AEROBIC: CPT | Performed by: PODIATRIST

## 2024-02-27 PROCEDURE — 3008F BODY MASS INDEX DOCD: CPT | Mod: CPTII,,, | Performed by: PODIATRIST

## 2024-02-27 PROCEDURE — 1159F MED LIST DOCD IN RCRD: CPT | Mod: CPTII,,, | Performed by: PODIATRIST

## 2024-02-27 PROCEDURE — 99214 OFFICE O/P EST MOD 30 MIN: CPT | Mod: S$PBB,25,, | Performed by: PODIATRIST

## 2024-02-27 PROCEDURE — 99212 OFFICE O/P EST SF 10 MIN: CPT | Mod: PBBFAC,PO | Performed by: PODIATRIST

## 2024-02-27 PROCEDURE — 99999 PR PBB SHADOW E&M-EST. PATIENT-LVL II: CPT | Mod: PBBFAC,,, | Performed by: PODIATRIST

## 2024-02-28 NOTE — PROCEDURES
"Wound Debridement    Date/Time: 2/27/2024 11:20 AM    Performed by: Jacob Bernal DPM  Authorized by: Jacob Bernal DPM    Time out: Immediately prior to procedure a "time out" was called to verify the correct patient, procedure, equipment, support staff and site/side marked as required.    Consent Done?:  Yes (Verbal)  Local anesthesia used?: No      Wound Details:    Location:  Left foot    Location:  Left 2nd Metatarsal Head    Type of Debridement:  Excisional       Length (cm):  2       Area (sq cm):  3       Width (cm):  1.5       Percent Debrided (%):  100       Depth (cm):  0.1       Total Area Debrided (sq cm):  3    Depth of debridement:  Epidermis/Dermis    Tissue debrided:  Dermis    Devitalized tissue debrided:  Fibrin    Instruments:  Curette  Bleeding:  Minimal  Hemostasis Achieved: Yes  Method Used:  Pressure  Patient tolerance:  Patient tolerated the procedure well with no immediate complications    "

## 2024-02-28 NOTE — PROGRESS NOTES
Subjective:      Patient ID: Keerthi Chase is a 48 y.o. female.    Chief Complaint: Wound Care    Keerthi is a 48 y.o. female with a past medical history of Bell's palsy, Coronary artery disease, Diabetes mellitus, GERD (gastroesophageal reflux disease), Hypertension, and Obesity. Patient relates sustaining a wound to the Lt. Forefoot after donning a pair of rubber boots two days earlier.  States the site began to blister, and she immediately began applying antibiotic ointment to the site.  Denies noticing signs of infection to the area.  Notes limiting her weight bearing activity since this occurred.  Denies experiencing N/V/F/C/D.  Denies any additional pedal complaints.         Hemoglobin A1C   Date Value Ref Range Status   2022 10.5 (H) 0.0 - 5.6 % Final     Comment:     Reference Interval:  5.0 - 5.6 Normal   5.7 - 6.4 High Risk   > 6.5 Diabetic      Hgb A1c results are standardized based on the (NGSP) National   Glycohemoglobin Standardization Program.      Hemoglobin A1C levels are related to mean serum/plasma glucose   during the preceding 2-3 months.        2021 11.8 (H) 0.0 - 5.6 % Final     Comment:     Reference Interval:  5.0 - 5.6 Normal   5.7 - 6.4 High Risk   > 6.5 Diabetic      Hgb A1c results are standardized based on the (NGSP) National   Glycohemoglobin Standardization Program.      Hemoglobin A1C levels are related to mean serum/plasma glucose   during the preceding 2-3 months.        2021 8.8 (H) 4.8 - 5.6 % Final     Comment:              Prediabetes: 5.7 - 6.4           Diabetes: >6.4           Glycemic control for adults with diabetes: <7.0         Past Medical History:   Diagnosis Date    Bell's palsy     Coronary artery disease     Pt states Dr. Virk said she did not have a blockage after a stress test.    Diabetes mellitus     GERD (gastroesophageal reflux disease)     Hypertension     Obesity        Past Surgical History:   Procedure Laterality Date      "SECTION      x 2    CHOLECYSTECTOMY      ESOPHAGOGASTRODUODENOSCOPY      PERIPHERALLY INSERTED CENTRAL CATHETER INSERTION N/A 10/11/2021    Procedure: INSERTION, PICC;  Surgeon: PICTATI, [facility];  Location: [facility];  Service: Cardiology;  Laterality: N/A;  Dr Aiken    PERIPHERALLY INSERTED CENTRAL CATHETER INSERTION N/A 7/21/2022    Procedure: INSERTION, PICC;  Surgeon: PICC, [facility];  Location: [facility];  Service: Cardiology;  Laterality: N/A;  Dr Dubose    SESAMOIDECTOMY Left 11/11/2021    Procedure: SESAMOIDECTOMY;  Surgeon: Jacob Bernal DPM;  Location: [facility] OR;  Service: Podiatry;  Laterality: Left;       Family History   Problem Relation Age of Onset    Hypertension Mother        Social History     Socioeconomic History    Marital status:    Tobacco Use    Smoking status: Never    Smokeless tobacco: Never   Substance and Sexual Activity    Alcohol use: No    Drug use: No       Current Outpatient Medications   Medication Sig Dispense Refill    alprazolam (XANAX) 1 MG tablet Take 1 mg by mouth 2 (two) times daily as needed for Anxiety.      amoxicillin-clavulanate 875-125mg (AUGMENTIN) 875-125 mg per tablet Take 1 tablet by mouth every 12 (twelve) hours. 20 tablet 0    ampicillin (PRINCIPEN) 500 MG capsule Take 1 capsule (500 mg total) by mouth every 12 (twelve) hours. 20 capsule 0    BASAGLAR KWIKPEN 100 unit/mL (3 mL) InPn pen 75 Units once daily.   3    BD INSULIN PEN NEEDLE UF MINI 31 gauge x 3/16" Ndle   2    BD INSULIN SYRINGE ULTRA-FINE 1 mL 31 gauge x 5/16 Syrg use as directed      chlorthalidone (HYGROTEN) 25 MG Tab Take 1 tablet (25 mg total) by mouth once daily. 30 tablet 11    ciprofloxacin HCl (CIPRO) 750 MG tablet Take 750 mg by mouth every 12 (twelve) hours.      hydrocodone-acetaminophen 10-325mg (NORCO)  mg Tab Take 1 tablet by mouth 3 (three) times daily as needed.      hydrocodone-acetaminophen 10-325mg (NORCO)  mg Tab Take 1 tablet by mouth every 8 (eight) hours as needed for " Pain. 90 tablet 0    insulin detemir (LEVEMIR) 100 unit/mL injection Inject into the skin every evening.      insulin glargine,hum.rec.anlog (LANTUS SUBQ) Inject into the skin.      LANTUS U-100 INSULIN 100 unit/mL injection SMARTSI Unit(s) SUB-Q Every Night      mupirocin (BACTROBAN) 2 % ointment Apply topically 3 (three) times daily. 30 g 1    OZEMPIC 1 mg/dose (2 mg/1.5 mL) PnIj Inject 60 mg into the skin every 7 days.       OZEMPIC 1 mg/dose (4 mg/3 mL) Inject 1 mg into the skin every 7 days.      sulfamethoxazole-trimethoprim 800-160mg (BACTRIM DS) 800-160 mg Tab Take 1 tablet by mouth 2 (two) times daily. 14 tablet 0    traMADoL (ULTRAM) 50 mg tablet Take 1 tablet (50 mg total) by mouth every 6 (six) hours as needed for Pain. 28 tablet 0     No current facility-administered medications for this visit.       Review of patient's allergies indicates:   Allergen Reactions    Codeine Hives and Rash         Review of Systems   Constitutional: Negative for chills and fever.   Skin:  Positive for color change, nail changes and poor wound healing.   Gastrointestinal:  Negative for nausea and vomiting.   Neurological:  Positive for numbness.   Psychiatric/Behavioral:  Negative for altered mental status.          Objective:      Physical Exam  Constitutional:       Appearance: Normal appearance. She is not ill-appearing.   Cardiovascular:      Pulses:           Dorsalis pedis pulses are 2+ on the right side and 2+ on the left side.        Posterior tibial pulses are 2+ on the right side and 2+ on the left side.      Comments: CFT is < 3 seconds bilateral.  Pedal hair growth is decreased bilateral.  No lower extremity edema noted bilateral.  Toes are warm to touch bilateral.    Musculoskeletal:         General: No signs of injury.      Right lower leg: No edema.      Left lower leg: No edema.      Comments: Muscle strength 5/5 in all muscle groups bilateral.  No tenderness nor crepitation with ROM of foot/ankle joints  bilateral.  No tenderness with palpation of bilateral foot and ankle.   Slight dorsal contracture of the Lt. Hallux.  No gross deformity noted to said digit.   Skin:     General: Skin is warm and dry.      Capillary Refill: Capillary refill takes 2 to 3 seconds.      Findings: Wound present. No bruising, ecchymosis, erythema, signs of injury, laceration, lesion, petechiae or rash.      Comments: Location: Open wound noted to the Lt. Sub 2nd met head.  Base: Down to dermis and comprised of fibrin.    Drainage: None  Malinda wound: Devoid of erythema, edema, fluctuance, purulence, and malodor.    Pre-debridement measurement: 2 x 1.5 x 0.1cm   Post-debridement measurement: 2.2 x 1.7 x 0.1cm    Neurological:      Mental Status: She is alert.      Sensory: Sensory deficit present.      Motor: No weakness.      Comments: Decreased protective sensation noted bilateral.  Light touch is absent bilateral.             Assessment:       Encounter Diagnoses   Name Primary?    Diabetic ulcer of other part of left foot associated with type 2 diabetes mellitus, limited to breakdown of skin Yes    Diabetic polyneuropathy associated with type 2 diabetes mellitus          Plan:       Keerthi was seen today for wound care.    Diagnoses and all orders for this visit:    Diabetic ulcer of other part of left foot associated with type 2 diabetes mellitus, limited to breakdown of skin  -     Aerobic culture  -     Culture, Anaerobic    Diabetic polyneuropathy associated with type 2 diabetes mellitus      I counseled the patient on her conditions, their implications and medical management.    Discussed with patient the associated risks and benefits associated with a selective excisional debridement of the Lt. foot/ankle.  Consent was read, signed, and witnessed.  See attached procedure note.      Wound cultures were obtained.    The wound base was covered with iodosorb ointment, offloaded with felt padding with an aperture, and a football  dressing was applied.    Advised to keep the dressing CDI x 1 week.    Advised to rest and elevate the affected extremity.    Instructed to minimize weight bearing to facilitate wound healing.    Advised to ambulate only in the postoperative shoe/boot.    Discussed optimal hydration and protein consumption and how they facilitate wound healing.      Follow up in about 1 week (around 3/5/2024).    Jacob Bernal DPM

## 2024-02-29 LAB — BACTERIA SPEC AEROBE CULT: NORMAL

## 2024-03-04 ENCOUNTER — OFFICE VISIT (OUTPATIENT)
Dept: PODIATRY | Facility: CLINIC | Age: 49
End: 2024-03-04
Payer: MEDICAID

## 2024-03-04 VITALS — BODY MASS INDEX: 40.93 KG/M2 | WEIGHT: 270.06 LBS | HEIGHT: 68 IN

## 2024-03-04 DIAGNOSIS — E11.42 DIABETIC POLYNEUROPATHY ASSOCIATED WITH TYPE 2 DIABETES MELLITUS: ICD-10-CM

## 2024-03-04 DIAGNOSIS — E11.621 DIABETIC ULCER OF OTHER PART OF LEFT FOOT ASSOCIATED WITH TYPE 2 DIABETES MELLITUS, LIMITED TO BREAKDOWN OF SKIN: Primary | ICD-10-CM

## 2024-03-04 DIAGNOSIS — L97.521 DIABETIC ULCER OF OTHER PART OF LEFT FOOT ASSOCIATED WITH TYPE 2 DIABETES MELLITUS, LIMITED TO BREAKDOWN OF SKIN: Primary | ICD-10-CM

## 2024-03-04 LAB — BACTERIA SPEC ANAEROBE CULT: NORMAL

## 2024-03-04 PROCEDURE — 99999 PR PBB SHADOW E&M-EST. PATIENT-LVL III: CPT | Mod: PBBFAC,,, | Performed by: PODIATRIST

## 2024-03-04 PROCEDURE — 99213 OFFICE O/P EST LOW 20 MIN: CPT | Mod: PBBFAC,PO,25 | Performed by: PODIATRIST

## 2024-03-04 PROCEDURE — 97597 DBRDMT OPN WND 1ST 20 CM/<: CPT | Mod: PBBFAC,PO | Performed by: PODIATRIST

## 2024-03-04 PROCEDURE — 99499 UNLISTED E&M SERVICE: CPT | Mod: S$PBB,,, | Performed by: PODIATRIST

## 2024-03-04 RX ORDER — PRAMIPEXOLE DIHYDROCHLORIDE 0.12 MG/1
0.12 TABLET ORAL
COMMUNITY
Start: 2024-01-08

## 2024-03-04 RX ORDER — FLUCONAZOLE 200 MG/1
200 TABLET ORAL
COMMUNITY
Start: 2023-10-24

## 2024-03-06 NOTE — PROCEDURES
"Wound Debridement    Date/Time: 3/4/2024 2:20 PM    Performed by: Jacob Bernal DPM  Authorized by: Jacob Bernal DPM    Time out: Immediately prior to procedure a "time out" was called to verify the correct patient, procedure, equipment, support staff and site/side marked as required.    Consent Done?:  Yes (Verbal)  Local anesthesia used?: No      Wound Details:    Location:  Left foot    Location:  Left 2nd Metatarsal Head    Type of Debridement:  Excisional       Length (cm):  1.5       Area (sq cm):  2.25       Width (cm):  1.5       Percent Debrided (%):  100       Depth (cm):  0.1       Total Area Debrided (sq cm):  2.25    Depth of debridement:  Epidermis/Dermis    Tissue debrided:  Dermis    Devitalized tissue debrided:  Fibrin    Instruments:  Curette  Bleeding:  Minimal  Hemostasis Achieved: Yes  Method Used:  Pressure  Patient tolerance:  Patient tolerated the procedure well with no immediate complications    "

## 2024-03-06 NOTE — PROGRESS NOTES
Subjective:      Patient ID: Keerthi Chase is a 48 y.o. female.    Chief Complaint: Diabetes Mellitus and Wound Care (DM, wound care, left foot)  Patient presents to clinic for a 1 week wound check.  She denies experiencing pain from the wound with today's exam.  Has kept the prior football dressing clean,dry, and intact for the past week.  Notes limiting ambulation while in the DARCO shoe.  Denies experiencing N/V/F/C/D.  Denies any additional pedal complaints.         Hemoglobin A1C   Date Value Ref Range Status   2022 10.5 (H) 0.0 - 5.6 % Final     Comment:     Reference Interval:  5.0 - 5.6 Normal   5.7 - 6.4 High Risk   > 6.5 Diabetic      Hgb A1c results are standardized based on the (NGSP) National   Glycohemoglobin Standardization Program.      Hemoglobin A1C levels are related to mean serum/plasma glucose   during the preceding 2-3 months.        2021 11.8 (H) 0.0 - 5.6 % Final     Comment:     Reference Interval:  5.0 - 5.6 Normal   5.7 - 6.4 High Risk   > 6.5 Diabetic      Hgb A1c results are standardized based on the (NGSP) National   Glycohemoglobin Standardization Program.      Hemoglobin A1C levels are related to mean serum/plasma glucose   during the preceding 2-3 months.        2021 8.8 (H) 4.8 - 5.6 % Final     Comment:              Prediabetes: 5.7 - 6.4           Diabetes: >6.4           Glycemic control for adults with diabetes: <7.0         Past Medical History:   Diagnosis Date    Bell's palsy     Coronary artery disease     Pt states Dr. Virk said she did not have a blockage after a stress test.    Diabetes mellitus     GERD (gastroesophageal reflux disease)     Hypertension     Obesity        Past Surgical History:   Procedure Laterality Date     SECTION      x 2    CHOLECYSTECTOMY      ESOPHAGOGASTRODUODENOSCOPY      PERIPHERALLY INSERTED CENTRAL CATHETER INSERTION N/A 10/11/2021    Procedure: INSERTION, PICC;  Surgeon: PICC, PRIYANK;  Location: [facility] ENDO;   "Service: Cardiology;  Laterality: N/A;  Dr Aiken    PERIPHERALLY INSERTED CENTRAL CATHETER INSERTION N/A 2022    Procedure: INSERTION, PICC;  Surgeon: PICC, PH;  Location: [facility] ENDO;  Service: Cardiology;  Laterality: N/A;  Dr Dubose    SESAMOIDECTOMY Left 2021    Procedure: SESAMOIDECTOMY;  Surgeon: Jacob Bernal DPM;  Location: [facility] OR;  Service: Podiatry;  Laterality: Left;       Family History   Problem Relation Age of Onset    Hypertension Mother        Social History     Socioeconomic History    Marital status:    Tobacco Use    Smoking status: Never    Smokeless tobacco: Never   Substance and Sexual Activity    Alcohol use: No    Drug use: No       Current Outpatient Medications   Medication Sig Dispense Refill    alprazolam (XANAX) 1 MG tablet Take 1 mg by mouth 2 (two) times daily as needed for Anxiety.      BD INSULIN PEN NEEDLE UF MINI 31 gauge x 3/16" Ndle   2    BD INSULIN SYRINGE ULTRA-FINE 1 mL 31 gauge x 5/16 Syrg use as directed      ciprofloxacin HCl (CIPRO) 750 MG tablet Take 750 mg by mouth every 12 (twelve) hours.      fluconazole (DIFLUCAN) 200 MG Tab Take 200 mg by mouth.      hydrocodone-acetaminophen 10-325mg (NORCO)  mg Tab Take 1 tablet by mouth 3 (three) times daily as needed.      hydrocodone-acetaminophen 10-325mg (NORCO)  mg Tab Take 1 tablet by mouth every 8 (eight) hours as needed for Pain. 90 tablet 0    insulin detemir (LEVEMIR) 100 unit/mL injection Inject into the skin every evening.      insulin glargine,hum.rec.anlog (LANTUS SUBQ) Inject into the skin.      LANTUS U-100 INSULIN 100 unit/mL injection SMARTSI Unit(s) SUB-Q Every Night      mupirocin (BACTROBAN) 2 % ointment Apply topically 3 (three) times daily. 30 g 1    OZEMPIC 1 mg/dose (2 mg/1.5 mL) PnIj Inject 60 mg into the skin every 7 days.       OZEMPIC 1 mg/dose (4 mg/3 mL) Inject 1 mg into the skin every 7 days.      pramipexole (MIRAPEX) 0.125 MG tablet Take 0.125 mg by mouth.      " sulfamethoxazole-trimethoprim 800-160mg (BACTRIM DS) 800-160 mg Tab Take 1 tablet by mouth 2 (two) times daily. 14 tablet 0    traMADoL (ULTRAM) 50 mg tablet Take 1 tablet (50 mg total) by mouth every 6 (six) hours as needed for Pain. 28 tablet 0    amoxicillin-clavulanate 875-125mg (AUGMENTIN) 875-125 mg per tablet Take 1 tablet by mouth every 12 (twelve) hours. (Patient not taking: Reported on 3/4/2024) 20 tablet 0    ampicillin (PRINCIPEN) 500 MG capsule Take 1 capsule (500 mg total) by mouth every 12 (twelve) hours. (Patient not taking: Reported on 3/4/2024) 20 capsule 0    BASAGLAR KWIKPEN 100 unit/mL (3 mL) InPn pen 75 Units once daily.   3    chlorthalidone (HYGROTEN) 25 MG Tab Take 1 tablet (25 mg total) by mouth once daily. 30 tablet 11     No current facility-administered medications for this visit.       Review of patient's allergies indicates:   Allergen Reactions    Codeine Hives and Rash         Review of Systems   Constitutional: Negative for chills and fever.   Skin:  Positive for color change and nail changes. Negative for poor wound healing.   Gastrointestinal:  Negative for nausea and vomiting.   Neurological:  Positive for numbness.   Psychiatric/Behavioral:  Negative for altered mental status.            Objective:      Physical Exam  Constitutional:       Appearance: Normal appearance. She is not ill-appearing.   Cardiovascular:      Pulses:           Dorsalis pedis pulses are 2+ on the right side and 2+ on the left side.        Posterior tibial pulses are 2+ on the right side and 2+ on the left side.      Comments: CFT is < 3 seconds bilateral.  Pedal hair growth is decreased bilateral.  No lower extremity edema noted bilateral.  Toes are warm to touch bilateral.    Musculoskeletal:         General: No signs of injury.      Right lower leg: No edema.      Left lower leg: No edema.      Comments: Muscle strength 5/5 in all muscle groups bilateral.  No tenderness nor crepitation with ROM of  foot/ankle joints bilateral.  No tenderness with palpation of bilateral foot and ankle.   Slight dorsal contracture of the Lt. Hallux.  No gross deformity noted to said digit.   Skin:     General: Skin is warm and dry.      Capillary Refill: Capillary refill takes 2 to 3 seconds.      Findings: Wound present. No bruising, ecchymosis, erythema, signs of injury, laceration, lesion, petechiae or rash.      Comments: Location: Open wound noted to the Lt. Sub 2nd met head.  Base: Down to dermis and comprised of fibrin.    Drainage: None  Malinda wound: Devoid of erythema, edema, fluctuance, purulence, and malodor.    Pre-debridement measurement: 1.5 x 1.5 x 0.1cm   Post-debridement measurement: 1.7 x 1.7 x 0.1cm    Neurological:      Mental Status: She is alert.      Sensory: Sensory deficit present.      Motor: No weakness.      Comments: Decreased protective sensation noted bilateral.  Light touch is absent bilateral.             Assessment:       Encounter Diagnoses   Name Primary?    Diabetic ulcer of other part of left foot associated with type 2 diabetes mellitus, limited to breakdown of skin Yes    Diabetic polyneuropathy associated with type 2 diabetes mellitus          Plan:       Keerthi was seen today for diabetes mellitus and wound care.    Diagnoses and all orders for this visit:    Diabetic ulcer of other part of left foot associated with type 2 diabetes mellitus, limited to breakdown of skin    Diabetic polyneuropathy associated with type 2 diabetes mellitus      I counseled the patient on her conditions, their implications and medical management.    Performed a selective excisional debridement of the Lt. foot/ankle.  See attached procedure note.      The wound base was covered with justine, offloaded with felt padding with an aperture, and a football dressing was applied.    Advised to keep the dressing CDI x 1 week.    Advised to rest and elevate the affected extremity.    Instructed to minimize weight  bearing to facilitate wound healing.    Advised to ambulate only in the postoperative shoe/boot.    Follow up in about 1 week (around 3/11/2024).    Jacob Bernal DPM

## 2024-03-11 ENCOUNTER — OFFICE VISIT (OUTPATIENT)
Dept: PODIATRY | Facility: CLINIC | Age: 49
End: 2024-03-11
Payer: MEDICAID

## 2024-03-11 VITALS — HEIGHT: 68 IN | BODY MASS INDEX: 40.93 KG/M2 | WEIGHT: 270.06 LBS

## 2024-03-11 DIAGNOSIS — E11.621 DIABETIC ULCER OF OTHER PART OF LEFT FOOT ASSOCIATED WITH TYPE 2 DIABETES MELLITUS, LIMITED TO BREAKDOWN OF SKIN: Primary | ICD-10-CM

## 2024-03-11 DIAGNOSIS — L97.521 DIABETIC ULCER OF OTHER PART OF LEFT FOOT ASSOCIATED WITH TYPE 2 DIABETES MELLITUS, LIMITED TO BREAKDOWN OF SKIN: Primary | ICD-10-CM

## 2024-03-11 DIAGNOSIS — E11.42 DIABETIC POLYNEUROPATHY ASSOCIATED WITH TYPE 2 DIABETES MELLITUS: ICD-10-CM

## 2024-03-11 PROCEDURE — 97597 DBRDMT OPN WND 1ST 20 CM/<: CPT | Mod: PBBFAC,PO | Performed by: PODIATRIST

## 2024-03-11 PROCEDURE — 99499 UNLISTED E&M SERVICE: CPT | Mod: S$PBB,,, | Performed by: PODIATRIST

## 2024-03-11 PROCEDURE — 99999 PR PBB SHADOW E&M-EST. PATIENT-LVL III: CPT | Mod: PBBFAC,,, | Performed by: PODIATRIST

## 2024-03-11 PROCEDURE — 99213 OFFICE O/P EST LOW 20 MIN: CPT | Mod: PBBFAC,PO | Performed by: PODIATRIST

## 2024-03-11 NOTE — PROCEDURES
"Wound Debridement    Date/Time: 3/11/2024 8:20 AM    Performed by: Jacob Bernal DPM  Authorized by: Jacob Bernal DPM    Time out: Immediately prior to procedure a "time out" was called to verify the correct patient, procedure, equipment, support staff and site/side marked as required.    Consent Done?:  Yes (Verbal)  Local anesthesia used?: No      Wound Details:    Location:  Left foot    Location:  Left 2nd Metatarsal Head       Length (cm):  0.8       Area (sq cm):  0.64       Width (cm):  0.8       Percent Debrided (%):  100       Depth (cm):  0.1       Total Area Debrided (sq cm):  0.64    Depth of debridement:  Epidermis/Dermis    Tissue debrided:  Dermis    Devitalized tissue debrided:  Fibrin    Instruments:  Curette  Bleeding:  Minimal  Hemostasis Achieved: Yes  Method Used:  Pressure  Patient tolerance:  Patient tolerated the procedure well with no immediate complications    "

## 2024-03-11 NOTE — PROGRESS NOTES
Subjective:      Patient ID: Keerthi Chase is a 48 y.o. female.    Chief Complaint: Wound Care  Patient presents to clinic for a 1 week wound check.  She denies experiencing pain from the wound with today's exam.  Has kept the prior football dressing clean,dry, and intact for the past week.  Notes limiting ambulation while in the DARCO shoe.  Denies experiencing N/V/F/C/D.  Denies any additional pedal complaints.         Hemoglobin A1C   Date Value Ref Range Status   2022 10.5 (H) 0.0 - 5.6 % Final     Comment:     Reference Interval:  5.0 - 5.6 Normal   5.7 - 6.4 High Risk   > 6.5 Diabetic      Hgb A1c results are standardized based on the (NGSP) National   Glycohemoglobin Standardization Program.      Hemoglobin A1C levels are related to mean serum/plasma glucose   during the preceding 2-3 months.        2021 11.8 (H) 0.0 - 5.6 % Final     Comment:     Reference Interval:  5.0 - 5.6 Normal   5.7 - 6.4 High Risk   > 6.5 Diabetic      Hgb A1c results are standardized based on the (NGSP) National   Glycohemoglobin Standardization Program.      Hemoglobin A1C levels are related to mean serum/plasma glucose   during the preceding 2-3 months.        2021 8.8 (H) 4.8 - 5.6 % Final     Comment:              Prediabetes: 5.7 - 6.4           Diabetes: >6.4           Glycemic control for adults with diabetes: <7.0         Past Medical History:   Diagnosis Date    Bell's palsy     Coronary artery disease     Pt states Dr. Virk said she did not have a blockage after a stress test.    Diabetes mellitus     GERD (gastroesophageal reflux disease)     Hypertension     Obesity        Past Surgical History:   Procedure Laterality Date     SECTION      x 2    CHOLECYSTECTOMY      ESOPHAGOGASTRODUODENOSCOPY      PERIPHERALLY INSERTED CENTRAL CATHETER INSERTION N/A 10/11/2021    Procedure: INSERTION, PICC;  Surgeon: PICC, PRIYANK;  Location: [facility];  Service: Cardiology;  Laterality: N/A;    "Ory    PERIPHERALLY INSERTED CENTRAL CATHETER INSERTION N/A 7/21/2022    Procedure: INSERTION, PICC;  Surgeon: PICC, [facility];  Location: [facility];  Service: Cardiology;  Laterality: N/A;  Dr FongGracy    SESAMOIDECTOMY Left 11/11/2021    Procedure: SESAMOIDECTOMY;  Surgeon: Jacob Bernal DPM;  Location: [facility] OR;  Service: Podiatry;  Laterality: Left;       Family History   Problem Relation Age of Onset    Hypertension Mother        Social History     Socioeconomic History    Marital status:    Tobacco Use    Smoking status: Never    Smokeless tobacco: Never   Substance and Sexual Activity    Alcohol use: No    Drug use: No       Current Outpatient Medications   Medication Sig Dispense Refill    alprazolam (XANAX) 1 MG tablet Take 1 mg by mouth 2 (two) times daily as needed for Anxiety.      amoxicillin-clavulanate 875-125mg (AUGMENTIN) 875-125 mg per tablet Take 1 tablet by mouth every 12 (twelve) hours. (Patient not taking: Reported on 3/4/2024) 20 tablet 0    ampicillin (PRINCIPEN) 500 MG capsule Take 1 capsule (500 mg total) by mouth every 12 (twelve) hours. (Patient not taking: Reported on 3/4/2024) 20 capsule 0    BASAGLAR KWIKPEN 100 unit/mL (3 mL) InPn pen 75 Units once daily.   3    BD INSULIN PEN NEEDLE UF MINI 31 gauge x 3/16" Ndle   2    BD INSULIN SYRINGE ULTRA-FINE 1 mL 31 gauge x 5/16 Syrg use as directed      chlorthalidone (HYGROTEN) 25 MG Tab Take 1 tablet (25 mg total) by mouth once daily. 30 tablet 11    ciprofloxacin HCl (CIPRO) 750 MG tablet Take 750 mg by mouth every 12 (twelve) hours.      fluconazole (DIFLUCAN) 200 MG Tab Take 200 mg by mouth.      hydrocodone-acetaminophen 10-325mg (NORCO)  mg Tab Take 1 tablet by mouth 3 (three) times daily as needed.      hydrocodone-acetaminophen 10-325mg (NORCO)  mg Tab Take 1 tablet by mouth every 8 (eight) hours as needed for Pain. 90 tablet 0    insulin detemir (LEVEMIR) 100 unit/mL injection Inject into the skin " every evening.      insulin glargine,hum.rec.anlog (LANTUS SUBQ) Inject into the skin.      LANTUS U-100 INSULIN 100 unit/mL injection SMARTSI Unit(s) SUB-Q Every Night      mupirocin (BACTROBAN) 2 % ointment Apply topically 3 (three) times daily. 30 g 1    OZEMPIC 1 mg/dose (2 mg/1.5 mL) PnIj Inject 60 mg into the skin every 7 days.       OZEMPIC 1 mg/dose (4 mg/3 mL) Inject 1 mg into the skin every 7 days.      pramipexole (MIRAPEX) 0.125 MG tablet Take 0.125 mg by mouth.      sulfamethoxazole-trimethoprim 800-160mg (BACTRIM DS) 800-160 mg Tab Take 1 tablet by mouth 2 (two) times daily. 14 tablet 0    traMADoL (ULTRAM) 50 mg tablet Take 1 tablet (50 mg total) by mouth every 6 (six) hours as needed for Pain. 28 tablet 0     No current facility-administered medications for this visit.       Review of patient's allergies indicates:   Allergen Reactions    Codeine Hives and Rash         Review of Systems   Constitutional: Negative for chills and fever.   Skin:  Positive for color change and nail changes. Negative for poor wound healing.   Gastrointestinal:  Negative for nausea and vomiting.   Neurological:  Positive for numbness.   Psychiatric/Behavioral:  Negative for altered mental status.            Objective:      Physical Exam  Constitutional:       Appearance: Normal appearance. She is not ill-appearing.   Cardiovascular:      Pulses:           Dorsalis pedis pulses are 2+ on the right side and 2+ on the left side.        Posterior tibial pulses are 2+ on the right side and 2+ on the left side.      Comments: CFT is < 3 seconds bilateral.  Pedal hair growth is decreased bilateral.  No lower extremity edema noted bilateral.  Toes are warm to touch bilateral.    Musculoskeletal:         General: No signs of injury.      Right lower leg: No edema.      Left lower leg: No edema.      Comments: Muscle strength 5/5 in all muscle groups bilateral.  No tenderness nor crepitation with ROM of foot/ankle  joints bilateral.  No tenderness with palpation of bilateral foot and ankle.   Slight dorsal contracture of the Lt. Hallux.  No gross deformity noted to said digit.   Skin:     General: Skin is warm and dry.      Capillary Refill: Capillary refill takes 2 to 3 seconds.      Findings: Wound present. No bruising, ecchymosis, erythema, signs of injury, laceration, lesion, petechiae or rash.      Comments: Location: Open wound noted to the Lt. Sub 2nd met head.  Base: Down to dermis and comprised of fibrin.    Drainage: None  Malinda wound: Devoid of erythema, edema, fluctuance, purulence, and malodor.    Pre-debridement measurement: 0.8 x 0.8 x 0.1cm   Post-debridement measurement: 1 x 1 x 0.1cm    Neurological:      Mental Status: She is alert.      Sensory: Sensory deficit present.      Motor: No weakness.      Comments: Decreased protective sensation noted bilateral.  Light touch is absent bilateral.             Assessment:       Encounter Diagnoses   Name Primary?    Diabetic ulcer of other part of left foot associated with type 2 diabetes mellitus, limited to breakdown of skin Yes    Diabetic polyneuropathy associated with type 2 diabetes mellitus          Plan:       Keerthi was seen today for wound care.    Diagnoses and all orders for this visit:    Diabetic ulcer of other part of left foot associated with type 2 diabetes mellitus, limited to breakdown of skin    Diabetic polyneuropathy associated with type 2 diabetes mellitus      I counseled the patient on her conditions, their implications and medical management.    Performed a selective excisional debridement of the Lt. foot/ankle.  See attached procedure note.      The wound base was covered with justine, offloaded with felt padding with an aperture, and a football dressing was applied.    Advised to keep the dressing CDI x 1 week.    Advised to rest and elevate the affected extremity.    Instructed to minimize weight bearing to facilitate wound  healing.    Advised to ambulate only in the postoperative shoe/boot.    Follow up in about 1 week (around 3/18/2024).    Jacob Bernal DPM

## 2024-03-21 ENCOUNTER — OFFICE VISIT (OUTPATIENT)
Dept: PODIATRY | Facility: CLINIC | Age: 49
End: 2024-03-21
Payer: MEDICAID

## 2024-03-21 VITALS — HEIGHT: 68 IN | BODY MASS INDEX: 40.93 KG/M2 | WEIGHT: 270.06 LBS

## 2024-03-21 DIAGNOSIS — E11.621 DIABETIC ULCER OF OTHER PART OF LEFT FOOT ASSOCIATED WITH TYPE 2 DIABETES MELLITUS, LIMITED TO BREAKDOWN OF SKIN: Primary | ICD-10-CM

## 2024-03-21 DIAGNOSIS — L97.521 DIABETIC ULCER OF OTHER PART OF LEFT FOOT ASSOCIATED WITH TYPE 2 DIABETES MELLITUS, LIMITED TO BREAKDOWN OF SKIN: Primary | ICD-10-CM

## 2024-03-21 DIAGNOSIS — E11.42 DIABETIC POLYNEUROPATHY ASSOCIATED WITH TYPE 2 DIABETES MELLITUS: ICD-10-CM

## 2024-03-21 PROCEDURE — 97597 DBRDMT OPN WND 1ST 20 CM/<: CPT | Mod: PBBFAC,PO | Performed by: PODIATRIST

## 2024-03-21 PROCEDURE — 99499 UNLISTED E&M SERVICE: CPT | Mod: S$PBB,,, | Performed by: PODIATRIST

## 2024-03-21 PROCEDURE — 99999 PR PBB SHADOW E&M-EST. PATIENT-LVL II: CPT | Mod: PBBFAC,,, | Performed by: PODIATRIST

## 2024-03-21 PROCEDURE — 99212 OFFICE O/P EST SF 10 MIN: CPT | Mod: PBBFAC,PO | Performed by: PODIATRIST

## 2024-03-21 NOTE — PROGRESS NOTES
Subjective:      Patient ID: Keerthi Chase is a 48 y.o. female.    Chief Complaint: Wound Care  Patient presents to clinic for a 1 week wound check.  Denies experiencing pain from the wound with today's exam.  Has kept the prior football dressing clean,dry, and intact for the past week.  Notes limiting ambulation while in the DARCO shoe.  Denies experiencing N/V/F/C/D.  Denies any additional pedal complaints.         Hemoglobin A1C   Date Value Ref Range Status   2022 10.5 (H) 0.0 - 5.6 % Final     Comment:     Reference Interval:  5.0 - 5.6 Normal   5.7 - 6.4 High Risk   > 6.5 Diabetic      Hgb A1c results are standardized based on the (NGSP) National   Glycohemoglobin Standardization Program.      Hemoglobin A1C levels are related to mean serum/plasma glucose   during the preceding 2-3 months.        2021 11.8 (H) 0.0 - 5.6 % Final     Comment:     Reference Interval:  5.0 - 5.6 Normal   5.7 - 6.4 High Risk   > 6.5 Diabetic      Hgb A1c results are standardized based on the (NGSP) National   Glycohemoglobin Standardization Program.      Hemoglobin A1C levels are related to mean serum/plasma glucose   during the preceding 2-3 months.        2021 8.8 (H) 4.8 - 5.6 % Final     Comment:              Prediabetes: 5.7 - 6.4           Diabetes: >6.4           Glycemic control for adults with diabetes: <7.0         Past Medical History:   Diagnosis Date    Bell's palsy     Coronary artery disease     Pt states Dr. Virk said she did not have a blockage after a stress test.    Diabetes mellitus     GERD (gastroesophageal reflux disease)     Hypertension     Obesity        Past Surgical History:   Procedure Laterality Date     SECTION      x 2    CHOLECYSTECTOMY      ESOPHAGOGASTRODUODENOSCOPY      PERIPHERALLY INSERTED CENTRAL CATHETER INSERTION N/A 10/11/2021    Procedure: INSERTION, PICC;  Surgeon: PICC, [facility];  Location: [facility];  Service: Cardiology;  Laterality: N/A;  Dr Aiken     "PERIPHERALLY INSERTED CENTRAL CATHETER INSERTION N/A 7/21/2022    Procedure: INSERTION, PICC;  Surgeon: PICC, [facility];  Location: [facility];  Service: Cardiology;  Laterality: N/A;  Dr FongGracy    SESAMOIDECTOMY Left 11/11/2021    Procedure: SESAMOIDECTOMY;  Surgeon: Jacob Bernal DPM;  Location: [facility] OR;  Service: Podiatry;  Laterality: Left;       Family History   Problem Relation Age of Onset    Hypertension Mother        Social History     Socioeconomic History    Marital status:    Tobacco Use    Smoking status: Never    Smokeless tobacco: Never   Substance and Sexual Activity    Alcohol use: No    Drug use: No       Current Outpatient Medications   Medication Sig Dispense Refill    alprazolam (XANAX) 1 MG tablet Take 1 mg by mouth 2 (two) times daily as needed for Anxiety.      amoxicillin-clavulanate 875-125mg (AUGMENTIN) 875-125 mg per tablet Take 1 tablet by mouth every 12 (twelve) hours. (Patient not taking: Reported on 3/4/2024) 20 tablet 0    ampicillin (PRINCIPEN) 500 MG capsule Take 1 capsule (500 mg total) by mouth every 12 (twelve) hours. (Patient not taking: Reported on 3/4/2024) 20 capsule 0    BASAGLAR KWIKPEN 100 unit/mL (3 mL) InPn pen 75 Units once daily.   3    BD INSULIN PEN NEEDLE UF MINI 31 gauge x 3/16" Ndle   2    BD INSULIN SYRINGE ULTRA-FINE 1 mL 31 gauge x 5/16 Syrg use as directed      chlorthalidone (HYGROTEN) 25 MG Tab Take 1 tablet (25 mg total) by mouth once daily. 30 tablet 11    ciprofloxacin HCl (CIPRO) 750 MG tablet Take 750 mg by mouth every 12 (twelve) hours.      fluconazole (DIFLUCAN) 200 MG Tab Take 200 mg by mouth.      hydrocodone-acetaminophen 10-325mg (NORCO)  mg Tab Take 1 tablet by mouth 3 (three) times daily as needed.      hydrocodone-acetaminophen 10-325mg (NORCO)  mg Tab Take 1 tablet by mouth every 8 (eight) hours as needed for Pain. 90 tablet 0    insulin detemir (LEVEMIR) 100 unit/mL injection Inject into the skin every evening.      insulin " glargine,hum.rec.anlog (LANTUS SUBQ) Inject into the skin.      LANTUS U-100 INSULIN 100 unit/mL injection SMARTSI Unit(s) SUB-Q Every Night      mupirocin (BACTROBAN) 2 % ointment Apply topically 3 (three) times daily. 30 g 1    OZEMPIC 1 mg/dose (2 mg/1.5 mL) PnIj Inject 60 mg into the skin every 7 days.       OZEMPIC 1 mg/dose (4 mg/3 mL) Inject 1 mg into the skin every 7 days.      pramipexole (MIRAPEX) 0.125 MG tablet Take 0.125 mg by mouth.      sulfamethoxazole-trimethoprim 800-160mg (BACTRIM DS) 800-160 mg Tab Take 1 tablet by mouth 2 (two) times daily. 14 tablet 0    traMADoL (ULTRAM) 50 mg tablet Take 1 tablet (50 mg total) by mouth every 6 (six) hours as needed for Pain. 28 tablet 0     No current facility-administered medications for this visit.       Review of patient's allergies indicates:   Allergen Reactions    Codeine Hives and Rash         Review of Systems   Constitutional: Negative for chills and fever.   Skin:  Positive for color change and nail changes. Negative for poor wound healing.   Gastrointestinal:  Negative for nausea and vomiting.   Neurological:  Positive for numbness.   Psychiatric/Behavioral:  Negative for altered mental status.            Objective:      Physical Exam  Constitutional:       Appearance: Normal appearance. She is not ill-appearing.   Cardiovascular:      Pulses:           Dorsalis pedis pulses are 2+ on the right side and 2+ on the left side.        Posterior tibial pulses are 2+ on the right side and 2+ on the left side.      Comments: CFT is < 3 seconds bilateral.  Pedal hair growth is decreased bilateral.  No lower extremity edema noted bilateral.  Toes are warm to touch bilateral.    Musculoskeletal:         General: No signs of injury.      Right lower leg: No edema.      Left lower leg: No edema.      Comments: Muscle strength 5/5 in all muscle groups bilateral.  No tenderness nor crepitation with ROM of foot/ankle joints bilateral.  No tenderness with  palpation of bilateral foot and ankle.   Slight dorsal contracture of the Lt. Hallux.  No gross deformity noted to said digit.   Skin:     General: Skin is warm and dry.      Capillary Refill: Capillary refill takes 2 to 3 seconds.      Findings: Wound present. No bruising, ecchymosis, erythema, signs of injury, laceration, lesion, petechiae or rash.      Comments: Location: Open wound noted to the Lt. Sub 2nd met head.  Base: Down to dermis and comprised of fibrin.    Drainage: None  Malinda wound: Devoid of erythema, edema, fluctuance, purulence, and malodor.    Pre-debridement measurement: 0.4 x 0.4 x 0.1cm   Post-debridement measurement: 0.6 x 0.6 x 0.1cm    Neurological:      Mental Status: She is alert.      Sensory: Sensory deficit present.      Motor: No weakness.      Comments: Decreased protective sensation noted bilateral.  Light touch is absent bilateral.               Assessment:       Encounter Diagnoses   Name Primary?    Diabetic ulcer of other part of left foot associated with type 2 diabetes mellitus, limited to breakdown of skin Yes    Diabetic polyneuropathy associated with type 2 diabetes mellitus          Plan:       Keerthi was seen today for wound care.    Diagnoses and all orders for this visit:    Diabetic ulcer of other part of left foot associated with type 2 diabetes mellitus, limited to breakdown of skin  -     Wound Debridement    Diabetic polyneuropathy associated with type 2 diabetes mellitus      I counseled the patient on her conditions, their implications and medical management.    Performed a selective excisional debridement of the Lt. foot/ankle.  See attached procedure note.      The wound base was covered with justine, offloaded with felt padding with an aperture, and a football dressing was applied.    Advised to keep the dressing CDI x 1 week.    Advised to rest and elevate the affected extremity.    Instructed to minimize weight bearing to facilitate wound healing.    Advised  to ambulate only in the postoperative shoe/boot.    Follow up in about 1 week (around 3/28/2024).    Jacob Bernal DPM

## 2024-03-21 NOTE — PROCEDURES
"Wound Debridement    Date/Time: 3/21/2024 9:00 AM    Performed by: Jacob Bernal DPM  Authorized by: Jacob Bernal DPM    Time out: Immediately prior to procedure a "time out" was called to verify the correct patient, procedure, equipment, support staff and site/side marked as required.    Consent Done?:  Yes (Verbal)  Local anesthesia used?: No      Wound Details:    Location:  Left foot    Location:  Left 2nd Metatarsal Head    Type of Debridement:  Excisional       Length (cm):  0.4       Area (sq cm):  0.16       Width (cm):  0.4       Percent Debrided (%):  100       Depth (cm):  0.1       Total Area Debrided (sq cm):  0.16    Depth of debridement:  Epidermis/Dermis    Tissue debrided:  Dermis    Devitalized tissue debrided:  Fibrin    Instruments:  Curette  Bleeding:  Minimal  Hemostasis Achieved: Yes  Method Used:  Pressure  Patient tolerance:  Patient tolerated the procedure well with no immediate complications    "

## 2024-03-26 ENCOUNTER — OFFICE VISIT (OUTPATIENT)
Dept: PODIATRY | Facility: CLINIC | Age: 49
End: 2024-03-26
Payer: MEDICAID

## 2024-03-26 VITALS — BODY MASS INDEX: 40.93 KG/M2 | HEIGHT: 68 IN | WEIGHT: 270.06 LBS

## 2024-03-26 DIAGNOSIS — E11.42 DIABETIC POLYNEUROPATHY ASSOCIATED WITH TYPE 2 DIABETES MELLITUS: ICD-10-CM

## 2024-03-26 DIAGNOSIS — L97.521 DIABETIC ULCER OF OTHER PART OF LEFT FOOT ASSOCIATED WITH TYPE 2 DIABETES MELLITUS, LIMITED TO BREAKDOWN OF SKIN: Primary | ICD-10-CM

## 2024-03-26 DIAGNOSIS — E11.621 DIABETIC ULCER OF OTHER PART OF LEFT FOOT ASSOCIATED WITH TYPE 2 DIABETES MELLITUS, LIMITED TO BREAKDOWN OF SKIN: Primary | ICD-10-CM

## 2024-03-26 PROCEDURE — 97597 DBRDMT OPN WND 1ST 20 CM/<: CPT | Mod: PBBFAC,PO | Performed by: PODIATRIST

## 2024-03-26 PROCEDURE — 99499 UNLISTED E&M SERVICE: CPT | Mod: S$PBB,,, | Performed by: PODIATRIST

## 2024-03-26 PROCEDURE — 99999 PR PBB SHADOW E&M-EST. PATIENT-LVL II: CPT | Mod: PBBFAC,,, | Performed by: PODIATRIST

## 2024-03-26 PROCEDURE — 99212 OFFICE O/P EST SF 10 MIN: CPT | Mod: PBBFAC,PO,25 | Performed by: PODIATRIST

## 2024-03-26 NOTE — PROCEDURES
"Wound Debridement    Date/Time: 3/26/2024 9:00 AM    Performed by: Jacob Bernal DPM  Authorized by: Jacob Bernal DPM    Time out: Immediately prior to procedure a "time out" was called to verify the correct patient, procedure, equipment, support staff and site/side marked as required.    Consent Done?:  Yes (Verbal)  Local anesthesia used?: No      Wound Details:    Location:  Left foot    Location:  Left 2nd Metatarsal Head    Type of Debridement:  Excisional       Length (cm):  0.4       Area (sq cm):  0.12       Width (cm):  0.3       Percent Debrided (%):  100       Depth (cm):  0.1       Total Area Debrided (sq cm):  0.12    Depth of debridement:  Epidermis/Dermis    Tissue debrided:  Dermis    Devitalized tissue debrided:  Fibrin    Instruments:  Curette  Bleeding:  Minimal  Hemostasis Achieved: Yes  Method Used:  Pressure  Patient tolerance:  Patient tolerated the procedure well with no immediate complications    "

## 2024-03-26 NOTE — PROGRESS NOTES
Subjective:      Patient ID: Keerthi Chase is a 48 y.o. female.    Chief Complaint: Wound Care  Patient presents to clinic for a 1 week wound check.  Denies experiencing pain from the wound with today's exam.  Has kept the prior football dressing clean,dry, and intact for the past week.  Notes limiting ambulation while in the DARCO shoe.  Denies experiencing N/V/F/C/D.  Denies any additional pedal complaints.         Hemoglobin A1C   Date Value Ref Range Status   2022 10.5 (H) 0.0 - 5.6 % Final     Comment:     Reference Interval:  5.0 - 5.6 Normal   5.7 - 6.4 High Risk   > 6.5 Diabetic      Hgb A1c results are standardized based on the (NGSP) National   Glycohemoglobin Standardization Program.      Hemoglobin A1C levels are related to mean serum/plasma glucose   during the preceding 2-3 months.        2021 11.8 (H) 0.0 - 5.6 % Final     Comment:     Reference Interval:  5.0 - 5.6 Normal   5.7 - 6.4 High Risk   > 6.5 Diabetic      Hgb A1c results are standardized based on the (NGSP) National   Glycohemoglobin Standardization Program.      Hemoglobin A1C levels are related to mean serum/plasma glucose   during the preceding 2-3 months.        2021 8.8 (H) 4.8 - 5.6 % Final     Comment:              Prediabetes: 5.7 - 6.4           Diabetes: >6.4           Glycemic control for adults with diabetes: <7.0         Past Medical History:   Diagnosis Date    Bell's palsy     Coronary artery disease     Pt states Dr. Virk said she did not have a blockage after a stress test.    Diabetes mellitus     GERD (gastroesophageal reflux disease)     Hypertension     Obesity        Past Surgical History:   Procedure Laterality Date     SECTION      x 2    CHOLECYSTECTOMY      ESOPHAGOGASTRODUODENOSCOPY      PERIPHERALLY INSERTED CENTRAL CATHETER INSERTION N/A 10/11/2021    Procedure: INSERTION, PICC;  Surgeon: PICC, [facility];  Location[facility];  Service: Cardiology;  Laterality: N/A;  Dr Aiken  "   PERIPHERALLY INSERTED CENTRAL CATHETER INSERTION N/A 7/21/2022    Procedure: INSERTION, PICC;  Surgeon: PICC, [facility];  Location: [facility];  Service: Cardiology;  Laterality: N/A;  Dr FongGracy    SESAMOIDECTOMY Left 11/11/2021    Procedure: SESAMOIDECTOMY;  Surgeon: Jacob Bernal DPM;  Location: [facility] OR;  Service: Podiatry;  Laterality: Left;       Family History   Problem Relation Age of Onset    Hypertension Mother        Social History     Socioeconomic History    Marital status:    Tobacco Use    Smoking status: Never    Smokeless tobacco: Never   Substance and Sexual Activity    Alcohol use: No    Drug use: No       Current Outpatient Medications   Medication Sig Dispense Refill    alprazolam (XANAX) 1 MG tablet Take 1 mg by mouth 2 (two) times daily as needed for Anxiety.      amoxicillin-clavulanate 875-125mg (AUGMENTIN) 875-125 mg per tablet Take 1 tablet by mouth every 12 (twelve) hours. (Patient not taking: Reported on 3/4/2024) 20 tablet 0    ampicillin (PRINCIPEN) 500 MG capsule Take 1 capsule (500 mg total) by mouth every 12 (twelve) hours. (Patient not taking: Reported on 3/4/2024) 20 capsule 0    BASAGLAR KWIKPEN 100 unit/mL (3 mL) InPn pen 75 Units once daily.   3    BD INSULIN PEN NEEDLE UF MINI 31 gauge x 3/16" Ndle   2    BD INSULIN SYRINGE ULTRA-FINE 1 mL 31 gauge x 5/16 Syrg use as directed      chlorthalidone (HYGROTEN) 25 MG Tab Take 1 tablet (25 mg total) by mouth once daily. 30 tablet 11    ciprofloxacin HCl (CIPRO) 750 MG tablet Take 750 mg by mouth every 12 (twelve) hours.      fluconazole (DIFLUCAN) 200 MG Tab Take 200 mg by mouth.      hydrocodone-acetaminophen 10-325mg (NORCO)  mg Tab Take 1 tablet by mouth 3 (three) times daily as needed.      hydrocodone-acetaminophen 10-325mg (NORCO)  mg Tab Take 1 tablet by mouth every 8 (eight) hours as needed for Pain. 90 tablet 0    insulin detemir (LEVEMIR) 100 unit/mL injection Inject into the skin every " evening.      insulin glargine,hum.rec.anlog (LANTUS SUBQ) Inject into the skin.      LANTUS U-100 INSULIN 100 unit/mL injection SMARTSI Unit(s) SUB-Q Every Night      mupirocin (BACTROBAN) 2 % ointment Apply topically 3 (three) times daily. 30 g 1    OZEMPIC 1 mg/dose (2 mg/1.5 mL) PnIj Inject 60 mg into the skin every 7 days.       OZEMPIC 1 mg/dose (4 mg/3 mL) Inject 1 mg into the skin every 7 days.      pramipexole (MIRAPEX) 0.125 MG tablet Take 0.125 mg by mouth.      sulfamethoxazole-trimethoprim 800-160mg (BACTRIM DS) 800-160 mg Tab Take 1 tablet by mouth 2 (two) times daily. 14 tablet 0    traMADoL (ULTRAM) 50 mg tablet Take 1 tablet (50 mg total) by mouth every 6 (six) hours as needed for Pain. 28 tablet 0     No current facility-administered medications for this visit.       Review of patient's allergies indicates:   Allergen Reactions    Codeine Hives and Rash         Review of Systems   Constitutional: Negative for chills and fever.   Skin:  Positive for color change and nail changes. Negative for poor wound healing.   Gastrointestinal:  Negative for nausea and vomiting.   Neurological:  Positive for numbness.   Psychiatric/Behavioral:  Negative for altered mental status.            Objective:      Physical Exam  Constitutional:       Appearance: Normal appearance. She is not ill-appearing.   Cardiovascular:      Pulses:           Dorsalis pedis pulses are 2+ on the right side and 2+ on the left side.        Posterior tibial pulses are 2+ on the right side and 2+ on the left side.      Comments: CFT is < 3 seconds bilateral.  Pedal hair growth is decreased bilateral.  No lower extremity edema noted bilateral.  Toes are warm to touch bilateral.    Musculoskeletal:         General: No signs of injury.      Right lower leg: No edema.      Left lower leg: No edema.      Comments: Muscle strength 5/5 in all muscle groups bilateral.  No tenderness nor crepitation with ROM of foot/ankle joints  bilateral.  No tenderness with palpation of bilateral foot and ankle.   Slight dorsal contracture of the Lt. Hallux.  No gross deformity noted to said digit.   Skin:     General: Skin is warm and dry.      Capillary Refill: Capillary refill takes 2 to 3 seconds.      Findings: Wound present. No bruising, ecchymosis, erythema, signs of injury, laceration, lesion, petechiae or rash.      Comments: Location: Open wound noted to the Lt. Sub 2nd met head.  Base: Down to dermis and comprised of fibrin.    Drainage: None  Malinda wound: Devoid of erythema, edema, fluctuance, purulence, and malodor.    Pre-debridement measurement: 0.4 x 0.3 x 0.1cm   Post-debridement measurement: 0.6 x 0.5 x 0.1cm    Neurological:      Mental Status: She is alert.      Sensory: Sensory deficit present.      Motor: No weakness.      Comments: Decreased protective sensation noted bilateral.  Light touch is absent bilateral.             Assessment:       Encounter Diagnoses   Name Primary?    Diabetic ulcer of other part of left foot associated with type 2 diabetes mellitus, limited to breakdown of skin Yes    Diabetic polyneuropathy associated with type 2 diabetes mellitus          Plan:       Keerthi was seen today for wound care.    Diagnoses and all orders for this visit:    Diabetic ulcer of other part of left foot associated with type 2 diabetes mellitus, limited to breakdown of skin    Diabetic polyneuropathy associated with type 2 diabetes mellitus      I counseled the patient on her conditions, their implications and medical management.    Performed a selective excisional debridement of the Lt. foot/ankle.  See attached procedure note.      The wound base was covered with justine, offloaded with felt padding with an aperture, and a football dressing was applied.    Advised to keep the dressing CDI x 1 week.    Advised to rest and elevate the affected extremity.    Instructed to minimize weight bearing to facilitate wound  healing.    Advised to ambulate only in the postoperative shoe/boot.    Follow up in about 1 week (around 4/2/2024).    Jacob Bernal DPM

## 2024-04-01 ENCOUNTER — OFFICE VISIT (OUTPATIENT)
Dept: PODIATRY | Facility: CLINIC | Age: 49
End: 2024-04-01
Payer: MEDICAID

## 2024-04-01 VITALS — BODY MASS INDEX: 40.93 KG/M2 | HEIGHT: 68 IN | WEIGHT: 270.06 LBS

## 2024-04-01 DIAGNOSIS — L97.521 DIABETIC ULCER OF OTHER PART OF LEFT FOOT ASSOCIATED WITH TYPE 2 DIABETES MELLITUS, LIMITED TO BREAKDOWN OF SKIN: Primary | ICD-10-CM

## 2024-04-01 DIAGNOSIS — E11.42 DIABETIC POLYNEUROPATHY ASSOCIATED WITH TYPE 2 DIABETES MELLITUS: ICD-10-CM

## 2024-04-01 DIAGNOSIS — E11.621 DIABETIC ULCER OF OTHER PART OF LEFT FOOT ASSOCIATED WITH TYPE 2 DIABETES MELLITUS, LIMITED TO BREAKDOWN OF SKIN: Primary | ICD-10-CM

## 2024-04-01 PROCEDURE — 99212 OFFICE O/P EST SF 10 MIN: CPT | Mod: PBBFAC,PO | Performed by: PODIATRIST

## 2024-04-01 PROCEDURE — 99999 PR PBB SHADOW E&M-EST. PATIENT-LVL II: CPT | Mod: PBBFAC,,, | Performed by: PODIATRIST

## 2024-04-01 PROCEDURE — 97597 DBRDMT OPN WND 1ST 20 CM/<: CPT | Mod: PBBFAC,PO | Performed by: PODIATRIST

## 2024-04-01 PROCEDURE — 99499 UNLISTED E&M SERVICE: CPT | Mod: S$PBB,,, | Performed by: PODIATRIST

## 2024-04-01 NOTE — PROGRESS NOTES
Subjective:      Patient ID: Keerthi Chase is a 48 y.o. female.    Chief Complaint: No chief complaint on file.  Patient presents to clinic for a 1 week wound check.  Denies experiencing pain from the wound with today's exam.  Has kept the prior football dressing clean,dry, and intact for the past week.  Notes limiting ambulation while in the DARCO shoe.  Denies experiencing N/V/F/C/D.  Denies any additional pedal complaints.         Hemoglobin A1C   Date Value Ref Range Status   2022 10.5 (H) 0.0 - 5.6 % Final     Comment:     Reference Interval:  5.0 - 5.6 Normal   5.7 - 6.4 High Risk   > 6.5 Diabetic      Hgb A1c results are standardized based on the (NGSP) National   Glycohemoglobin Standardization Program.      Hemoglobin A1C levels are related to mean serum/plasma glucose   during the preceding 2-3 months.        2021 11.8 (H) 0.0 - 5.6 % Final     Comment:     Reference Interval:  5.0 - 5.6 Normal   5.7 - 6.4 High Risk   > 6.5 Diabetic      Hgb A1c results are standardized based on the (NGSP) National   Glycohemoglobin Standardization Program.      Hemoglobin A1C levels are related to mean serum/plasma glucose   during the preceding 2-3 months.        2021 8.8 (H) 4.8 - 5.6 % Final     Comment:              Prediabetes: 5.7 - 6.4           Diabetes: >6.4           Glycemic control for adults with diabetes: <7.0         Past Medical History:   Diagnosis Date    Bell's palsy     Coronary artery disease     Pt states Dr. Virk said she did not have a blockage after a stress test.    Diabetes mellitus     GERD (gastroesophageal reflux disease)     Hypertension     Obesity        Past Surgical History:   Procedure Laterality Date     SECTION      x 2    CHOLECYSTECTOMY      ESOPHAGOGASTRODUODENOSCOPY      PERIPHERALLY INSERTED CENTRAL CATHETER INSERTION N/A 10/11/2021    Procedure: INSERTION, PICC;  Surgeon: PICC, PRIYANK;  [facility];  Service: Cardiology;   "Laterality: N/A;  Dr Aiken    PERIPHERALLY INSERTED CENTRAL CATHETER INSERTION N/A 7/21/2022    Procedure: INSERTION, PICC;  Surgeon: PICC, PRIYANK;  [facility];  Service: Cardiology;  Laterality: N/A;  Dr Dubose    SESAMOIDECTOMY Left 11/11/2021    Procedure: SESAMOIDECTOMY;  Surgeon: Jacob Bernal DPM;  Location: [facility] OR;  Service: Podiatry;  Laterality: Left;       Family History   Problem Relation Age of Onset    Hypertension Mother        Social History     Socioeconomic History    Marital status:    Tobacco Use    Smoking status: Never    Smokeless tobacco: Never   Substance and Sexual Activity    Alcohol use: No    Drug use: No       Current Outpatient Medications   Medication Sig Dispense Refill    alprazolam (XANAX) 1 MG tablet Take 1 mg by mouth 2 (two) times daily as needed for Anxiety.      amoxicillin-clavulanate 875-125mg (AUGMENTIN) 875-125 mg per tablet Take 1 tablet by mouth every 12 (twelve) hours. (Patient not taking: Reported on 3/4/2024) 20 tablet 0    ampicillin (PRINCIPEN) 500 MG capsule Take 1 capsule (500 mg total) by mouth every 12 (twelve) hours. (Patient not taking: Reported on 3/4/2024) 20 capsule 0    BASAGLAR KWIKPEN 100 unit/mL (3 mL) InPn pen 75 Units once daily.   3    BD INSULIN PEN NEEDLE UF MINI 31 gauge x 3/16" Ndle   2    BD INSULIN SYRINGE ULTRA-FINE 1 mL 31 gauge x 5/16 Syrg use as directed      chlorthalidone (HYGROTEN) 25 MG Tab Take 1 tablet (25 mg total) by mouth once daily. 30 tablet 11    ciprofloxacin HCl (CIPRO) 750 MG tablet Take 750 mg by mouth every 12 (twelve) hours.      fluconazole (DIFLUCAN) 200 MG Tab Take 200 mg by mouth.      hydrocodone-acetaminophen 10-325mg (NORCO)  mg Tab Take 1 tablet by mouth 3 (three) times daily as needed.      hydrocodone-acetaminophen 10-325mg (NORCO)  mg Tab Take 1 tablet by mouth every 8 (eight) hours as needed for Pain. 90 tablet 0    insulin detemir (LEVEMIR) 100 unit/mL injection " Inject into the skin every evening.      insulin glargine,hum.rec.anlog (LANTUS SUBQ) Inject into the skin.      LANTUS U-100 INSULIN 100 unit/mL injection SMARTSI Unit(s) SUB-Q Every Night      mupirocin (BACTROBAN) 2 % ointment Apply topically 3 (three) times daily. 30 g 1    OZEMPIC 1 mg/dose (2 mg/1.5 mL) PnIj Inject 60 mg into the skin every 7 days.       OZEMPIC 1 mg/dose (4 mg/3 mL) Inject 1 mg into the skin every 7 days.      pramipexole (MIRAPEX) 0.125 MG tablet Take 0.125 mg by mouth.      sulfamethoxazole-trimethoprim 800-160mg (BACTRIM DS) 800-160 mg Tab Take 1 tablet by mouth 2 (two) times daily. 14 tablet 0    traMADoL (ULTRAM) 50 mg tablet Take 1 tablet (50 mg total) by mouth every 6 (six) hours as needed for Pain. 28 tablet 0     No current facility-administered medications for this visit.       Review of patient's allergies indicates:   Allergen Reactions    Codeine Hives and Rash         Review of Systems   Constitutional: Negative for chills and fever.   Skin:  Positive for color change and nail changes. Negative for poor wound healing.   Gastrointestinal:  Negative for nausea and vomiting.   Neurological:  Positive for numbness.   Psychiatric/Behavioral:  Negative for altered mental status.            Objective:      Physical Exam  Constitutional:       Appearance: Normal appearance. She is not ill-appearing.   Cardiovascular:      Pulses:           Dorsalis pedis pulses are 2+ on the right side and 2+ on the left side.        Posterior tibial pulses are 2+ on the right side and 2+ on the left side.      Comments: CFT is < 3 seconds bilateral.  Pedal hair growth is decreased bilateral.  No lower extremity edema noted bilateral.  Toes are warm to touch bilateral.    Musculoskeletal:         General: No signs of injury.      Right lower leg: No edema.      Left lower leg: No edema.      Comments: Muscle strength 5/5 in all muscle groups bilateral.  No tenderness nor crepitation with  ROM of foot/ankle joints bilateral.  No tenderness with palpation of bilateral foot and ankle.   Slight dorsal contracture of the Lt. Hallux.  No gross deformity noted to said digit.   Skin:     General: Skin is warm and dry.      Capillary Refill: Capillary refill takes 2 to 3 seconds.      Findings: Wound present. No bruising, ecchymosis, erythema, signs of injury, laceration, lesion, petechiae or rash.      Comments: Location: Open wound noted to the Lt. Sub 2nd met head.  Base: Down to dermis and comprised of fibrin.    Drainage: None  Malinda wound: Devoid of erythema, edema, fluctuance, purulence, and malodor.    Pre-debridement measurement: 0.3 x 0.3 x 0.1cm   Post-debridement measurement: 0.5 x 0.5 x 0.1cm    Neurological:      Mental Status: She is alert.      Sensory: Sensory deficit present.      Motor: No weakness.      Comments: Decreased protective sensation noted bilateral.  Light touch is absent bilateral.             Assessment:       Encounter Diagnoses   Name Primary?    Diabetic ulcer of other part of left foot associated with type 2 diabetes mellitus, limited to breakdown of skin Yes    Diabetic polyneuropathy associated with type 2 diabetes mellitus          Plan:       Diagnoses and all orders for this visit:    Diabetic ulcer of other part of left foot associated with type 2 diabetes mellitus, limited to breakdown of skin    Diabetic polyneuropathy associated with type 2 diabetes mellitus      I counseled the patient on her conditions, their implications and medical management.    Performed a selective excisional debridement of the Lt. foot/ankle.  See attached procedure note.      The wound base was covered with justine, offloaded with felt padding with an aperture, and a football dressing was applied.    Advised to keep the dressing CDI x 1 week.    Advised to rest and elevate the affected extremity.    Instructed to minimize weight bearing to facilitate wound healing.    Advised to ambulate  only in the postoperative shoe/boot.    RTC in 1 week for a follow up.    Jacob Bernal DPM

## 2024-04-01 NOTE — PROCEDURES
"Wound Debridement    Date/Time: 4/1/2024 7:00 AM    Performed by: Jacob Bernal DPM  Authorized by: Jacob Bernal DPM    Time out: Immediately prior to procedure a "time out" was called to verify the correct patient, procedure, equipment, support staff and site/side marked as required.    Consent Done?:  Yes (Verbal)  Local anesthesia used?: No      Wound Details:    Location:  Left foot    Location:  Left 2nd Metatarsal Head    Type of Debridement:  Excisional       Length (cm):  0.3       Area (sq cm):  0.09       Width (cm):  0.3       Percent Debrided (%):  100       Depth (cm):  0.1       Total Area Debrided (sq cm):  0.09    Depth of debridement:  Epidermis/Dermis    Tissue debrided:  Dermis    Devitalized tissue debrided:  Fibrin    Instruments:  Curette  Bleeding:  Minimal  Hemostasis Achieved: Yes  Method Used:  Pressure  Patient tolerance:  Patient tolerated the procedure well with no immediate complications    "

## 2024-04-10 ENCOUNTER — OFFICE VISIT (OUTPATIENT)
Dept: PODIATRY | Facility: CLINIC | Age: 49
End: 2024-04-10
Payer: MEDICAID

## 2024-04-10 VITALS — WEIGHT: 270.06 LBS | HEIGHT: 68 IN | BODY MASS INDEX: 40.93 KG/M2

## 2024-04-10 DIAGNOSIS — L97.521 DIABETIC ULCER OF OTHER PART OF LEFT FOOT ASSOCIATED WITH TYPE 2 DIABETES MELLITUS, LIMITED TO BREAKDOWN OF SKIN: Primary | ICD-10-CM

## 2024-04-10 DIAGNOSIS — E11.42 DIABETIC POLYNEUROPATHY ASSOCIATED WITH TYPE 2 DIABETES MELLITUS: ICD-10-CM

## 2024-04-10 DIAGNOSIS — E11.621 DIABETIC ULCER OF OTHER PART OF LEFT FOOT ASSOCIATED WITH TYPE 2 DIABETES MELLITUS, LIMITED TO BREAKDOWN OF SKIN: Primary | ICD-10-CM

## 2024-04-10 DIAGNOSIS — I10 PRIMARY HYPERTENSION: ICD-10-CM

## 2024-04-10 PROCEDURE — 99214 OFFICE O/P EST MOD 30 MIN: CPT | Mod: PBBFAC,PO | Performed by: PODIATRIST

## 2024-04-10 PROCEDURE — 11042 DBRDMT SUBQ TIS 1ST 20SQCM/<: CPT | Mod: PBBFAC,PO | Performed by: PODIATRIST

## 2024-04-10 PROCEDURE — 99499 UNLISTED E&M SERVICE: CPT | Mod: S$PBB,,, | Performed by: PODIATRIST

## 2024-04-10 PROCEDURE — 99999 PR PBB SHADOW E&M-EST. PATIENT-LVL IV: CPT | Mod: PBBFAC,,, | Performed by: PODIATRIST

## 2024-04-10 RX ORDER — PANTOPRAZOLE SODIUM 40 MG/1
40 TABLET, DELAYED RELEASE ORAL
COMMUNITY
Start: 2024-02-22

## 2024-04-10 NOTE — PROGRESS NOTES
Subjective:      Patient ID: Keerthi Chase is a 48 y.o. female.    Chief Complaint: Wound Care  Patient presents to clinic for a 1 week wound check.  Denies experiencing pain from the wound with today's exam.  Has kept the prior football dressing clean,dry, and intact for the past week.  Notes limiting ambulation while in the DARCO shoe.  Denies experiencing N/V/F/C/D.  Denies any additional pedal complaints.     4/10/24: Patient was seen for Dr. Bernal who is out this week, here for wound care left plantar foot ulcer ambulating in darco and football wraps    Hemoglobin A1C   Date Value Ref Range Status   2022 10.5 (H) 0.0 - 5.6 % Final     Comment:     Reference Interval:  5.0 - 5.6 Normal   5.7 - 6.4 High Risk   > 6.5 Diabetic      Hgb A1c results are standardized based on the (NGSP) National   Glycohemoglobin Standardization Program.      Hemoglobin A1C levels are related to mean serum/plasma glucose   during the preceding 2-3 months.        2021 11.8 (H) 0.0 - 5.6 % Final     Comment:     Reference Interval:  5.0 - 5.6 Normal   5.7 - 6.4 High Risk   > 6.5 Diabetic      Hgb A1c results are standardized based on the (NGSP) National   Glycohemoglobin Standardization Program.      Hemoglobin A1C levels are related to mean serum/plasma glucose   during the preceding 2-3 months.        2021 8.8 (H) 4.8 - 5.6 % Final     Comment:              Prediabetes: 5.7 - 6.4           Diabetes: >6.4           Glycemic control for adults with diabetes: <7.0         Past Medical History:   Diagnosis Date    Bell's palsy     Coronary artery disease     Pt states Dr. Virk said she did not have a blockage after a stress test.    Diabetes mellitus     GERD (gastroesophageal reflux disease)     Hypertension     Obesity        Past Surgical History:   Procedure Laterality Date     SECTION      x 2    CHOLECYSTECTOMY      ESOPHAGOGASTRODUODENOSCOPY      PERIPHERALLY INSERTED CENTRAL CATHETER INSERTION N/A  "10/11/2021    Procedure: INSERTION, PICC;  Surgeon: PICC, [facility];  Location: [facility];  Service: Cardiology;  Laterality: N/A;  Dr Aiken    PERIPHERALLY INSERTED CENTRAL CATHETER INSERTION N/A 7/21/2022    Procedure: INSERTION, PICC;  Surgeon: PICC, [facility];  Location: [facility];  Service: Cardiology;  Laterality: N/A;  Dr Dubose    SESAMOIDECTOMY Left 11/11/2021    Procedure: SESAMOIDECTOMY;  Surgeon: Jacob Bernal DPM;  Location: [facility] OR;  Service: Podiatry;  Laterality: Left;       Family History   Problem Relation Age of Onset    Hypertension Mother        Social History     Socioeconomic History    Marital status:    Tobacco Use    Smoking status: Never    Smokeless tobacco: Never   Substance and Sexual Activity    Alcohol use: No    Drug use: No       Current Outpatient Medications   Medication Sig Dispense Refill    alprazolam (XANAX) 1 MG tablet Take 1 mg by mouth 2 (two) times daily as needed for Anxiety.      amoxicillin-clavulanate 875-125mg (AUGMENTIN) 875-125 mg per tablet Take 1 tablet by mouth every 12 (twelve) hours. 20 tablet 0    ampicillin (PRINCIPEN) 500 MG capsule Take 1 capsule (500 mg total) by mouth every 12 (twelve) hours. 20 capsule 0    BASAGLAR KWIKPEN 100 unit/mL (3 mL) InPn pen 75 Units once daily.   3    BD INSULIN PEN NEEDLE UF MINI 31 gauge x 3/16" Ndle   2    BD INSULIN SYRINGE ULTRA-FINE 1 mL 31 gauge x 5/16 Syrg use as directed      ciprofloxacin HCl (CIPRO) 750 MG tablet Take 750 mg by mouth every 12 (twelve) hours.      fluconazole (DIFLUCAN) 200 MG Tab Take 200 mg by mouth.      hydrocodone-acetaminophen 10-325mg (NORCO)  mg Tab Take 1 tablet by mouth 3 (three) times daily as needed.      hydrocodone-acetaminophen 10-325mg (NORCO)  mg Tab Take 1 tablet by mouth every 8 (eight) hours as needed for Pain. 90 tablet 0    insulin detemir (LEVEMIR) 100 unit/mL injection Inject into the skin every evening.      insulin glargine,hum.rec.anlog (LANTUS SUBQ) Inject into " the skin.      LANTUS U-100 INSULIN 100 unit/mL injection SMARTSI Unit(s) SUB-Q Every Night      mupirocin (BACTROBAN) 2 % ointment Apply topically 3 (three) times daily. 30 g 1    OZEMPIC 1 mg/dose (2 mg/1.5 mL) PnIj Inject 60 mg into the skin every 7 days.       OZEMPIC 1 mg/dose (4 mg/3 mL) Inject 1 mg into the skin every 7 days.      pantoprazole (PROTONIX) 40 MG tablet Take 40 mg by mouth.      pramipexole (MIRAPEX) 0.125 MG tablet Take 0.125 mg by mouth.      sulfamethoxazole-trimethoprim 800-160mg (BACTRIM DS) 800-160 mg Tab Take 1 tablet by mouth 2 (two) times daily. 14 tablet 0    traMADoL (ULTRAM) 50 mg tablet Take 1 tablet (50 mg total) by mouth every 6 (six) hours as needed for Pain. 28 tablet 0    chlorthalidone (HYGROTEN) 25 MG Tab Take 1 tablet (25 mg total) by mouth once daily. 30 tablet 11     No current facility-administered medications for this visit.       Review of patient's allergies indicates:   Allergen Reactions    Codeine Hives and Rash         Review of Systems   Constitutional: Negative for chills and fever.   Skin:  Positive for color change and nail changes. Negative for poor wound healing.   Gastrointestinal:  Negative for nausea and vomiting.   Neurological:  Positive for numbness.   Psychiatric/Behavioral:  Negative for altered mental status.            Objective:      Physical Exam  Constitutional:       Appearance: Normal appearance. She is not ill-appearing.   Cardiovascular:      Pulses:           Dorsalis pedis pulses are 2+ on the right side and 2+ on the left side.        Posterior tibial pulses are 2+ on the right side and 2+ on the left side.      Comments: CFT is < 3 seconds bilateral.  Pedal hair growth is decreased bilateral.  No lower extremity edema noted bilateral.  Toes are warm to touch bilateral.    Musculoskeletal:         General: No signs of injury.      Right lower leg: No edema.      Left lower leg: No edema.      Comments: Muscle strength 5/5 in all muscle  groups bilateral.  No tenderness nor crepitation with ROM of foot/ankle joints bilateral.  No tenderness with palpation of bilateral foot and ankle.   Slight dorsal contracture of the Lt. Hallux.  No gross deformity noted to said digit.   Skin:     General: Skin is warm and dry.      Capillary Refill: Capillary refill takes 2 to 3 seconds.      Findings: Wound present. No bruising, ecchymosis, erythema, signs of injury, laceration, lesion, petechiae or rash.      Comments: Location: Open wound noted to the Lt. Sub 2nd met head.  Base: Down to dermis and comprised of fibrin.    Drainage: None  Malinda wound: Devoid of erythema, edema, fluctuance, purulence, and malodor.    Pre-debridement measurement: 0.2 x 0.2 x 0.1cm   Post-debridement measurement: 0.4 x 0.4 x 0.1cm    Neurological:      Mental Status: She is alert.      Sensory: Sensory deficit present.      Motor: No weakness.      Comments: Decreased protective sensation noted bilateral.  Light touch is absent bilateral.               Assessment:       Encounter Diagnoses   Name Primary?    Diabetic ulcer of other part of left foot associated with type 2 diabetes mellitus, limited to breakdown of skin Yes    Primary hypertension          Plan:       Keerthi was seen today for wound care.    Diagnoses and all orders for this visit:    Diabetic ulcer of other part of left foot associated with type 2 diabetes mellitus, limited to breakdown of skin  -     Ambulatory referral/consult to Endocrinology; Future    Primary hypertension  -     Ambulatory referral/consult to Cardiology; Future      I counseled the patient on her conditions, their implications and medical management.    Performed a selective excisional debridement of the Lt. foot/ankle.  See attached procedure note.      The wound base was covered with iodosorb, offloaded with felt padding with an aperture, and a football dressing was applied.    Advised to keep the dressing CDI x 1 week.    Advised to rest  and elevate the affected extremity.    Instructed to minimize weight bearing to facilitate wound healing.    Advised to ambulate only in the postoperative shoe/boot.    RTC in 1 week for a follow up with Dr. Gabe Crawford DPM

## 2024-04-10 NOTE — PROCEDURES
"Wound Debridement    Date/Time: 4/10/2024 8:45 AM    Performed by: Samuel Crawford DPM  Authorized by: Samuel Crawford DPM    Time out: Immediately prior to procedure a "time out" was called to verify the correct patient, procedure, equipment, support staff and site/side marked as required.    Consent Done?:  Yes (Verbal)    Preparation: Patient was prepped and draped in usual sterile fashion    Local anesthesia used?: No      Wound Details:    Location:  Left foot    Location:  Left 2nd Metatarsal Head    Type of Debridement:  Excisional       Length (cm):  0.4       Area (sq cm):  0.16       Width (cm):  0.4       Percent Debrided (%):  100       Depth (cm):  0.1       Total Area Debrided (sq cm):  0.16    Depth of debridement:  Subcutaneous tissue    Devitalized tissue debrided:  Biofilm, Slough and Callus    Instruments:  Curette  Bleeding:  Minimal  Hemostasis Achieved: Yes  Method Used:  Pressure  Patient tolerance:  Patient tolerated the procedure well with no immediate complications    "

## 2024-04-18 ENCOUNTER — OFFICE VISIT (OUTPATIENT)
Dept: PODIATRY | Facility: CLINIC | Age: 49
End: 2024-04-18
Payer: MEDICAID

## 2024-04-18 VITALS — HEIGHT: 68 IN | WEIGHT: 270.06 LBS | BODY MASS INDEX: 40.93 KG/M2

## 2024-04-18 DIAGNOSIS — E11.42 DIABETIC POLYNEUROPATHY ASSOCIATED WITH TYPE 2 DIABETES MELLITUS: ICD-10-CM

## 2024-04-18 DIAGNOSIS — L97.521 DIABETIC ULCER OF OTHER PART OF LEFT FOOT ASSOCIATED WITH TYPE 2 DIABETES MELLITUS, LIMITED TO BREAKDOWN OF SKIN: Primary | ICD-10-CM

## 2024-04-18 DIAGNOSIS — E11.621 DIABETIC ULCER OF OTHER PART OF LEFT FOOT ASSOCIATED WITH TYPE 2 DIABETES MELLITUS, LIMITED TO BREAKDOWN OF SKIN: Primary | ICD-10-CM

## 2024-04-18 PROCEDURE — 97597 DBRDMT OPN WND 1ST 20 CM/<: CPT | Mod: PBBFAC,PO | Performed by: PODIATRIST

## 2024-04-18 PROCEDURE — 99999 PR PBB SHADOW E&M-EST. PATIENT-LVL II: CPT | Mod: PBBFAC,,, | Performed by: PODIATRIST

## 2024-04-18 PROCEDURE — 99212 OFFICE O/P EST SF 10 MIN: CPT | Mod: PBBFAC,PO | Performed by: PODIATRIST

## 2024-04-18 PROCEDURE — 99499 UNLISTED E&M SERVICE: CPT | Mod: S$PBB,,, | Performed by: PODIATRIST

## 2024-04-18 RX ORDER — AMOXICILLIN AND CLAVULANATE POTASSIUM 875; 125 MG/1; MG/1
1 TABLET, FILM COATED ORAL EVERY 12 HOURS
Qty: 14 TABLET | Refills: 0 | Status: SHIPPED | OUTPATIENT
Start: 2024-04-18

## 2024-04-18 NOTE — PROGRESS NOTES
Subjective:      Patient ID: Keerthi Chase is a 48 y.o. female.    Chief Complaint: No chief complaint on file.  Patient presents to clinic for a 1 week wound check.  Denies experiencing pain from the wound with today's exam.  Has kept the prior football dressing clean,dry, and intact for the past week.  Continues to minimize weight bearing while in the DARCO shoe.  Denies experiencing N/V/F/C/D.  Denies any additional pedal complaints.         Hemoglobin A1C   Date Value Ref Range Status   2022 10.5 (H) 0.0 - 5.6 % Final     Comment:     Reference Interval:  5.0 - 5.6 Normal   5.7 - 6.4 High Risk   > 6.5 Diabetic      Hgb A1c results are standardized based on the (NGSP) National   Glycohemoglobin Standardization Program.      Hemoglobin A1C levels are related to mean serum/plasma glucose   during the preceding 2-3 months.        2021 11.8 (H) 0.0 - 5.6 % Final     Comment:     Reference Interval:  5.0 - 5.6 Normal   5.7 - 6.4 High Risk   > 6.5 Diabetic      Hgb A1c results are standardized based on the (NGSP) National   Glycohemoglobin Standardization Program.      Hemoglobin A1C levels are related to mean serum/plasma glucose   during the preceding 2-3 months.        2021 8.8 (H) 4.8 - 5.6 % Final     Comment:              Prediabetes: 5.7 - 6.4           Diabetes: >6.4           Glycemic control for adults with diabetes: <7.0         Past Medical History:   Diagnosis Date    Bell's palsy     Coronary artery disease     Pt states Dr. Virk said she did not have a blockage after a stress test.    Diabetes mellitus     GERD (gastroesophageal reflux disease)     Hypertension     Obesity        Past Surgical History:   Procedure Laterality Date     SECTION      x 2    CHOLECYSTECTOMY      ESOPHAGOGASTRODUODENOSCOPY      PERIPHERALLY INSERTED CENTRAL CATHETER INSERTION N/A 10/11/2021    Procedure: INSERTION, PICC;  Surgeon: PICC, PRIYANK;  Location: [facility] ENDO;  Service: Cardiology;   "Laterality: N/A;  Dr Aiken    PERIPHERALLY INSERTED CENTRAL CATHETER INSERTION N/A 7/21/2022    Procedure: INSERTION, PICC;  Surgeon: PICC, PRIYANK;  Location: [facility] ENDO;  Service: Cardiology;  Laterality: N/A;  Dr Dubose    SESAMOIDECTOMY Left 11/11/2021    Procedure: SESAMOIDECTOMY;  Surgeon: Jacob Bernal DPM;  Location: [facility] OR;  Service: Podiatry;  Laterality: Left;       Family History   Problem Relation Name Age of Onset    Hypertension Mother         Social History     Socioeconomic History    Marital status:    Tobacco Use    Smoking status: Never    Smokeless tobacco: Never   Substance and Sexual Activity    Alcohol use: No    Drug use: No       Current Outpatient Medications   Medication Sig Dispense Refill    alprazolam (XANAX) 1 MG tablet Take 1 mg by mouth 2 (two) times daily as needed for Anxiety.      amoxicillin-clavulanate 875-125mg (AUGMENTIN) 875-125 mg per tablet Take 1 tablet by mouth every 12 (twelve) hours. 20 tablet 0    ampicillin (PRINCIPEN) 500 MG capsule Take 1 capsule (500 mg total) by mouth every 12 (twelve) hours. 20 capsule 0    BASAGLAR KWIKPEN 100 unit/mL (3 mL) InPn pen 75 Units once daily.   3    BD INSULIN PEN NEEDLE UF MINI 31 gauge x 3/16" Ndle   2    BD INSULIN SYRINGE ULTRA-FINE 1 mL 31 gauge x 5/16 Syrg use as directed      chlorthalidone (HYGROTEN) 25 MG Tab Take 1 tablet (25 mg total) by mouth once daily. 30 tablet 11    ciprofloxacin HCl (CIPRO) 750 MG tablet Take 750 mg by mouth every 12 (twelve) hours.      fluconazole (DIFLUCAN) 200 MG Tab Take 200 mg by mouth.      hydrocodone-acetaminophen 10-325mg (NORCO)  mg Tab Take 1 tablet by mouth 3 (three) times daily as needed.      hydrocodone-acetaminophen 10-325mg (NORCO)  mg Tab Take 1 tablet by mouth every 8 (eight) hours as needed for Pain. 90 tablet 0    insulin detemir (LEVEMIR) 100 unit/mL injection Inject into the skin every evening.      insulin glargine,hum.rec.anlog (LANTUS SUBQ) Inject into the " skin.      LANTUS U-100 INSULIN 100 unit/mL injection SMARTSI Unit(s) SUB-Q Every Night      mupirocin (BACTROBAN) 2 % ointment Apply topically 3 (three) times daily. 30 g 1    OZEMPIC 1 mg/dose (2 mg/1.5 mL) PnIj Inject 60 mg into the skin every 7 days.       OZEMPIC 1 mg/dose (4 mg/3 mL) Inject 1 mg into the skin every 7 days.      pantoprazole (PROTONIX) 40 MG tablet Take 40 mg by mouth.      pramipexole (MIRAPEX) 0.125 MG tablet Take 0.125 mg by mouth.      sulfamethoxazole-trimethoprim 800-160mg (BACTRIM DS) 800-160 mg Tab Take 1 tablet by mouth 2 (two) times daily. 14 tablet 0    traMADoL (ULTRAM) 50 mg tablet Take 1 tablet (50 mg total) by mouth every 6 (six) hours as needed for Pain. 28 tablet 0     No current facility-administered medications for this visit.       Review of patient's allergies indicates:   Allergen Reactions    Codeine Hives and Rash         Review of Systems   Constitutional: Negative for chills and fever.   Skin:  Positive for color change and nail changes. Negative for poor wound healing.   Gastrointestinal:  Negative for nausea and vomiting.   Neurological:  Positive for numbness.   Psychiatric/Behavioral:  Negative for altered mental status.            Objective:      Physical Exam  Constitutional:       Appearance: Normal appearance. She is not ill-appearing.   Cardiovascular:      Pulses:           Dorsalis pedis pulses are 2+ on the right side and 2+ on the left side.        Posterior tibial pulses are 2+ on the right side and 2+ on the left side.      Comments: CFT is < 3 seconds bilateral.  Pedal hair growth is decreased bilateral.  No lower extremity edema noted bilateral.  Toes are warm to touch bilateral.    Musculoskeletal:         General: No signs of injury.      Right lower leg: No edema.      Left lower leg: No edema.      Comments: Muscle strength 5/5 in all muscle groups bilateral.  No tenderness nor crepitation with ROM of foot/ankle joints bilateral.  No tenderness  with palpation of bilateral foot and ankle.   Slight dorsal contracture of the Lt. Hallux.  No gross deformity noted to said digit.   Skin:     General: Skin is warm and dry.      Capillary Refill: Capillary refill takes 2 to 3 seconds.      Findings: Wound present. No bruising, ecchymosis, erythema, signs of injury, laceration, lesion, petechiae or rash.      Comments: Location: Open wound noted to the Lt. Sub 2nd met head.  Base: Down to dermis and comprised of fibrin.    Drainage: None  Malinda wound: Devoid of erythema, edema, fluctuance, purulence, and malodor.    Pre-debridement measurement: 0.1 x 0.1 x 0.1cm   Post-debridement measurement: 0.3 x 0.3 x 0.1cm    Neurological:      Mental Status: She is alert.      Sensory: Sensory deficit present.      Motor: No weakness.      Comments: Decreased protective sensation noted bilateral.  Light touch is absent bilateral.             Assessment:       Encounter Diagnoses   Name Primary?    Diabetic ulcer of other part of left foot associated with type 2 diabetes mellitus, limited to breakdown of skin Yes    Diabetic polyneuropathy associated with type 2 diabetes mellitus          Plan:       Diagnoses and all orders for this visit:    Diabetic ulcer of other part of left foot associated with type 2 diabetes mellitus, limited to breakdown of skin    Diabetic polyneuropathy associated with type 2 diabetes mellitus      I counseled the patient on her conditions, their implications and medical management.    Performed a selective excisional debridement of the Lt. foot/ankle.  See attached procedure note.      The wound base was covered with silver alginate, offloaded with felt padding with an aperture, and a football dressing was applied.    Advised to keep the dressing CDI x 1 week.    Advised to rest and elevate the affected extremity.    Instructed to minimize weight bearing to facilitate wound healing.    Advised to ambulate only in the postoperative shoe/boot.    RTC  in 1 week for a follow up.    Jacob Bernal DPM

## 2024-04-23 ENCOUNTER — OFFICE VISIT (OUTPATIENT)
Dept: PODIATRY | Facility: CLINIC | Age: 49
End: 2024-04-23
Payer: MEDICAID

## 2024-04-23 VITALS — HEIGHT: 68 IN | WEIGHT: 270.06 LBS | BODY MASS INDEX: 40.93 KG/M2

## 2024-04-23 DIAGNOSIS — E11.42 DIABETIC POLYNEUROPATHY ASSOCIATED WITH TYPE 2 DIABETES MELLITUS: ICD-10-CM

## 2024-04-23 DIAGNOSIS — Z87.2 HEALED ULCER OF LEFT FOOT: Primary | ICD-10-CM

## 2024-04-23 PROCEDURE — 1159F MED LIST DOCD IN RCRD: CPT | Mod: CPTII,,, | Performed by: PODIATRIST

## 2024-04-23 PROCEDURE — 99212 OFFICE O/P EST SF 10 MIN: CPT | Mod: S$PBB,,, | Performed by: PODIATRIST

## 2024-04-23 PROCEDURE — 99212 OFFICE O/P EST SF 10 MIN: CPT | Mod: PBBFAC,PO | Performed by: PODIATRIST

## 2024-04-23 PROCEDURE — 3008F BODY MASS INDEX DOCD: CPT | Mod: CPTII,,, | Performed by: PODIATRIST

## 2024-04-23 PROCEDURE — 99999 PR PBB SHADOW E&M-EST. PATIENT-LVL II: CPT | Mod: PBBFAC,,, | Performed by: PODIATRIST

## 2024-04-23 NOTE — PROGRESS NOTES
Subjective:      Patient ID: Keerthi Chase is a 48 y.o. female.    Chief Complaint: Wound Care  Patient presents to clinic for a 1 week wound check.  Denies experiencing pain from the wound with today's exam.  Has kept the prior football dressing clean,dry, and intact for the past week.  Continues to minimize weight bearing while in the DARCO shoe.  Denies experiencing N/V/F/C/D.  Denies any additional pedal complaints.         Hemoglobin A1C   Date Value Ref Range Status   2022 10.5 (H) 0.0 - 5.6 % Final     Comment:     Reference Interval:  5.0 - 5.6 Normal   5.7 - 6.4 High Risk   > 6.5 Diabetic      Hgb A1c results are standardized based on the (NGSP) National   Glycohemoglobin Standardization Program.      Hemoglobin A1C levels are related to mean serum/plasma glucose   during the preceding 2-3 months.        2021 11.8 (H) 0.0 - 5.6 % Final     Comment:     Reference Interval:  5.0 - 5.6 Normal   5.7 - 6.4 High Risk   > 6.5 Diabetic      Hgb A1c results are standardized based on the (NGSP) National   Glycohemoglobin Standardization Program.      Hemoglobin A1C levels are related to mean serum/plasma glucose   during the preceding 2-3 months.        2021 8.8 (H) 4.8 - 5.6 % Final     Comment:              Prediabetes: 5.7 - 6.4           Diabetes: >6.4           Glycemic control for adults with diabetes: <7.0         Past Medical History:   Diagnosis Date    Bell's palsy     Coronary artery disease     Pt states Dr. Virk said she did not have a blockage after a stress test.    Diabetes mellitus     GERD (gastroesophageal reflux disease)     Hypertension     Obesity        Past Surgical History:   Procedure Laterality Date     SECTION      x 2    CHOLECYSTECTOMY      ESOPHAGOGASTRODUODENOSCOPY      PERIPHERALLY INSERTED CENTRAL CATHETER INSERTION N/A 10/11/2021    Procedure: INSERTION, PICC;  Surgeon: PICC, PRIYANK;  Location: [facility];  Service: Cardiology;  Laterality: N/A;  Dr Aiken  "   PERIPHERALLY INSERTED CENTRAL CATHETER INSERTION N/A 7/21/2022    Procedure: INSERTION, PICC;  Surgeon: PICC, [facility];  Location: [facility];  Service: Cardiology;  Laterality: N/A;  Dr Dubose    SESAMOIDECTOMY Left 11/11/2021    Procedure: SESAMOIDECTOMY;  Surgeon: Jacob Bernal DPM;  Location: [facility] OR;  Service: Podiatry;  Laterality: Left;       Family History   Problem Relation Name Age of Onset    Hypertension Mother         Social History     Socioeconomic History    Marital status:    Tobacco Use    Smoking status: Never    Smokeless tobacco: Never   Substance and Sexual Activity    Alcohol use: No    Drug use: No       Current Outpatient Medications   Medication Sig Dispense Refill    alprazolam (XANAX) 1 MG tablet Take 1 mg by mouth 2 (two) times daily as needed for Anxiety.      amoxicillin-clavulanate 875-125mg (AUGMENTIN) 875-125 mg per tablet Take 1 tablet by mouth every 12 (twelve) hours. 20 tablet 0    amoxicillin-clavulanate 875-125mg (AUGMENTIN) 875-125 mg per tablet Take 1 tablet by mouth every 12 (twelve) hours. 14 tablet 0    ampicillin (PRINCIPEN) 500 MG capsule Take 1 capsule (500 mg total) by mouth every 12 (twelve) hours. 20 capsule 0    BASAGLAR KWIKPEN 100 unit/mL (3 mL) InPn pen 75 Units once daily.   3    BD INSULIN PEN NEEDLE UF MINI 31 gauge x 3/16" Ndle   2    BD INSULIN SYRINGE ULTRA-FINE 1 mL 31 gauge x 5/16 Syrg use as directed      chlorthalidone (HYGROTEN) 25 MG Tab Take 1 tablet (25 mg total) by mouth once daily. 30 tablet 11    ciprofloxacin HCl (CIPRO) 750 MG tablet Take 750 mg by mouth every 12 (twelve) hours.      fluconazole (DIFLUCAN) 200 MG Tab Take 200 mg by mouth.      hydrocodone-acetaminophen 10-325mg (NORCO)  mg Tab Take 1 tablet by mouth 3 (three) times daily as needed.      hydrocodone-acetaminophen 10-325mg (NORCO)  mg Tab Take 1 tablet by mouth every 8 (eight) hours as needed for Pain. 90 tablet 0    insulin detemir (LEVEMIR) 100 unit/mL " injection Inject into the skin every evening.      insulin glargine,hum.rec.anlog (LANTUS SUBQ) Inject into the skin.      LANTUS U-100 INSULIN 100 unit/mL injection SMARTSI Unit(s) SUB-Q Every Night      mupirocin (BACTROBAN) 2 % ointment Apply topically 3 (three) times daily. 30 g 1    OZEMPIC 1 mg/dose (2 mg/1.5 mL) PnIj Inject 60 mg into the skin every 7 days.       OZEMPIC 1 mg/dose (4 mg/3 mL) Inject 1 mg into the skin every 7 days.      pantoprazole (PROTONIX) 40 MG tablet Take 40 mg by mouth.      pramipexole (MIRAPEX) 0.125 MG tablet Take 0.125 mg by mouth.      sulfamethoxazole-trimethoprim 800-160mg (BACTRIM DS) 800-160 mg Tab Take 1 tablet by mouth 2 (two) times daily. 14 tablet 0    traMADoL (ULTRAM) 50 mg tablet Take 1 tablet (50 mg total) by mouth every 6 (six) hours as needed for Pain. 28 tablet 0     No current facility-administered medications for this visit.       Review of patient's allergies indicates:   Allergen Reactions    Codeine Hives and Rash         Review of Systems   Constitutional: Negative for chills and fever.   Skin:  Positive for color change and nail changes. Negative for poor wound healing.   Gastrointestinal:  Negative for nausea and vomiting.   Neurological:  Positive for numbness.   Psychiatric/Behavioral:  Negative for altered mental status.            Objective:      Physical Exam  Constitutional:       Appearance: Normal appearance. She is not ill-appearing.   Cardiovascular:      Pulses:           Dorsalis pedis pulses are 2+ on the right side and 2+ on the left side.        Posterior tibial pulses are 2+ on the right side and 2+ on the left side.      Comments: CFT is < 3 seconds bilateral.  Pedal hair growth is decreased bilateral.  No lower extremity edema noted bilateral.  Toes are warm to touch bilateral.    Musculoskeletal:         General: No signs of injury.      Right lower leg: No edema.      Left lower leg: No edema.      Comments: Muscle strength 5/5 in all  muscle groups bilateral.  No tenderness nor crepitation with ROM of foot/ankle joints bilateral.  No tenderness with palpation of bilateral foot and ankle.   Slight dorsal contracture of the Lt. Hallux.  No gross deformity noted to said digit.   Skin:     General: Skin is warm and dry.      Capillary Refill: Capillary refill takes 2 to 3 seconds.      Findings: No bruising, ecchymosis, erythema, signs of injury, laceration, lesion, petechiae, rash or wound.      Comments: Fragile epithelium noted to the Lt. Sub 2nd met head.   Neurological:      Mental Status: She is alert.      Sensory: Sensory deficit present.      Motor: No weakness.      Comments: Decreased protective sensation noted bilateral.  Light touch is absent bilateral.               Assessment:       Encounter Diagnoses   Name Primary?    Healed ulcer of left foot Yes    Diabetic polyneuropathy associated with type 2 diabetes mellitus          Plan:       Keerthi was seen today for wound care.    Diagnoses and all orders for this visit:    Healed ulcer of left foot    Diabetic polyneuropathy associated with type 2 diabetes mellitus      I counseled the patient on her conditions, their implications and medical management.    No wound debridement performed, as the site is fully epithelialized with today's exam    Prior site of ulceration was painted with betadine, offloaded with felt padding with an aperture, and a football dressing was applied.    Advised to keep the dressing CDI x 1 week to allow new skin a chance to mature.    Advised to rest and elevate the affected extremity.    Instructed to minimize weight bearing to maintain healed status.    Advised to ambulate only in the postoperative shoe/boot.    RTC in 1 week for a follow up.    Jacob Bernal DPM

## 2024-04-30 ENCOUNTER — TELEPHONE (OUTPATIENT)
Dept: PODIATRY | Facility: CLINIC | Age: 49
End: 2024-04-30

## 2024-04-30 ENCOUNTER — OFFICE VISIT (OUTPATIENT)
Dept: PODIATRY | Facility: CLINIC | Age: 49
End: 2024-04-30
Payer: MEDICAID

## 2024-04-30 VITALS — WEIGHT: 270.06 LBS | HEIGHT: 68 IN | BODY MASS INDEX: 40.93 KG/M2

## 2024-04-30 DIAGNOSIS — E11.42 DIABETIC POLYNEUROPATHY ASSOCIATED WITH TYPE 2 DIABETES MELLITUS: ICD-10-CM

## 2024-04-30 DIAGNOSIS — Z87.2 HEALED ULCER OF LEFT FOOT: Primary | ICD-10-CM

## 2024-04-30 PROCEDURE — 1159F MED LIST DOCD IN RCRD: CPT | Mod: CPTII,,, | Performed by: PODIATRIST

## 2024-04-30 PROCEDURE — 3008F BODY MASS INDEX DOCD: CPT | Mod: CPTII,,, | Performed by: PODIATRIST

## 2024-04-30 PROCEDURE — 99999 PR PBB SHADOW E&M-EST. PATIENT-LVL II: CPT | Mod: PBBFAC,,, | Performed by: PODIATRIST

## 2024-04-30 PROCEDURE — 99212 OFFICE O/P EST SF 10 MIN: CPT | Mod: S$PBB,,, | Performed by: PODIATRIST

## 2024-04-30 PROCEDURE — 99212 OFFICE O/P EST SF 10 MIN: CPT | Mod: PBBFAC,PO | Performed by: PODIATRIST

## 2024-04-30 NOTE — PROGRESS NOTES
Subjective:      Patient ID: Keerthi Chase is a 48 y.o. female.    Chief Complaint: Wound Care  Patient presents to clinic for a 1 week wound check.  Denies experiencing pain from the wound with today's exam.  Has kept the prior football dressing clean,dry, and intact for the past week.  Continues to minimize weight bearing while in the DARCO shoe.  Denies experiencing N/V/F/C/D.  Denies any additional pedal complaints.         Hemoglobin A1C   Date Value Ref Range Status   2022 10.5 (H) 0.0 - 5.6 % Final     Comment:     Reference Interval:  5.0 - 5.6 Normal   5.7 - 6.4 High Risk   > 6.5 Diabetic      Hgb A1c results are standardized based on the (NGSP) National   Glycohemoglobin Standardization Program.      Hemoglobin A1C levels are related to mean serum/plasma glucose   during the preceding 2-3 months.        2021 11.8 (H) 0.0 - 5.6 % Final     Comment:     Reference Interval:  5.0 - 5.6 Normal   5.7 - 6.4 High Risk   > 6.5 Diabetic      Hgb A1c results are standardized based on the (NGSP) National   Glycohemoglobin Standardization Program.      Hemoglobin A1C levels are related to mean serum/plasma glucose   during the preceding 2-3 months.        2021 8.8 (H) 4.8 - 5.6 % Final     Comment:              Prediabetes: 5.7 - 6.4           Diabetes: >6.4           Glycemic control for adults with diabetes: <7.0         Past Medical History:   Diagnosis Date    Bell's palsy     Coronary artery disease     Pt states Dr. Virk said she did not have a blockage after a stress test.    Diabetes mellitus     GERD (gastroesophageal reflux disease)     Hypertension     Obesity        Past Surgical History:   Procedure Laterality Date     SECTION      x 2    CHOLECYSTECTOMY      ESOPHAGOGASTRODUODENOSCOPY      PERIPHERALLY INSERTED CENTRAL CATHETER INSERTION N/A 10/11/2021    Procedure: INSERTION, PICC;  Surgeon: PICC, PRIYANK;  Location: [facility];  Service: Cardiology;  Laterality: N/A;  Dr Aiken  "   PERIPHERALLY INSERTED CENTRAL CATHETER INSERTION N/A 7/21/2022    Procedure: INSERTION, PICC;  Surgeon: PICC, [facility];  Location: [facility];  Service: Cardiology;  Laterality: N/A;  Dr Dubose    SESAMOIDECTOMY Left 11/11/2021    Procedure: SESAMOIDECTOMY;  Surgeon: Jacob Bernal DPM;  Location: [facility] OR;  Service: Podiatry;  Laterality: Left;       Family History   Problem Relation Name Age of Onset    Hypertension Mother         Social History     Socioeconomic History    Marital status:    Tobacco Use    Smoking status: Never    Smokeless tobacco: Never   Substance and Sexual Activity    Alcohol use: No    Drug use: No       Current Outpatient Medications   Medication Sig Dispense Refill    alprazolam (XANAX) 1 MG tablet Take 1 mg by mouth 2 (two) times daily as needed for Anxiety.      amoxicillin-clavulanate 875-125mg (AUGMENTIN) 875-125 mg per tablet Take 1 tablet by mouth every 12 (twelve) hours. 20 tablet 0    amoxicillin-clavulanate 875-125mg (AUGMENTIN) 875-125 mg per tablet Take 1 tablet by mouth every 12 (twelve) hours. 14 tablet 0    ampicillin (PRINCIPEN) 500 MG capsule Take 1 capsule (500 mg total) by mouth every 12 (twelve) hours. 20 capsule 0    BASAGLAR KWIKPEN 100 unit/mL (3 mL) InPn pen 75 Units once daily.   3    BD INSULIN PEN NEEDLE UF MINI 31 gauge x 3/16" Ndle   2    BD INSULIN SYRINGE ULTRA-FINE 1 mL 31 gauge x 5/16 Syrg use as directed      chlorthalidone (HYGROTEN) 25 MG Tab Take 1 tablet (25 mg total) by mouth once daily. 30 tablet 11    ciprofloxacin HCl (CIPRO) 750 MG tablet Take 750 mg by mouth every 12 (twelve) hours.      fluconazole (DIFLUCAN) 200 MG Tab Take 200 mg by mouth.      hydrocodone-acetaminophen 10-325mg (NORCO)  mg Tab Take 1 tablet by mouth 3 (three) times daily as needed.      hydrocodone-acetaminophen 10-325mg (NORCO)  mg Tab Take 1 tablet by mouth every 8 (eight) hours as needed for Pain. 90 tablet 0    insulin detemir (LEVEMIR) 100 unit/mL " injection Inject into the skin every evening.      insulin glargine,hum.rec.anlog (LANTUS SUBQ) Inject into the skin.      LANTUS U-100 INSULIN 100 unit/mL injection SMARTSI Unit(s) SUB-Q Every Night      mupirocin (BACTROBAN) 2 % ointment Apply topically 3 (three) times daily. 30 g 1    OZEMPIC 1 mg/dose (2 mg/1.5 mL) PnIj Inject 60 mg into the skin every 7 days.       OZEMPIC 1 mg/dose (4 mg/3 mL) Inject 1 mg into the skin every 7 days.      pantoprazole (PROTONIX) 40 MG tablet Take 40 mg by mouth.      pramipexole (MIRAPEX) 0.125 MG tablet Take 0.125 mg by mouth.      sulfamethoxazole-trimethoprim 800-160mg (BACTRIM DS) 800-160 mg Tab Take 1 tablet by mouth 2 (two) times daily. 14 tablet 0    traMADoL (ULTRAM) 50 mg tablet Take 1 tablet (50 mg total) by mouth every 6 (six) hours as needed for Pain. 28 tablet 0     No current facility-administered medications for this visit.       Review of patient's allergies indicates:   Allergen Reactions    Codeine Hives and Rash         Review of Systems   Constitutional: Negative for chills and fever.   Skin:  Positive for color change and nail changes. Negative for poor wound healing.   Gastrointestinal:  Negative for nausea and vomiting.   Neurological:  Positive for numbness.   Psychiatric/Behavioral:  Negative for altered mental status.            Objective:      Physical Exam  Constitutional:       Appearance: Normal appearance. She is not ill-appearing.   Cardiovascular:      Pulses:           Dorsalis pedis pulses are 2+ on the right side and 2+ on the left side.        Posterior tibial pulses are 2+ on the right side and 2+ on the left side.      Comments: CFT is < 3 seconds bilateral.  Pedal hair growth is decreased bilateral.  No lower extremity edema noted bilateral.  Toes are warm to touch bilateral.    Musculoskeletal:         General: No signs of injury.      Right lower leg: No edema.      Left lower leg: No edema.      Comments: Muscle strength 5/5 in all  muscle groups bilateral.  No tenderness nor crepitation with ROM of foot/ankle joints bilateral.  No tenderness with palpation of bilateral foot and ankle.   Slight dorsal contracture of the Lt. Hallux.  No gross deformity noted to said digit.   Skin:     General: Skin is warm and dry.      Capillary Refill: Capillary refill takes 2 to 3 seconds.      Findings: No bruising, ecchymosis, erythema, signs of injury, laceration, lesion, petechiae, rash or wound.      Comments: Mature epithelium noted to the Lt. Sub 2nd met head.   Neurological:      Mental Status: She is alert.      Sensory: Sensory deficit present.      Motor: No weakness.      Comments: Decreased protective sensation noted bilateral.  Light touch is absent bilateral.               Assessment:       Encounter Diagnoses   Name Primary?    Healed ulcer of left foot Yes    Diabetic polyneuropathy associated with type 2 diabetes mellitus          Plan:       Keerthi was seen today for wound care.    Diagnoses and all orders for this visit:    Healed ulcer of left foot    Diabetic polyneuropathy associated with type 2 diabetes mellitus      I counseled the patient on her conditions, their implications and medical management.    No wound debridement performed, as the site remains fully epithelialized with today's exam    Prior site of ulceration was covered with a mepilex border.  Advised to protect the site as such daily x 2 final weeks.    May transition back into casual shoe gear with progressive increase in weight bearing activity.     May resume showering but discouraged from soaking/scrubbing the affected area for the next two week.    To begin applying moisturizer to the affected area QD.    RTC in 4 weeks for a follow up.    Jacob Bernal DPM

## 2024-04-30 NOTE — TELEPHONE ENCOUNTER
She said it is a small spot, it has stopped bleeding. Told her to keep it clean apply antibiotic ointment and a band aid.

## 2024-04-30 NOTE — TELEPHONE ENCOUNTER
----- Message from Nia Mushtaq sent at 4/30/2024  3:38 PM CDT -----  Contact: patient  Type:  Needs Medical Advice    Who Called: patient     Would the patient rather a call back or a response via [facility]? Call     Best Call Back Number:  [phone] (home) [phone] (work)     Additional Information: patient would like to speak with the nurse in regards to her wound is bleeding.     Please call to advise

## 2024-05-06 ENCOUNTER — TELEPHONE (OUTPATIENT)
Dept: PODIATRY | Facility: CLINIC | Age: 49
End: 2024-05-06
Payer: MEDICAID

## 2024-05-06 NOTE — TELEPHONE ENCOUNTER
Called and scheduled an appt she declined the appt for this week that was offered scheduled her for next week.

## 2024-05-06 NOTE — TELEPHONE ENCOUNTER
----- Message from Randi Thad sent at 5/6/2024  7:12 AM CDT -----  Regarding: Needs return call  Type: Needs Medical Advice  Who Called:  Pt  =  Best Call Back Number: [phone]    Additional Information: Pt states she called last week regarding getting her boot off and her wound opening back up she needs to speak to the [facility] regarding this this morning, it is still draining

## 2024-05-07 ENCOUNTER — TELEPHONE (OUTPATIENT)
Dept: PODIATRY | Facility: CLINIC | Age: 49
End: 2024-05-07
Payer: MEDICAID

## 2024-05-07 NOTE — TELEPHONE ENCOUNTER
----- Message from Ekta Mauro sent at 5/7/2024  9:18 AM CDT -----  Contact: self  Type:  Needs Medical Advice    Who Called: self  Symptoms (please be specific): pt sts she thinks her foot might be getting infected and is requesting some antibiotics be sent to her phar.  She has an appt but needs it before then.    Pharmacy name and phone #:    [facility] - [place], [place] - [address] [address] [address] [address]  [facility] [address]  Phone: [phone] Fax: [phone]      Would the patient rather a call back or a response via [facility]? call  Best Call Back Number: [phone] (home) [phone] (work)    Additional Information: please advise and thank you.

## 2024-05-08 DIAGNOSIS — L08.9 DIABETIC FOOT INFECTION: Primary | ICD-10-CM

## 2024-05-08 DIAGNOSIS — E11.628 DIABETIC FOOT INFECTION: Primary | ICD-10-CM

## 2024-05-08 RX ORDER — CEPHALEXIN 500 MG/1
500 CAPSULE ORAL EVERY 12 HOURS
Qty: 20 CAPSULE | Refills: 0 | Status: SHIPPED | OUTPATIENT
Start: 2024-05-08

## 2024-05-14 ENCOUNTER — OFFICE VISIT (OUTPATIENT)
Dept: PODIATRY | Facility: CLINIC | Age: 49
End: 2024-05-14
Payer: MEDICAID

## 2024-05-14 DIAGNOSIS — L97.521 DIABETIC ULCER OF OTHER PART OF LEFT FOOT ASSOCIATED WITH TYPE 2 DIABETES MELLITUS, LIMITED TO BREAKDOWN OF SKIN: Primary | ICD-10-CM

## 2024-05-14 DIAGNOSIS — E11.42 DIABETIC POLYNEUROPATHY ASSOCIATED WITH TYPE 2 DIABETES MELLITUS: ICD-10-CM

## 2024-05-14 DIAGNOSIS — E11.621 DIABETIC ULCER OF OTHER PART OF LEFT FOOT ASSOCIATED WITH TYPE 2 DIABETES MELLITUS, LIMITED TO BREAKDOWN OF SKIN: Primary | ICD-10-CM

## 2024-05-14 DIAGNOSIS — S90.811A ABRASION OF SKIN OF RIGHT FOOT: ICD-10-CM

## 2024-05-14 PROCEDURE — 97597 DBRDMT OPN WND 1ST 20 CM/<: CPT | Mod: PBBFAC,PO | Performed by: PODIATRIST

## 2024-05-14 PROCEDURE — 99213 OFFICE O/P EST LOW 20 MIN: CPT | Mod: 25,S$PBB,, | Performed by: PODIATRIST

## 2024-05-14 PROCEDURE — 87075 CULTR BACTERIA EXCEPT BLOOD: CPT | Performed by: PODIATRIST

## 2024-05-14 PROCEDURE — 87070 CULTURE OTHR SPECIMN AEROBIC: CPT | Performed by: PODIATRIST

## 2024-05-14 PROCEDURE — 87077 CULTURE AEROBIC IDENTIFY: CPT | Performed by: PODIATRIST

## 2024-05-14 PROCEDURE — 87186 SC STD MICRODIL/AGAR DIL: CPT | Mod: 59 | Performed by: PODIATRIST

## 2024-05-14 NOTE — PROCEDURES
"Wound Debridement    Date/Time: 5/14/2024 1:00 PM    Performed by: Jacob Bernal DPM  Authorized by: Jacob Bernal DPM    Time out: Immediately prior to procedure a "time out" was called to verify the correct patient, procedure, equipment, support staff and site/side marked as required.    Consent Done?:  Yes (Verbal)  Local anesthesia used?: No      Wound Details:    Location:  Left foot    Location:  Left 2nd Metatarsal Head    Type of Debridement:  Excisional       Length (cm):  0.6       Area (sq cm):  0.36       Width (cm):  0.6       Percent Debrided (%):  100       Depth (cm):  0.2       Total Area Debrided (sq cm):  0.36    Depth of debridement:  Epidermis/Dermis    Tissue debrided:  Dermis    Devitalized tissue debrided:  Fibrin    Instruments:  Curette  Bleeding:  Minimal  Hemostasis Achieved: Yes  Method Used:  Pressure  Patient tolerance:  Patient tolerated the procedure well with no immediate complications    "

## 2024-05-14 NOTE — PROGRESS NOTES
Subjective:      Patient ID: Keerthi Chase is a 48 y.o. female.    Chief Complaint: No chief complaint on file.  Patient presents to clinic out of concern regarding a new wound of the Lt. Forefoot.  Notes stepping on the sharp end of a recently cut zip tie which embedded in the recently healed site of ulceration.  States this occurred last week.  She has been taking oral clindamycin as instructed, however, notes pain from the site.  Rates pain as an 8/10.  Symptoms are present with weight bearing and alleviated with rest.  She has been applying mupirocin ointment to the site daily.  Also, notes having a small, nonpainful abrasion to the Rt. Sub 5th met head. Notes being unsure as to how this developed.  Has been applying mupirocin to this site as well.  Denies experiencing N/V/F/C/D.  Denies any additional pedal complaints.         Hemoglobin A1C   Date Value Ref Range Status   06/03/2022 10.5 (H) 0.0 - 5.6 % Final     Comment:     Reference Interval:  5.0 - 5.6 Normal   5.7 - 6.4 High Risk   > 6.5 Diabetic      Hgb A1c results are standardized based on the (NGSP) National   Glycohemoglobin Standardization Program.      Hemoglobin A1C levels are related to mean serum/plasma glucose   during the preceding 2-3 months.        09/23/2021 11.8 (H) 0.0 - 5.6 % Final     Comment:     Reference Interval:  5.0 - 5.6 Normal   5.7 - 6.4 High Risk   > 6.5 Diabetic      Hgb A1c results are standardized based on the (NGSP) National   Glycohemoglobin Standardization Program.      Hemoglobin A1C levels are related to mean serum/plasma glucose   during the preceding 2-3 months.        06/07/2021 8.8 (H) 4.8 - 5.6 % Final     Comment:              Prediabetes: 5.7 - 6.4           Diabetes: >6.4           Glycemic control for adults with diabetes: <7.0         Past Medical History:   Diagnosis Date    Bell's palsy     Coronary artery disease     Pt states Dr. Virk said she did not have a blockage after a stress test.    Diabetes  "mellitus     GERD (gastroesophageal reflux disease)     Hypertension     Obesity        Past Surgical History:   Procedure Laterality Date     SECTION      x 2    CHOLECYSTECTOMY      ESOPHAGOGASTRODUODENOSCOPY      PERIPHERALLY INSERTED CENTRAL CATHETER INSERTION N/A 10/11/2021    Procedure: INSERTION, PICC;  Surgeon: PICC, [facility];  Location: [facility];  Service: Cardiology;  Laterality: N/A;  Dr Aiken    PERIPHERALLY INSERTED CENTRAL CATHETER INSERTION N/A 2022    Procedure: INSERTION, PICC;  Surgeon: PICC, [facility];  Location: [facility];  Service: Cardiology;  Laterality: N/A;  Dr Dubose    SESAMOIDECTOMY Left 2021    Procedure: SESAMOIDECTOMY;  Surgeon: Jacob Bernal DPM;  Location: [facility] OR;  Service: Podiatry;  Laterality: Left;       Family History   Problem Relation Name Age of Onset    Hypertension Mother         Social History     Socioeconomic History    Marital status:    Tobacco Use    Smoking status: Never    Smokeless tobacco: Never   Substance and Sexual Activity    Alcohol use: No    Drug use: No       Current Outpatient Medications   Medication Sig Dispense Refill    alprazolam (XANAX) 1 MG tablet Take 1 mg by mouth 2 (two) times daily as needed for Anxiety.      amoxicillin-clavulanate 875-125mg (AUGMENTIN) 875-125 mg per tablet Take 1 tablet by mouth every 12 (twelve) hours. 20 tablet 0    amoxicillin-clavulanate 875-125mg (AUGMENTIN) 875-125 mg per tablet Take 1 tablet by mouth every 12 (twelve) hours. 14 tablet 0    ampicillin (PRINCIPEN) 500 MG capsule Take 1 capsule (500 mg total) by mouth every 12 (twelve) hours. 20 capsule 0    BASAGLAR KWIKPEN 100 unit/mL (3 mL) InPn pen 75 Units once daily.   3    BD INSULIN PEN NEEDLE UF MINI 31 gauge x 3/16" Ndle   2    BD INSULIN SYRINGE ULTRA-FINE 1 mL 31 gauge x 5/16 Syrg use as directed      cephALEXin (KEFLEX) 500 MG capsule Take 1 capsule (500 mg total) by mouth every 12 (twelve) hours. 20 capsule 0    chlorthalidone (HYGROTEN) " 25 MG Tab Take 1 tablet (25 mg total) by mouth once daily. 30 tablet 11    ciprofloxacin HCl (CIPRO) 750 MG tablet Take 750 mg by mouth every 12 (twelve) hours.      fluconazole (DIFLUCAN) 200 MG Tab Take 200 mg by mouth.      hydrocodone-acetaminophen 10-325mg (NORCO)  mg Tab Take 1 tablet by mouth 3 (three) times daily as needed.      hydrocodone-acetaminophen 10-325mg (NORCO)  mg Tab Take 1 tablet by mouth every 8 (eight) hours as needed for Pain. 90 tablet 0    insulin detemir (LEVEMIR) 100 unit/mL injection Inject into the skin every evening.      insulin glargine,hum.rec.anlog (LANTUS SUBQ) Inject into the skin.      LANTUS U-100 INSULIN 100 unit/mL injection SMARTSI Unit(s) SUB-Q Every Night      mupirocin (BACTROBAN) 2 % ointment Apply topically 3 (three) times daily. 30 g 1    OZEMPIC 1 mg/dose (2 mg/1.5 mL) PnIj Inject 60 mg into the skin every 7 days.       OZEMPIC 1 mg/dose (4 mg/3 mL) Inject 1 mg into the skin every 7 days.      pantoprazole (PROTONIX) 40 MG tablet Take 40 mg by mouth.      pramipexole (MIRAPEX) 0.125 MG tablet Take 0.125 mg by mouth.      sulfamethoxazole-trimethoprim 800-160mg (BACTRIM DS) 800-160 mg Tab Take 1 tablet by mouth 2 (two) times daily. 14 tablet 0    traMADoL (ULTRAM) 50 mg tablet Take 1 tablet (50 mg total) by mouth every 6 (six) hours as needed for Pain. 28 tablet 0     No current facility-administered medications for this visit.       Review of patient's allergies indicates:   Allergen Reactions    Codeine Hives and Rash         Review of Systems   Constitutional: Negative for chills and fever.   Skin:  Positive for color change and nail changes. Negative for poor wound healing.   Gastrointestinal:  Negative for nausea and vomiting.   Neurological:  Positive for numbness.   Psychiatric/Behavioral:  Negative for altered mental status.            Objective:      Physical Exam  Constitutional:       Appearance: Normal appearance. She is not ill-appearing.    Cardiovascular:      Pulses:           Dorsalis pedis pulses are 2+ on the right side and 2+ on the left side.        Posterior tibial pulses are 2+ on the right side and 2+ on the left side.      Comments: CFT is < 3 seconds bilateral.  Pedal hair growth is decreased bilateral.  No lower extremity edema noted bilateral.  Toes are warm to touch bilateral.    Musculoskeletal:         General: No signs of injury.      Right lower leg: No edema.      Left lower leg: No edema.      Comments: Muscle strength 5/5 in all muscle groups bilateral.  No tenderness nor crepitation with ROM of foot/ankle joints bilateral.  Slight dorsal contracture of the Lt. Hallux.  No gross deformity noted to said digit.  Pain with palpation to the Lt. Sub 2nd met head wound.   Skin:     General: Skin is warm and dry.      Capillary Refill: Capillary refill takes 2 to 3 seconds.      Findings: Wound present. No bruising, ecchymosis, erythema, signs of injury, laceration, lesion, petechiae or rash.      Comments: Location: Open wound noted to the Lt. Sub 2nd met head.  Base: Down to dermis and comprised of fibrin.      Drainage: None  Chacorta wound: Devoid of erythema, fluctuance, purulence, and malodor.  Mild chacorta wound edema noted.   Pre-debridement measurement: 0.6 x 0.6 x 0.2cm   Post-debridement measurement: 0.8 x 0.8 x 0.8cm    Superficial abrasion noted to the Rt. Sub 5th met head.  No localized sign of infection noted.     Neurological:      Mental Status: She is alert.      Sensory: Sensory deficit present.      Motor: No weakness.      Comments: Decreased protective sensation noted bilateral.  Light touch is absent bilateral.             Assessment:       Encounter Diagnoses   Name Primary?    Diabetic ulcer of other part of left foot associated with type 2 diabetes mellitus, limited to breakdown of skin Yes    Abrasion of skin of right foot     Diabetic polyneuropathy associated with type 2 diabetes mellitus            Plan:        Diagnoses and all orders for this visit:    Diabetic ulcer of other part of left foot associated with type 2 diabetes mellitus, limited to breakdown of skin  -     Aerobic culture  -     Culture, Anaerobic    Abrasion of skin of right foot    Diabetic polyneuropathy associated with type 2 diabetes mellitus        I counseled the patient on her conditions, their implications and medical management.    Discussed with patient the associated risks and benefits associated with a selective excisional debridement of the Lt. Foot.  Verbal consent was obtained.  See attached procedure note.      Wound cultures were obtained from the Lt. Forefoot.      Bilateral foot wounds were covered with iodosorb.  The Lt. Forefoot wound was then offloaded with two full length felt pads, and a football dressing was applied.  A football dressing was applied on the Rt. Side.      Advised to keep the dressings CDI x 1 week.    Advised to rest and elevate the affected extremity.    Instructed to minimize weight bearing to facilitate wound healing.    Advised to ambulate only in the postoperative shoe/boots.    Discussed optimal hydration and protein consumption and how they facilitate wound healing.      RTC in 1 week.    Jacob Bernal DPM

## 2024-05-16 ENCOUNTER — TELEPHONE (OUTPATIENT)
Dept: PODIATRY | Facility: CLINIC | Age: 49
End: 2024-05-16
Payer: MEDICAID

## 2024-05-16 NOTE — TELEPHONE ENCOUNTER
----- Message from Jacob Bernal DPM sent at 5/16/2024  1:43 PM CDT -----  Please let her know initial cultures are negative for infection.  We will keep her posted on the 2nd set once available.  ----- Message -----  From: [facility]  Sent: 5/16/2024   8:33 AM CDT  To: Jacob Bernal DPM

## 2024-05-19 LAB — BACTERIA SPEC AEROBE CULT: ABNORMAL

## 2024-05-20 DIAGNOSIS — L08.9 DIABETIC FOOT INFECTION: Primary | ICD-10-CM

## 2024-05-20 DIAGNOSIS — E11.628 DIABETIC FOOT INFECTION: Primary | ICD-10-CM

## 2024-05-20 LAB — BACTERIA SPEC ANAEROBE CULT: NORMAL

## 2024-05-20 RX ORDER — CIPROFLOXACIN 750 MG/1
750 TABLET, FILM COATED ORAL EVERY 12 HOURS
Qty: 20 TABLET | Refills: 0 | Status: SHIPPED | OUTPATIENT
Start: 2024-05-20

## 2024-05-22 ENCOUNTER — OFFICE VISIT (OUTPATIENT)
Dept: PODIATRY | Facility: CLINIC | Age: 49
End: 2024-05-22
Payer: MEDICAID

## 2024-05-22 VITALS — HEIGHT: 68 IN | BODY MASS INDEX: 40.93 KG/M2 | WEIGHT: 270.06 LBS

## 2024-05-22 DIAGNOSIS — E11.42 DIABETIC POLYNEUROPATHY ASSOCIATED WITH TYPE 2 DIABETES MELLITUS: ICD-10-CM

## 2024-05-22 DIAGNOSIS — L97.521 DIABETIC ULCER OF OTHER PART OF LEFT FOOT ASSOCIATED WITH TYPE 2 DIABETES MELLITUS, LIMITED TO BREAKDOWN OF SKIN: Primary | ICD-10-CM

## 2024-05-22 DIAGNOSIS — E11.621 DIABETIC ULCER OF OTHER PART OF LEFT FOOT ASSOCIATED WITH TYPE 2 DIABETES MELLITUS, LIMITED TO BREAKDOWN OF SKIN: Primary | ICD-10-CM

## 2024-05-22 PROCEDURE — 99999 PR PBB SHADOW E&M-EST. PATIENT-LVL II: CPT | Mod: PBBFAC,,, | Performed by: PODIATRIST

## 2024-05-22 PROCEDURE — 99499 UNLISTED E&M SERVICE: CPT | Mod: S$PBB,,, | Performed by: PODIATRIST

## 2024-05-22 PROCEDURE — 99212 OFFICE O/P EST SF 10 MIN: CPT | Mod: PBBFAC,PO | Performed by: PODIATRIST

## 2024-05-22 PROCEDURE — 97597 DBRDMT OPN WND 1ST 20 CM/<: CPT | Mod: PBBFAC,PO | Performed by: PODIATRIST

## 2024-05-22 NOTE — PROGRESS NOTES
Subjective:      Patient ID: Keerthi Chase is a 48 y.o. female.    Chief Complaint: No chief complaint on file.  Patient presents to clinic for a 1 week wound check of the Lt. Forefoot.  Denies pain from said extremity with today's exam.  Notes football dressings were removed yesterday by cardiology.  Temporary bandages were then applied.  Has been ambulating only in the [employer] shoes.  Denies experiencing N/V/F/C/D.  Denies any additional pedal complaints.         Hemoglobin A1C   Date Value Ref Range Status   2022 10.5 (H) 0.0 - 5.6 % Final     Comment:     Reference Interval:  5.0 - 5.6 Normal   5.7 - 6.4 High Risk   > 6.5 Diabetic      Hgb A1c results are standardized based on the (NGSP) National   Glycohemoglobin Standardization Program.      Hemoglobin A1C levels are related to mean serum/plasma glucose   during the preceding 2-3 months.        2021 11.8 (H) 0.0 - 5.6 % Final     Comment:     Reference Interval:  5.0 - 5.6 Normal   5.7 - 6.4 High Risk   > 6.5 Diabetic      Hgb A1c results are standardized based on the (NGSP) National   Glycohemoglobin Standardization Program.      Hemoglobin A1C levels are related to mean serum/plasma glucose   during the preceding 2-3 months.        2021 8.8 (H) 4.8 - 5.6 % Final     Comment:              Prediabetes: 5.7 - 6.4           Diabetes: >6.4           Glycemic control for adults with diabetes: <7.0         Past Medical History:   Diagnosis Date    Bell's palsy     Coronary artery disease     Pt states Dr. Virk said she did not have a blockage after a stress test.    Diabetes mellitus     GERD (gastroesophageal reflux disease)     Hypertension     Obesity        Past Surgical History:   Procedure Laterality Date     SECTION      x 2    CHOLECYSTECTOMY      ESOPHAGOGASTRODUODENOSCOPY      PERIPHERALLY INSERTED CENTRAL CATHETER INSERTION N/A 10/11/2021    Procedure: INSERTION, PICC;  Surgeon: PRIYANK COSTA;  Location: [facility];  Service:  "Cardiology;  Laterality: N/A;  Dr Aiken    PERIPHERALLY INSERTED CENTRAL CATHETER INSERTION N/A 7/21/2022    Procedure: INSERTION, PICC;  Surgeon: PICC, PRIYANK;  Location: [facility] ENDO;  Service: Cardiology;  Laterality: N/A;  Dr Dubose    SESAMOIDECTOMY Left 11/11/2021    Procedure: SESAMOIDECTOMY;  Surgeon: Jacob Bernal DPM;  Location: [facility] OR;  Service: Podiatry;  Laterality: Left;       Family History   Problem Relation Name Age of Onset    Hypertension Mother         Social History     Socioeconomic History    Marital status:    Tobacco Use    Smoking status: Never    Smokeless tobacco: Never   Substance and Sexual Activity    Alcohol use: No    Drug use: No       Current Outpatient Medications   Medication Sig Dispense Refill    alprazolam (XANAX) 1 MG tablet Take 1 mg by mouth 2 (two) times daily as needed for Anxiety.      amoxicillin-clavulanate 875-125mg (AUGMENTIN) 875-125 mg per tablet Take 1 tablet by mouth every 12 (twelve) hours. 20 tablet 0    amoxicillin-clavulanate 875-125mg (AUGMENTIN) 875-125 mg per tablet Take 1 tablet by mouth every 12 (twelve) hours. 14 tablet 0    ampicillin (PRINCIPEN) 500 MG capsule Take 1 capsule (500 mg total) by mouth every 12 (twelve) hours. 20 capsule 0    BASAGLAR KWIKPEN 100 unit/mL (3 mL) InPn pen 75 Units once daily.   3    BD INSULIN PEN NEEDLE UF MINI 31 gauge x 3/16" Ndle   2    BD INSULIN SYRINGE ULTRA-FINE 1 mL 31 gauge x 5/16 Syrg use as directed      cephALEXin (KEFLEX) 500 MG capsule Take 1 capsule (500 mg total) by mouth every 12 (twelve) hours. 20 capsule 0    chlorthalidone (HYGROTEN) 25 MG Tab Take 1 tablet (25 mg total) by mouth once daily. 30 tablet 11    ciprofloxacin HCl (CIPRO) 750 MG tablet Take 1 tablet (750 mg total) by mouth every 12 (twelve) hours. 20 tablet 0    fluconazole (DIFLUCAN) 200 MG Tab Take 200 mg by mouth.      hydrocodone-acetaminophen 10-325mg (NORCO)  mg Tab Take 1 tablet by mouth 3 (three) times daily as needed.      " hydrocodone-acetaminophen 10-325mg (NORCO)  mg Tab Take 1 tablet by mouth every 8 (eight) hours as needed for Pain. 90 tablet 0    insulin detemir (LEVEMIR) 100 unit/mL injection Inject into the skin every evening.      insulin glargine,hum.rec.anlog (LANTUS SUBQ) Inject into the skin.      LANTUS U-100 INSULIN 100 unit/mL injection SMARTSI Unit(s) SUB-Q Every Night      mupirocin (BACTROBAN) 2 % ointment Apply topically 3 (three) times daily. 30 g 1    OZEMPIC 1 mg/dose (2 mg/1.5 mL) PnIj Inject 60 mg into the skin every 7 days.       OZEMPIC 1 mg/dose (4 mg/3 mL) Inject 1 mg into the skin every 7 days.      pantoprazole (PROTONIX) 40 MG tablet Take 40 mg by mouth.      pramipexole (MIRAPEX) 0.125 MG tablet Take 0.125 mg by mouth.      sulfamethoxazole-trimethoprim 800-160mg (BACTRIM DS) 800-160 mg Tab Take 1 tablet by mouth 2 (two) times daily. 14 tablet 0    traMADoL (ULTRAM) 50 mg tablet Take 1 tablet (50 mg total) by mouth every 6 (six) hours as needed for Pain. 28 tablet 0     No current facility-administered medications for this visit.       Review of patient's allergies indicates:   Allergen Reactions    Codeine Hives and Rash         Review of Systems   Constitutional: Negative for chills and fever.   Skin:  Positive for color change and nail changes. Negative for poor wound healing.   Gastrointestinal:  Negative for nausea and vomiting.   Neurological:  Positive for numbness.   Psychiatric/Behavioral:  Negative for altered mental status.            Objective:      Physical Exam  Constitutional:       Appearance: Normal appearance. She is not ill-appearing.   Cardiovascular:      Pulses:           Dorsalis pedis pulses are 2+ on the right side and 2+ on the left side.        Posterior tibial pulses are 2+ on the right side and 2+ on the left side.      Comments: CFT is < 3 seconds bilateral.  Pedal hair growth is decreased bilateral.  No lower extremity edema noted bilateral.  Toes are warm to  touch bilateral.    Musculoskeletal:         General: No signs of injury.      Right lower leg: No edema.      Left lower leg: No edema.      Comments: Muscle strength 5/5 in all muscle groups bilateral.  No tenderness nor crepitation with ROM of foot/ankle joints bilateral.  Slight dorsal contracture of the Lt. Hallux.  No gross deformity noted to said digit.  (-) for pain with palpation to the Lt. Sub 2nd met head wound.   Skin:     General: Skin is warm and dry.      Capillary Refill: Capillary refill takes 2 to 3 seconds.      Findings: Wound present. No bruising, ecchymosis, erythema, signs of injury, laceration, lesion, petechiae or rash.      Comments: Location: Open wound noted to the Lt. Sub 2nd met head.  Base: Down to dermis and comprised of fibrin.      Drainage: None  Chacorta wound: Devoid of erythema, fluctuance, purulence, and malodor.  Mild chacorta wound edema noted.   Pre-debridement measurement: 0.5 x 0.5 x 0.1cm   Post-debridement measurement: 0.7 x 0.7 x 0.1cm    Mature epithelium noted to the Rt. Sub 5th met head.     Neurological:      Mental Status: She is alert.      Sensory: Sensory deficit present.      Motor: No weakness.      Comments: Decreased protective sensation noted bilateral.  Light touch is absent bilateral.             Assessment:       Encounter Diagnoses   Name Primary?    Diabetic ulcer of other part of left foot associated with type 2 diabetes mellitus, limited to breakdown of skin Yes    Diabetic polyneuropathy associated with type 2 diabetes mellitus            Plan:       Diagnoses and all orders for this visit:    Diabetic ulcer of other part of left foot associated with type 2 diabetes mellitus, limited to breakdown of skin    Diabetic polyneuropathy associated with type 2 diabetes mellitus        I counseled the patient on her conditions, their implications and medical management.    Performed a selective excisional debridement of the Lt. Foot.  See attached procedure note.       The Lt. Forefoot wound was covered with justine, then offloaded with two full length felt pads, and a football dressing was applied.     May return to normal shoe gear on the Rt. Side, as the prior wound is healed with today's exam.    Advised to keep the dressing CDI x 1 week.    Advised to rest and elevate the affected extremity.    Instructed to minimize weight bearing to facilitate wound healing.    Advised to ambulate only in the postoperative shoe/boot on the Lt. side.    RTC in 1 week.    Jacob Bernal DPM

## 2024-05-22 NOTE — PROCEDURES
"Wound Debridement    Date/Time: 5/22/2024 7:40 AM    Performed by: Jacob Bernal DPM  Authorized by: Jacob Bernal DPM    Time out: Immediately prior to procedure a "time out" was called to verify the correct patient, procedure, equipment, support staff and site/side marked as required.    Consent Done?:  Yes (Verbal)  Local anesthesia used?: No      Wound Details:    Location:  Left foot    Location:  Left 2nd Metatarsal Head    Type of Debridement:  Excisional       Length (cm):  0.5       Area (sq cm):  0.25       Width (cm):  0.5       Percent Debrided (%):  100       Depth (cm):  0.1       Total Area Debrided (sq cm):  0.25    Depth of debridement:  Epidermis/Dermis    Tissue debrided:  Dermis    Devitalized tissue debrided:  Fibrin    Instruments:  Curette  Bleeding:  Minimal  Hemostasis Achieved: Yes  Method Used:  Pressure  Patient tolerance:  Patient tolerated the procedure well with no immediate complications    "

## 2024-05-28 ENCOUNTER — OFFICE VISIT (OUTPATIENT)
Dept: PODIATRY | Facility: CLINIC | Age: 49
End: 2024-05-28
Payer: MEDICAID

## 2024-05-28 VITALS — HEIGHT: 68 IN | WEIGHT: 270.06 LBS | BODY MASS INDEX: 40.93 KG/M2

## 2024-05-28 DIAGNOSIS — E11.42 DIABETIC POLYNEUROPATHY ASSOCIATED WITH TYPE 2 DIABETES MELLITUS: ICD-10-CM

## 2024-05-28 DIAGNOSIS — L97.521 DIABETIC ULCER OF OTHER PART OF LEFT FOOT ASSOCIATED WITH TYPE 2 DIABETES MELLITUS, LIMITED TO BREAKDOWN OF SKIN: Primary | ICD-10-CM

## 2024-05-28 DIAGNOSIS — E11.621 DIABETIC ULCER OF OTHER PART OF LEFT FOOT ASSOCIATED WITH TYPE 2 DIABETES MELLITUS, LIMITED TO BREAKDOWN OF SKIN: Primary | ICD-10-CM

## 2024-05-28 PROCEDURE — 99999 PR PBB SHADOW E&M-EST. PATIENT-LVL II: CPT | Mod: PBBFAC,,, | Performed by: PODIATRIST

## 2024-05-28 PROCEDURE — 99212 OFFICE O/P EST SF 10 MIN: CPT | Mod: PBBFAC,PO,25 | Performed by: PODIATRIST

## 2024-05-28 PROCEDURE — 99499 UNLISTED E&M SERVICE: CPT | Mod: S$PBB,,, | Performed by: PODIATRIST

## 2024-05-28 PROCEDURE — 97597 DBRDMT OPN WND 1ST 20 CM/<: CPT | Mod: PBBFAC,PO | Performed by: PODIATRIST

## 2024-05-28 NOTE — PROCEDURES
"Wound Debridement    Date/Time: 5/28/2024 10:00 AM    Performed by: Jacob Bernal DPM  Authorized by: Jacob Bernal DPM    Time out: Immediately prior to procedure a "time out" was called to verify the correct patient, procedure, equipment, support staff and site/side marked as required.    Consent Done?:  Yes (Verbal)  Local anesthesia used?: No      Wound Details:    Location:  Left foot    Location:  Left 2nd Metatarsal Head    Type of Debridement:  Excisional       Length (cm):  0.4       Area (sq cm):  0.12       Width (cm):  0.3       Percent Debrided (%):  100       Depth (cm):  0.1       Total Area Debrided (sq cm):  0.12    Depth of debridement:  Epidermis/Dermis    Tissue debrided:  Dermis    Devitalized tissue debrided:  Fibrin    Instruments:  Blade  Bleeding:  Minimal  Hemostasis Achieved: Yes  Method Used:  Pressure  Patient tolerance:  Patient tolerated the procedure well with no immediate complications    "

## 2024-05-28 NOTE — PROGRESS NOTES
Subjective:      Patient ID: Keerthi Chase is a 49 y.o. female.    Chief Complaint: Wound Care  Patient presents to clinic for a 1 week wound check of the Lt. Forefoot.  Denies pain from said extremity with today's exam.  Has kept the prior football dressing clean, dry, and intact for the past week.  Has been ambulating only in the DARCO shoes.  Denies experiencing N/V/F/C/D.  Denies any additional pedal complaints.         Hemoglobin A1C   Date Value Ref Range Status   2022 10.5 (H) 0.0 - 5.6 % Final     Comment:     Reference Interval:  5.0 - 5.6 Normal   5.7 - 6.4 High Risk   > 6.5 Diabetic      Hgb A1c results are standardized based on the (NGSP) National   Glycohemoglobin Standardization Program.      Hemoglobin A1C levels are related to mean serum/plasma glucose   during the preceding 2-3 months.        2021 11.8 (H) 0.0 - 5.6 % Final     Comment:     Reference Interval:  5.0 - 5.6 Normal   5.7 - 6.4 High Risk   > 6.5 Diabetic      Hgb A1c results are standardized based on the (NGSP) National   Glycohemoglobin Standardization Program.      Hemoglobin A1C levels are related to mean serum/plasma glucose   during the preceding 2-3 months.        2021 8.8 (H) 4.8 - 5.6 % Final     Comment:              Prediabetes: 5.7 - 6.4           Diabetes: >6.4           Glycemic control for adults with diabetes: <7.0         Past Medical History:   Diagnosis Date    Bell's palsy     Coronary artery disease     Pt states Dr. Virk said she did not have a blockage after a stress test.    Diabetes mellitus     GERD (gastroesophageal reflux disease)     Hypertension     Obesity        Past Surgical History:   Procedure Laterality Date     SECTION      x 2    CHOLECYSTECTOMY      ESOPHAGOGASTRODUODENOSCOPY      PERIPHERALLY INSERTED CENTRAL CATHETER INSERTION N/A 10/11/2021    Procedure: INSERTION, PICC;  Surgeon: PICC, STTAVIA;  Location: [facility] ENDO;  Service: Cardiology;  Laterality: N/A;  Dr Aiken     "PERIPHERALLY INSERTED CENTRAL CATHETER INSERTION N/A 7/21/2022    Procedure: INSERTION, PICC;  Surgeon: PICC, [facility];  Location: [facility] ENDO;  Service: Cardiology;  Laterality: N/A;  Dr FongGracy    SESAMOIDECTOMY Left 11/11/2021    Procedure: SESAMOIDECTOMY;  Surgeon: Jacob Bernal DPM;  Location: [facility] OR;  Service: Podiatry;  Laterality: Left;       Family History   Problem Relation Name Age of Onset    Hypertension Mother         Social History     Socioeconomic History    Marital status:    Tobacco Use    Smoking status: Never    Smokeless tobacco: Never   Substance and Sexual Activity    Alcohol use: No    Drug use: No       Current Outpatient Medications   Medication Sig Dispense Refill    alprazolam (XANAX) 1 MG tablet Take 1 mg by mouth 2 (two) times daily as needed for Anxiety.      amoxicillin-clavulanate 875-125mg (AUGMENTIN) 875-125 mg per tablet Take 1 tablet by mouth every 12 (twelve) hours. 20 tablet 0    amoxicillin-clavulanate 875-125mg (AUGMENTIN) 875-125 mg per tablet Take 1 tablet by mouth every 12 (twelve) hours. 14 tablet 0    ampicillin (PRINCIPEN) 500 MG capsule Take 1 capsule (500 mg total) by mouth every 12 (twelve) hours. 20 capsule 0    BASAGLAR KWIKPEN 100 unit/mL (3 mL) InPn pen 75 Units once daily.   3    BD INSULIN PEN NEEDLE UF MINI 31 gauge x 3/16" Ndle   2    BD INSULIN SYRINGE ULTRA-FINE 1 mL 31 gauge x 5/16 Syrg use as directed      cephALEXin (KEFLEX) 500 MG capsule Take 1 capsule (500 mg total) by mouth every 12 (twelve) hours. 20 capsule 0    chlorthalidone (HYGROTEN) 25 MG Tab Take 1 tablet (25 mg total) by mouth once daily. 30 tablet 11    ciprofloxacin HCl (CIPRO) 750 MG tablet Take 1 tablet (750 mg total) by mouth every 12 (twelve) hours. 20 tablet 0    fluconazole (DIFLUCAN) 200 MG Tab Take 200 mg by mouth.      hydrocodone-acetaminophen 10-325mg (NORCO)  mg Tab Take 1 tablet by mouth 3 (three) times daily as needed.      hydrocodone-acetaminophen 10-325mg " (NORCO)  mg Tab Take 1 tablet by mouth every 8 (eight) hours as needed for Pain. 90 tablet 0    insulin detemir (LEVEMIR) 100 unit/mL injection Inject into the skin every evening.      insulin glargine,hum.rec.anlog (LANTUS SUBQ) Inject into the skin.      LANTUS U-100 INSULIN 100 unit/mL injection SMARTSI Unit(s) SUB-Q Every Night      mupirocin (BACTROBAN) 2 % ointment Apply topically 3 (three) times daily. 30 g 1    OZEMPIC 1 mg/dose (2 mg/1.5 mL) PnIj Inject 60 mg into the skin every 7 days.       OZEMPIC 1 mg/dose (4 mg/3 mL) Inject 1 mg into the skin every 7 days.      pantoprazole (PROTONIX) 40 MG tablet Take 40 mg by mouth.      pramipexole (MIRAPEX) 0.125 MG tablet Take 0.125 mg by mouth.      sulfamethoxazole-trimethoprim 800-160mg (BACTRIM DS) 800-160 mg Tab Take 1 tablet by mouth 2 (two) times daily. 14 tablet 0    traMADoL (ULTRAM) 50 mg tablet Take 1 tablet (50 mg total) by mouth every 6 (six) hours as needed for Pain. 28 tablet 0     No current facility-administered medications for this visit.       Review of patient's allergies indicates:   Allergen Reactions    Codeine Hives and Rash         Review of Systems   Constitutional: Negative for chills and fever.   Skin:  Positive for color change and nail changes. Negative for poor wound healing.   Gastrointestinal:  Negative for nausea and vomiting.   Neurological:  Positive for numbness.   Psychiatric/Behavioral:  Negative for altered mental status.            Objective:      Physical Exam  Constitutional:       Appearance: Normal appearance. She is not ill-appearing.   Cardiovascular:      Pulses:           Dorsalis pedis pulses are 2+ on the right side and 2+ on the left side.        Posterior tibial pulses are 2+ on the right side and 2+ on the left side.      Comments: CFT is < 3 seconds bilateral.  Pedal hair growth is decreased bilateral.  No lower extremity edema noted bilateral.  Toes are warm to touch bilateral.    Musculoskeletal:          General: No signs of injury.      Right lower leg: No edema.      Left lower leg: No edema.      Comments: Muscle strength 5/5 in all muscle groups bilateral.  No tenderness nor crepitation with ROM of foot/ankle joints bilateral.  Slight dorsal contracture of the Lt. Hallux.  No gross deformity noted to said digit.  (-) for pain with palpation to the Lt. Sub 2nd met head wound.   Skin:     General: Skin is warm and dry.      Capillary Refill: Capillary refill takes 2 to 3 seconds.      Findings: Wound present. No bruising, ecchymosis, erythema, signs of injury, laceration, lesion, petechiae or rash.      Comments: Location: Open wound noted to the Lt. Sub 2nd met head.  Base: Down to dermis and comprised of fibrin.      Drainage: None  Chacorta wound: Devoid of erythema, fluctuance, purulence, and malodor.  Mild chacorta wound edema noted.   Pre-debridement measurement: 0.4 x 0.3 x 0.1cm   Post-debridement measurement: 0.6 x 0.5 x 0.1cm     Neurological:      Mental Status: She is alert.      Sensory: Sensory deficit present.      Motor: No weakness.      Comments: Decreased protective sensation noted bilateral.  Light touch is absent bilateral.               Assessment:       Encounter Diagnoses   Name Primary?    Diabetic ulcer of other part of left foot associated with type 2 diabetes mellitus, limited to breakdown of skin Yes    Diabetic polyneuropathy associated with type 2 diabetes mellitus            Plan:       Keerthi was seen today for wound care.    Diagnoses and all orders for this visit:    Diabetic ulcer of other part of left foot associated with type 2 diabetes mellitus, limited to breakdown of skin  -     Wound Debridement    Diabetic polyneuropathy associated with type 2 diabetes mellitus        I counseled the patient on her conditions, their implications and medical management.    Performed a selective excisional debridement of the Lt. Foot.  See attached procedure note.      The Lt. Forefoot wound  was covered with justine, then offloaded with two full length felt pads, and a football dressing was applied.     Advised to keep the dressing CDI x 1 week.    Advised to rest and elevate the affected extremity.    Instructed to minimize weight bearing to facilitate wound healing.    Advised to ambulate only in the postoperative shoe/boot on the Lt. side.    RTC in 1 week.    Jacob Bernal DPM

## 2024-06-05 ENCOUNTER — OFFICE VISIT (OUTPATIENT)
Dept: PODIATRY | Facility: CLINIC | Age: 49
End: 2024-06-05
Payer: MEDICAID

## 2024-06-05 VITALS — BODY MASS INDEX: 40.93 KG/M2 | HEIGHT: 68 IN | WEIGHT: 270.06 LBS

## 2024-06-05 DIAGNOSIS — E11.42 DIABETIC POLYNEUROPATHY ASSOCIATED WITH TYPE 2 DIABETES MELLITUS: ICD-10-CM

## 2024-06-05 DIAGNOSIS — E11.621 DIABETIC ULCER OF OTHER PART OF LEFT FOOT ASSOCIATED WITH TYPE 2 DIABETES MELLITUS, LIMITED TO BREAKDOWN OF SKIN: Primary | ICD-10-CM

## 2024-06-05 DIAGNOSIS — L97.521 DIABETIC ULCER OF OTHER PART OF LEFT FOOT ASSOCIATED WITH TYPE 2 DIABETES MELLITUS, LIMITED TO BREAKDOWN OF SKIN: Primary | ICD-10-CM

## 2024-06-05 PROCEDURE — 99212 OFFICE O/P EST SF 10 MIN: CPT | Mod: PBBFAC,PO | Performed by: PODIATRIST

## 2024-06-05 PROCEDURE — 97597 DBRDMT OPN WND 1ST 20 CM/<: CPT | Mod: PBBFAC,PO | Performed by: PODIATRIST

## 2024-06-05 PROCEDURE — 99999 PR PBB SHADOW E&M-EST. PATIENT-LVL II: CPT | Mod: PBBFAC,,, | Performed by: PODIATRIST

## 2024-06-05 PROCEDURE — 99499 UNLISTED E&M SERVICE: CPT | Mod: S$PBB,,, | Performed by: PODIATRIST

## 2024-06-05 NOTE — PROGRESS NOTES
Subjective:      Patient ID: Keerthi Chase is a 49 y.o. female.    Chief Complaint: No chief complaint on file.  Patient presents to clinic for a 1 week wound check of the Lt. Forefoot.  Denies pain from said extremity with today's exam.  Has kept the prior football dressing clean, dry, and intact for the past week.  Has been ambulating only in the DARCO shoes.  Denies experiencing N/V/F/C/D.  Denies any additional pedal complaints.         Hemoglobin A1C   Date Value Ref Range Status   2022 10.5 (H) 0.0 - 5.6 % Final     Comment:     Reference Interval:  5.0 - 5.6 Normal   5.7 - 6.4 High Risk   > 6.5 Diabetic      Hgb A1c results are standardized based on the (NGSP) National   Glycohemoglobin Standardization Program.      Hemoglobin A1C levels are related to mean serum/plasma glucose   during the preceding 2-3 months.        2021 11.8 (H) 0.0 - 5.6 % Final     Comment:     Reference Interval:  5.0 - 5.6 Normal   5.7 - 6.4 High Risk   > 6.5 Diabetic      Hgb A1c results are standardized based on the (NGSP) National   Glycohemoglobin Standardization Program.      Hemoglobin A1C levels are related to mean serum/plasma glucose   during the preceding 2-3 months.        2021 8.8 (H) 4.8 - 5.6 % Final     Comment:              Prediabetes: 5.7 - 6.4           Diabetes: >6.4           Glycemic control for adults with diabetes: <7.0         Past Medical History:   Diagnosis Date    Bell's palsy     Coronary artery disease     Pt states Dr. Virk said she did not have a blockage after a stress test.    Diabetes mellitus     GERD (gastroesophageal reflux disease)     Hypertension     Obesity        Past Surgical History:   Procedure Laterality Date     SECTION      x 2    CHOLECYSTECTOMY      ESOPHAGOGASTRODUODENOSCOPY      PERIPHERALLY INSERTED CENTRAL CATHETER INSERTION N/A 10/11/2021    Procedure: INSERTION, PICC;  Surgeon: IGOR, PRIYANK;  Location[facility];  Service: Cardiology;   "Laterality: N/A;  Dr Aiken    PERIPHERALLY INSERTED CENTRAL CATHETER INSERTION N/A 7/21/2022    Procedure: INSERTION, PICC;  Surgeon: PICC, PRIYANK;  Location: [facility] ENDO;  Service: Cardiology;  Laterality: N/A;  Dr Dubose    SESAMOIDECTOMY Left 11/11/2021    Procedure: SESAMOIDECTOMY;  Surgeon: Jacob Bernal DPM;  Location: [facility] OR;  Service: Podiatry;  Laterality: Left;       Family History   Problem Relation Name Age of Onset    Hypertension Mother         Social History     Socioeconomic History    Marital status:    Tobacco Use    Smoking status: Never    Smokeless tobacco: Never   Substance and Sexual Activity    Alcohol use: No    Drug use: No       Current Outpatient Medications   Medication Sig Dispense Refill    alprazolam (XANAX) 1 MG tablet Take 1 mg by mouth 2 (two) times daily as needed for Anxiety.      amoxicillin-clavulanate 875-125mg (AUGMENTIN) 875-125 mg per tablet Take 1 tablet by mouth every 12 (twelve) hours. 20 tablet 0    amoxicillin-clavulanate 875-125mg (AUGMENTIN) 875-125 mg per tablet Take 1 tablet by mouth every 12 (twelve) hours. 14 tablet 0    ampicillin (PRINCIPEN) 500 MG capsule Take 1 capsule (500 mg total) by mouth every 12 (twelve) hours. 20 capsule 0    BASAGLAR KWIKPEN 100 unit/mL (3 mL) InPn pen 75 Units once daily.   3    BD INSULIN PEN NEEDLE UF MINI 31 gauge x 3/16" Ndle   2    BD INSULIN SYRINGE ULTRA-FINE 1 mL 31 gauge x 5/16 Syrg use as directed      cephALEXin (KEFLEX) 500 MG capsule Take 1 capsule (500 mg total) by mouth every 12 (twelve) hours. 20 capsule 0    chlorthalidone (HYGROTEN) 25 MG Tab Take 1 tablet (25 mg total) by mouth once daily. 30 tablet 11    ciprofloxacin HCl (CIPRO) 750 MG tablet Take 1 tablet (750 mg total) by mouth every 12 (twelve) hours. 20 tablet 0    fluconazole (DIFLUCAN) 200 MG Tab Take 200 mg by mouth.      hydrocodone-acetaminophen 10-325mg (NORCO)  mg Tab Take 1 tablet by mouth 3 (three) times daily as " needed.      hydrocodone-acetaminophen 10-325mg (NORCO)  mg Tab Take 1 tablet by mouth every 8 (eight) hours as needed for Pain. 90 tablet 0    insulin detemir (LEVEMIR) 100 unit/mL injection Inject into the skin every evening.      insulin glargine,hum.rec.anlog (LANTUS SUBQ) Inject into the skin.      LANTUS U-100 INSULIN 100 unit/mL injection SMARTSI Unit(s) SUB-Q Every Night      mupirocin (BACTROBAN) 2 % ointment Apply topically 3 (three) times daily. 30 g 1    OZEMPIC 1 mg/dose (2 mg/1.5 mL) PnIj Inject 60 mg into the skin every 7 days.       OZEMPIC 1 mg/dose (4 mg/3 mL) Inject 1 mg into the skin every 7 days.      pantoprazole (PROTONIX) 40 MG tablet Take 40 mg by mouth.      pramipexole (MIRAPEX) 0.125 MG tablet Take 0.125 mg by mouth.      sulfamethoxazole-trimethoprim 800-160mg (BACTRIM DS) 800-160 mg Tab Take 1 tablet by mouth 2 (two) times daily. 14 tablet 0    traMADoL (ULTRAM) 50 mg tablet Take 1 tablet (50 mg total) by mouth every 6 (six) hours as needed for Pain. 28 tablet 0     No current facility-administered medications for this visit.       Review of patient's allergies indicates:   Allergen Reactions    Codeine Hives and Rash         Review of Systems   Constitutional: Negative for chills and fever.   Skin:  Positive for color change and nail changes. Negative for poor wound healing.   Gastrointestinal:  Negative for nausea and vomiting.   Neurological:  Positive for numbness.   Psychiatric/Behavioral:  Negative for altered mental status.            Objective:      Physical Exam  Constitutional:       Appearance: Normal appearance. She is not ill-appearing.   Cardiovascular:      Pulses:           Dorsalis pedis pulses are 2+ on the right side and 2+ on the left side.        Posterior tibial pulses are 2+ on the right side and 2+ on the left side.      Comments: CFT is < 3 seconds bilateral.  Pedal hair growth is decreased bilateral.  No lower extremity edema noted  bilateral.  Toes are warm to touch bilateral.    Musculoskeletal:         General: No signs of injury.      Right lower leg: No edema.      Left lower leg: No edema.      Comments: Muscle strength 5/5 in all muscle groups bilateral.  No tenderness nor crepitation with ROM of foot/ankle joints bilateral.  Slight dorsal contracture of the Lt. Hallux.  No gross deformity noted to said digit.  (-) for pain with palpation to the Lt. Sub 2nd met head wound.   Skin:     General: Skin is warm and dry.      Capillary Refill: Capillary refill takes 2 to 3 seconds.      Findings: Wound present. No bruising, ecchymosis, erythema, signs of injury, laceration, lesion, petechiae or rash.      Comments: Location: Open wound noted to the Lt. Sub 2nd met head.  Base: Down to dermis and comprised of fibrin.      Drainage: None  Mal[place] wound: Devoid of edema, erythema, fluctuance, purulence, and malodor.    Pre-debridement measurement: 0.4 x 0.2 x 0.1cm   Post-debridement measurement: 0.6 x 0.4 x 0.1cm     Neurological:      Mental Status: She is alert.      Sensory: Sensory deficit present.      Motor: No weakness.      Comments: Decreased protective sensation noted bilateral.  Light touch is absent bilateral.             Assessment:       Encounter Diagnoses   Name Primary?    Diabetic ulcer of other part of left foot associated with type 2 diabetes mellitus, limited to breakdown of skin Yes    Diabetic polyneuropathy associated with type 2 diabetes mellitus              Plan:       Diagnoses and all orders for this visit:    Diabetic ulcer of other part of left foot associated with type 2 diabetes mellitus, limited to breakdown of skin    Diabetic polyneuropathy associated with type 2 diabetes mellitus          I counseled the patient on her conditions, their implications and medical management.    Performed a selective excisional debridement of the Lt. Foot.  See attached procedure note.      The Lt. Forefoot wound was covered with  justine, then offloaded with two full length felt pads, and a football dressing was applied.     Advised to keep the dressing CDI x 1 week.    Advised to rest and elevate the affected extremity.    Instructed to minimize weight bearing to facilitate wound healing.    Advised to ambulate only in the postoperative shoe/boot on the Lt. side.    RTC in 1 week.    Jacob Bernal DPM

## 2024-06-05 NOTE — PROCEDURES
"Wound Debridement    Date/Time: 6/5/2024 7:00 AM    Performed by: Jacob Bernal DPM  Authorized by: Jacob Bernal DPM    Time out: Immediately prior to procedure a "time out" was called to verify the correct patient, procedure, equipment, support staff and site/side marked as required.    Consent Done?:  Yes (Verbal)  Local anesthesia used?: No      Wound Details:    Location:  Left foot    Location:  Left 2nd Metatarsal Head    Type of Debridement:  Excisional       Length (cm):  0.4       Area (sq cm):  0.08       Width (cm):  0.2       Percent Debrided (%):  100       Depth (cm):  0.1       Total Area Debrided (sq cm):  0.08    Depth of debridement:  Epidermis/Dermis    Tissue debrided:  Dermis    Devitalized tissue debrided:  Fibrin    Instruments:  Blade  Bleeding:  Minimal  Hemostasis Achieved: Yes  Method Used:  Pressure  Patient tolerance:  Patient tolerated the procedure well with no immediate complications    "

## 2024-06-13 ENCOUNTER — OFFICE VISIT (OUTPATIENT)
Dept: PODIATRY | Facility: CLINIC | Age: 49
End: 2024-06-13
Payer: MEDICAID

## 2024-06-13 VITALS — HEIGHT: 68 IN | WEIGHT: 270.06 LBS | BODY MASS INDEX: 40.93 KG/M2

## 2024-06-13 DIAGNOSIS — E11.621 DIABETIC ULCER OF OTHER PART OF LEFT FOOT ASSOCIATED WITH TYPE 2 DIABETES MELLITUS, LIMITED TO BREAKDOWN OF SKIN: Primary | ICD-10-CM

## 2024-06-13 DIAGNOSIS — E11.42 DIABETIC POLYNEUROPATHY ASSOCIATED WITH TYPE 2 DIABETES MELLITUS: ICD-10-CM

## 2024-06-13 DIAGNOSIS — L97.521 DIABETIC ULCER OF OTHER PART OF LEFT FOOT ASSOCIATED WITH TYPE 2 DIABETES MELLITUS, LIMITED TO BREAKDOWN OF SKIN: Primary | ICD-10-CM

## 2024-06-13 PROCEDURE — 99999 PR PBB SHADOW E&M-EST. PATIENT-LVL II: CPT | Mod: PBBFAC,,, | Performed by: PODIATRIST

## 2024-06-13 PROCEDURE — 97597 DBRDMT OPN WND 1ST 20 CM/<: CPT | Mod: PBBFAC,PO | Performed by: PODIATRIST

## 2024-06-13 PROCEDURE — 99212 OFFICE O/P EST SF 10 MIN: CPT | Mod: PBBFAC,PO | Performed by: PODIATRIST

## 2024-06-13 PROCEDURE — 99499 UNLISTED E&M SERVICE: CPT | Mod: S$PBB,,, | Performed by: PODIATRIST

## 2024-06-13 NOTE — PROCEDURES
"Wound Debridement    Date/Time: 6/13/2024 7:40 AM    Performed by: Jacob Bernal DPM  Authorized by: Jacob Bernal DPM    Time out: Immediately prior to procedure a "time out" was called to verify the correct patient, procedure, equipment, support staff and site/side marked as required.    Consent Done?:  Yes (Verbal)  Local anesthesia used?: No      Wound Details:    Location:  Left foot    Location:  Left 2nd Metatarsal Head    Type of Debridement:  Excisional       Length (cm):  0.2       Area (sq cm):  0.04       Width (cm):  0.2       Percent Debrided (%):  100       Depth (cm):  0.1       Total Area Debrided (sq cm):  0.04    Depth of debridement:  Epidermis/Dermis    Tissue debrided:  Dermis    Devitalized tissue debrided:  Fibrin    Instruments:  Blade  Bleeding:  Minimal  Hemostasis Achieved: Yes  Method Used:  Pressure  Patient tolerance:  Patient tolerated the procedure well with no immediate complications    "

## 2024-06-13 NOTE — PROGRESS NOTES
Subjective:      Patient ID: Keerthi Chase is a 49 y.o. female.    Chief Complaint: Wound Care  Patient presents to clinic for a 1 week wound check of the Lt. Forefoot.  Notes having an increase in neuropathy pain in the lower leg, however, denies this being associated with pain in the wound itself.  Has kept the prior football dressing clean, dry, and intact for the past week.  Has been ambulating only in the DARCO shoes.  Denies experiencing N/V/F/C/D.  Denies any additional pedal complaints.         Hemoglobin A1C   Date Value Ref Range Status   2022 10.5 (H) 0.0 - 5.6 % Final     Comment:     Reference Interval:  5.0 - 5.6 Normal   5.7 - 6.4 High Risk   > 6.5 Diabetic      Hgb A1c results are standardized based on the (NGSP) National   Glycohemoglobin Standardization Program.      Hemoglobin A1C levels are related to mean serum/plasma glucose   during the preceding 2-3 months.        2021 11.8 (H) 0.0 - 5.6 % Final     Comment:     Reference Interval:  5.0 - 5.6 Normal   5.7 - 6.4 High Risk   > 6.5 Diabetic      Hgb A1c results are standardized based on the (NGSP) National   Glycohemoglobin Standardization Program.      Hemoglobin A1C levels are related to mean serum/plasma glucose   during the preceding 2-3 months.        2021 8.8 (H) 4.8 - 5.6 % Final     Comment:              Prediabetes: 5.7 - 6.4           Diabetes: >6.4           Glycemic control for adults with diabetes: <7.0         Past Medical History:   Diagnosis Date    Bell's palsy     Coronary artery disease     Pt states Dr. Virk said she did not have a blockage after a stress test.    Diabetes mellitus     GERD (gastroesophageal reflux disease)     Hypertension     Obesity        Past Surgical History:   Procedure Laterality Date     SECTION      x 2    CHOLECYSTECTOMY      ESOPHAGOGASTRODUODENOSCOPY      PERIPHERALLY INSERTED CENTRAL CATHETER INSERTION N/A 10/11/2021    Procedure: INSERTION, PICC;   "Surgeon: PICC, [facility];  [facility];  Service: Cardiology;  Laterality: N/A;  Dr Aiken    PERIPHERALLY INSERTED CENTRAL CATHETER INSERTION N/A 7/21/2022    Procedure: INSERTION, PICC;  Surgeon: PICC, [facility];  Location: [facility];  Service: Cardiology;  Laterality: N/A;  Dr Dubose    SESAMOIDECTOMY Left 11/11/2021    Procedure: SESAMOIDECTOMY;  Surgeon: Jacob Bernal DPM;  Location: [facility] OR;  Service: Podiatry;  Laterality: Left;       Family History   Problem Relation Name Age of Onset    Hypertension Mother         Social History     Socioeconomic History    Marital status:    Tobacco Use    Smoking status: Never    Smokeless tobacco: Never   Substance and Sexual Activity    Alcohol use: No    Drug use: No       Current Outpatient Medications   Medication Sig Dispense Refill    alprazolam (XANAX) 1 MG tablet Take 1 mg by mouth 2 (two) times daily as needed for Anxiety.      amoxicillin-clavulanate 875-125mg (AUGMENTIN) 875-125 mg per tablet Take 1 tablet by mouth every 12 (twelve) hours. 20 tablet 0    amoxicillin-clavulanate 875-125mg (AUGMENTIN) 875-125 mg per tablet Take 1 tablet by mouth every 12 (twelve) hours. 14 tablet 0    ampicillin (PRINCIPEN) 500 MG capsule Take 1 capsule (500 mg total) by mouth every 12 (twelve) hours. 20 capsule 0    BASAGLAR KWIKPEN 100 unit/mL (3 mL) InPn pen 75 Units once daily.   3    BD INSULIN PEN NEEDLE UF MINI 31 gauge x 3/16" Ndle   2    BD INSULIN SYRINGE ULTRA-FINE 1 mL 31 gauge x 5/16 Syrg use as directed      cephALEXin (KEFLEX) 500 MG capsule Take 1 capsule (500 mg total) by mouth every 12 (twelve) hours. 20 capsule 0    chlorthalidone (HYGROTEN) 25 MG Tab Take 1 tablet (25 mg total) by mouth once daily. 30 tablet 11    ciprofloxacin HCl (CIPRO) 750 MG tablet Take 1 tablet (750 mg total) by mouth every 12 (twelve) hours. 20 tablet 0    fluconazole (DIFLUCAN) 200 MG Tab Take 200 mg by mouth.      hydrocodone-acetaminophen 10-325mg (NORCO) "  mg Tab Take 1 tablet by mouth 3 (three) times daily as needed.      hydrocodone-acetaminophen 10-325mg (NORCO)  mg Tab Take 1 tablet by mouth every 8 (eight) hours as needed for Pain. 90 tablet 0    insulin detemir (LEVEMIR) 100 unit/mL injection Inject into the skin every evening.      insulin glargine,hum.rec.anlog (LANTUS SUBQ) Inject into the skin.      LANTUS U-100 INSULIN 100 unit/mL injection SMARTSI Unit(s) SUB-Q Every Night      mupirocin (BACTROBAN) 2 % ointment Apply topically 3 (three) times daily. 30 g 1    OZEMPIC 1 mg/dose (2 mg/1.5 mL) PnIj Inject 60 mg into the skin every 7 days.       OZEMPIC 1 mg/dose (4 mg/3 mL) Inject 1 mg into the skin every 7 days.      pantoprazole (PROTONIX) 40 MG tablet Take 40 mg by mouth.      pramipexole (MIRAPEX) 0.125 MG tablet Take 0.125 mg by mouth.      sulfamethoxazole-trimethoprim 800-160mg (BACTRIM DS) 800-160 mg Tab Take 1 tablet by mouth 2 (two) times daily. 14 tablet 0    traMADoL (ULTRAM) 50 mg tablet Take 1 tablet (50 mg total) by mouth every 6 (six) hours as needed for Pain. 28 tablet 0     No current facility-administered medications for this visit.       Review of patient's allergies indicates:   Allergen Reactions    Codeine Hives and Rash         Review of Systems   Constitutional: Negative for chills and fever.   Skin:  Positive for color change and nail changes. Negative for poor wound healing.   Gastrointestinal:  Negative for nausea and vomiting.   Neurological:  Positive for numbness.   Psychiatric/Behavioral:  Negative for altered mental status.            Objective:      Physical Exam  Constitutional:       Appearance: Normal appearance. She is not ill-appearing.   Cardiovascular:      Pulses:           Dorsalis pedis pulses are 2+ on the right side and 2+ on the left side.        Posterior tibial pulses are 2+ on the right side and 2+ on the left side.      Comments: CFT is < 3 seconds bilateral.  Pedal hair growth  is decreased bilateral.  No lower extremity edema noted bilateral.  Toes are warm to touch bilateral.    Musculoskeletal:         General: No signs of injury.      Right lower leg: No edema.      Left lower leg: No edema.      Comments: Muscle strength 5/5 in all muscle groups bilateral.  No tenderness nor crepitation with ROM of foot/ankle joints bilateral.  Slight dorsal contracture of the Lt. Hallux.  No gross deformity noted to said digit.  (-) for pain with palpation to the Lt. Sub 2nd met head wound.   Skin:     General: Skin is warm and dry.      Capillary Refill: Capillary refill takes 2 to 3 seconds.      Findings: Wound present. No bruising, ecchymosis, erythema, signs of injury, laceration, lesion, petechiae or rash.      Comments: Location: Open wound noted to the Lt. Sub 2nd met head.  Base: Down to dermis and comprised of fibrin.      Drainage: None  Malinda wound: Devoid of edema, erythema, fluctuance, purulence, and malodor.    Pre-debridement measurement: 0.2 x 0.2 x 0.1cm   Post-debridement measurement: 0.4 x 0.4 x 0.1cm     Neurological:      Mental Status: She is alert.      Sensory: Sensory deficit present.      Motor: No weakness.      Comments: Decreased protective sensation noted bilateral.  Light touch is absent bilateral.             Assessment:       Encounter Diagnoses   Name Primary?    Diabetic ulcer of other part of left foot associated with type 2 diabetes mellitus, limited to breakdown of skin Yes    Diabetic polyneuropathy associated with type 2 diabetes mellitus              Plan:       Keerthi was seen today for wound care.    Diagnoses and all orders for this visit:    Diabetic ulcer of other part of left foot associated with type 2 diabetes mellitus, limited to breakdown of skin    Diabetic polyneuropathy associated with type 2 diabetes mellitus          I counseled the patient on her conditions, their implications and medical management.    Performed a selective excisional  debridement of the Lt. Foot.  See attached procedure note.      The Lt. Forefoot wound was covered with justine, then offloaded with two full length felt pads, and a football dressing was applied.     Advised to keep the dressing CDI x 1 week.    Advised to rest and elevate the affected extremity.    Instructed to minimize weight bearing to facilitate wound healing.    Advised to ambulate only in the postoperative shoe/boot on the Lt. side.    RTC in 1 week.    Jacob Bernal DPM

## 2024-06-19 ENCOUNTER — OFFICE VISIT (OUTPATIENT)
Dept: PODIATRY | Facility: CLINIC | Age: 49
End: 2024-06-19
Payer: MEDICAID

## 2024-06-19 VITALS — WEIGHT: 270.06 LBS | HEIGHT: 68 IN | BODY MASS INDEX: 40.93 KG/M2

## 2024-06-19 DIAGNOSIS — E11.621 DIABETIC ULCER OF OTHER PART OF LEFT FOOT ASSOCIATED WITH TYPE 2 DIABETES MELLITUS, LIMITED TO BREAKDOWN OF SKIN: Primary | ICD-10-CM

## 2024-06-19 DIAGNOSIS — E11.42 DIABETIC POLYNEUROPATHY ASSOCIATED WITH TYPE 2 DIABETES MELLITUS: ICD-10-CM

## 2024-06-19 DIAGNOSIS — L97.521 DIABETIC ULCER OF OTHER PART OF LEFT FOOT ASSOCIATED WITH TYPE 2 DIABETES MELLITUS, LIMITED TO BREAKDOWN OF SKIN: Primary | ICD-10-CM

## 2024-06-19 PROCEDURE — 97597 DBRDMT OPN WND 1ST 20 CM/<: CPT | Mod: PBBFAC,PO | Performed by: PODIATRIST

## 2024-06-19 PROCEDURE — 99212 OFFICE O/P EST SF 10 MIN: CPT | Mod: PBBFAC,PO | Performed by: PODIATRIST

## 2024-06-19 PROCEDURE — 99499 UNLISTED E&M SERVICE: CPT | Mod: S$PBB,,, | Performed by: PODIATRIST

## 2024-06-19 PROCEDURE — 99999 PR PBB SHADOW E&M-EST. PATIENT-LVL II: CPT | Mod: PBBFAC,,, | Performed by: PODIATRIST

## 2024-06-19 NOTE — PROGRESS NOTES
Subjective:      Patient ID: Keerthi Chase is a 49 y.o. female.    Chief Complaint: Wound Care  Patient presents to clinic for a 1 week wound check of the Lt. Forefoot.  Denies pain from the wound with today's exam.   Has kept the prior football dressing clean, dry, and intact for the past week.  Has been ambulating only in the [employer] shoes.  Denies experiencing N/V/F/C/D.  Denies any additional pedal complaints.         Hemoglobin A1C   Date Value Ref Range Status   2022 10.5 (H) 0.0 - 5.6 % Final     Comment:     Reference Interval:  5.0 - 5.6 Normal   5.7 - 6.4 High Risk   > 6.5 Diabetic      Hgb A1c results are standardized based on the (NGSP) National   Glycohemoglobin Standardization Program.      Hemoglobin A1C levels are related to mean serum/plasma glucose   during the preceding 2-3 months.        2021 11.8 (H) 0.0 - 5.6 % Final     Comment:     Reference Interval:  5.0 - 5.6 Normal   5.7 - 6.4 High Risk   > 6.5 Diabetic      Hgb A1c results are standardized based on the (NGSP) National   Glycohemoglobin Standardization Program.      Hemoglobin A1C levels are related to mean serum/plasma glucose   during the preceding 2-3 months.        2021 8.8 (H) 4.8 - 5.6 % Final     Comment:              Prediabetes: 5.7 - 6.4           Diabetes: >6.4           Glycemic control for adults with diabetes: <7.0         Past Medical History:   Diagnosis Date    Bell's palsy     Coronary artery disease     Pt states Dr. Virk said she did not have a blockage after a stress test.    Diabetes mellitus     GERD (gastroesophageal reflux disease)     Hypertension     Obesity        Past Surgical History:   Procedure Laterality Date     SECTION      x 2    CHOLECYSTECTOMY      ESOPHAGOGASTRODUODENOSCOPY      PERIPHERALLY INSERTED CENTRAL CATHETER INSERTION N/A 10/11/2021    Procedure: INSERTION, PICC;  Surgeon: PICC, [facility];  Location: [facility];  Service: Cardiology;  Laterality: N/A;  Dr Aiken     "PERIPHERALLY INSERTED CENTRAL CATHETER INSERTION N/A 7/21/2022    Procedure: INSERTION, PICC;  Surgeon: PICC, [facility];  Location: [facility];  Service: Cardiology;  Laterality: N/A;  Dr FongGracy    SESAMOIDECTOMY Left 11/11/2021    Procedure: SESAMOIDECTOMY;  Surgeon: Jacob Bernal DPM;  Location: [facility] OR;  Service: Podiatry;  Laterality: Left;       Family History   Problem Relation Name Age of Onset    Hypertension Mother         Social History     Socioeconomic History    Marital status:    Tobacco Use    Smoking status: Never    Smokeless tobacco: Never   Substance and Sexual Activity    Alcohol use: No    Drug use: No       Current Outpatient Medications   Medication Sig Dispense Refill    alprazolam (XANAX) 1 MG tablet Take 1 mg by mouth 2 (two) times daily as needed for Anxiety.      amoxicillin-clavulanate 875-125mg (AUGMENTIN) 875-125 mg per tablet Take 1 tablet by mouth every 12 (twelve) hours. 20 tablet 0    amoxicillin-clavulanate 875-125mg (AUGMENTIN) 875-125 mg per tablet Take 1 tablet by mouth every 12 (twelve) hours. 14 tablet 0    ampicillin (PRINCIPEN) 500 MG capsule Take 1 capsule (500 mg total) by mouth every 12 (twelve) hours. 20 capsule 0    BASAGLAR KWIKPEN 100 unit/mL (3 mL) InPn pen 75 Units once daily.   3    BD INSULIN PEN NEEDLE UF MINI 31 gauge x 3/16" Ndle   2    BD INSULIN SYRINGE ULTRA-FINE 1 mL 31 gauge x 5/16 Syrg use as directed      cephALEXin (KEFLEX) 500 MG capsule Take 1 capsule (500 mg total) by mouth every 12 (twelve) hours. 20 capsule 0    chlorthalidone (HYGROTEN) 25 MG Tab Take 1 tablet (25 mg total) by mouth once daily. 30 tablet 11    ciprofloxacin HCl (CIPRO) 750 MG tablet Take 1 tablet (750 mg total) by mouth every 12 (twelve) hours. 20 tablet 0    fluconazole (DIFLUCAN) 200 MG Tab Take 200 mg by mouth.      hydrocodone-acetaminophen 10-325mg (NORCO)  mg Tab Take 1 tablet by mouth 3 (three) times daily as needed.      hydrocodone-acetaminophen 10-325mg " (NORCO)  mg Tab Take 1 tablet by mouth every 8 (eight) hours as needed for Pain. 90 tablet 0    insulin detemir (LEVEMIR) 100 unit/mL injection Inject into the skin every evening.      insulin glargine,hum.rec.anlog (LANTUS SUBQ) Inject into the skin.      LANTUS U-100 INSULIN 100 unit/mL injection SMARTSI Unit(s) SUB-Q Every Night      mupirocin (BACTROBAN) 2 % ointment Apply topically 3 (three) times daily. 30 g 1    OZEMPIC 1 mg/dose (2 mg/1.5 mL) PnIj Inject 60 mg into the skin every 7 days.       OZEMPIC 1 mg/dose (4 mg/3 mL) Inject 1 mg into the skin every 7 days.      pantoprazole (PROTONIX) 40 MG tablet Take 40 mg by mouth.      pramipexole (MIRAPEX) 0.125 MG tablet Take 0.125 mg by mouth.      sulfamethoxazole-trimethoprim 800-160mg (BACTRIM DS) 800-160 mg Tab Take 1 tablet by mouth 2 (two) times daily. 14 tablet 0    traMADoL (ULTRAM) 50 mg tablet Take 1 tablet (50 mg total) by mouth every 6 (six) hours as needed for Pain. 28 tablet 0     No current facility-administered medications for this visit.       Review of patient's allergies indicates:   Allergen Reactions    Codeine Hives and Rash         Review of Systems   Constitutional: Negative for chills and fever.   Skin:  Positive for color change and nail changes. Negative for poor wound healing.   Gastrointestinal:  Negative for nausea and vomiting.   Neurological:  Positive for numbness.   Psychiatric/Behavioral:  Negative for altered mental status.            Objective:      Physical Exam  Constitutional:       Appearance: Normal appearance. She is not ill-appearing.   Cardiovascular:      Pulses:           Dorsalis pedis pulses are 2+ on the right side and 2+ on the left side.        Posterior tibial pulses are 2+ on the right side and 2+ on the left side.      Comments: CFT is < 3 seconds bilateral.  Pedal hair growth is decreased bilateral.  No lower extremity edema noted bilateral.  Toes are warm to touch bilateral.    Musculoskeletal:          General: No signs of injury.      Right lower leg: No edema.      Left lower leg: No edema.      Comments: Muscle strength 5/5 in all muscle groups bilateral.  No tenderness nor crepitation with ROM of foot/ankle joints bilateral.  Slight dorsal contracture of the Lt. Hallux.  No gross deformity noted to said digit.  (-) for pain with palpation to the Lt. Sub 2nd met head wound.   Skin:     General: Skin is warm and dry.      Capillary Refill: Capillary refill takes 2 to 3 seconds.      Findings: Wound present. No bruising, ecchymosis, erythema, signs of injury, laceration, lesion, petechiae or rash.      Comments: Location: Open wound noted to the Lt. Sub 2nd met head.  Base: Down to dermis and comprised of fibrin.      Drainage: None  Malinda wound: Devoid of edema, erythema, fluctuance, purulence, and malodor.    Pre-debridement measurement: 0.1 x 0.1 x 0.1cm   Post-debridement measurement: 0.3 x 0.3 x 0.1cm     Neurological:      Mental Status: She is alert.      Sensory: Sensory deficit present.      Motor: No weakness.      Comments: Decreased protective sensation noted bilateral.  Light touch is absent bilateral.               Assessment:       Encounter Diagnoses   Name Primary?    Diabetic ulcer of other part of left foot associated with type 2 diabetes mellitus, limited to breakdown of skin Yes    Diabetic polyneuropathy associated with type 2 diabetes mellitus              Plan:       Keerthi was seen today for wound care.    Diagnoses and all orders for this visit:    Diabetic ulcer of other part of left foot associated with type 2 diabetes mellitus, limited to breakdown of skin  -     Wound Debridement    Diabetic polyneuropathy associated with type 2 diabetes mellitus          I counseled the patient on her conditions, their implications and medical management.    Performed a selective excisional debridement of the Lt. Foot.  See attached procedure note.      The Lt. Forefoot wound was covered with  justine, then offloaded with two full length felt pads, and a football dressing was applied.     Advised to keep the dressing CDI x 1 week.    Advised to rest and elevate the affected extremity.    Instructed to minimize weight bearing to facilitate wound healing.    Advised to ambulate only in the postoperative shoe/boot on the Lt. side.    RTC in 1 week.    Jacob Bernal DPM

## 2024-06-19 NOTE — PROCEDURES
"Wound Debridement    Date/Time: 6/19/2024 8:40 AM    Performed by: Jacob Bernal DPM  Authorized by: Jacob Bernal DPM    Time out: Immediately prior to procedure a "time out" was called to verify the correct patient, procedure, equipment, support staff and site/side marked as required.    Consent Done?:  Yes (Verbal)  Local anesthesia used?: No      Wound Details:    Location:  Left foot    Location:  Left 2nd Metatarsal Head    Type of Debridement:  Excisional       Length (cm):  0.1       Area (sq cm):  0.01       Width (cm):  0.1       Percent Debrided (%):  100       Depth (cm):  0.1       Total Area Debrided (sq cm):  0.01    Depth of debridement:  Epidermis/Dermis    Tissue debrided:  Dermis    Devitalized tissue debrided:  Fibrin    Instruments:  Blade  Bleeding:  Minimal  Hemostasis Achieved: Yes  Method Used:  Pressure  Patient tolerance:  Patient tolerated the procedure well with no immediate complications    "

## 2024-06-26 ENCOUNTER — OFFICE VISIT (OUTPATIENT)
Dept: PODIATRY | Facility: CLINIC | Age: 49
End: 2024-06-26
Payer: MEDICAID

## 2024-06-26 VITALS — BODY MASS INDEX: 40.93 KG/M2 | HEIGHT: 68 IN | WEIGHT: 270.06 LBS

## 2024-06-26 DIAGNOSIS — E11.42 DIABETIC POLYNEUROPATHY ASSOCIATED WITH TYPE 2 DIABETES MELLITUS: ICD-10-CM

## 2024-06-26 DIAGNOSIS — L97.521 DIABETIC ULCER OF OTHER PART OF LEFT FOOT ASSOCIATED WITH TYPE 2 DIABETES MELLITUS, LIMITED TO BREAKDOWN OF SKIN: Primary | ICD-10-CM

## 2024-06-26 DIAGNOSIS — E11.621 DIABETIC ULCER OF OTHER PART OF LEFT FOOT ASSOCIATED WITH TYPE 2 DIABETES MELLITUS, LIMITED TO BREAKDOWN OF SKIN: Primary | ICD-10-CM

## 2024-06-26 PROCEDURE — 87147 CULTURE TYPE IMMUNOLOGIC: CPT | Performed by: PODIATRIST

## 2024-06-26 PROCEDURE — 99212 OFFICE O/P EST SF 10 MIN: CPT | Mod: PBBFAC,PO | Performed by: PODIATRIST

## 2024-06-26 PROCEDURE — 97597 DBRDMT OPN WND 1ST 20 CM/<: CPT | Mod: PBBFAC,PO | Performed by: PODIATRIST

## 2024-06-26 PROCEDURE — 99999 PR PBB SHADOW E&M-EST. PATIENT-LVL II: CPT | Mod: PBBFAC,,, | Performed by: PODIATRIST

## 2024-06-26 PROCEDURE — 87075 CULTR BACTERIA EXCEPT BLOOD: CPT | Performed by: PODIATRIST

## 2024-06-26 PROCEDURE — 99499 UNLISTED E&M SERVICE: CPT | Mod: S$PBB,,, | Performed by: PODIATRIST

## 2024-06-26 PROCEDURE — 87070 CULTURE OTHR SPECIMN AEROBIC: CPT | Performed by: PODIATRIST

## 2024-06-26 NOTE — PROCEDURES
"Wound Debridement    Date/Time: 6/26/2024 8:40 AM    Performed by: Jacob Bernla DPM  Authorized by: Jacob Bernal DPM    Time out: Immediately prior to procedure a "time out" was called to verify the correct patient, procedure, equipment, support staff and site/side marked as required.    Consent Done?:  Yes (Verbal)  Local anesthesia used?: No      Wound Details:    Location:  Left foot    Location:  Left 2nd Metatarsal Head    Type of Debridement:  Excisional       Length (cm):  0.1       Area (sq cm):  0.01       Width (cm):  0.1       Percent Debrided (%):  100       Depth (cm):  0.1       Total Area Debrided (sq cm):  0.01    Depth of debridement:  Epidermis/Dermis    Tissue debrided:  Dermis    Devitalized tissue debrided:  Fibrin    Instruments:  Blade  Bleeding:  Minimal  Hemostasis Achieved: Yes  Method Used:  Pressure  Patient tolerance:  Patient tolerated the procedure well with no immediate complications    "

## 2024-06-26 NOTE — PROGRESS NOTES
Subjective:      Patient ID: Keerthi Chase is a 49 y.o. female.    Chief Complaint: Wound Care  Patient presents to clinic for a 1 week wound check of the Lt. Forefoot.  Notes an increase in pain from the wound with today's exam.  Rates pain as a 2/10.  Symptoms have been aggravated with weight bearing pressure, as they were absent a week ago.   Has kept the prior football dressing clean, dry, and intact for the past week.  Has been ambulating only in the DAR[employer] shoes.  Denies experiencing N/V/F/C/D.  Denies any additional pedal complaints.         Hemoglobin A1C   Date Value Ref Range Status   2022 10.5 (H) 0.0 - 5.6 % Final     Comment:     Reference Interval:  5.0 - 5.6 Normal   5.7 - 6.4 High Risk   > 6.5 Diabetic      Hgb A1c results are standardized based on the (NGSP) National   Glycohemoglobin Standardization Program.      Hemoglobin A1C levels are related to mean serum/plasma glucose   during the preceding 2-3 months.        2021 11.8 (H) 0.0 - 5.6 % Final     Comment:     Reference Interval:  5.0 - 5.6 Normal   5.7 - 6.4 High Risk   > 6.5 Diabetic      Hgb A1c results are standardized based on the (NGSP) National   Glycohemoglobin Standardization Program.      Hemoglobin A1C levels are related to mean serum/plasma glucose   during the preceding 2-3 months.        2021 8.8 (H) 4.8 - 5.6 % Final     Comment:              Prediabetes: 5.7 - 6.4           Diabetes: >6.4           Glycemic control for adults with diabetes: <7.0         Past Medical History:   Diagnosis Date    Bell's palsy     Coronary artery disease     Pt states Dr. Virk said she did not have a blockage after a stress test.    Diabetes mellitus     GERD (gastroesophageal reflux disease)     Hypertension     Obesity        Past Surgical History:   Procedure Laterality Date     SECTION      x 2    CHOLECYSTECTOMY      ESOPHAGOGASTRODUODENOSCOPY      PERIPHERALLY INSERTED CENTRAL CATHETER INSERTION N/A 10/11/2021     "Procedure: INSERTION, PICC;  Surgeon: PICC, [facility];  [facility];  Service: Cardiology;  Laterality: N/A;  Dr Aiken    PERIPHERALLY INSERTED CENTRAL CATHETER INSERTION N/A 7/21/2022    Procedure: INSERTION, PICC;  Surgeon: PICC, [facility];  Location: [facility];  Service: Cardiology;  Laterality: N/A;  Dr Dubose    SESAMOIDECTOMY Left 11/11/2021    Procedure: SESAMOIDECTOMY;  Surgeon: Jacob Bernal DPM;  Location: [facility];  Service: Podiatry;  Laterality: Left;       Family History   Problem Relation Name Age of Onset    Hypertension Mother         Social History     Socioeconomic History    Marital status:    Tobacco Use    Smoking status: Never    Smokeless tobacco: Never   Substance and Sexual Activity    Alcohol use: No    Drug use: No       Current Outpatient Medications   Medication Sig Dispense Refill    alprazolam (XANAX) 1 MG tablet Take 1 mg by mouth 2 (two) times daily as needed for Anxiety.      amoxicillin-clavulanate 875-125mg (AUGMENTIN) 875-125 mg per tablet Take 1 tablet by mouth every 12 (twelve) hours. 20 tablet 0    amoxicillin-clavulanate 875-125mg (AUGMENTIN) 875-125 mg per tablet Take 1 tablet by mouth every 12 (twelve) hours. 14 tablet 0    ampicillin (PRINCIPEN) 500 MG capsule Take 1 capsule (500 mg total) by mouth every 12 (twelve) hours. 20 capsule 0    BASAGLAR KWIKPEN 100 unit/mL (3 mL) InPn pen 75 Units once daily.   3    BD INSULIN PEN NEEDLE UF MINI 31 gauge x 3/16" Ndle   2    BD INSULIN SYRINGE ULTRA-FINE 1 mL 31 gauge x 5/16 Syrg use as directed      cephALEXin (KEFLEX) 500 MG capsule Take 1 capsule (500 mg total) by mouth every 12 (twelve) hours. 20 capsule 0    chlorthalidone (HYGROTEN) 25 MG Tab Take 1 tablet (25 mg total) by mouth once daily. 30 tablet 11    ciprofloxacin HCl (CIPRO) 750 MG tablet Take 1 tablet (750 mg total) by mouth every 12 (twelve) hours. 20 tablet 0    fluconazole (DIFLUCAN) 200 MG Tab Take 200 mg by mouth.      hydrocodone-acetaminophen 10-325mg " (NORCO)  mg Tab Take 1 tablet by mouth 3 (three) times daily as needed.      hydrocodone-acetaminophen 10-325mg (NORCO)  mg Tab Take 1 tablet by mouth every 8 (eight) hours as needed for Pain. 90 tablet 0    insulin detemir (LEVEMIR) 100 unit/mL injection Inject into the skin every evening.      insulin glargine,hum.rec.anlog (LANTUS SUBQ) Inject into the skin.      LANTUS U-100 INSULIN 100 unit/mL injection SMARTSI Unit(s) SUB-Q Every Night      mupirocin (BACTROBAN) 2 % ointment Apply topically 3 (three) times daily. 30 g 1    OZEMPIC 1 mg/dose (2 mg/1.5 mL) PnIj Inject 60 mg into the skin every 7 days.       OZEMPIC 1 mg/dose (4 mg/3 mL) Inject 1 mg into the skin every 7 days.      pantoprazole (PROTONIX) 40 MG tablet Take 40 mg by mouth.      pramipexole (MIRAPEX) 0.125 MG tablet Take 0.125 mg by mouth.      sulfamethoxazole-trimethoprim 800-160mg (BACTRIM DS) 800-160 mg Tab Take 1 tablet by mouth 2 (two) times daily. 14 tablet 0    traMADoL (ULTRAM) 50 mg tablet Take 1 tablet (50 mg total) by mouth every 6 (six) hours as needed for Pain. 28 tablet 0     No current facility-administered medications for this visit.       Review of patient's allergies indicates:   Allergen Reactions    Codeine Hives and Rash         Review of Systems   Constitutional: Negative for chills and fever.   Skin:  Positive for color change and nail changes. Negative for poor wound healing.   Gastrointestinal:  Negative for nausea and vomiting.   Neurological:  Positive for numbness.   Psychiatric/Behavioral:  Negative for altered mental status.            Objective:      Physical Exam  Constitutional:       Appearance: Normal appearance. She is not ill-appearing.   Cardiovascular:      Pulses:           Dorsalis pedis pulses are 2+ on the right side and 2+ on the left side.        Posterior tibial pulses are 2+ on the right side and 2+ on the left side.      Comments: CFT is < 3 seconds bilateral.  Pedal hair growth is  decreased bilateral.  No lower extremity edema noted bilateral.  Toes are warm to touch bilateral.    Musculoskeletal:         General: No signs of injury.      Right lower leg: No edema.      Left lower leg: No edema.      Comments: Muscle strength 5/5 in all muscle groups bilateral.  No tenderness nor crepitation with ROM of foot/ankle joints bilateral.  Slight dorsal contracture of the Lt. Hallux.  No gross deformity noted to said digit.  (-) for pain with palpation to the Lt. Sub 2nd met head wound.   Skin:     General: Skin is warm and dry.      Capillary Refill: Capillary refill takes 2 to 3 seconds.      Findings: Wound present. No bruising, ecchymosis, erythema, signs of injury, laceration, lesion, petechiae or rash.      Comments: Location: Open wound noted to the Lt. Sub 2nd met head.  Base: Down to dermis and comprised of fibrin.      Drainage: None  Malinda wound: Devoid of edema, erythema, fluctuance, purulence, and malodor.    Pre-debridement measurement: 0.1 x 0.1 x 0.1cm   Post-debridement measurement: 0.3 x 0.3 x 0.1cm  Unchanged in comparison to the prior exam.   Neurological:      Mental Status: She is alert.      Sensory: Sensory deficit present.      Motor: No weakness.      Comments: Decreased protective sensation noted bilateral.  Light touch is absent bilateral.               Assessment:       Encounter Diagnoses   Name Primary?    Diabetic ulcer of other part of left foot associated with type 2 diabetes mellitus, limited to breakdown of skin Yes    Diabetic polyneuropathy associated with type 2 diabetes mellitus              Plan:       Keerthi was seen today for wound care.    Diagnoses and all orders for this visit:    Diabetic ulcer of other part of left foot associated with type 2 diabetes mellitus, limited to breakdown of skin  -     Aerobic culture  -     Culture, Anaerobic  -     Wound Debridement    Diabetic polyneuropathy associated with type 2 diabetes mellitus          I counseled  the patient on her conditions, their implications and medical management.    Performed a selective excisional debridement of the Lt. Foot.  See attached procedure note.      On account of an up tick in wound pain, new cultures were obtained.    The Lt. Forefoot wound was covered with iodosorb, then offloaded with two full length felt pads, and a football dressing was applied.     Advised to keep the dressing CDI x 1 week.    Advised to rest and elevate the affected extremity.    Instructed to minimize weight bearing to facilitate wound healing.    Advised to ambulate only in the postoperative shoe/boot on the Lt. side.    RTC in 1 week.    Jacob Bernal DPM

## 2024-06-27 ENCOUNTER — TELEPHONE (OUTPATIENT)
Dept: PODIATRY | Facility: CLINIC | Age: 49
End: 2024-06-27
Payer: MEDICAID

## 2024-06-27 DIAGNOSIS — L08.9 DIABETIC FOOT INFECTION: Primary | ICD-10-CM

## 2024-06-27 DIAGNOSIS — E11.628 DIABETIC FOOT INFECTION: Primary | ICD-10-CM

## 2024-06-27 RX ORDER — CEFDINIR 300 MG/1
300 CAPSULE ORAL 2 TIMES DAILY
Qty: 20 CAPSULE | Refills: 0 | Status: SHIPPED | OUTPATIENT
Start: 2024-06-27 | End: 2024-07-07

## 2024-06-27 NOTE — TELEPHONE ENCOUNTER
----- Message from Jacob Bernal DPM sent at 6/27/2024 12:30 PM CDT -----  Please let the patient know her cultures were positive for bacteria.  I have sent omnicef to her pharmacy.  Thanks!  ----- Message -----  From: [facility]  Sent: 6/27/2024  10:05 AM CDT  To: Jacob Bernal DPM

## 2024-06-28 LAB
BACTERIA SPEC AEROBE CULT: ABNORMAL
BACTERIA SPEC ANAEROBE CULT: NORMAL

## 2024-07-05 ENCOUNTER — OFFICE VISIT (OUTPATIENT)
Dept: PODIATRY | Facility: CLINIC | Age: 49
End: 2024-07-05
Payer: MEDICAID

## 2024-07-05 VITALS — BODY MASS INDEX: 40.93 KG/M2 | HEIGHT: 68 IN | WEIGHT: 270.06 LBS

## 2024-07-05 DIAGNOSIS — E11.621 DIABETIC ULCER OF OTHER PART OF LEFT FOOT ASSOCIATED WITH TYPE 2 DIABETES MELLITUS, LIMITED TO BREAKDOWN OF SKIN: Primary | ICD-10-CM

## 2024-07-05 DIAGNOSIS — E11.42 DIABETIC POLYNEUROPATHY ASSOCIATED WITH TYPE 2 DIABETES MELLITUS: ICD-10-CM

## 2024-07-05 DIAGNOSIS — L97.521 DIABETIC ULCER OF OTHER PART OF LEFT FOOT ASSOCIATED WITH TYPE 2 DIABETES MELLITUS, LIMITED TO BREAKDOWN OF SKIN: Primary | ICD-10-CM

## 2024-07-05 PROCEDURE — 99999 PR PBB SHADOW E&M-EST. PATIENT-LVL III: CPT | Mod: PBBFAC,,, | Performed by: PODIATRIST

## 2024-07-05 PROCEDURE — 99213 OFFICE O/P EST LOW 20 MIN: CPT | Mod: PBBFAC,PO | Performed by: PODIATRIST

## 2024-07-05 NOTE — PROCEDURES
"Wound Debridement    Date/Time: 7/5/2024 7:00 AM    Performed by: Jacob Bernal DPM  Authorized by: Jacob Bernal DPM    Time out: Immediately prior to procedure a "time out" was called to verify the correct patient, procedure, equipment, support staff and site/side marked as required.    Consent Done?:  Yes (Verbal)  Local anesthesia used?: No      Wound Details:    Location:  Left foot    Location:  Left 2nd Metatarsal Head    Type of Debridement:  Excisional       Length (cm):  0.1       Area (sq cm):  0.01       Width (cm):  0.1       Percent Debrided (%):  100       Depth (cm):  0.1       Total Area Debrided (sq cm):  0.01    Depth of debridement:  Epidermis/Dermis    Tissue debrided:  Dermis    Devitalized tissue debrided:  Fibrin    Instruments:  Blade  Bleeding:  Minimal  Hemostasis Achieved: Yes  Method Used:  Pressure  Patient tolerance:  Patient tolerated the procedure well with no immediate complications    "

## 2024-07-05 NOTE — PROGRESS NOTES
"Subjective:      Patient ID: Keerthi Chase is a 49 y.o. female.    Chief Complaint: Wound Care  Patient presents to clinic for a 1 week wound check of the Lt. Forefoot.  Notes the usual neuropathy related pain at the site, however, states "stinging pain" from the infection has resolved with taking the oral antibiotics.  Has kept the prior football dressing clean, dry, and intact for the past week.  Has been ambulating only in the [employer] shoes.  Denies experiencing N/V/F/C/D.  Denies any additional pedal complaints.         Hemoglobin A1C   Date Value Ref Range Status   2022 10.5 (H) 0.0 - 5.6 % Final     Comment:     Reference Interval:  5.0 - 5.6 Normal   5.7 - 6.4 High Risk   > 6.5 Diabetic      Hgb A1c results are standardized based on the (NGSP) National   Glycohemoglobin Standardization Program.      Hemoglobin A1C levels are related to mean serum/plasma glucose   during the preceding 2-3 months.        2021 11.8 (H) 0.0 - 5.6 % Final     Comment:     Reference Interval:  5.0 - 5.6 Normal   5.7 - 6.4 High Risk   > 6.5 Diabetic      Hgb A1c results are standardized based on the (NGSP) National   Glycohemoglobin Standardization Program.      Hemoglobin A1C levels are related to mean serum/plasma glucose   during the preceding 2-3 months.        2021 8.8 (H) 4.8 - 5.6 % Final     Comment:              Prediabetes: 5.7 - 6.4           Diabetes: >6.4           Glycemic control for adults with diabetes: <7.0         Past Medical History:   Diagnosis Date    Bell's palsy     Coronary artery disease     Pt states Dr. Virk said she did not have a blockage after a stress test.    Diabetes mellitus     GERD (gastroesophageal reflux disease)     Hypertension     Obesity        Past Surgical History:   Procedure Laterality Date     SECTION      x 2    CHOLECYSTECTOMY      ESOPHAGOGASTRODUODENOSCOPY      PERIPHERALLY INSERTED CENTRAL CATHETER INSERTION N/A 10/11/2021    Procedure: INSERTION, " "PICC;  Surgeon: PICC, [facility];  Location: [facility];  Service: Cardiology;  Laterality: N/A;  Dr Aiken    PERIPHERALLY INSERTED CENTRAL CATHETER INSERTION N/A 2022    Procedure: INSERTION, PICC;  Surgeon: PICC, [facility];  [facility];  Service: Cardiology;  Laterality: N/A;  Dr Dubose    SESAMOIDECTOMY Left 2021    Procedure: SESAMOIDECTOMY;  Surgeon: Jacob Bernal DPM;  Location: [facility] OR;  Service: Podiatry;  Laterality: Left;       Family History   Problem Relation Name Age of Onset    Hypertension Mother         Social History     Socioeconomic History    Marital status:    Tobacco Use    Smoking status: Never    Smokeless tobacco: Never   Substance and Sexual Activity    Alcohol use: No    Drug use: No       Current Outpatient Medications   Medication Sig Dispense Refill    alprazolam (XANAX) 1 MG tablet Take 1 mg by mouth 2 (two) times daily as needed for Anxiety.      BASAGLAR KWIKPEN 100 unit/mL (3 mL) InPn pen 75 Units once daily.   3    BD INSULIN PEN NEEDLE UF MINI 31 gauge x 3/16" Ndle   2    BD INSULIN SYRINGE ULTRA-FINE 1 mL 31 gauge x 5/16 Syrg use as directed      cefdinir (OMNICEF) 300 MG capsule Take 1 capsule (300 mg total) by mouth 2 (two) times daily. for 10 days 20 capsule 0    ciprofloxacin HCl (CIPRO) 750 MG tablet Take 1 tablet (750 mg total) by mouth every 12 (twelve) hours. 20 tablet 0    fluconazole (DIFLUCAN) 200 MG Tab Take 200 mg by mouth.      hydrocodone-acetaminophen 10-325mg (NORCO)  mg Tab Take 1 tablet by mouth 3 (three) times daily as needed.      hydrocodone-acetaminophen 10-325mg (NORCO)  mg Tab Take 1 tablet by mouth every 8 (eight) hours as needed for Pain. 90 tablet 0    insulin detemir (LEVEMIR) 100 unit/mL injection Inject into the skin every evening.      insulin glargine,hum.rec.anlog (LANTUS SUBQ) Inject into the skin.      LANTUS U-100 INSULIN 100 unit/mL injection SMARTSI Unit(s) SUB-Q Every Night      mupirocin (BACTROBAN) 2 % " ointment Apply topically 3 (three) times daily. 30 g 1    OZEMPIC 1 mg/dose (2 mg/1.5 mL) PnIj Inject 60 mg into the skin every 7 days.       OZEMPIC 1 mg/dose (4 mg/3 mL) Inject 1 mg into the skin every 7 days.      pantoprazole (PROTONIX) 40 MG tablet Take 40 mg by mouth.      pramipexole (MIRAPEX) 0.125 MG tablet Take 0.125 mg by mouth.      sulfamethoxazole-trimethoprim 800-160mg (BACTRIM DS) 800-160 mg Tab Take 1 tablet by mouth 2 (two) times daily. 14 tablet 0    traMADoL (ULTRAM) 50 mg tablet Take 1 tablet (50 mg total) by mouth every 6 (six) hours as needed for Pain. 28 tablet 0    chlorthalidone (HYGROTEN) 25 MG Tab Take 1 tablet (25 mg total) by mouth once daily. 30 tablet 11     No current facility-administered medications for this visit.       Review of patient's allergies indicates:   Allergen Reactions    Codeine Hives and Rash         Review of Systems   Constitutional: Negative for chills and fever.   Skin:  Positive for color change and nail changes. Negative for poor wound healing.   Gastrointestinal:  Negative for nausea and vomiting.   Neurological:  Positive for numbness.   Psychiatric/Behavioral:  Negative for altered mental status.            Objective:      Physical Exam  Constitutional:       Appearance: Normal appearance. She is not ill-appearing.   Cardiovascular:      Pulses:           Dorsalis pedis pulses are 2+ on the right side and 2+ on the left side.        Posterior tibial pulses are 2+ on the right side and 2+ on the left side.      Comments: CFT is < 3 seconds bilateral.  Pedal hair growth is decreased bilateral.  No lower extremity edema noted bilateral.  Toes are warm to touch bilateral.    Musculoskeletal:         General: No signs of injury.      Right lower leg: No edema.      Left lower leg: No edema.      Comments: Muscle strength 5/5 in all muscle groups bilateral.  No tenderness nor crepitation with ROM of foot/ankle joints bilateral.  Slight dorsal contracture of the  Lt. Hallux.  No gross deformity noted to said digit.  (-) for pain with palpation to the Lt. Sub 2nd met head wound.   Skin:     General: Skin is warm and dry.      Capillary Refill: Capillary refill takes 2 to 3 seconds.      Findings: Wound present. No bruising, ecchymosis, erythema, signs of injury, laceration, lesion, petechiae or rash.      Comments: Location: Open wound noted to the Lt. Sub 2nd met head.  Base: Down to dermis and comprised of fibrin.      Drainage: None  Malinda wound: Devoid of edema, erythema, fluctuance, purulence, and malodor.    Pre-debridement measurement: 0.1 x 0.1 x 0.1cm   Post-debridement measurement: 0.3 x 0.3 x 0.1cm     Neurological:      Mental Status: She is alert.      Sensory: Sensory deficit present.      Motor: No weakness.      Comments: Decreased protective sensation noted bilateral.  Light touch is absent bilateral.               Assessment:       Encounter Diagnoses   Name Primary?    Diabetic ulcer of other part of left foot associated with type 2 diabetes mellitus, limited to breakdown of skin Yes    Diabetic polyneuropathy associated with type 2 diabetes mellitus              Plan:       Keerthi was seen today for wound care.    Diagnoses and all orders for this visit:    Diabetic ulcer of other part of left foot associated with type 2 diabetes mellitus, limited to breakdown of skin  -     Wound Debridement    Diabetic polyneuropathy associated with type 2 diabetes mellitus          I counseled the patient on her conditions, their implications and medical management.    Performed a selective excisional debridement of the Lt. Foot.  See attached procedure note.      The Lt. Forefoot wound was covered with iodosorb, then offloaded with two metatarsal pads, and a football dressing was applied.     Advised to keep the dressing CDI x 1 week.    Advised to rest and elevate the affected extremity.    Instructed to minimize weight bearing to facilitate wound healing.    Advised  to ambulate only in the postoperative shoe/boot on the Lt. side.    RTC in 1 week.    Jacob Bernal DPM

## 2024-07-12 ENCOUNTER — CLINICAL SUPPORT (OUTPATIENT)
Dept: PODIATRY | Facility: CLINIC | Age: 49
End: 2024-07-12
Payer: MEDICAID

## 2024-07-12 VITALS — BODY MASS INDEX: 40.93 KG/M2 | HEIGHT: 68 IN | WEIGHT: 270.06 LBS

## 2024-07-12 DIAGNOSIS — Z48.00 DRESSING CHANGE: Primary | ICD-10-CM

## 2024-07-12 PROCEDURE — 99213 OFFICE O/P EST LOW 20 MIN: CPT | Mod: PBBFAC,PO

## 2024-07-12 PROCEDURE — 99999 PR PBB SHADOW E&M-EST. PATIENT-LVL III: CPT | Mod: PBBFAC,,,

## 2024-07-12 NOTE — PROGRESS NOTES
Pt seen today per provider request for dressing change. Betadine, Silver Alginate, cast padding, and Ace wrap in a football dressing used on L forefront. RTC in one week to be seen by provider.

## 2024-07-19 ENCOUNTER — OFFICE VISIT (OUTPATIENT)
Dept: PODIATRY | Facility: CLINIC | Age: 49
End: 2024-07-19
Payer: MEDICAID

## 2024-07-19 VITALS — HEIGHT: 68 IN | WEIGHT: 270.06 LBS | BODY MASS INDEX: 40.93 KG/M2

## 2024-07-19 DIAGNOSIS — L97.521 DIABETIC ULCER OF OTHER PART OF LEFT FOOT ASSOCIATED WITH TYPE 2 DIABETES MELLITUS, LIMITED TO BREAKDOWN OF SKIN: Primary | ICD-10-CM

## 2024-07-19 DIAGNOSIS — E11.621 DIABETIC ULCER OF OTHER PART OF LEFT FOOT ASSOCIATED WITH TYPE 2 DIABETES MELLITUS, LIMITED TO BREAKDOWN OF SKIN: Primary | ICD-10-CM

## 2024-07-19 DIAGNOSIS — E11.42 DIABETIC POLYNEUROPATHY ASSOCIATED WITH TYPE 2 DIABETES MELLITUS: ICD-10-CM

## 2024-07-19 PROCEDURE — 99213 OFFICE O/P EST LOW 20 MIN: CPT | Mod: PBBFAC,PO | Performed by: PODIATRIST

## 2024-07-19 PROCEDURE — 99999 PR PBB SHADOW E&M-EST. PATIENT-LVL III: CPT | Mod: PBBFAC,,, | Performed by: PODIATRIST

## 2024-07-19 PROCEDURE — 99499 UNLISTED E&M SERVICE: CPT | Mod: S$PBB,,, | Performed by: PODIATRIST

## 2024-07-19 PROCEDURE — 97597 DBRDMT OPN WND 1ST 20 CM/<: CPT | Mod: PBBFAC,PO | Performed by: PODIATRIST

## 2024-07-19 NOTE — PROGRESS NOTES
Subjective:      Patient ID: Keerthi Chase is a 49 y.o. female.    Chief Complaint: Wound Care  Patient presents to clinic for a 2 week wound check of the Lt. Forefoot.  Notes tolerating the last bandage, that was applied by nursing staff, quite well.  Has kept said dressing clean, dry, and intact for the past week.  Has been ambulating only in the DARCO shoes.  Denies experiencing N/V/F/C/D.  Denies any additional pedal complaints.         Hemoglobin A1C   Date Value Ref Range Status   2022 10.5 (H) 0.0 - 5.6 % Final     Comment:     Reference Interval:  5.0 - 5.6 Normal   5.7 - 6.4 High Risk   > 6.5 Diabetic      Hgb A1c results are standardized based on the (NGSP) National   Glycohemoglobin Standardization Program.      Hemoglobin A1C levels are related to mean serum/plasma glucose   during the preceding 2-3 months.        2021 11.8 (H) 0.0 - 5.6 % Final     Comment:     Reference Interval:  5.0 - 5.6 Normal   5.7 - 6.4 High Risk   > 6.5 Diabetic      Hgb A1c results are standardized based on the (NGSP) National   Glycohemoglobin Standardization Program.      Hemoglobin A1C levels are related to mean serum/plasma glucose   during the preceding 2-3 months.        2021 8.8 (H) 4.8 - 5.6 % Final     Comment:              Prediabetes: 5.7 - 6.4           Diabetes: >6.4           Glycemic control for adults with diabetes: <7.0         Past Medical History:   Diagnosis Date    Bell's palsy     Coronary artery disease     Pt states Dr. Virk said she did not have a blockage after a stress test.    Diabetes mellitus     GERD (gastroesophageal reflux disease)     Hypertension     Obesity        Past Surgical History:   Procedure Laterality Date     SECTION      x 2    CHOLECYSTECTOMY      ESOPHAGOGASTRODUODENOSCOPY      PERIPHERALLY INSERTED CENTRAL CATHETER INSERTION N/A 10/11/2021    Procedure: INSERTION, PICC;  Surgeon: IGOR, PRIYANK;  Location: [facility];  Service: Cardiology;  Laterality:  "N/A;  Dr Aiken    PERIPHERALLY INSERTED CENTRAL CATHETER INSERTION N/A 2022    Procedure: INSERTION, PICC;  Surgeon: PICC, PRIYANK;  Location: [facility];  Service: Cardiology;  Laterality: N/A;  Dr Dubose    SESAMOIDECTOMY Left 2021    Procedure: SESAMOIDECTOMY;  Surgeon: Jacob Bernal DPM;  Location: [facility] OR;  Service: Podiatry;  Laterality: Left;       Family History   Problem Relation Name Age of Onset    Hypertension Mother         Social History     Socioeconomic History    Marital status:    Tobacco Use    Smoking status: Never    Smokeless tobacco: Never   Substance and Sexual Activity    Alcohol use: No    Drug use: No       Current Outpatient Medications   Medication Sig Dispense Refill    alprazolam (XANAX) 1 MG tablet Take 1 mg by mouth 2 (two) times daily as needed for Anxiety.      BASAGLAR KWIKPEN 100 unit/mL (3 mL) InPn pen 75 Units once daily.   3    BD INSULIN PEN NEEDLE UF MINI 31 gauge x 3/16" Ndle   2    BD INSULIN SYRINGE ULTRA-FINE 1 mL 31 gauge x 5/16 Syrg use as directed      ciprofloxacin HCl (CIPRO) 750 MG tablet Take 1 tablet (750 mg total) by mouth every 12 (twelve) hours. 20 tablet 0    fluconazole (DIFLUCAN) 200 MG Tab Take 200 mg by mouth.      hydrocodone-acetaminophen 10-325mg (NORCO)  mg Tab Take 1 tablet by mouth 3 (three) times daily as needed.      hydrocodone-acetaminophen 10-325mg (NORCO)  mg Tab Take 1 tablet by mouth every 8 (eight) hours as needed for Pain. 90 tablet 0    insulin detemir (LEVEMIR) 100 unit/mL injection Inject into the skin every evening.      insulin glargine,hum.rec.anlog (LANTUS SUBQ) Inject into the skin.      LANTUS U-100 INSULIN 100 unit/mL injection SMARTSI Unit(s) SUB-Q Every Night      mupirocin (BACTROBAN) 2 % ointment Apply topically 3 (three) times daily. 30 g 1    OZEMPIC 1 mg/dose (2 mg/1.5 mL) PnIj Inject 60 mg into the skin every 7 days.       OZEMPIC 1 mg/dose (4 mg/3 mL) Inject 1 mg into the skin every 7 days. "      pantoprazole (PROTONIX) 40 MG tablet Take 40 mg by mouth.      pramipexole (MIRAPEX) 0.125 MG tablet Take 0.125 mg by mouth.      sulfamethoxazole-trimethoprim 800-160mg (BACTRIM DS) 800-160 mg Tab Take 1 tablet by mouth 2 (two) times daily. 14 tablet 0    traMADoL (ULTRAM) 50 mg tablet Take 1 tablet (50 mg total) by mouth every 6 (six) hours as needed for Pain. 28 tablet 0    chlorthalidone (HYGROTEN) 25 MG Tab Take 1 tablet (25 mg total) by mouth once daily. 30 tablet 11     No current facility-administered medications for this visit.       Review of patient's allergies indicates:   Allergen Reactions    Codeine Hives and Rash         Review of Systems   Constitutional: Negative for chills and fever.   Skin:  Positive for color change and nail changes. Negative for poor wound healing.   Gastrointestinal:  Negative for nausea and vomiting.   Neurological:  Positive for numbness.   Psychiatric/Behavioral:  Negative for altered mental status.            Objective:      Physical Exam  Constitutional:       Appearance: Normal appearance. She is not ill-appearing.   Cardiovascular:      Pulses:           Dorsalis pedis pulses are 2+ on the right side and 2+ on the left side.        Posterior tibial pulses are 2+ on the right side and 2+ on the left side.      Comments: CFT is < 3 seconds bilateral.  Pedal hair growth is decreased bilateral.  No lower extremity edema noted bilateral.  Toes are warm to touch bilateral.    Musculoskeletal:         General: No signs of injury.      Right lower leg: No edema.      Left lower leg: No edema.      Comments: Muscle strength 5/5 in all muscle groups bilateral.  No tenderness nor crepitation with ROM of foot/ankle joints bilateral.  Slight dorsal contracture of the Lt. Hallux.  No gross deformity noted to said digit.  (-) for pain with palpation to the Lt. Sub 2nd met head wound.   Skin:     General: Skin is warm and dry.      Capillary Refill: Capillary refill takes 2 to 3  seconds.      Findings: Wound present. No bruising, ecchymosis, erythema, signs of injury, laceration, lesion, petechiae or rash.      Comments: Location: Open wound noted to the Lt. Sub 2nd met head.  Base: Down to dermis and comprised of fibrin.      Drainage: None  Malinda wound: Devoid of edema, erythema, fluctuance, purulence, and malodor.    Pre-debridement measurement: 0.2 x 0.2 x 0.1cm   Post-debridement measurement: 0.4 x 0.4 x 0.1cm     Neurological:      Mental Status: She is alert.      Sensory: Sensory deficit present.      Motor: No weakness.      Comments: Decreased protective sensation noted bilateral.  Light touch is absent bilateral.               Assessment:       Encounter Diagnoses   Name Primary?    Diabetic ulcer of other part of left foot associated with type 2 diabetes mellitus, limited to breakdown of skin Yes    Diabetic polyneuropathy associated with type 2 diabetes mellitus              Plan:       Keerthi was seen today for wound care.    Diagnoses and all orders for this visit:    Diabetic ulcer of other part of left foot associated with type 2 diabetes mellitus, limited to breakdown of skin  -     Wound Debridement    Diabetic polyneuropathy associated with type 2 diabetes mellitus          I counseled the patient on her conditions, their implications and medical management.    Performed a selective excisional debridement of the Lt. Foot.  See attached procedure note.      The Lt. Forefoot wound was covered with justine, offloaded with full length felt, and a football dressing was applied.     Advised to keep the dressing CDI x 1 week.    Advised to rest and elevate the affected extremity.    Instructed to minimize weight bearing to facilitate wound healing.    Advised to ambulate only in the postoperative shoe/boot on the Lt. side.    RTC in 1 week.    Jacob Bernal DPM

## 2024-07-19 NOTE — PROCEDURES
"Wound Debridement    Date/Time: 7/19/2024 8:00 AM    Performed by: Jacob Bernal DPM  Authorized by: Jacob Bernal DPM    Time out: Immediately prior to procedure a "time out" was called to verify the correct patient, procedure, equipment, support staff and site/side marked as required.    Consent Done?:  Yes (Verbal)  Local anesthesia used?: No      Wound Details:    Location:  Left foot    Location:  Left 2nd Metatarsal Head    Type of Debridement:  Excisional       Length (cm):  0.2       Area (sq cm):  0.04       Width (cm):  0.2       Percent Debrided (%):  100       Depth (cm):  0.1       Total Area Debrided (sq cm):  0.04    Depth of debridement:  Epidermis/Dermis    Tissue debrided:  Dermis    Devitalized tissue debrided:  Fibrin    Instruments:  Blade  Bleeding:  Minimal  Hemostasis Achieved: Yes  Method Used:  Pressure  Patient tolerance:  Patient tolerated the procedure well with no immediate complications    "

## 2024-07-26 ENCOUNTER — OFFICE VISIT (OUTPATIENT)
Dept: PODIATRY | Facility: CLINIC | Age: 49
End: 2024-07-26
Payer: MEDICAID

## 2024-07-26 VITALS — WEIGHT: 270.06 LBS | BODY MASS INDEX: 40.93 KG/M2 | HEIGHT: 68 IN

## 2024-07-26 DIAGNOSIS — L97.521 DIABETIC ULCER OF OTHER PART OF LEFT FOOT ASSOCIATED WITH TYPE 2 DIABETES MELLITUS, LIMITED TO BREAKDOWN OF SKIN: Primary | ICD-10-CM

## 2024-07-26 DIAGNOSIS — E11.621 DIABETIC ULCER OF OTHER PART OF LEFT FOOT ASSOCIATED WITH TYPE 2 DIABETES MELLITUS, LIMITED TO BREAKDOWN OF SKIN: Primary | ICD-10-CM

## 2024-07-26 DIAGNOSIS — E11.42 DIABETIC POLYNEUROPATHY ASSOCIATED WITH TYPE 2 DIABETES MELLITUS: ICD-10-CM

## 2024-07-26 PROCEDURE — 99499 UNLISTED E&M SERVICE: CPT | Mod: S$PBB,,, | Performed by: PODIATRIST

## 2024-07-26 PROCEDURE — 99999 PR PBB SHADOW E&M-EST. PATIENT-LVL III: CPT | Mod: PBBFAC,,, | Performed by: PODIATRIST

## 2024-07-26 PROCEDURE — 99213 OFFICE O/P EST LOW 20 MIN: CPT | Mod: PBBFAC,PO | Performed by: PODIATRIST

## 2024-07-26 PROCEDURE — 97597 DBRDMT OPN WND 1ST 20 CM/<: CPT | Mod: PBBFAC,PO | Performed by: PODIATRIST

## 2024-07-26 NOTE — PROGRESS NOTES
Subjective:      Patient ID: Keerthi Chase is a 49 y.o. female.    Chief Complaint: Diabetes Mellitus and Wound Care (DM, wound care left foot)  Patient presents to clinic for a 1 week wound check of the Lt. Forefoot.  Has kept the previous wrap clean, dry, and intact for the past week.  Denies pain from the wound.   Has been ambulating only in the DARCO shoe.  Denies experiencing N/V/F/C/D.  Denies any additional pedal complaints.         Hemoglobin A1C   Date Value Ref Range Status   2022 10.5 (H) 0.0 - 5.6 % Final     Comment:     Reference Interval:  5.0 - 5.6 Normal   5.7 - 6.4 High Risk   > 6.5 Diabetic      Hgb A1c results are standardized based on the (NGSP) National   Glycohemoglobin Standardization Program.      Hemoglobin A1C levels are related to mean serum/plasma glucose   during the preceding 2-3 months.        2021 11.8 (H) 0.0 - 5.6 % Final     Comment:     Reference Interval:  5.0 - 5.6 Normal   5.7 - 6.4 High Risk   > 6.5 Diabetic      Hgb A1c results are standardized based on the (NGSP) National   Glycohemoglobin Standardization Program.      Hemoglobin A1C levels are related to mean serum/plasma glucose   during the preceding 2-3 months.        2021 8.8 (H) 4.8 - 5.6 % Final     Comment:              Prediabetes: 5.7 - 6.4           Diabetes: >6.4           Glycemic control for adults with diabetes: <7.0         Past Medical History:   Diagnosis Date    Bell's palsy     Coronary artery disease     Pt states Dr. Virk said she did not have a blockage after a stress test.    Diabetes mellitus     GERD (gastroesophageal reflux disease)     Hypertension     Obesity        Past Surgical History:   Procedure Laterality Date     SECTION      x 2    CHOLECYSTECTOMY      ESOPHAGOGASTRODUODENOSCOPY      PERIPHERALLY INSERTED CENTRAL CATHETER INSERTION N/A 10/11/2021    Procedure: INSERTION, PICC;  Surgeon: PRIYANK COSTA;  Location: [facility];  Service: Cardiology;   "Laterality: N/A;  Dr Aiken    PERIPHERALLY INSERTED CENTRAL CATHETER INSERTION N/A 2022    Procedure: INSERTION, PICC;  Surgeon: PICC, PRIYANK;  [facility];  Service: Cardiology;  Laterality: N/A;  Dr Dubose    SESAMOIDECTOMY Left 2021    Procedure: SESAMOIDECTOMY;  Surgeon: Jacob Bernal DPM;  Location: [facility] OR;  Service: Podiatry;  Laterality: Left;       Family History   Problem Relation Name Age of Onset    Hypertension Mother         Social History     Socioeconomic History    Marital status:    Tobacco Use    Smoking status: Never    Smokeless tobacco: Never   Substance and Sexual Activity    Alcohol use: No    Drug use: No       Current Outpatient Medications   Medication Sig Dispense Refill    alprazolam (XANAX) 1 MG tablet Take 1 mg by mouth 2 (two) times daily as needed for Anxiety.      BD INSULIN PEN NEEDLE UF MINI 31 gauge x 3/16" Ndle   2    BD INSULIN SYRINGE ULTRA-FINE 1 mL 31 gauge x 5/16 Syrg use as directed      ciprofloxacin HCl (CIPRO) 750 MG tablet Take 1 tablet (750 mg total) by mouth every 12 (twelve) hours. 20 tablet 0    fluconazole (DIFLUCAN) 200 MG Tab Take 200 mg by mouth.      hydrocodone-acetaminophen 10-325mg (NORCO)  mg Tab Take 1 tablet by mouth 3 (three) times daily as needed.      hydrocodone-acetaminophen 10-325mg (NORCO)  mg Tab Take 1 tablet by mouth every 8 (eight) hours as needed for Pain. 90 tablet 0    insulin detemir (LEVEMIR) 100 unit/mL injection Inject into the skin every evening.      insulin glargine,hum.rec.anlog (LANTUS SUBQ) Inject into the skin.      LANTUS U-100 INSULIN 100 unit/mL injection SMARTSI Unit(s) SUB-Q Every Night      mupirocin (BACTROBAN) 2 % ointment Apply topically 3 (three) times daily. 30 g 1    OZEMPIC 1 mg/dose (2 mg/1.5 mL) PnIj Inject 60 mg into the skin every 7 days.       OZEMPIC 1 mg/dose (4 mg/3 mL) Inject 1 mg into the skin every 7 days.      pantoprazole (PROTONIX) 40 MG " tablet Take 40 mg by mouth.      pramipexole (MIRAPEX) 0.125 MG tablet Take 0.125 mg by mouth.      sulfamethoxazole-trimethoprim 800-160mg (BACTRIM DS) 800-160 mg Tab Take 1 tablet by mouth 2 (two) times daily. 14 tablet 0    traMADoL (ULTRAM) 50 mg tablet Take 1 tablet (50 mg total) by mouth every 6 (six) hours as needed for Pain. 28 tablet 0    BASAGLAR KWIKPEN 100 unit/mL (3 mL) InPn pen 75 Units once daily.  (Patient not taking: Reported on 7/26/2024)  3    chlorthalidone (HYGROTEN) 25 MG Tab Take 1 tablet (25 mg total) by mouth once daily. 30 tablet 11     No current facility-administered medications for this visit.       Review of patient's allergies indicates:   Allergen Reactions    Codeine Hives and Rash         Review of Systems   Constitutional: Negative for chills and fever.   Skin:  Positive for color change and nail changes. Negative for poor wound healing.   Gastrointestinal:  Negative for nausea and vomiting.   Neurological:  Positive for numbness.   Psychiatric/Behavioral:  Negative for altered mental status.            Objective:      Physical Exam  Constitutional:       Appearance: Normal appearance. She is not ill-appearing.   Cardiovascular:      Pulses:           Dorsalis pedis pulses are 2+ on the right side and 2+ on the left side.        Posterior tibial pulses are 2+ on the right side and 2+ on the left side.      Comments: CFT is < 3 seconds bilateral.  Pedal hair growth is decreased bilateral.  No lower extremity edema noted bilateral.  Toes are warm to touch bilateral.    Musculoskeletal:         General: No signs of injury.      Right lower leg: No edema.      Left lower leg: No edema.      Comments: Muscle strength 5/5 in all muscle groups bilateral.  No tenderness nor crepitation with ROM of foot/ankle joints bilateral.  Slight dorsal contracture of the Lt. Hallux.  No gross deformity noted to said digit.  (-) for pain with palpation to the Lt. Sub 2nd met head wound.   Skin:      General: Skin is warm and dry.      Capillary Refill: Capillary refill takes 2 to 3 seconds.      Findings: Wound present. No bruising, ecchymosis, erythema, signs of injury, laceration, lesion, petechiae or rash.      Comments: Location: Open wound noted to the Lt. Sub 2nd met head.  Base: Down to dermis and comprised of fibrin.      Drainage: None  Malinda wound: Devoid of edema, erythema, fluctuance, purulence, and malodor.    Pre-debridement measurement: 0.1 x 0.1 x 0.1cm   Post-debridement measurement: 0.3 x 0.3 x 0.1cm     Neurological:      Mental Status: She is alert.      Sensory: Sensory deficit present.      Motor: No weakness.      Comments: Decreased protective sensation noted bilateral.  Light touch is absent bilateral.             Assessment:       Encounter Diagnoses   Name Primary?    Diabetic ulcer of other part of left foot associated with type 2 diabetes mellitus, limited to breakdown of skin Yes    Diabetic polyneuropathy associated with type 2 diabetes mellitus              Plan:       Keerthi was seen today for diabetes mellitus and wound care.    Diagnoses and all orders for this visit:    Diabetic ulcer of other part of left foot associated with type 2 diabetes mellitus, limited to breakdown of skin    Diabetic polyneuropathy associated with type 2 diabetes mellitus          I counseled the patient on her conditions, their implications and medical management.    Performed a selective excisional debridement of the Lt. Foot.  See attached procedure note.      The Lt. Forefoot wound was covered with justine, offloaded with full length felt, and a football dressing was applied.     Advised to keep the dressing CDI x 1 week.    Advised to rest and elevate the affected extremity.    Instructed to minimize weight bearing to facilitate wound healing.    Advised to ambulate only in the postoperative shoe/boot on the Lt. side.    RTC in 1 week.    Jacob Bernal DPM

## 2024-07-28 NOTE — PROCEDURES
"Wound Debridement    Date/Time: 7/26/2024 3:20 PM    Performed by: Jacob Bernal DPM  Authorized by: Jacob Bernal DPM    Time out: Immediately prior to procedure a "time out" was called to verify the correct patient, procedure, equipment, support staff and site/side marked as required.    Consent Done?:  Yes (Verbal)  Local anesthesia used?: No      Wound Details:    Location:  Left foot    Location:  Left 2nd Metatarsal Head    Type of Debridement:  Excisional       Length (cm):  0.1       Area (sq cm):  0.01       Width (cm):  0.1       Percent Debrided (%):  100       Depth (cm):  0.1       Total Area Debrided (sq cm):  0.01    Depth of debridement:  Epidermis/Dermis    Tissue debrided:  Dermis    Devitalized tissue debrided:  Fibrin    Instruments:  Blade  Bleeding:  Minimal  Hemostasis Achieved: Yes  Method Used:  Pressure  Patient tolerance:  Patient tolerated the procedure well with no immediate complications    "

## 2024-08-02 ENCOUNTER — OFFICE VISIT (OUTPATIENT)
Dept: PODIATRY | Facility: CLINIC | Age: 49
End: 2024-08-02
Payer: MEDICAID

## 2024-08-02 VITALS — BODY MASS INDEX: 40.93 KG/M2 | HEIGHT: 68 IN | WEIGHT: 270.06 LBS

## 2024-08-02 DIAGNOSIS — E11.42 DIABETIC POLYNEUROPATHY ASSOCIATED WITH TYPE 2 DIABETES MELLITUS: ICD-10-CM

## 2024-08-02 DIAGNOSIS — Z87.2 HEALED ULCER OF LEFT FOOT: Primary | ICD-10-CM

## 2024-08-02 PROCEDURE — 99999 PR PBB SHADOW E&M-EST. PATIENT-LVL III: CPT | Mod: PBBFAC,,, | Performed by: PODIATRIST

## 2024-08-02 PROCEDURE — 99213 OFFICE O/P EST LOW 20 MIN: CPT | Mod: PBBFAC,PO | Performed by: PODIATRIST

## 2024-08-02 NOTE — PROGRESS NOTES
Subjective:      Patient ID: Keerthi Chase is a 49 y.o. female.    Chief Complaint: Wound Care  Patient presents to clinic for a 1 week wound check of the Lt. Forefoot.  Has kept the previous wrap clean, dry, and intact for the past week.  Denies pain from the wound.   Has been ambulating only in the DARCO shoe.  Denies experiencing N/V/F/C/D.  Denies any additional pedal complaints.         Hemoglobin A1C   Date Value Ref Range Status   2022 10.5 (H) 0.0 - 5.6 % Final     Comment:     Reference Interval:  5.0 - 5.6 Normal   5.7 - 6.4 High Risk   > 6.5 Diabetic      Hgb A1c results are standardized based on the (NGSP) National   Glycohemoglobin Standardization Program.      Hemoglobin A1C levels are related to mean serum/plasma glucose   during the preceding 2-3 months.        2021 11.8 (H) 0.0 - 5.6 % Final     Comment:     Reference Interval:  5.0 - 5.6 Normal   5.7 - 6.4 High Risk   > 6.5 Diabetic      Hgb A1c results are standardized based on the (NGSP) National   Glycohemoglobin Standardization Program.      Hemoglobin A1C levels are related to mean serum/plasma glucose   during the preceding 2-3 months.        2021 8.8 (H) 4.8 - 5.6 % Final     Comment:              Prediabetes: 5.7 - 6.4           Diabetes: >6.4           Glycemic control for adults with diabetes: <7.0         Past Medical History:   Diagnosis Date    Bell's palsy     Coronary artery disease     Pt states Dr. Virk said she did not have a blockage after a stress test.    Diabetes mellitus     GERD (gastroesophageal reflux disease)     Hypertension     Obesity        Past Surgical History:   Procedure Laterality Date     SECTION      x 2    CHOLECYSTECTOMY      ESOPHAGOGASTRODUODENOSCOPY      PERIPHERALLY INSERTED CENTRAL CATHETER INSERTION N/A 10/11/2021    Procedure: INSERTION, PICC;  Surgeon: PICC, PRIYANK;  Location: [facility] ENDO;  Service: Cardiology;  Laterality: N/A;  Dr Aiken    PERIPHERALLY INSERTED CENTRAL  "CATHETER INSERTION N/A 2022    Procedure: INSERTION, PICC;  Surgeon: PICC, PH;  Location: [facility] ENDO;  Service: Cardiology;  Laterality: N/A;  Dr FongGracy    SESAMOIDECTOMY Left 2021    Procedure: SESAMOIDECTOMY;  Surgeon: Jacob Bernal DPM;  Location: [facility] OR;  Service: Podiatry;  Laterality: Left;       Family History   Problem Relation Name Age of Onset    Hypertension Mother         Social History     Socioeconomic History    Marital status:    Tobacco Use    Smoking status: Never    Smokeless tobacco: Never   Substance and Sexual Activity    Alcohol use: No    Drug use: No       Current Outpatient Medications   Medication Sig Dispense Refill    alprazolam (XANAX) 1 MG tablet Take 1 mg by mouth 2 (two) times daily as needed for Anxiety.      BASAGLAR KWIKPEN 100 unit/mL (3 mL) InPn pen 75 Units once daily.  3    BD INSULIN PEN NEEDLE UF MINI 31 gauge x 3/16" Ndle   2    BD INSULIN SYRINGE ULTRA-FINE 1 mL 31 gauge x 5/16 Syrg use as directed      ciprofloxacin HCl (CIPRO) 750 MG tablet Take 1 tablet (750 mg total) by mouth every 12 (twelve) hours. 20 tablet 0    fluconazole (DIFLUCAN) 200 MG Tab Take 200 mg by mouth.      hydrocodone-acetaminophen 10-325mg (NORCO)  mg Tab Take 1 tablet by mouth 3 (three) times daily as needed.      hydrocodone-acetaminophen 10-325mg (NORCO)  mg Tab Take 1 tablet by mouth every 8 (eight) hours as needed for Pain. 90 tablet 0    insulin detemir (LEVEMIR) 100 unit/mL injection Inject into the skin every evening.      insulin glargine,hum.rec.anlog (LANTUS SUBQ) Inject into the skin.      LANTUS U-100 INSULIN 100 unit/mL injection SMARTSI Unit(s) SUB-Q Every Night      mupirocin (BACTROBAN) 2 % ointment Apply topically 3 (three) times daily. 30 g 1    OZEMPIC 1 mg/dose (2 mg/1.5 mL) PnIj Inject 60 mg into the skin every 7 days.       OZEMPIC 1 mg/dose (4 mg/3 mL) Inject 1 mg into the skin every 7 days.      pantoprazole (PROTONIX) 40 MG tablet Take " 40 mg by mouth.      pramipexole (MIRAPEX) 0.125 MG tablet Take 0.125 mg by mouth.      sulfamethoxazole-trimethoprim 800-160mg (BACTRIM DS) 800-160 mg Tab Take 1 tablet by mouth 2 (two) times daily. 14 tablet 0    traMADoL (ULTRAM) 50 mg tablet Take 1 tablet (50 mg total) by mouth every 6 (six) hours as needed for Pain. 28 tablet 0    chlorthalidone (HYGROTEN) 25 MG Tab Take 1 tablet (25 mg total) by mouth once daily. 30 tablet 11     No current facility-administered medications for this visit.       Review of patient's allergies indicates:   Allergen Reactions    Codeine Hives and Rash         Review of Systems   Constitutional: Negative for chills and fever.   Skin:  Positive for color change and nail changes. Negative for poor wound healing.   Gastrointestinal:  Negative for nausea and vomiting.   Neurological:  Positive for numbness.   Psychiatric/Behavioral:  Negative for altered mental status.            Objective:      Physical Exam  Constitutional:       Appearance: Normal appearance. She is not ill-appearing.   Cardiovascular:      Pulses:           Dorsalis pedis pulses are 2+ on the right side and 2+ on the left side.        Posterior tibial pulses are 2+ on the right side and 2+ on the left side.      Comments: CFT is < 3 seconds bilateral.  Pedal hair growth is decreased bilateral.  No lower extremity edema noted bilateral.  Toes are warm to touch bilateral.    Musculoskeletal:         General: No signs of injury.      Right lower leg: No edema.      Left lower leg: No edema.      Comments: Muscle strength 5/5 in all muscle groups bilateral.  No tenderness nor crepitation with ROM of foot/ankle joints bilateral.  Slight dorsal contracture of the Lt. Hallux.  No gross deformity noted to said digit.  (-) for pain with palpation to the Lt. Sub 2nd met head wound.   Skin:     General: Skin is warm and dry.      Capillary Refill: Capillary refill takes 2 to 3 seconds.      Findings: No bruising, ecchymosis,  erythema, signs of injury, laceration, lesion, petechiae, rash or wound.      Comments: Fragile skin noted to the Lt. Sub 2nd met head.   Neurological:      Mental Status: She is alert.      Sensory: Sensory deficit present.      Motor: No weakness.      Comments: Decreased protective sensation noted bilateral.  Light touch is absent bilateral.               Assessment:       Encounter Diagnoses   Name Primary?    Healed ulcer of left foot Yes    Diabetic polyneuropathy associated with type 2 diabetes mellitus              Plan:       Keerthi was seen today for wound care.    Diagnoses and all orders for this visit:    Healed ulcer of left foot    Diabetic polyneuropathy associated with type 2 diabetes mellitus          I counseled the patient on her conditions, their implications and medical management.    No wound debridement performed, as the site is fully healed.    Will continue to offload for another several weeks to allow for skin maturation.    The prior Lt. Forefoot wound was covered with silver alginate, offloaded with full length felt, and a football dressing was applied.     Advised to keep the dressing CDI x 1 week.    Advised to rest and elevate the affected extremity.    Instructed to minimize weight bearing to facilitate healing.    Advised to ambulate only in the postoperative shoe/boot on the Lt. side.    RTC in 1 week.    Jacob Bernal DPM

## 2024-08-12 ENCOUNTER — OFFICE VISIT (OUTPATIENT)
Dept: PODIATRY | Facility: CLINIC | Age: 49
End: 2024-08-12
Payer: MEDICAID

## 2024-08-12 VITALS — HEIGHT: 68 IN | BODY MASS INDEX: 40.93 KG/M2 | WEIGHT: 270.06 LBS

## 2024-08-12 DIAGNOSIS — L97.521 DIABETIC ULCER OF OTHER PART OF LEFT FOOT ASSOCIATED WITH TYPE 2 DIABETES MELLITUS, LIMITED TO BREAKDOWN OF SKIN: Primary | ICD-10-CM

## 2024-08-12 DIAGNOSIS — E11.621 DIABETIC ULCER OF OTHER PART OF LEFT FOOT ASSOCIATED WITH TYPE 2 DIABETES MELLITUS, LIMITED TO BREAKDOWN OF SKIN: Primary | ICD-10-CM

## 2024-08-12 DIAGNOSIS — E11.42 DIABETIC POLYNEUROPATHY ASSOCIATED WITH TYPE 2 DIABETES MELLITUS: ICD-10-CM

## 2024-08-12 PROCEDURE — 3008F BODY MASS INDEX DOCD: CPT | Mod: CPTII,,, | Performed by: PODIATRIST

## 2024-08-12 PROCEDURE — 99999 PR PBB SHADOW E&M-EST. PATIENT-LVL III: CPT | Mod: PBBFAC,,, | Performed by: PODIATRIST

## 2024-08-12 PROCEDURE — 99212 OFFICE O/P EST SF 10 MIN: CPT | Mod: S$PBB,,, | Performed by: PODIATRIST

## 2024-08-12 PROCEDURE — 1159F MED LIST DOCD IN RCRD: CPT | Mod: CPTII,,, | Performed by: PODIATRIST

## 2024-08-12 PROCEDURE — 99213 OFFICE O/P EST LOW 20 MIN: CPT | Mod: PBBFAC,PO | Performed by: PODIATRIST

## 2024-08-12 NOTE — PROGRESS NOTES
Subjective:      Patient ID: Keerthi Chase is a 49 y.o. female.    Chief Complaint: Wound Care  Patient presents to clinic for a 1 week wound check of the Lt. Forefoot.  Has kept the previous wrap clean, dry, and intact for the past week.  Denies pain from the wound.   Has been ambulating only in the DARCO shoe.  Denies experiencing N/V/F/C/D.  Denies any additional pedal complaints.         Hemoglobin A1C   Date Value Ref Range Status   2022 10.5 (H) 0.0 - 5.6 % Final     Comment:     Reference Interval:  5.0 - 5.6 Normal   5.7 - 6.4 High Risk   > 6.5 Diabetic      Hgb A1c results are standardized based on the (NGSP) National   Glycohemoglobin Standardization Program.      Hemoglobin A1C levels are related to mean serum/plasma glucose   during the preceding 2-3 months.        2021 11.8 (H) 0.0 - 5.6 % Final     Comment:     Reference Interval:  5.0 - 5.6 Normal   5.7 - 6.4 High Risk   > 6.5 Diabetic      Hgb A1c results are standardized based on the (NGSP) National   Glycohemoglobin Standardization Program.      Hemoglobin A1C levels are related to mean serum/plasma glucose   during the preceding 2-3 months.        2021 8.8 (H) 4.8 - 5.6 % Final     Comment:              Prediabetes: 5.7 - 6.4           Diabetes: >6.4           Glycemic control for adults with diabetes: <7.0         Past Medical History:   Diagnosis Date    Bell's palsy     Coronary artery disease     Pt states Dr. Virk said she did not have a blockage after a stress test.    Diabetes mellitus     GERD (gastroesophageal reflux disease)     Hypertension     Obesity        Past Surgical History:   Procedure Laterality Date     SECTION      x 2    CHOLECYSTECTOMY      ESOPHAGOGASTRODUODENOSCOPY      PERIPHERALLY INSERTED CENTRAL CATHETER INSERTION N/A 10/11/2021    Procedure: INSERTION, PICC;  Surgeon: PICC, PRIYANK;  Location: [facility] ENDO;  Service: Cardiology;  Laterality: N/A;  Dr Aiken    PERIPHERALLY INSERTED CENTRAL  "CATHETER INSERTION N/A 2022    Procedure: INSERTION, PICC;  Surgeon: PICC, [facility];  Location: [facility] ENDO;  Service: Cardiology;  Laterality: N/A;  Dr Dubose    SESAMOIDECTOMY Left 2021    Procedure: SESAMOIDECTOMY;  Surgeon: Jacob Bernal DPM;  Location: [facility] OR;  Service: Podiatry;  Laterality: Left;       Family History   Problem Relation Name Age of Onset    Hypertension Mother         Social History     Socioeconomic History    Marital status:    Tobacco Use    Smoking status: Never    Smokeless tobacco: Never   Substance and Sexual Activity    Alcohol use: No    Drug use: No       Current Outpatient Medications   Medication Sig Dispense Refill    alprazolam (XANAX) 1 MG tablet Take 1 mg by mouth 2 (two) times daily as needed for Anxiety.      BASAGLAR KWIKPEN 100 unit/mL (3 mL) InPn pen 75 Units once daily.  3    BD INSULIN PEN NEEDLE UF MINI 31 gauge x 3/16" Ndle   2    BD INSULIN SYRINGE ULTRA-FINE 1 mL 31 gauge x 5/16 Syrg use as directed      chlorthalidone (HYGROTEN) 25 MG Tab Take 1 tablet (25 mg total) by mouth once daily. 30 tablet 11    ciprofloxacin HCl (CIPRO) 750 MG tablet Take 1 tablet (750 mg total) by mouth every 12 (twelve) hours. 20 tablet 0    fluconazole (DIFLUCAN) 200 MG Tab Take 200 mg by mouth.      hydrocodone-acetaminophen 10-325mg (NORCO)  mg Tab Take 1 tablet by mouth 3 (three) times daily as needed.      hydrocodone-acetaminophen 10-325mg (NORCO)  mg Tab Take 1 tablet by mouth every 8 (eight) hours as needed for Pain. 90 tablet 0    insulin detemir (LEVEMIR) 100 unit/mL injection Inject into the skin every evening.      insulin glargine,hum.rec.anlog (LANTUS SUBQ) Inject into the skin.      LANTUS U-100 INSULIN 100 unit/mL injection SMARTSI Unit(s) SUB-Q Every Night      mupirocin (BACTROBAN) 2 % ointment Apply topically 3 (three) times daily. 30 g 1    OZEMPIC 1 mg/dose (2 mg/1.5 mL) PnIj Inject 60 mg into the skin every 7 days.       OZEMPIC 1 " mg/dose (4 mg/3 mL) Inject 1 mg into the skin every 7 days.      pantoprazole (PROTONIX) 40 MG tablet Take 40 mg by mouth.      pramipexole (MIRAPEX) 0.125 MG tablet Take 0.125 mg by mouth.      sulfamethoxazole-trimethoprim 800-160mg (BACTRIM DS) 800-160 mg Tab Take 1 tablet by mouth 2 (two) times daily. 14 tablet 0    traMADoL (ULTRAM) 50 mg tablet Take 1 tablet (50 mg total) by mouth every 6 (six) hours as needed for Pain. 28 tablet 0     No current facility-administered medications for this visit.       Review of patient's allergies indicates:   Allergen Reactions    Codeine Hives and Rash         Review of Systems   Constitutional: Negative for chills and fever.   Skin:  Positive for color change and nail changes. Negative for poor wound healing.   Gastrointestinal:  Negative for nausea and vomiting.   Neurological:  Positive for numbness.   Psychiatric/Behavioral:  Negative for altered mental status.            Objective:      Physical Exam  Constitutional:       Appearance: Normal appearance. She is not ill-appearing.   Cardiovascular:      Pulses:           Dorsalis pedis pulses are 2+ on the right side and 2+ on the left side.        Posterior tibial pulses are 2+ on the right side and 2+ on the left side.      Comments: CFT is < 3 seconds bilateral.  Pedal hair growth is decreased bilateral.  No lower extremity edema noted bilateral.  Toes are warm to touch bilateral.    Musculoskeletal:         General: No signs of injury.      Right lower leg: No edema.      Left lower leg: No edema.      Comments: Muscle strength 5/5 in all muscle groups bilateral.  No tenderness nor crepitation with ROM of foot/ankle joints bilateral.  Slight dorsal contracture of the Lt. Hallux.  No gross deformity noted to said digit.  (-) for pain with palpation to the Lt. Sub 2nd met head wound.   Skin:     General: Skin is warm and dry.      Capillary Refill: Capillary refill takes 2 to 3 seconds.      Findings: No bruising,  ecchymosis, erythema, signs of injury, laceration, lesion, petechiae, rash or wound.      Comments: Mature skin noted to the Lt. Sub 2nd met head.   Neurological:      Mental Status: She is alert.      Sensory: Sensory deficit present.      Motor: No weakness.      Comments: Decreased protective sensation noted bilateral.  Light touch is absent bilateral.               Assessment:       Encounter Diagnoses   Name Primary?    Diabetic ulcer of other part of left foot associated with type 2 diabetes mellitus, limited to breakdown of skin Yes    Diabetic polyneuropathy associated with type 2 diabetes mellitus              Plan:       Keerthi was seen today for wound care.    Diagnoses and all orders for this visit:    Diabetic ulcer of other part of left foot associated with type 2 diabetes mellitus, limited to breakdown of skin    Diabetic polyneuropathy associated with type 2 diabetes mellitus          I counseled the patient on her conditions, their implications and medical management.    No wound debridement performed, as the site remains fully healed.    Will continue to offload for another four weeks to allow for skin maturation.    The prior Lt. Forefoot wound was painted with betadine, offloaded with full length felt, and a football dressing was applied.     Advised to keep the dressing CDI x 2 weeks.    Advised to rest and elevate the affected extremity.    Instructed to minimize weight bearing to facilitate healing.    Advised to ambulate only in the postoperative shoe/boot on the Lt. side.    RTC in 2 weeks.    Jacob Bernal DPM

## 2024-08-22 ENCOUNTER — TELEPHONE (OUTPATIENT)
Dept: PODIATRY | Facility: CLINIC | Age: 49
End: 2024-08-22
Payer: MEDICAID

## 2024-08-22 NOTE — TELEPHONE ENCOUNTER
cover the site with betadine, pad with gauze, wear the DARCO shoe with all weight bearing, and rest the extremity  patient was notified,she gave verbal understanding

## 2024-08-22 NOTE — TELEPHONE ENCOUNTER
Rt foot flared might have some infection she has been putting iodine on it and keeping it lean and a band aid she put the boot back on it she there is no open wound it looks like it under the skin.

## 2024-08-22 NOTE — TELEPHONE ENCOUNTER
----- Message from Cecile Romeo sent at 8/22/2024 11:09 AM CDT -----  Contact: pt  Type: Needs Medical Advice         Who Called: pt  Best Call Back Number:[phone]  Additional Information: Requesting a call back regarding pt needing for office to call her. Pt believes she may have an infection starting   Please Advise- Thank you

## 2024-08-28 ENCOUNTER — OFFICE VISIT (OUTPATIENT)
Dept: PODIATRY | Facility: CLINIC | Age: 49
End: 2024-08-28
Payer: MEDICAID

## 2024-08-28 VITALS — HEIGHT: 68 IN | WEIGHT: 270.06 LBS | BODY MASS INDEX: 40.93 KG/M2

## 2024-08-28 DIAGNOSIS — L89.891 PRESSURE INJURY OF RIGHT FOOT, STAGE 1: Primary | ICD-10-CM

## 2024-08-28 DIAGNOSIS — Z87.2 HEALED ULCER OF LEFT FOOT: ICD-10-CM

## 2024-08-28 DIAGNOSIS — E11.42 DIABETIC POLYNEUROPATHY ASSOCIATED WITH TYPE 2 DIABETES MELLITUS: ICD-10-CM

## 2024-08-28 PROCEDURE — 99999 PR PBB SHADOW E&M-EST. PATIENT-LVL II: CPT | Mod: PBBFAC,,, | Performed by: PODIATRIST

## 2024-08-28 PROCEDURE — 99212 OFFICE O/P EST SF 10 MIN: CPT | Mod: PBBFAC,PO | Performed by: PODIATRIST

## 2024-08-28 PROCEDURE — 99213 OFFICE O/P EST LOW 20 MIN: CPT | Mod: S$PBB,,, | Performed by: PODIATRIST

## 2024-08-28 PROCEDURE — 3008F BODY MASS INDEX DOCD: CPT | Mod: CPTII,,, | Performed by: PODIATRIST

## 2024-08-28 PROCEDURE — 1159F MED LIST DOCD IN RCRD: CPT | Mod: CPTII,,, | Performed by: PODIATRIST

## 2024-08-28 NOTE — PROGRESS NOTES
Subjective:      Patient ID: Keerthi Chase is a 49 y.o. female.    Chief Complaint: Wound Care  Patient presents to clinic for a 2 week wound check of the Lt. Forefoot.  Has kept the previous wrap clean, dry, and intact for the past couple of weeks.  Notes a new site of skin breakdown to the Rt. Lateral forefoot.  States this developed within the past week with wearing casual shoe gear.  Rates pain from the site as a 3/10.  Denies noticing drainage nor localized signs of infection to the area.  Has since switched to use of a DARCO shoe to offload the area.  Denies any additional pedal complaints.       Hemoglobin A1C   Date Value Ref Range Status   2022 10.5 (H) 0.0 - 5.6 % Final     Comment:     Reference Interval:  5.0 - 5.6 Normal   5.7 - 6.4 High Risk   > 6.5 Diabetic      Hgb A1c results are standardized based on the (NGSP) National   Glycohemoglobin Standardization Program.      Hemoglobin A1C levels are related to mean serum/plasma glucose   during the preceding 2-3 months.        2021 11.8 (H) 0.0 - 5.6 % Final     Comment:     Reference Interval:  5.0 - 5.6 Normal   5.7 - 6.4 High Risk   > 6.5 Diabetic      Hgb A1c results are standardized based on the (NGSP) National   Glycohemoglobin Standardization Program.      Hemoglobin A1C levels are related to mean serum/plasma glucose   during the preceding 2-3 months.        2021 8.8 (H) 4.8 - 5.6 % Final     Comment:              Prediabetes: 5.7 - 6.4           Diabetes: >6.4           Glycemic control for adults with diabetes: <7.0         Past Medical History:   Diagnosis Date    Bell's palsy     Coronary artery disease     Pt states Dr. Virk said she did not have a blockage after a stress test.    Diabetes mellitus     GERD (gastroesophageal reflux disease)     Hypertension     Obesity        Past Surgical History:   Procedure Laterality Date     SECTION      x 2    CHOLECYSTECTOMY      ESOPHAGOGASTRODUODENOSCOPY       "PERIPHERALLY INSERTED CENTRAL CATHETER INSERTION N/A 10/11/2021    Procedure: INSERTION, PICC;  Surgeon: PICC, [facility];  Location: [facility] ENDO;  Service: Cardiology;  Laterality: N/A;  Dr Aiken    PERIPHERALLY INSERTED CENTRAL CATHETER INSERTION N/A 7/21/2022    Procedure: INSERTION, PICC;  Surgeon: PICC, [facility];  Location: [facility] ENDO;  Service: Cardiology;  Laterality: N/A;  Dr Dubose    SESAMOIDECTOMY Left 11/11/2021    Procedure: SESAMOIDECTOMY;  Surgeon: Jacob Bernal DPM;  Location: [facility] OR;  Service: Podiatry;  Laterality: Left;       Family History   Problem Relation Name Age of Onset    Hypertension Mother         Social History     Socioeconomic History    Marital status:    Tobacco Use    Smoking status: Never    Smokeless tobacco: Never   Substance and Sexual Activity    Alcohol use: No    Drug use: No       Current Outpatient Medications   Medication Sig Dispense Refill    alprazolam (XANAX) 1 MG tablet Take 1 mg by mouth 2 (two) times daily as needed for Anxiety.      BASAGLAR KWIKPEN 100 unit/mL (3 mL) InPn pen 75 Units once daily.  3    BD INSULIN PEN NEEDLE UF MINI 31 gauge x 3/16" Ndle   2    BD INSULIN SYRINGE ULTRA-FINE 1 mL 31 gauge x 5/16 Syrg use as directed      chlorthalidone (HYGROTEN) 25 MG Tab Take 1 tablet (25 mg total) by mouth once daily. 30 tablet 11    ciprofloxacin HCl (CIPRO) 750 MG tablet Take 1 tablet (750 mg total) by mouth every 12 (twelve) hours. 20 tablet 0    fluconazole (DIFLUCAN) 200 MG Tab Take 200 mg by mouth.      hydrocodone-acetaminophen 10-325mg (NORCO)  mg Tab Take 1 tablet by mouth 3 (three) times daily as needed.      hydrocodone-acetaminophen 10-325mg (NORCO)  mg Tab Take 1 tablet by mouth every 8 (eight) hours as needed for Pain. 90 tablet 0    insulin detemir (LEVEMIR) 100 unit/mL injection Inject into the skin every evening.      insulin glargine,hum.rec.anlog (LANTUS SUBQ) Inject into the skin.      LANTUS U-100 INSULIN 100 unit/mL injection " SMARTSI Unit(s) SUB-Q Every Night      mupirocin (BACTROBAN) 2 % ointment Apply topically 3 (three) times daily. 30 g 1    OZEMPIC 1 mg/dose (2 mg/1.5 mL) PnIj Inject 60 mg into the skin every 7 days.       OZEMPIC 1 mg/dose (4 mg/3 mL) Inject 1 mg into the skin every 7 days.      pantoprazole (PROTONIX) 40 MG tablet Take 40 mg by mouth.      pramipexole (MIRAPEX) 0.125 MG tablet Take 0.125 mg by mouth.      sulfamethoxazole-trimethoprim 800-160mg (BACTRIM DS) 800-160 mg Tab Take 1 tablet by mouth 2 (two) times daily. 14 tablet 0    traMADoL (ULTRAM) 50 mg tablet Take 1 tablet (50 mg total) by mouth every 6 (six) hours as needed for Pain. 28 tablet 0     No current facility-administered medications for this visit.       Review of patient's allergies indicates:   Allergen Reactions    Codeine Hives and Rash         Review of Systems   Constitutional: Negative for chills and fever.   Skin:  Positive for color change and nail changes. Negative for poor wound healing.   Gastrointestinal:  Negative for nausea and vomiting.   Neurological:  Positive for numbness.   Psychiatric/Behavioral:  Negative for altered mental status.            Objective:      Physical Exam  Constitutional:       Appearance: Normal appearance. She is not ill-appearing.   Cardiovascular:      Pulses:           Dorsalis pedis pulses are 2+ on the right side and 2+ on the left side.        Posterior tibial pulses are 2+ on the right side and 2+ on the left side.      Comments: CFT is < 3 seconds bilateral.  Pedal hair growth is decreased bilateral.  No lower extremity edema noted bilateral.  Toes are warm to touch bilateral.    Musculoskeletal:         General: Tenderness present. No signs of injury.      Right lower leg: No edema.      Left lower leg: No edema.      Comments: Muscle strength 5/5 in all muscle groups bilateral.  No tenderness nor crepitation with ROM of foot/ankle joints bilateral.  Slight dorsal contracture of the Lt. Hallux.   No gross deformity noted to said digit.  Pain with palpation to the pressure injury site of the Rt. Lateral 5th mtp joint.     Skin:     General: Skin is warm and dry.      Capillary Refill: Capillary refill takes 2 to 3 seconds.      Findings: Wound present. No bruising, ecchymosis, erythema, signs of injury, laceration, lesion, petechiae or rash.      Comments: Mature skin noted to the Lt. Sub 2nd met head.    Stage one pressure injury noted to the lateral aspect of the Rt. 5th mtp joint.  Site is comprised of a fully intact hemorrhagic bulla.  No localized sign of infection noted.  Measures roughly 2 x 1cm.     Neurological:      Mental Status: She is alert.      Sensory: Sensory deficit present.      Motor: No weakness.      Comments: Decreased protective sensation noted bilateral.  Light touch is absent bilateral.               Assessment:       Encounter Diagnoses   Name Primary?    Pressure injury of right foot, stage 1 Yes    Healed ulcer of left foot     Diabetic polyneuropathy associated with type 2 diabetes mellitus              Plan:       Keerthi was seen today for wound care.    Diagnoses and all orders for this visit:    Pressure injury of right foot, stage 1    Healed ulcer of left foot    Diabetic polyneuropathy associated with type 2 diabetes mellitus          I counseled the patient on her conditions, their implications and medical management.    No wound debridement performed as the site of pressure injury appears stable and the Lt. Sub 2nd met head site has remained healed.      Will offload for another two weeks to allow for skin maturation at both sites.    The prior Lt. Forefoot wound and site of pressure injury was painted with betadine, offloaded with full length felt on the Lt., and a football dressing was applied bilateral.     Advised to keep the dressings CDI x 2 weeks.    Advised to rest and elevate the affected extremity.    Instructed to minimize weight bearing to facilitate  healing.    Advised to ambulate only in the postoperative shoes bilateral.    RTC in 2 weeks.    Jacob Bernal DPM

## 2024-09-04 ENCOUNTER — OFFICE VISIT (OUTPATIENT)
Dept: PODIATRY | Facility: CLINIC | Age: 49
End: 2024-09-04
Payer: MEDICARE

## 2024-09-04 VITALS — HEIGHT: 68 IN | WEIGHT: 270.06 LBS | BODY MASS INDEX: 40.93 KG/M2

## 2024-09-04 DIAGNOSIS — E11.42 DIABETIC POLYNEUROPATHY ASSOCIATED WITH TYPE 2 DIABETES MELLITUS: ICD-10-CM

## 2024-09-04 DIAGNOSIS — L89.891 PRESSURE INJURY OF RIGHT FOOT, STAGE 1: Primary | ICD-10-CM

## 2024-09-04 DIAGNOSIS — Z87.2 HEALED ULCER OF LEFT FOOT: ICD-10-CM

## 2024-09-04 PROCEDURE — 99212 OFFICE O/P EST SF 10 MIN: CPT | Mod: PBBFAC,PO | Performed by: PODIATRIST

## 2024-09-04 PROCEDURE — 99999 PR PBB SHADOW E&M-EST. PATIENT-LVL II: CPT | Mod: PBBFAC,,, | Performed by: PODIATRIST

## 2024-09-04 NOTE — PROGRESS NOTES
Subjective:      Patient ID: Keerthi Chase is a 49 y.o. female.    Chief Complaint: Wound Care  Patient presents to clinic for a 2 week wound check of bilateral foot.  Notes mild pain from the site of pressure injury of the Rt. Lateral forefoot.  Symptoms are present only with weight bearing and alleviated with rest.  Continues exclusive use of the DARCO shoes bilateral.  Denies experiencing N/V/F/C/D.   Denies any additional pedal complaints.       Hemoglobin A1C   Date Value Ref Range Status   2022 10.5 (H) 0.0 - 5.6 % Final     Comment:     Reference Interval:  5.0 - 5.6 Normal   5.7 - 6.4 High Risk   > 6.5 Diabetic      Hgb A1c results are standardized based on the (NGSP) National   Glycohemoglobin Standardization Program.      Hemoglobin A1C levels are related to mean serum/plasma glucose   during the preceding 2-3 months.        2021 11.8 (H) 0.0 - 5.6 % Final     Comment:     Reference Interval:  5.0 - 5.6 Normal   5.7 - 6.4 High Risk   > 6.5 Diabetic      Hgb A1c results are standardized based on the (NGSP) National   Glycohemoglobin Standardization Program.      Hemoglobin A1C levels are related to mean serum/plasma glucose   during the preceding 2-3 months.        2021 8.8 (H) 4.8 - 5.6 % Final     Comment:              Prediabetes: 5.7 - 6.4           Diabetes: >6.4           Glycemic control for adults with diabetes: <7.0         Past Medical History:   Diagnosis Date    Bell's palsy     Coronary artery disease     Pt states Dr. Virk said she did not have a blockage after a stress test.    Diabetes mellitus     GERD (gastroesophageal reflux disease)     Hypertension     Obesity        Past Surgical History:   Procedure Laterality Date     SECTION      x 2    CHOLECYSTECTOMY      ESOPHAGOGASTRODUODENOSCOPY      PERIPHERALLY INSERTED CENTRAL CATHETER INSERTION N/A 10/11/2021    Procedure: INSERTION, PICC;  Surgeon: PICC, PRIYANK;  Location: [facility];  Service: Cardiology;   "Laterality: N/A;  Dr Aiken    PERIPHERALLY INSERTED CENTRAL CATHETER INSERTION N/A 2022    Procedure: INSERTION, PICC;  Surgeon: PICC, PRIYANK;  Location: [facility] ENDO;  Service: Cardiology;  Laterality: N/A;  Dr Dubose    SESAMOIDECTOMY Left 2021    Procedure: SESAMOIDECTOMY;  Surgeon: Jacob Bernal DPM;  Location: [facility] OR;  Service: Podiatry;  Laterality: Left;       Family History   Problem Relation Name Age of Onset    Hypertension Mother         Social History     Socioeconomic History    Marital status:    Tobacco Use    Smoking status: Never    Smokeless tobacco: Never   Substance and Sexual Activity    Alcohol use: No    Drug use: No       Current Outpatient Medications   Medication Sig Dispense Refill    alprazolam (XANAX) 1 MG tablet Take 1 mg by mouth 2 (two) times daily as needed for Anxiety.      BASAGLAR KWIKPEN 100 unit/mL (3 mL) InPn pen 75 Units once daily.  3    BD INSULIN PEN NEEDLE UF MINI 31 gauge x 3/16" Ndle   2    BD INSULIN SYRINGE ULTRA-FINE 1 mL 31 gauge x 5/16 Syrg use as directed      chlorthalidone (HYGROTEN) 25 MG Tab Take 1 tablet (25 mg total) by mouth once daily. 30 tablet 11    ciprofloxacin HCl (CIPRO) 750 MG tablet Take 1 tablet (750 mg total) by mouth every 12 (twelve) hours. 20 tablet 0    fluconazole (DIFLUCAN) 200 MG Tab Take 200 mg by mouth.      hydrocodone-acetaminophen 10-325mg (NORCO)  mg Tab Take 1 tablet by mouth 3 (three) times daily as needed.      hydrocodone-acetaminophen 10-325mg (NORCO)  mg Tab Take 1 tablet by mouth every 8 (eight) hours as needed for Pain. 90 tablet 0    insulin detemir (LEVEMIR) 100 unit/mL injection Inject into the skin every evening.      insulin glargine,hum.rec.anlog (LANTUS SUBQ) Inject into the skin.      LANTUS U-100 INSULIN 100 unit/mL injection SMARTSI Unit(s) SUB-Q Every Night      mupirocin (BACTROBAN) 2 % ointment Apply topically 3 (three) times daily. 30 g 1    OZEMPIC 1 mg/dose (2 mg/1.5 mL) PnIj " Inject 60 mg into the skin every 7 days.       OZEMPIC 1 mg/dose (4 mg/3 mL) Inject 1 mg into the skin every 7 days.      pantoprazole (PROTONIX) 40 MG tablet Take 40 mg by mouth.      pramipexole (MIRAPEX) 0.125 MG tablet Take 0.125 mg by mouth.      sulfamethoxazole-trimethoprim 800-160mg (BACTRIM DS) 800-160 mg Tab Take 1 tablet by mouth 2 (two) times daily. 14 tablet 0    traMADoL (ULTRAM) 50 mg tablet Take 1 tablet (50 mg total) by mouth every 6 (six) hours as needed for Pain. 28 tablet 0     No current facility-administered medications for this visit.       Review of patient's allergies indicates:   Allergen Reactions    Codeine Hives and Rash         Review of Systems   Constitutional: Negative for chills and fever.   Skin:  Positive for color change and nail changes. Negative for poor wound healing.   Gastrointestinal:  Negative for nausea and vomiting.   Neurological:  Positive for numbness.   Psychiatric/Behavioral:  Negative for altered mental status.            Objective:      Physical Exam  Constitutional:       Appearance: Normal appearance. She is not ill-appearing.   Cardiovascular:      Pulses:           Dorsalis pedis pulses are 2+ on the right side and 2+ on the left side.        Posterior tibial pulses are 2+ on the right side and 2+ on the left side.      Comments: CFT is < 3 seconds bilateral.  Pedal hair growth is decreased bilateral.  No lower extremity edema noted bilateral.  Toes are warm to touch bilateral.    Musculoskeletal:         General: Tenderness present. No signs of injury.      Right lower leg: No edema.      Left lower leg: No edema.      Comments: Muscle strength 5/5 in all muscle groups bilateral.  No tenderness nor crepitation with ROM of foot/ankle joints bilateral.  Slight dorsal contracture of the Lt. Hallux.  No gross deformity noted to said digit.  Pain with palpation to the pressure injury site of the Rt. Lateral 5th mtp joint.     Skin:     General: Skin is warm and  dry.      Capillary Refill: Capillary refill takes 2 to 3 seconds.      Findings: Wound present. No bruising, ecchymosis, erythema, signs of injury, laceration, lesion, petechiae or rash.      Comments: Mature skin noted to the Lt. Sub 2nd met head.    Stage one pressure injury noted to the lateral aspect of the Rt. 5th mtp joint.  Site is comprised of a resolving bulla.  No localized sign of infection noted.  Measures roughly 2 x 1cm.     Neurological:      Mental Status: She is alert.      Sensory: Sensory deficit present.      Motor: No weakness.      Comments: Decreased protective sensation noted bilateral.  Light touch is absent bilateral.               Assessment:       Encounter Diagnoses   Name Primary?    Pressure injury of right foot, stage 1 Yes    Healed ulcer of left foot     Diabetic polyneuropathy associated with type 2 diabetes mellitus                Plan:       Keerthi was seen today for wound care.    Diagnoses and all orders for this visit:    Pressure injury of right foot, stage 1    Healed ulcer of left foot    Diabetic polyneuropathy associated with type 2 diabetes mellitus        I counseled the patient on her conditions, their implications and medical management.    No wound debridement performed as the site of pressure injury appears stable and the Lt. Sub 2nd met head site has remained healed.      Will offload for another two weeks to allow for skin maturation at both sites.    The prior Lt. Forefoot wound was covered with a mepilex border, and the site of Rt. Forefoot pressure injury was painted with betadine, and a football dressing was applied on the Rt. Side.    Advised to keep the dressing on the Rt. side CDI x 2 weeks.  Patient to change out the mepilex border, on the Lt. Foot, once daily.    Advised to rest and elevate the affected extremity.    Instructed to minimize weight bearing to facilitate healing.    Advised to ambulate only in the postoperative shoes bilateral.    RTC in 2  weeks.    ISABEL SellersM

## 2024-09-12 ENCOUNTER — TELEPHONE (OUTPATIENT)
Dept: PODIATRY | Facility: CLINIC | Age: 49
End: 2024-09-12
Payer: MEDICARE

## 2024-09-12 NOTE — TELEPHONE ENCOUNTER
----- Message from Dinorah Quiñones sent at 9/10/2024  4:32 PM CDT -----  Regarding: return call  Contact: patient  Type:  Patient Returning Call    Who Called:  patient  Who Left Message for Patient:  Alexis  Does the patient know what this is regarding?:  schedule  Best Call Back Number:  [phone] (home) [phone] (work)    Additional Information:  Please call patient to advise.  Thanks!

## 2024-09-17 ENCOUNTER — OFFICE VISIT (OUTPATIENT)
Dept: PODIATRY | Facility: CLINIC | Age: 49
End: 2024-09-17
Payer: MEDICARE

## 2024-09-17 VITALS — WEIGHT: 270.06 LBS | HEIGHT: 68 IN | BODY MASS INDEX: 40.93 KG/M2

## 2024-09-17 DIAGNOSIS — E11.42 DIABETIC POLYNEUROPATHY ASSOCIATED WITH TYPE 2 DIABETES MELLITUS: ICD-10-CM

## 2024-09-17 DIAGNOSIS — L97.521 DIABETIC ULCER OF OTHER PART OF LEFT FOOT ASSOCIATED WITH TYPE 2 DIABETES MELLITUS, LIMITED TO BREAKDOWN OF SKIN: Primary | ICD-10-CM

## 2024-09-17 DIAGNOSIS — L89.892 PRESSURE INJURY OF RIGHT FOOT, STAGE 2: ICD-10-CM

## 2024-09-17 DIAGNOSIS — E11.621 DIABETIC ULCER OF OTHER PART OF LEFT FOOT ASSOCIATED WITH TYPE 2 DIABETES MELLITUS, LIMITED TO BREAKDOWN OF SKIN: Primary | ICD-10-CM

## 2024-09-17 PROCEDURE — 99999 PR PBB SHADOW E&M-EST. PATIENT-LVL II: CPT | Mod: PBBFAC,,, | Performed by: PODIATRIST

## 2024-09-17 PROCEDURE — 97597 DBRDMT OPN WND 1ST 20 CM/<: CPT | Mod: PBBFAC,PO | Performed by: PODIATRIST

## 2024-09-17 PROCEDURE — 99499 UNLISTED E&M SERVICE: CPT | Mod: S$PBB,,, | Performed by: PODIATRIST

## 2024-09-17 PROCEDURE — 99212 OFFICE O/P EST SF 10 MIN: CPT | Mod: PBBFAC,PO | Performed by: PODIATRIST

## 2024-09-17 NOTE — PROGRESS NOTES
"Subjective:      Patient ID: Keerthi Chase is a 49 y.o. female.    Chief Complaint: Wound Care  Patient presents to clinic for a 2 week wound check of bilateral foot.  States the wound to the Rt. Lateral forefoot continues to be a source of sensitivity with weight bearing.  Symptoms are alleviated with rest.  Has kept the Rt. Football dressing clean, dry, and intact for the past two weeks.  She has also been applying a padded bandage to the prior site of ulceration of the Lt. Foot.  Notes there being a small amount of "bloody drainage" on the bandage each day.  Notes wearing a DARCO shoe on the Rt. And sandal on the Lt.  Suspects shearing with use of the sandal on the Lt. Side, hence the new development of a wound.   Denies experiencing N/V/F/C/D.   Denies any additional pedal complaints.       Hemoglobin A1C   Date Value Ref Range Status   06/03/2022 10.5 (H) 0.0 - 5.6 % Final     Comment:     Reference Interval:  5.0 - 5.6 Normal   5.7 - 6.4 High Risk   > 6.5 Diabetic      Hgb A1c results are standardized based on the (NGSP) National   Glycohemoglobin Standardization Program.      Hemoglobin A1C levels are related to mean serum/plasma glucose   during the preceding 2-3 months.        09/23/2021 11.8 (H) 0.0 - 5.6 % Final     Comment:     Reference Interval:  5.0 - 5.6 Normal   5.7 - 6.4 High Risk   > 6.5 Diabetic      Hgb A1c results are standardized based on the (NGSP) National   Glycohemoglobin Standardization Program.      Hemoglobin A1C levels are related to mean serum/plasma glucose   during the preceding 2-3 months.        06/07/2021 8.8 (H) 4.8 - 5.6 % Final     Comment:              Prediabetes: 5.7 - 6.4           Diabetes: >6.4           Glycemic control for adults with diabetes: <7.0         Past Medical History:   Diagnosis Date    Bell's palsy     Coronary artery disease     Pt states Dr. Virk said she did not have a blockage after a stress test.    Diabetes mellitus     GERD (gastroesophageal " "reflux disease)     Hypertension     Obesity        Past Surgical History:   Procedure Laterality Date     SECTION      x 2    CHOLECYSTECTOMY      ESOPHAGOGASTRODUODENOSCOPY      PERIPHERALLY INSERTED CENTRAL CATHETER INSERTION N/A 10/11/2021    Procedure: INSERTION, PICC;  Surgeon: PICC, [facility];  Location: [facility] ENDO;  Service: Cardiology;  Laterality: N/A;  Dr Aiken    PERIPHERALLY INSERTED CENTRAL CATHETER INSERTION N/A 2022    Procedure: INSERTION, PICC;  Surgeon: PICC, [facility];  Location: [facility] END[facility];  Service: Cardiology;  Laterality: N/A;  Dr Dubose    SESAMOIDECTOMY Left 2021    Procedure: SESAMOIDECTOMY;  Surgeon: Jacob Bernal DPM;  Location: [facility] OR;  Service: Podiatry;  Laterality: Left;       Family History   Problem Relation Name Age of Onset    Hypertension Mother         Social History     Socioeconomic History    Marital status:    Tobacco Use    Smoking status: Never    Smokeless tobacco: Never   Substance and Sexual Activity    Alcohol use: No    Drug use: No       Current Outpatient Medications   Medication Sig Dispense Refill    alprazolam (XANAX) 1 MG tablet Take 1 mg by mouth 2 (two) times daily as needed for Anxiety.      BASAGLAR KWIKPEN 100 unit/mL (3 mL) InPn pen 75 Units once daily.  3    BD INSULIN PEN NEEDLE UF MINI 31 gauge x 3/16" Ndle   2    BD INSULIN SYRINGE ULTRA-FINE 1 mL 31 gauge x 5/16 Syrg use as directed      chlorthalidone (HYGROTEN) 25 MG Tab Take 1 tablet (25 mg total) by mouth once daily. 30 tablet 11    ciprofloxacin HCl (CIPRO) 750 MG tablet Take 1 tablet (750 mg total) by mouth every 12 (twelve) hours. 20 tablet 0    fluconazole (DIFLUCAN) 200 MG Tab Take 200 mg by mouth.      hydrocodone-acetaminophen 10-325mg (NORCO)  mg Tab Take 1 tablet by mouth 3 (three) times daily as needed.      hydrocodone-acetaminophen 10-325mg (NORCO)  mg Tab Take 1 tablet by mouth every 8 (eight) hours as needed for Pain. 90 tablet 0    insulin detemir " (LEVEMIR) 100 unit/mL injection Inject into the skin every evening.      insulin glargine,hum.rec.anlog (LANTUS SUBQ) Inject into the skin.      LANTUS U-100 INSULIN 100 unit/mL injection SMARTSI Unit(s) SUB-Q Every Night      mupirocin (BACTROBAN) 2 % ointment Apply topically 3 (three) times daily. 30 g 1    OZEMPIC 1 mg/dose (2 mg/1.5 mL) PnIj Inject 60 mg into the skin every 7 days.       OZEMPIC 1 mg/dose (4 mg/3 mL) Inject 1 mg into the skin every 7 days.      pantoprazole (PROTONIX) 40 MG tablet Take 40 mg by mouth.      pramipexole (MIRAPEX) 0.125 MG tablet Take 0.125 mg by mouth.      sulfamethoxazole-trimethoprim 800-160mg (BACTRIM DS) 800-160 mg Tab Take 1 tablet by mouth 2 (two) times daily. 14 tablet 0    traMADoL (ULTRAM) 50 mg tablet Take 1 tablet (50 mg total) by mouth every 6 (six) hours as needed for Pain. 28 tablet 0     No current facility-administered medications for this visit.       Review of patient's allergies indicates:   Allergen Reactions    Codeine Hives and Rash         Review of Systems   Constitutional: Negative for chills and fever.   Skin:  Positive for color change and nail changes. Negative for poor wound healing.   Gastrointestinal:  Negative for nausea and vomiting.   Neurological:  Positive for numbness.   Psychiatric/Behavioral:  Negative for altered mental status.            Objective:      Physical Exam  Constitutional:       Appearance: Normal appearance. She is not ill-appearing.   Cardiovascular:      Pulses:           Dorsalis pedis pulses are 2+ on the right side and 2+ on the left side.        Posterior tibial pulses are 2+ on the right side and 2+ on the left side.      Comments: CFT is < 3 seconds bilateral.  Pedal hair growth is decreased bilateral.  No lower extremity edema noted bilateral.  Toes are warm to touch bilateral.    Musculoskeletal:         General: Tenderness present. No signs of injury.      Right lower leg: No edema.      Left lower leg: No edema.       Comments: Muscle strength 5/5 in all muscle groups bilateral.  No tenderness nor crepitation with ROM of foot/ankle joints bilateral.  Slight dorsal contracture of the Lt. Hallux.  No gross deformity noted to said digit.  Pain with palpation to the pressure injury site of the Rt. Lateral 5th mtp joint.     Skin:     General: Skin is warm and dry.      Capillary Refill: Capillary refill takes 2 to 3 seconds.      Findings: Wound present. No bruising, ecchymosis, erythema, signs of injury, laceration, lesion, petechiae or rash.      Comments: Location: Open wound noted to the Rt. Lateral sub 5th met head.    Base: Down to dermis and comprised of fibrin.      Drainage: None  Malinda wound: Devoid of erythema, edema, fluctuance, purulence, and malodor.   Pre-debridement measurement: 1 x 0.5 x 0.1cm   Post-debridement measurement: 1.2 x 0.7 x 0.1cm     Location: Open wound noted to the Lt. Sub 2nd met head.    Base: Down to dermis and comprised of fibrin.      Drainage: None  Malinda wound: Devoid of erythema, edema, fluctuance, purulence, and malodor.   Pre-debridement measurement: 0.3 x 0.3 x 0.1cm   Post-debridement measurement: 0.5 x 0.5 x 0.1cm      Neurological:      Mental Status: She is alert.      Sensory: Sensory deficit present.      Motor: No weakness.      Comments: Decreased protective sensation noted bilateral.  Light touch is absent bilateral.               Assessment:       Encounter Diagnoses   Name Primary?    Diabetic ulcer of other part of left foot associated with type 2 diabetes mellitus, limited to breakdown of skin Yes    Pressure injury of right foot, stage 2     Diabetic polyneuropathy associated with type 2 diabetes mellitus                  Plan:       Keerthi was seen today for wound care.    Diagnoses and all orders for this visit:    Diabetic ulcer of other part of left foot associated with type 2 diabetes mellitus, limited to breakdown of skin  -     Wound Debridement    Pressure injury of  right foot, stage 2  -     Wound Debridement    Diabetic polyneuropathy associated with type 2 diabetes mellitus          I counseled the patient on her conditions, their implications and medical management.    Performed a selective excisional debridement of bilateral foot.  See attached procedure note.     Silver alginate was applied to bilateral wounds, offloaded with two full length felt pads with and aperture, and a football dressing was applied.    Advised to keep the dressings CDI x 2 weeks.    Advised to rest and elevate the affected extremity.    Instructed to minimize weight bearing to facilitate healing.    Advised to ambulate only in the postoperative shoes bilateral.    RTC in 2 weeks.    Jacob Bernal DPM

## 2024-10-02 ENCOUNTER — OFFICE VISIT (OUTPATIENT)
Dept: PODIATRY | Facility: CLINIC | Age: 49
End: 2024-10-02
Payer: MEDICARE

## 2024-10-02 VITALS — HEIGHT: 68 IN | WEIGHT: 270.06 LBS | BODY MASS INDEX: 40.93 KG/M2

## 2024-10-02 DIAGNOSIS — E11.42 DIABETIC POLYNEUROPATHY ASSOCIATED WITH TYPE 2 DIABETES MELLITUS: ICD-10-CM

## 2024-10-02 DIAGNOSIS — L89.892 PRESSURE INJURY OF RIGHT FOOT, STAGE 2: Primary | ICD-10-CM

## 2024-10-02 DIAGNOSIS — E11.621 DIABETIC ULCER OF OTHER PART OF LEFT FOOT ASSOCIATED WITH TYPE 2 DIABETES MELLITUS, LIMITED TO BREAKDOWN OF SKIN: ICD-10-CM

## 2024-10-02 DIAGNOSIS — L97.521 DIABETIC ULCER OF OTHER PART OF LEFT FOOT ASSOCIATED WITH TYPE 2 DIABETES MELLITUS, LIMITED TO BREAKDOWN OF SKIN: ICD-10-CM

## 2024-10-02 PROCEDURE — 99213 OFFICE O/P EST LOW 20 MIN: CPT | Mod: PBBFAC,PO | Performed by: PODIATRIST

## 2024-10-02 PROCEDURE — 99999 PR PBB SHADOW E&M-EST. PATIENT-LVL III: CPT | Mod: PBBFAC,,, | Performed by: PODIATRIST

## 2024-10-02 PROCEDURE — 97597 DBRDMT OPN WND 1ST 20 CM/<: CPT | Mod: PBBFAC,PO | Performed by: PODIATRIST

## 2024-10-02 NOTE — PROCEDURES
"Wound Debridement    Date/Time: 10/2/2024 7:00 AM    Performed by: Jacob Bernal DPM  Authorized by: Jacob Bernal DPM    Time out: Immediately prior to procedure a "time out" was called to verify the correct patient, procedure, equipment, support staff and site/side marked as required.    Consent Done?:  Yes (Verbal)  Local anesthesia used?: No      Wound Details:    Location:  Left foot    Location:  Left 2nd Metatarsal Head    Type of Debridement:  Excisional       Length (cm):  0.2       Area (sq cm):  0.02       Width (cm):  0.1       Percent Debrided (%):  100       Depth (cm):  0.1       Total Area Debrided (sq cm):  0.02    Depth of debridement:  Epidermis/Dermis    Tissue debrided:  Dermis    Devitalized tissue debrided:  Fibrin    Instruments:  Blade  Bleeding:  Minimal  Hemostasis Achieved: Yes  Method Used:  Pressure    2nd Wound Details:     Location:  Right foot    Location:  Right 5th Metatarsal Head    Location:  Right 5th Metatarsal Head    Type of Debridement:  Excisional       Length (cm):  1.2       Area (sq cm):  0.48       Width (cm):  0.4       Percent Debrided (%):  100       Depth (cm):  0.2       Total Area Debrided (sq cm):  0.48    Depth of debridement:  Epidermis/Dermis    Tissue debrided:  Dermis    Devitalized tissue debrided:  Fibrin    Instruments:  Blade  Bleeding:  Minimal  Hemostasis Achieved: Yes    Method Used:  Pressure  Patient tolerance:  Patient tolerated the procedure well with no immediate complications    "

## 2024-10-02 NOTE — PROGRESS NOTES
Subjective:      Patient ID: Keerthi Chase is a 49 y.o. female.    Chief Complaint: No chief complaint on file.  Patient presents to clinic for a 2 week wound check of bilateral foot.  Notes continued sensitivity from the Rt. Lateral forefoot wound.  Symptoms are present only with weight bearing and alleviated with rest.  Has kept the football dressings clean, dry, and intact for the past two weeks.  Has been ambulatory in the [facility] shoes.  Denies experiencing N/V/F/C/D.   Denies any additional pedal complaints.       Hemoglobin A1C   Date Value Ref Range Status   2022 10.5 (H) 0.0 - 5.6 % Final     Comment:     Reference Interval:  5.0 - 5.6 Normal   5.7 - 6.4 High Risk   > 6.5 Diabetic      Hgb A1c results are standardized based on the (NGSP) National   Glycohemoglobin Standardization Program.      Hemoglobin A1C levels are related to mean serum/plasma glucose   during the preceding 2-3 months.        2021 11.8 (H) 0.0 - 5.6 % Final     Comment:     Reference Interval:  5.0 - 5.6 Normal   5.7 - 6.4 High Risk   > 6.5 Diabetic      Hgb A1c results are standardized based on the (NGSP) National   Glycohemoglobin Standardization Program.      Hemoglobin A1C levels are related to mean serum/plasma glucose   during the preceding 2-3 months.        2021 8.8 (H) 4.8 - 5.6 % Final     Comment:              Prediabetes: 5.7 - 6.4           Diabetes: >6.4           Glycemic control for adults with diabetes: <7.0         Past Medical History:   Diagnosis Date    Bell's palsy     Coronary artery disease     Pt states Dr. Virk said she did not have a blockage after a stress test.    Diabetes mellitus     GERD (gastroesophageal reflux disease)     Hypertension     Obesity        Past Surgical History:   Procedure Laterality Date     SECTION      x 2    CHOLECYSTECTOMY      ESOPHAGOGASTRODUODENOSCOPY      PERIPHERALLY INSERTED CENTRAL CATHETER INSERTION N/A 10/11/2021    Procedure: INSERTION, PICC;   "Surgeon: PICC, [facility];  Location: [facility] END[facility];  Service: Cardiology;  Laterality: N/A;  Dr Aiken    PERIPHERALLY INSERTED CENTRAL CATHETER INSERTION N/A 2022    Procedure: INSERTION, PICC;  Surgeon: PICC, [facility];  [facility];  Service: Cardiology;  Laterality: N/A;  Dr Dubose    SESAMOIDECTOMY Left 2021    Procedure: SESAMOIDECTOMY;  Surgeon: Jacob Bernal DPM;  Location: [facility] OR;  Service: Podiatry;  Laterality: Left;       Family History   Problem Relation Name Age of Onset    Hypertension Mother         Social History     Socioeconomic History    Marital status:    Tobacco Use    Smoking status: Never    Smokeless tobacco: Never   Substance and Sexual Activity    Alcohol use: No    Drug use: No       Current Outpatient Medications   Medication Sig Dispense Refill    alprazolam (XANAX) 1 MG tablet Take 1 mg by mouth 2 (two) times daily as needed for Anxiety.      BASAGLAR KWIKPEN 100 unit/mL (3 mL) InPn pen 75 Units once daily.  3    BD INSULIN PEN NEEDLE UF MINI 31 gauge x 3/16" Ndle   2    BD INSULIN SYRINGE ULTRA-FINE 1 mL 31 gauge x 5/16 Syrg use as directed      chlorthalidone (HYGROTEN) 25 MG Tab Take 1 tablet (25 mg total) by mouth once daily. 30 tablet 11    ciprofloxacin HCl (CIPRO) 750 MG tablet Take 1 tablet (750 mg total) by mouth every 12 (twelve) hours. 20 tablet 0    fluconazole (DIFLUCAN) 200 MG Tab Take 200 mg by mouth.      hydrocodone-acetaminophen 10-325mg (NORCO)  mg Tab Take 1 tablet by mouth 3 (three) times daily as needed.      hydrocodone-acetaminophen 10-325mg (NORCO)  mg Tab Take 1 tablet by mouth every 8 (eight) hours as needed for Pain. 90 tablet 0    insulin detemir (LEVEMIR) 100 unit/mL injection Inject into the skin every evening.      insulin glargine,hum.rec.anlog (LANTUS SUBQ) Inject into the skin.      LANTUS U-100 INSULIN 100 unit/mL injection SMARTSI Unit(s) SUB-Q Every Night      mupirocin (BACTROBAN) 2 % ointment Apply topically 3 " (three) times daily. 30 g 1    OZEMPIC 1 mg/dose (2 mg/1.5 mL) PnIj Inject 60 mg into the skin every 7 days.       OZEMPIC 1 mg/dose (4 mg/3 mL) Inject 1 mg into the skin every 7 days.      pantoprazole (PROTONIX) 40 MG tablet Take 40 mg by mouth.      pramipexole (MIRAPEX) 0.125 MG tablet Take 0.125 mg by mouth.      sulfamethoxazole-trimethoprim 800-160mg (BACTRIM DS) 800-160 mg Tab Take 1 tablet by mouth 2 (two) times daily. 14 tablet 0    traMADoL (ULTRAM) 50 mg tablet Take 1 tablet (50 mg total) by mouth every 6 (six) hours as needed for Pain. 28 tablet 0     No current facility-administered medications for this visit.       Review of patient's allergies indicates:   Allergen Reactions    Codeine Hives and Rash         Review of Systems   Constitutional: Negative for chills and fever.   Skin:  Positive for color change and nail changes. Negative for poor wound healing.   Gastrointestinal:  Negative for nausea and vomiting.   Neurological:  Positive for numbness.   Psychiatric/Behavioral:  Negative for altered mental status.            Objective:      Physical Exam  Constitutional:       Appearance: Normal appearance. She is not ill-appearing.   Cardiovascular:      Pulses:           Dorsalis pedis pulses are 2+ on the right side and 2+ on the left side.        Posterior tibial pulses are 2+ on the right side and 2+ on the left side.      Comments: CFT is < 3 seconds bilateral.  Pedal hair growth is decreased bilateral.  No lower extremity edema noted bilateral.  Toes are warm to touch bilateral.    Musculoskeletal:         General: Tenderness present. No signs of injury.      Right lower leg: No edema.      Left lower leg: No edema.      Comments: Muscle strength 5/5 in all muscle groups bilateral.  No tenderness nor crepitation with ROM of foot/ankle joints bilateral.  Slight dorsal contracture of the Lt. Hallux.  No gross deformity noted to said digit.  Pain with palpation to the pressure injury site of the  Rt. Lateral 5th mtp joint.     Skin:     General: Skin is warm and dry.      Capillary Refill: Capillary refill takes 2 to 3 seconds.      Findings: Wound present. No bruising, ecchymosis, erythema, signs of injury, laceration, lesion, petechiae or rash.      Comments: Location: Open wound noted to the Rt. Lateral sub 5th met head.    Base: Down to dermis and comprised of fibrin.      Drainage: None  Malinda wound: Devoid of erythema, edema, fluctuance, purulence, and malodor.   Pre-debridement measurement: 1.2 x 0.4 x 0.2cm   Post-debridement measurement: 1.4 x 0.6 x 0.2cm     Location: Open wound noted to the Lt. Sub 2nd met head.    Base: Down to dermis and comprised of fibrin.      Drainage: None  Malinda wound: Devoid of erythema, edema, fluctuance, purulence, and malodor.   Pre-debridement measurement: 0.2 x 0.1 x 0.1cm   Post-debridement measurement: 0.4 x 0.3 x 0.1cm      Neurological:      Mental Status: She is alert.      Sensory: Sensory deficit present.      Motor: No weakness.      Comments: Decreased protective sensation noted bilateral.  Light touch is absent bilateral.               Assessment:       Encounter Diagnoses   Name Primary?    Pressure injury of right foot, stage 2 Yes    Diabetic ulcer of other part of left foot associated with type 2 diabetes mellitus, limited to breakdown of skin     Diabetic polyneuropathy associated with type 2 diabetes mellitus                  Plan:       Diagnoses and all orders for this visit:    Pressure injury of right foot, stage 2    Diabetic ulcer of other part of left foot associated with type 2 diabetes mellitus, limited to breakdown of skin    Diabetic polyneuropathy associated with type 2 diabetes mellitus          I counseled the patient on her conditions, their implications and medical management.    Performed a selective excisional debridement of bilateral foot.  See attached procedure note.     Silver alginate was applied to bilateral wounds, offloaded  with two full length felt pads with and aperture, and a football dressing was applied.    Advised to keep the dressings CDI x 2 weeks.    Advised to rest and elevate the affected extremity.    Instructed to minimize weight bearing to facilitate healing.    Advised to ambulate only in the postoperative shoes bilateral.    RTC in 2 weeks.    Jacob Bernal DPM

## 2024-10-08 ENCOUNTER — TELEPHONE (OUTPATIENT)
Dept: PODIATRY | Facility: CLINIC | Age: 49
End: 2024-10-08
Payer: MEDICARE

## 2024-10-11 ENCOUNTER — TELEPHONE (OUTPATIENT)
Dept: PODIATRY | Facility: CLINIC | Age: 49
End: 2024-10-11
Payer: MEDICARE

## 2024-10-11 NOTE — TELEPHONE ENCOUNTER
----- Message from Yennifer sent at 10/11/2024 10:00 AM CDT -----  Type: Needs Medical Advice  Who Called:  Keerthi  Bernard Call Back Number: [phone]  Pharmacy:   [facility] [address]  [address] [address]  [facility] [address]  Phone: [phone] Fax: [phone]     Additional Information: patient believe her foot is getting infected and was waiting on a phone call back from the nurse and have not heard anything wants to know if she can get a return call and antibiotics sent to pharmacy

## 2024-10-15 ENCOUNTER — OFFICE VISIT (OUTPATIENT)
Dept: PODIATRY | Facility: CLINIC | Age: 49
End: 2024-10-15
Payer: MEDICARE

## 2024-10-15 VITALS — HEIGHT: 68 IN | BODY MASS INDEX: 40.93 KG/M2 | WEIGHT: 270.06 LBS

## 2024-10-15 DIAGNOSIS — E11.42 DIABETIC POLYNEUROPATHY ASSOCIATED WITH TYPE 2 DIABETES MELLITUS: ICD-10-CM

## 2024-10-15 DIAGNOSIS — L97.512 DIABETIC ULCER OF OTHER PART OF RIGHT FOOT ASSOCIATED WITH TYPE 2 DIABETES MELLITUS, WITH FAT LAYER EXPOSED: Primary | ICD-10-CM

## 2024-10-15 DIAGNOSIS — E11.621 DIABETIC ULCER OF OTHER PART OF RIGHT FOOT ASSOCIATED WITH TYPE 2 DIABETES MELLITUS, WITH FAT LAYER EXPOSED: Primary | ICD-10-CM

## 2024-10-15 DIAGNOSIS — Z87.2 HEALED ULCER OF LEFT FOOT: ICD-10-CM

## 2024-10-15 PROCEDURE — 87186 SC STD MICRODIL/AGAR DIL: CPT | Performed by: PODIATRIST

## 2024-10-15 PROCEDURE — 87077 CULTURE AEROBIC IDENTIFY: CPT | Performed by: PODIATRIST

## 2024-10-15 PROCEDURE — 87070 CULTURE OTHR SPECIMN AEROBIC: CPT | Performed by: PODIATRIST

## 2024-10-15 PROCEDURE — 87075 CULTR BACTERIA EXCEPT BLOOD: CPT | Performed by: PODIATRIST

## 2024-10-15 PROCEDURE — 99499 UNLISTED E&M SERVICE: CPT | Mod: S$PBB,,, | Performed by: PODIATRIST

## 2024-10-15 PROCEDURE — 11042 DBRDMT SUBQ TIS 1ST 20SQCM/<: CPT | Mod: PBBFAC,PO | Performed by: PODIATRIST

## 2024-10-15 PROCEDURE — 99999 PR PBB SHADOW E&M-EST. PATIENT-LVL III: CPT | Mod: PBBFAC,,, | Performed by: PODIATRIST

## 2024-10-15 PROCEDURE — 87076 CULTURE ANAEROBE IDENT EACH: CPT | Performed by: PODIATRIST

## 2024-10-15 PROCEDURE — 99213 OFFICE O/P EST LOW 20 MIN: CPT | Mod: PBBFAC,PO | Performed by: PODIATRIST

## 2024-10-15 PROCEDURE — 87147 CULTURE TYPE IMMUNOLOGIC: CPT | Performed by: PODIATRIST

## 2024-10-15 NOTE — PROGRESS NOTES
Subjective:      Patient ID: Keerthi Chase is a 49 y.o. female.    Chief Complaint: Wound Care (Bilateral wound care)  Patient presents to clinic for a 2 week wound check of bilateral foot.  States sensitivity has increased along the Rt. Lateral forefoot since the last exam.  Notes more drainage and an order from the site.  Relates having cipro called in by her PCP to counteract any potential infection.  Has kept the football dressings dry and intact for the past two weeks.  [facility]  Denies experiencing N/V/F/C/D.   Denies any additional pedal complaints.       Hemoglobin A1C   Date Value Ref Range Status   2022 10.5 (H) 0.0 - 5.6 % Final     Comment:     Reference Interval:  5.0 - 5.6 Normal   5.7 - 6.4 High Risk   > 6.5 Diabetic      Hgb A1c results are standardized based on the (NGSP) National   Glycohemoglobin Standardization Program.      Hemoglobin A1C levels are related to mean serum/plasma glucose   during the preceding 2-3 months.        2021 11.8 (H) 0.0 - 5.6 % Final     Comment:     Reference Interval:  5.0 - 5.6 Normal   5.7 - 6.4 High Risk   > 6.5 Diabetic      Hgb A1c results are standardized based on the (NGSP) National   Glycohemoglobin Standardization Program.      Hemoglobin A1C levels are related to mean serum/plasma glucose   during the preceding 2-3 months.        2021 8.8 (H) 4.8 - 5.6 % Final     Comment:              Prediabetes: 5.7 - 6.4           Diabetes: >6.4           Glycemic control for adults with diabetes: <7.0         Past Medical History:   Diagnosis Date    Bell's palsy     Coronary artery disease     Pt states Dr. Virk said she did not have a blockage after a stress test.    Diabetes mellitus     GERD (gastroesophageal reflux disease)     Hypertension     Obesity        Past Surgical History:   Procedure Laterality Date     SECTION      x 2    CHOLECYSTECTOMY      ESOPHAGOGASTRODUODENOSCOPY      PERIPHERALLY  "INSERTED CENTRAL CATHETER INSERTION N/A 10/11/2021    Procedure: INSERTION, PICC;  Surgeon: PICC, [facility];  Location: [facility] ENDO;  Service: Cardiology;  Laterality: N/A;  Dr Aiken    PERIPHERALLY INSERTED CENTRAL CATHETER INSERTION N/A 2022    Procedure: INSERTION, PICC;  Surgeon: PICC, [facility];  Location: [facility] END[facility];  Service: Cardiology;  Laterality: N/A;  Dr Dubose    SESAMOIDECTOMY Left 2021    Procedure: SESAMOIDECTOMY;  Surgeon: Jacob Bernal DPM;  Location: [facility] OR;  Service: Podiatry;  Laterality: Left;       Family History   Problem Relation Name Age of Onset    Hypertension Mother         Social History     Socioeconomic History    Marital status:    Tobacco Use    Smoking status: Never    Smokeless tobacco: Never   Substance and Sexual Activity    Alcohol use: No    Drug use: No       Current Outpatient Medications   Medication Sig Dispense Refill    alprazolam (XANAX) 1 MG tablet Take 1 mg by mouth 2 (two) times daily as needed for Anxiety.      BD INSULIN PEN NEEDLE UF MINI 31 gauge x 3/16" Ndle   2    BD INSULIN SYRINGE ULTRA-FINE 1 mL 31 gauge x 5/16 Syrg use as directed      ciprofloxacin HCl (CIPRO) 750 MG tablet Take 1 tablet (750 mg total) by mouth every 12 (twelve) hours. 20 tablet 0    fluconazole (DIFLUCAN) 200 MG Tab Take 200 mg by mouth.      hydrocodone-acetaminophen 10-325mg (NORCO)  mg Tab Take 1 tablet by mouth 3 (three) times daily as needed.      hydrocodone-acetaminophen 10-325mg (NORCO)  mg Tab Take 1 tablet by mouth every 8 (eight) hours as needed for Pain. 90 tablet 0    insulin detemir (LEVEMIR) 100 unit/mL injection Inject into the skin every evening.      insulin glargine,hum.rec.anlog (LANTUS SUBQ) Inject into the skin.      LANTUS U-100 INSULIN 100 unit/mL injection SMARTSI Unit(s) SUB-Q Every Night      mupirocin (BACTROBAN) 2 % ointment Apply topically 3 (three) times daily. 30 g 1    OZEMPIC 1 mg/dose (2 mg/1.5 mL) PnIj Inject 60 mg into the skin " every 7 days.       OZEMPIC 1 mg/dose (4 mg/3 mL) Inject 1 mg into the skin every 7 days.      pantoprazole (PROTONIX) 40 MG tablet Take 40 mg by mouth.      pramipexole (MIRAPEX) 0.125 MG tablet Take 0.125 mg by mouth.      sulfamethoxazole-trimethoprim 800-160mg (BACTRIM DS) 800-160 mg Tab Take 1 tablet by mouth 2 (two) times daily. 14 tablet 0    traMADoL (ULTRAM) 50 mg tablet Take 1 tablet (50 mg total) by mouth every 6 (six) hours as needed for Pain. 28 tablet 0    BASAGLAR KWIKPEN 100 unit/mL (3 mL) InPn pen 75 Units once daily. (Patient not taking: Reported on 10/15/2024)  3    chlorthalidone (HYGROTEN) 25 MG Tab Take 1 tablet (25 mg total) by mouth once daily. 30 tablet 11     No current facility-administered medications for this visit.       Review of patient's allergies indicates:   Allergen Reactions    Codeine Hives and Rash         Review of Systems   Constitutional: Negative for chills and fever.   Skin:  Positive for color change and nail changes. Negative for poor wound healing.   Gastrointestinal:  Negative for nausea and vomiting.   Neurological:  Positive for numbness.   Psychiatric/Behavioral:  Negative for altered mental status.            Objective:      Physical Exam  Constitutional:       Appearance: Normal appearance. She is not ill-appearing.   Cardiovascular:      Pulses:           Dorsalis pedis pulses are 2+ on the right side and 2+ on the left side.        Posterior tibial pulses are 2+ on the right side and 2+ on the left side.      Comments: CFT is < 3 seconds bilateral.  Pedal hair growth is decreased bilateral.  No lower extremity edema noted bilateral.  Toes are warm to touch bilateral.    Musculoskeletal:         General: Tenderness present. No signs of injury.      Right lower leg: No edema.      Left lower leg: No edema.      Comments: Muscle strength 5/5 in all muscle groups bilateral.  No tenderness nor crepitation with ROM of foot/ankle joints bilateral.  Slight dorsal  contracture of the Lt. Hallux.  No gross deformity noted to said digit.  Pain with palpation to the pressure injury site of the Rt. Lateral 5th mtp joint.     Skin:     General: Skin is warm and dry.      Capillary Refill: Capillary refill takes 2 to 3 seconds.      Findings: Erythema and wound present. No bruising, ecchymosis, signs of injury, laceration, lesion, petechiae or rash.      Comments: Location: Open wound noted to the Rt. Lateral sub 5th met head.    Base: Down to subQ and comprised of fibrin.      Drainage: None  Malinda wound: Devoid of fluctuance and purulence.  Mild edema, erythema,  and malodor noted.   Pre-debridement measurement: 3 x 1.5 x 0.3cm   Post-debridement measurement: 3.2 x 1.7 x 0.3cm     Fragile epithelium noted to the Lt. Sub 2nd met head.     Neurological:      Mental Status: She is alert.      Sensory: Sensory deficit present.      Motor: No weakness.      Comments: Decreased protective sensation noted bilateral.  Light touch is absent bilateral.             Assessment:       Encounter Diagnoses   Name Primary?    Diabetic ulcer of other part of right foot associated with type 2 diabetes mellitus, with fat layer exposed Yes    Healed ulcer of left foot     Diabetic polyneuropathy associated with type 2 diabetes mellitus                  Plan:       Keerthi was seen today for wound care.    Diagnoses and all orders for this visit:    Diabetic ulcer of other part of right foot associated with type 2 diabetes mellitus, with fat layer exposed  -     Aerobic culture  -     Culture, Anaerobic    Healed ulcer of left foot    Diabetic polyneuropathy associated with type 2 diabetes mellitus          I counseled the patient on her conditions, their implications and medical management.    Performed a selective excisional debridement of the Rt. Forefoot.  See attached procedure note.     Wound cultures were obtained.    Iodosorb ointment was applied to the Rt. Foot wound.  Betadine was then  applied to the site of fragile epithelium of the Lt. Forefoot.  The Lt. Side was offloaded with two full length felt pads with and aperture, and a football dressing was applied bilateral.    Advised to keep the dressings CDI x  1week.    Advised to rest and elevate the affected extremity.    Instructed to minimize weight bearing to facilitate healing.    Advised to ambulate only in the postoperative shoes bilateral.    To continue the current course of cipro until cultures have resulted.    RTC in 1 week.    Jacob Bernal DPM

## 2024-10-15 NOTE — PROCEDURES
"Wound Debridement    Date/Time: 10/15/2024 9:40 AM    Performed by: Jacob Bernal DPM  Authorized by: Jacob Bernal DPM    Time out: Immediately prior to procedure a "time out" was called to verify the correct patient, procedure, equipment, support staff and site/side marked as required.    Consent Done?:  Yes (Verbal)  Local anesthesia used?: No      Wound Details:    Location:  Right foot    Location:  Right Dorsal Foot    Type of Debridement:  Excisional       Length (cm):  3       Area (sq cm):  4.5       Width (cm):  1.5       Percent Debrided (%):  100       Depth (cm):  0.3       Total Area Debrided (sq cm):  4.5    Depth of debridement:  Subcutaneous tissue    Tissue debrided:  Subcutaneous    Devitalized tissue debrided:  Fibrin    Instruments:  Curette  Bleeding:  Minimal  Hemostasis Achieved: Yes  Method Used:  Pressure  Patient tolerance:  Patient tolerated the procedure well with no immediate complications    "

## 2024-10-18 ENCOUNTER — TELEPHONE (OUTPATIENT)
Dept: PODIATRY | Facility: CLINIC | Age: 49
End: 2024-10-18
Payer: MEDICARE

## 2024-10-18 DIAGNOSIS — E11.628 DIABETIC FOOT INFECTION: Primary | ICD-10-CM

## 2024-10-18 DIAGNOSIS — L08.9 DIABETIC FOOT INFECTION: ICD-10-CM

## 2024-10-18 DIAGNOSIS — L08.9 DIABETIC FOOT INFECTION: Primary | ICD-10-CM

## 2024-10-18 DIAGNOSIS — E11.628 DIABETIC FOOT INFECTION: ICD-10-CM

## 2024-10-18 LAB
BACTERIA SPEC AEROBE CULT: ABNORMAL
BACTERIA SPEC AEROBE CULT: ABNORMAL
BACTERIA SPEC ANAEROBE CULT: ABNORMAL

## 2024-10-18 RX ORDER — CEFDINIR 300 MG/1
300 CAPSULE ORAL 2 TIMES DAILY
Qty: 20 CAPSULE | Refills: 0 | Status: SHIPPED | OUTPATIENT
Start: 2024-10-18 | End: 2024-10-18 | Stop reason: SDUPTHER

## 2024-10-18 RX ORDER — CEFDINIR 300 MG/1
300 CAPSULE ORAL 2 TIMES DAILY
Qty: 20 CAPSULE | Refills: 0 | Status: SHIPPED | OUTPATIENT
Start: 2024-10-18 | End: 2024-10-28

## 2024-10-18 NOTE — TELEPHONE ENCOUNTER
Spoke with patient to provide results and patient stated that the pharmacy was closed, asked could the provider send to another pharmacy. Patient's request was obliged and she expressed understanding of the message.

## 2024-10-21 ENCOUNTER — TELEPHONE (OUTPATIENT)
Dept: PODIATRY | Facility: CLINIC | Age: 49
End: 2024-10-21
Payer: MEDICARE

## 2024-10-21 DIAGNOSIS — E11.628 DIABETIC FOOT INFECTION: Primary | ICD-10-CM

## 2024-10-21 DIAGNOSIS — L08.9 DIABETIC FOOT INFECTION: Primary | ICD-10-CM

## 2024-10-21 RX ORDER — METRONIDAZOLE 500 MG/1
500 TABLET ORAL EVERY 12 HOURS
Qty: 14 TABLET | Refills: 0 | Status: SHIPPED | OUTPATIENT
Start: 2024-10-21

## 2024-10-21 NOTE — TELEPHONE ENCOUNTER
Called patient to provide results per provider's request. Phone went to voicemail and a message was left.

## 2024-10-21 NOTE — TELEPHONE ENCOUNTER
Spoke with patient to confirm that the provider did send a second antibiotic to the pharmacy. She expressed understanding of the message.Stated that she would have her Mom to  the prescription.

## 2024-10-21 NOTE — TELEPHONE ENCOUNTER
----- Message from Jacob Bernal DPM sent at 10/21/2024  9:24 AM CDT -----  Please let the patient know I've sent a 2nd antibiotic to the pharmacy to handle some anaerobic bacteria as well.  ----- Message -----  From: [facility]  Sent: 10/16/2024   7:06 AM CDT  To: Jacob Bernal DPM

## 2024-10-21 NOTE — TELEPHONE ENCOUNTER
----- Message from [employer] sent at 10/21/2024 10:11 AM CDT -----  Type:  Patient Returning Call    Who Called: the patient  Who Left Message for Patient: ENOCH MULLINS  Does the patient know what this is regarding?:yes/  Would the patient rather a call back or a response via [facility]? call back   Best Call Back Number:[phone]   Additional Information: Thanks

## 2024-10-21 NOTE — TELEPHONE ENCOUNTER
Spoke with the patient to provide results per provider. She expressed understanding of the message.

## 2024-10-23 ENCOUNTER — OFFICE VISIT (OUTPATIENT)
Dept: PODIATRY | Facility: CLINIC | Age: 49
End: 2024-10-23
Payer: MEDICARE

## 2024-10-23 VITALS — HEIGHT: 68 IN | WEIGHT: 270.06 LBS | BODY MASS INDEX: 40.93 KG/M2

## 2024-10-23 DIAGNOSIS — E11.621 DIABETIC ULCER OF OTHER PART OF RIGHT FOOT ASSOCIATED WITH TYPE 2 DIABETES MELLITUS, WITH FAT LAYER EXPOSED: Primary | ICD-10-CM

## 2024-10-23 DIAGNOSIS — E11.42 DIABETIC POLYNEUROPATHY ASSOCIATED WITH TYPE 2 DIABETES MELLITUS: ICD-10-CM

## 2024-10-23 DIAGNOSIS — L97.512 DIABETIC ULCER OF OTHER PART OF RIGHT FOOT ASSOCIATED WITH TYPE 2 DIABETES MELLITUS, WITH FAT LAYER EXPOSED: Primary | ICD-10-CM

## 2024-10-23 DIAGNOSIS — Z87.2 HEALED ULCER OF LEFT FOOT: ICD-10-CM

## 2024-10-23 PROCEDURE — 99212 OFFICE O/P EST SF 10 MIN: CPT | Mod: PBBFAC,PO | Performed by: PODIATRIST

## 2024-10-23 PROCEDURE — 99999 PR PBB SHADOW E&M-EST. PATIENT-LVL II: CPT | Mod: PBBFAC,,, | Performed by: PODIATRIST

## 2024-10-23 PROCEDURE — 11042 DBRDMT SUBQ TIS 1ST 20SQCM/<: CPT | Mod: PBBFAC,PO | Performed by: PODIATRIST

## 2024-10-23 NOTE — PROGRESS NOTES
Subjective:      Patient ID: Keerthi Chase is a 49 y.o. female.    Chief Complaint: Wound Care  Patient presents to clinic for a 1 week wound check of bilateral foot.  Notes minimal pain from the Rt. Lateral forefoot wound with taking the two oral antibiotics.   Has kept the football dressings dry and intact for the past two weeks.  Has been ambulatory in the [employer] shoes.  Denies experiencing N/V/F/C/D.   Denies any additional pedal complaints.       Hemoglobin A1C   Date Value Ref Range Status   2022 10.5 (H) 0.0 - 5.6 % Final     Comment:     Reference Interval:  5.0 - 5.6 Normal   5.7 - 6.4 High Risk   > 6.5 Diabetic      Hgb A1c results are standardized based on the (NGSP) National   Glycohemoglobin Standardization Program.      Hemoglobin A1C levels are related to mean serum/plasma glucose   during the preceding 2-3 months.        2021 11.8 (H) 0.0 - 5.6 % Final     Comment:     Reference Interval:  5.0 - 5.6 Normal   5.7 - 6.4 High Risk   > 6.5 Diabetic      Hgb A1c results are standardized based on the (NGSP) National   Glycohemoglobin Standardization Program.      Hemoglobin A1C levels are related to mean serum/plasma glucose   during the preceding 2-3 months.        2021 8.8 (H) 4.8 - 5.6 % Final     Comment:              Prediabetes: 5.7 - 6.4           Diabetes: >6.4           Glycemic control for adults with diabetes: <7.0         Past Medical History:   Diagnosis Date    Bell's palsy     Coronary artery disease     Pt states Dr. Virk said she did not have a blockage after a stress test.    Diabetes mellitus     GERD (gastroesophageal reflux disease)     Hypertension     Obesity        Past Surgical History:   Procedure Laterality Date     SECTION      x 2    CHOLECYSTECTOMY      ESOPHAGOGASTRODUODENOSCOPY      PERIPHERALLY INSERTED CENTRAL CATHETER INSERTION N/A 10/11/2021    Procedure: INSERTION, PICC;  Surgeon: PICC, [facility]IYANK;  Location: [facility];  Service: Cardiology;   "Laterality: N/A;  Dr Aiken    PERIPHERALLY INSERTED CENTRAL CATHETER INSERTION N/A 7/21/2022    Procedure: INSERTION, PICC;  Surgeon: PICC, [facility]ANK;  Location: [facility] ENDO;  Service: Cardiology;  Laterality: N/A;  Dr Dubose    SESAMOIDECTOMY Left 11/11/2021    Procedure: SESAMOIDECTOMY;  Surgeon: Jacob Bernal DPM;  Location: [facility] OR;  Service: Podiatry;  Laterality: Left;       Family History   Problem Relation Name Age of Onset    Hypertension Mother         Social History     Socioeconomic History    Marital status:    Tobacco Use    Smoking status: Never    Smokeless tobacco: Never   Substance and Sexual Activity    Alcohol use: No    Drug use: No       Current Outpatient Medications   Medication Sig Dispense Refill    alprazolam (XANAX) 1 MG tablet Take 1 mg by mouth 2 (two) times daily as needed for Anxiety.      BASAGLAR KWIKPEN 100 unit/mL (3 mL) InPn pen 75 Units once daily. (Patient not taking: Reported on 10/15/2024)  3    BD INSULIN PEN NEEDLE UF MINI 31 gauge x 3/16" Ndle   2    BD INSULIN SYRINGE ULTRA-FINE 1 mL 31 gauge x 5/16 Syrg use as directed      cefdinir (OMNICEF) 300 MG capsule Take 1 capsule (300 mg total) by mouth 2 (two) times daily. for 10 days 20 capsule 0    chlorthalidone (HYGROTEN) 25 MG Tab Take 1 tablet (25 mg total) by mouth once daily. 30 tablet 11    ciprofloxacin HCl (CIPRO) 750 MG tablet Take 1 tablet (750 mg total) by mouth every 12 (twelve) hours. 20 tablet 0    fluconazole (DIFLUCAN) 200 MG Tab Take 200 mg by mouth.      hydrocodone-acetaminophen 10-325mg (NORCO)  mg Tab Take 1 tablet by mouth 3 (three) times daily as needed.      hydrocodone-acetaminophen 10-325mg (NORCO)  mg Tab Take 1 tablet by mouth every 8 (eight) hours as needed for Pain. 90 tablet 0    insulin detemir (LEVEMIR) 100 unit/mL injection Inject into the skin every evening.      insulin glargine,hum.rec.anlog (LANTUS SUBQ) Inject into the skin.      LANTUS U-100 INSULIN 100 unit/mL injection " SMARTSI Unit(s) SUB-Q Every Night      metroNIDAZOLE (FLAGYL) 500 MG tablet Take 1 tablet (500 mg total) by mouth every 12 (twelve) hours. 14 tablet 0    mupirocin (BACTROBAN) 2 % ointment Apply topically 3 (three) times daily. 30 g 1    OZEMPIC 1 mg/dose (2 mg/1.5 mL) PnIj Inject 60 mg into the skin every 7 days.       OZEMPIC 1 mg/dose (4 mg/3 mL) Inject 1 mg into the skin every 7 days.      pantoprazole (PROTONIX) 40 MG tablet Take 40 mg by mouth.      pramipexole (MIRAPEX) 0.125 MG tablet Take 0.125 mg by mouth.      sulfamethoxazole-trimethoprim 800-160mg (BACTRIM DS) 800-160 mg Tab Take 1 tablet by mouth 2 (two) times daily. 14 tablet 0    traMADoL (ULTRAM) 50 mg tablet Take 1 tablet (50 mg total) by mouth every 6 (six) hours as needed for Pain. 28 tablet 0     No current facility-administered medications for this visit.       Review of patient's allergies indicates:   Allergen Reactions    Codeine Hives and Rash         Review of Systems   Constitutional: Negative for chills and fever.   Skin:  Positive for color change and nail changes. Negative for poor wound healing.   Gastrointestinal:  Negative for nausea and vomiting.   Neurological:  Positive for numbness.   Psychiatric/Behavioral:  Negative for altered mental status.            Objective:      Physical Exam  Constitutional:       Appearance: Normal appearance. She is not ill-appearing.   Cardiovascular:      Pulses:           Dorsalis pedis pulses are 2+ on the right side and 2+ on the left side.        Posterior tibial pulses are 2+ on the right side and 2+ on the left side.      Comments: CFT is < 3 seconds bilateral.  Pedal hair growth is decreased bilateral.  No lower extremity edema noted bilateral.  Toes are warm to touch bilateral.    Musculoskeletal:         General: Tenderness present. No signs of injury.      Right lower leg: No edema.      Left lower leg: No edema.      Comments: Muscle strength 5/5 in all muscle groups bilateral.  No  tenderness nor crepitation with ROM of foot/ankle joints bilateral.  Slight dorsal contracture of the Lt. Hallux.  No gross deformity noted to said digit.  Mild pain with palpation to the pressure injury site of the Rt. Lateral 5th mtp joint.     Skin:     General: Skin is warm and dry.      Capillary Refill: Capillary refill takes 2 to 3 seconds.      Findings: Wound present. No bruising, ecchymosis, erythema, signs of injury, laceration, lesion, petechiae or rash.      Comments: Location: Open wound noted to the Rt. Lateral sub 5th met head.    Base: Down to subQ and comprised of fibrin.      Drainage: None  Malinda wound: Devoid of fluctuance and purulence.  Mild edema, erythema,  and malodor noted.   Pre-debridement measurement: 1.5 x 1.2 x 0.3cm   Post-debridement measurement: 1.7 x 1.4 x 0.3cm     Mature epithelium noted to the Lt. Sub 2nd met head.     Neurological:      Mental Status: She is alert.      Sensory: Sensory deficit present.      Motor: No weakness.      Comments: Decreased protective sensation noted bilateral.  Light touch is absent bilateral.               Assessment:       Encounter Diagnoses   Name Primary?    Diabetic ulcer of other part of right foot associated with type 2 diabetes mellitus, with fat layer exposed Yes    Healed ulcer of left foot     Diabetic polyneuropathy associated with type 2 diabetes mellitus                    Plan:       Keerthi was seen today for wound care.    Diagnoses and all orders for this visit:    Diabetic ulcer of other part of right foot associated with type 2 diabetes mellitus, with fat layer exposed  -     Wound Debridement    Healed ulcer of left foot    Diabetic polyneuropathy associated with type 2 diabetes mellitus            I counseled the patient on her conditions, their implications and medical management.    Performed a selective excisional debridement of the Rt. Forefoot.  See attached procedure note.     Iodosorb ointment was applied to the Rt.  Foot wound.  Betadine was then applied to the site of mature epithelium of the Lt. Forefoot.  Both sites were offloaded with two full length felt pads with and aperture, and a football dressing was applied bilateral.    Advised to keep the dressings CDI x  1week.    Advised to rest and elevate the affected extremity.    Instructed to minimize weight bearing to facilitate healing.    Advised to ambulate only in the postoperative shoes bilateral.    To finish the current course of oral antibiotics.      RTC in 1 week.    Jacob Bernal DPM

## 2024-10-23 NOTE — PROCEDURES
"Wound Debridement    Date/Time: 10/23/2024 7:20 AM    Performed by: Jacob Beranl DPM  Authorized by: Jacob Bernal DPM    Time out: Immediately prior to procedure a "time out" was called to verify the correct patient, procedure, equipment, support staff and site/side marked as required.    Consent Done?:  Yes (Verbal)  Local anesthesia used?: No      Wound Details:    Location:  Right foot    Location:  Right 5th Metatarsal Head    Type of Debridement:  Excisional       Length (cm):  1.5       Area (sq cm):  1.8       Width (cm):  1.2       Percent Debrided (%):  100       Depth (cm):  0.3       Total Area Debrided (sq cm):  1.8    Depth of debridement:  Subcutaneous tissue    Tissue debrided:  Subcutaneous    Devitalized tissue debrided:  Fibrin    Instruments:  Curette  Bleeding:  Minimal  Hemostasis Achieved: Yes  Method Used:  Pressure  Patient tolerance:  Patient tolerated the procedure well with no immediate complications    "

## 2024-10-24 ENCOUNTER — PATIENT MESSAGE (OUTPATIENT)
Facility: HOSPITAL | Age: 49
End: 2024-10-24
Payer: MEDICARE

## 2024-10-30 ENCOUNTER — OFFICE VISIT (OUTPATIENT)
Dept: PODIATRY | Facility: CLINIC | Age: 49
End: 2024-10-30
Payer: MEDICARE

## 2024-10-30 VITALS — WEIGHT: 270.06 LBS | HEIGHT: 68 IN | BODY MASS INDEX: 40.93 KG/M2

## 2024-10-30 DIAGNOSIS — Z87.2 HEALED ULCER OF LEFT FOOT: ICD-10-CM

## 2024-10-30 DIAGNOSIS — L97.512 DIABETIC ULCER OF OTHER PART OF RIGHT FOOT ASSOCIATED WITH TYPE 2 DIABETES MELLITUS, WITH FAT LAYER EXPOSED: Primary | ICD-10-CM

## 2024-10-30 DIAGNOSIS — E11.621 DIABETIC ULCER OF OTHER PART OF RIGHT FOOT ASSOCIATED WITH TYPE 2 DIABETES MELLITUS, WITH FAT LAYER EXPOSED: Primary | ICD-10-CM

## 2024-10-30 DIAGNOSIS — E11.42 DIABETIC POLYNEUROPATHY ASSOCIATED WITH TYPE 2 DIABETES MELLITUS: ICD-10-CM

## 2024-10-30 PROCEDURE — 11042 DBRDMT SUBQ TIS 1ST 20SQCM/<: CPT | Mod: PBBFAC,PO | Performed by: PODIATRIST

## 2024-10-30 PROCEDURE — 99499 UNLISTED E&M SERVICE: CPT | Mod: S$PBB,,, | Performed by: PODIATRIST

## 2024-10-30 PROCEDURE — 99999 PR PBB SHADOW E&M-EST. PATIENT-LVL II: CPT | Mod: PBBFAC,,, | Performed by: PODIATRIST

## 2024-10-30 PROCEDURE — 99212 OFFICE O/P EST SF 10 MIN: CPT | Mod: PBBFAC,PO | Performed by: PODIATRIST

## 2024-11-06 ENCOUNTER — TELEPHONE (OUTPATIENT)
Dept: PODIATRY | Facility: CLINIC | Age: 49
End: 2024-11-06
Payer: MEDICARE

## 2024-11-06 NOTE — TELEPHONE ENCOUNTER
Pt is very rude . I called to get pt on the schedule for Monday @10 but told me she going to keep the appt on the 13th .

## 2024-11-06 NOTE — TELEPHONE ENCOUNTER
----- Message from Wendy sent at 11/6/2024  8:04 AM CST -----  Contact: Self  Type: Needs Medical Advice  Who Called:  Patient  Best Call Back Number: [phone]    Additional Information: Pt is calling in regards to the wound care appt she has with Dr Bernal, stated she has been going weekly and just noticed nothing is scheduled this week, not until the 13th. She is wanting to see if she needs to just wait or come in this week. Can we please check with Dr Bernal and call pt back to advise. Thank You

## 2024-11-13 ENCOUNTER — OFFICE VISIT (OUTPATIENT)
Dept: PODIATRY | Facility: CLINIC | Age: 49
End: 2024-11-13
Payer: MEDICARE

## 2024-11-13 VITALS — HEIGHT: 68 IN | BODY MASS INDEX: 40.93 KG/M2 | WEIGHT: 270.06 LBS

## 2024-11-13 DIAGNOSIS — E11.42 DIABETIC POLYNEUROPATHY ASSOCIATED WITH TYPE 2 DIABETES MELLITUS: ICD-10-CM

## 2024-11-13 DIAGNOSIS — E11.621 DIABETIC ULCER OF OTHER PART OF RIGHT FOOT ASSOCIATED WITH TYPE 2 DIABETES MELLITUS, WITH FAT LAYER EXPOSED: Primary | ICD-10-CM

## 2024-11-13 DIAGNOSIS — L97.512 DIABETIC ULCER OF OTHER PART OF RIGHT FOOT ASSOCIATED WITH TYPE 2 DIABETES MELLITUS, WITH FAT LAYER EXPOSED: Primary | ICD-10-CM

## 2024-11-13 PROCEDURE — 99212 OFFICE O/P EST SF 10 MIN: CPT | Mod: PBBFAC,PO | Performed by: PODIATRIST

## 2024-11-13 PROCEDURE — 87186 SC STD MICRODIL/AGAR DIL: CPT | Performed by: PODIATRIST

## 2024-11-13 PROCEDURE — 87070 CULTURE OTHR SPECIMN AEROBIC: CPT | Performed by: PODIATRIST

## 2024-11-13 PROCEDURE — 11042 DBRDMT SUBQ TIS 1ST 20SQCM/<: CPT | Mod: PBBFAC,PO | Performed by: PODIATRIST

## 2024-11-13 PROCEDURE — 99999 PR PBB SHADOW E&M-EST. PATIENT-LVL II: CPT | Mod: PBBFAC,,, | Performed by: PODIATRIST

## 2024-11-13 PROCEDURE — 87075 CULTR BACTERIA EXCEPT BLOOD: CPT | Performed by: PODIATRIST

## 2024-11-13 PROCEDURE — 99499 UNLISTED E&M SERVICE: CPT | Mod: S$PBB,,, | Performed by: PODIATRIST

## 2024-11-13 NOTE — PROCEDURES
"Wound Debridement    Date/Time: 11/13/2024 7:20 AM    Performed by: Jacob Bernal DPM  Authorized by: Jacob Bernal DPM    Time out: Immediately prior to procedure a "time out" was called to verify the correct patient, procedure, equipment, support staff and site/side marked as required.    Consent Done?:  Yes (Verbal)  Local anesthesia used?: No      Wound Details:    Location:  Right foot    Location:  Right 5th Metatarsal Head    Type of Debridement:  Excisional       Length (cm):  1       Area (sq cm):  0.5       Width (cm):  0.5       Percent Debrided (%):  100       Depth (cm):  0.2       Total Area Debrided (sq cm):  0.5    Depth of debridement:  Subcutaneous tissue    Tissue debrided:  Subcutaneous    Devitalized tissue debrided:  Fibrin    Instruments:  Curette  Bleeding:  Minimal  Hemostasis Achieved: Yes  Method Used:  Pressure  Patient tolerance:  Patient tolerated the procedure well with no immediate complications    "

## 2024-11-13 NOTE — PROGRESS NOTES
Subjective:      Patient ID: Keerthi Chase is a 49 y.o. female.    Chief Complaint: Wound Care  Patient presents to clinic for a 1 week wound check of bilateral foot.  Notes a slight increase in pain to the Rt. Lateral forefoot wound with weight bearing.  Also, notes some slippage in the felt padding on the Rt. Bandage with weight bearing.  Has kept the football dressings CDI for the past week. Denies experiencing N/V/F/C/D.   Denies any additional pedal complaints.       Hemoglobin A1C   Date Value Ref Range Status   2022 10.5 (H) 0.0 - 5.6 % Final     Comment:     Reference Interval:  5.0 - 5.6 Normal   5.7 - 6.4 High Risk   > 6.5 Diabetic      Hgb A1c results are standardized based on the (NGSP) National   Glycohemoglobin Standardization Program.      Hemoglobin A1C levels are related to mean serum/plasma glucose   during the preceding 2-3 months.        2021 11.8 (H) 0.0 - 5.6 % Final     Comment:     Reference Interval:  5.0 - 5.6 Normal   5.7 - 6.4 High Risk   > 6.5 Diabetic      Hgb A1c results are standardized based on the (NGSP) National   Glycohemoglobin Standardization Program.      Hemoglobin A1C levels are related to mean serum/plasma glucose   during the preceding 2-3 months.        2021 8.8 (H) 4.8 - 5.6 % Final     Comment:              Prediabetes: 5.7 - 6.4           Diabetes: >6.4           Glycemic control for adults with diabetes: <7.0         Past Medical History:   Diagnosis Date    Bell's palsy     Coronary artery disease     Pt states Dr. Virk said she did not have a blockage after a stress test.    Diabetes mellitus     GERD (gastroesophageal reflux disease)     Hypertension     Obesity        Past Surgical History:   Procedure Laterality Date     SECTION      x 2    CHOLECYSTECTOMY      ESOPHAGOGASTRODUODENOSCOPY      PERIPHERALLY INSERTED CENTRAL CATHETER INSERTION N/A 10/11/2021    Procedure: INSERTION, PICC;  Surgeon: PICC, [facility]ANK;  Location: [facility];   "Service: Cardiology;  Laterality: N/A;  Dr Aiken    PERIPHERALLY INSERTED CENTRAL CATHETER INSERTION N/A 2022    Procedure: INSERTION, PICC;  Surgeon: PICC, PRIYANK;  Location: [facility] ENDO;  Service: Cardiology;  Laterality: N/A;  Dr Dubose    SESAMOIDECTOMY Left 2021    Procedure: SESAMOIDECTOMY;  Surgeon: Jacob Bernal DPM;  Location: [facility] OR;  Service: Podiatry;  Laterality: Left;       Family History   Problem Relation Name Age of Onset    Hypertension Mother         Social History     Socioeconomic History    Marital status:    Tobacco Use    Smoking status: Never    Smokeless tobacco: Never   Substance and Sexual Activity    Alcohol use: No    Drug use: No       Current Outpatient Medications   Medication Sig Dispense Refill    alprazolam (XANAX) 1 MG tablet Take 1 mg by mouth 2 (two) times daily as needed for Anxiety.      BASAGLAR KWIKPEN 100 unit/mL (3 mL) InPn pen 75 Units once daily. (Patient not taking: Reported on 10/15/2024)  3    BD INSULIN PEN NEEDLE UF MINI 31 gauge x 3/16" Ndle   2    BD INSULIN SYRINGE ULTRA-FINE 1 mL 31 gauge x 5/16 Syrg use as directed      chlorthalidone (HYGROTEN) 25 MG Tab Take 1 tablet (25 mg total) by mouth once daily. 30 tablet 11    ciprofloxacin HCl (CIPRO) 750 MG tablet Take 1 tablet (750 mg total) by mouth every 12 (twelve) hours. 20 tablet 0    fluconazole (DIFLUCAN) 200 MG Tab Take 200 mg by mouth.      hydrocodone-acetaminophen 10-325mg (NORCO)  mg Tab Take 1 tablet by mouth 3 (three) times daily as needed.      hydrocodone-acetaminophen 10-325mg (NORCO)  mg Tab Take 1 tablet by mouth every 8 (eight) hours as needed for Pain. 90 tablet 0    insulin detemir (LEVEMIR) 100 unit/mL injection Inject into the skin every evening.      insulin glargine,hum.rec.anlog (LANTUS SUBQ) Inject into the skin.      LANTUS U-100 INSULIN 100 unit/mL injection SMARTSI Unit(s) SUB-Q Every Night      metroNIDAZOLE (FLAGYL) 500 MG tablet Take 1 tablet (500 mg " total) by mouth every 12 (twelve) hours. 14 tablet 0    mupirocin (BACTROBAN) 2 % ointment Apply topically 3 (three) times daily. 30 g 1    OZEMPIC 1 mg/dose (2 mg/1.5 mL) PnIj Inject 60 mg into the skin every 7 days.       OZEMPIC 1 mg/dose (4 mg/3 mL) Inject 1 mg into the skin every 7 days.      pantoprazole (PROTONIX) 40 MG tablet Take 40 mg by mouth.      pramipexole (MIRAPEX) 0.125 MG tablet Take 0.125 mg by mouth.      sulfamethoxazole-trimethoprim 800-160mg (BACTRIM DS) 800-160 mg Tab Take 1 tablet by mouth 2 (two) times daily. 14 tablet 0    traMADoL (ULTRAM) 50 mg tablet Take 1 tablet (50 mg total) by mouth every 6 (six) hours as needed for Pain. 28 tablet 0     No current facility-administered medications for this visit.       Review of patient's allergies indicates:   Allergen Reactions    Codeine Hives and Rash         Review of Systems   Constitutional: Negative for chills and fever.   Skin:  Positive for color change and nail changes. Negative for poor wound healing.   Gastrointestinal:  Negative for nausea and vomiting.   Neurological:  Positive for numbness.   Psychiatric/Behavioral:  Negative for altered mental status.            Objective:      Physical Exam  Constitutional:       Appearance: Normal appearance. She is not ill-appearing.   Cardiovascular:      Pulses:           Dorsalis pedis pulses are 2+ on the right side and 2+ on the left side.        Posterior tibial pulses are 2+ on the right side and 2+ on the left side.      Comments: CFT is < 3 seconds bilateral.  Pedal hair growth is decreased bilateral.  No lower extremity edema noted bilateral.  Toes are warm to touch bilateral.    Musculoskeletal:         General: Tenderness present. No signs of injury.      Right lower leg: No edema.      Left lower leg: No edema.      Comments: Muscle strength 5/5 in all muscle groups bilateral.  No tenderness nor crepitation with ROM of foot/ankle joints bilateral.  Slight dorsal contracture of the  Lt. Hallux.  No gross deformity noted to said digit.  Mild pain with palpation to the pressure injury site of the Rt. Lateral 5th mtp joint.     Skin:     General: Skin is warm and dry.      Capillary Refill: Capillary refill takes 2 to 3 seconds.      Findings: Wound present. No bruising, ecchymosis, erythema, signs of injury, laceration, lesion, petechiae or rash.      Comments: Location: Open wound noted to the Rt. Lateral sub 5th met head.    Base: Down to subQ and comprised of fibrin.      Drainage: None  Malinda wound: Devoid of fluctuance and purulence.  Mild edema, erythema,  and malodor noted.   Pre-debridement measurement: 1 x 0.5 x 0.2cm   Post-debridement measurement: 1.2 x 0.7 x 0.2cm     Mature epithelium noted to the Lt. Sub 2nd met head.     Neurological:      Mental Status: She is alert.      Sensory: Sensory deficit present.      Motor: No weakness.      Comments: Decreased protective sensation noted bilateral.  Light touch is absent bilateral.               Assessment:       Encounter Diagnoses   Name Primary?    Diabetic ulcer of other part of right foot associated with type 2 diabetes mellitus, with fat layer exposed Yes    Diabetic polyneuropathy associated with type 2 diabetes mellitus                      Plan:       Keerthi was seen today for wound care.    Diagnoses and all orders for this visit:    Diabetic ulcer of other part of right foot associated with type 2 diabetes mellitus, with fat layer exposed  -     Aerobic culture  -     Culture, Anaerobic  -     Wound Debridement    Diabetic polyneuropathy associated with type 2 diabetes mellitus        I counseled the patient on her conditions, their implications and medical management.    Performed a selective excisional debridement of the Rt. Forefoot.  See attached procedure note.     Obtained a wound culture from the Rt. 5th met head wound.    Iodosorb ointment was applied to the Rt. Foot wound.  Betadine was then applied to the site of  mature epithelium of the Lt. Forefoot.  Both sites were offloaded with two full length felt pads with and aperture, and a football dressing was applied bilateral.    Advised to keep the dressings CDI x  1week.    Advised to rest and elevate the affected extremity.    Instructed to minimize weight bearing to facilitate healing.    Advised to ambulate only in the postoperative shoes bilateral.    RTC in 1 week.    Jacob Bernal DPM

## 2024-11-15 LAB — BACTERIA SPEC ANAEROBE CULT: NORMAL

## 2024-11-16 LAB
BACTERIA SPEC AEROBE CULT: ABNORMAL
BACTERIA SPEC AEROBE CULT: ABNORMAL

## 2024-11-18 ENCOUNTER — TELEPHONE (OUTPATIENT)
Dept: PODIATRY | Facility: CLINIC | Age: 49
End: 2024-11-18
Payer: MEDICARE

## 2024-11-18 RX ORDER — SULFAMETHOXAZOLE AND TRIMETHOPRIM 800; 160 MG/1; MG/1
1 TABLET ORAL 2 TIMES DAILY
Qty: 14 TABLET | Refills: 0 | Status: SHIPPED | OUTPATIENT
Start: 2024-11-18

## 2024-11-18 NOTE — TELEPHONE ENCOUNTER
----- Message from Jacob Bernal DPM sent at 11/18/2024  6:04 AM CST -----  Please let the patient know cultures were positive for bacteria.  I have sent Bactrim to her pharmacy.  ----- Message -----  From: [facility]  Sent: 11/15/2024   7:51 AM CST  To: Jacob Bernal DPM

## 2024-11-20 ENCOUNTER — OFFICE VISIT (OUTPATIENT)
Dept: PODIATRY | Facility: CLINIC | Age: 49
End: 2024-11-20
Payer: MEDICARE

## 2024-11-20 VITALS — WEIGHT: 270.06 LBS | BODY MASS INDEX: 40.93 KG/M2 | HEIGHT: 68 IN

## 2024-11-20 DIAGNOSIS — E11.42 DIABETIC POLYNEUROPATHY ASSOCIATED WITH TYPE 2 DIABETES MELLITUS: ICD-10-CM

## 2024-11-20 DIAGNOSIS — E11.621 DIABETIC ULCER OF OTHER PART OF RIGHT FOOT ASSOCIATED WITH TYPE 2 DIABETES MELLITUS, WITH FAT LAYER EXPOSED: Primary | ICD-10-CM

## 2024-11-20 DIAGNOSIS — Z87.2 HEALED ULCER OF LEFT FOOT: ICD-10-CM

## 2024-11-20 DIAGNOSIS — L97.512 DIABETIC ULCER OF OTHER PART OF RIGHT FOOT ASSOCIATED WITH TYPE 2 DIABETES MELLITUS, WITH FAT LAYER EXPOSED: Primary | ICD-10-CM

## 2024-11-20 PROCEDURE — 99212 OFFICE O/P EST SF 10 MIN: CPT | Mod: PBBFAC,PO | Performed by: PODIATRIST

## 2024-11-20 PROCEDURE — 99499 UNLISTED E&M SERVICE: CPT | Mod: S$PBB,,, | Performed by: PODIATRIST

## 2024-11-20 PROCEDURE — 99999 PR PBB SHADOW E&M-EST. PATIENT-LVL II: CPT | Mod: PBBFAC,,, | Performed by: PODIATRIST

## 2024-11-20 PROCEDURE — 11042 DBRDMT SUBQ TIS 1ST 20SQCM/<: CPT | Mod: PBBFAC,PO | Performed by: PODIATRIST

## 2024-11-20 NOTE — PROCEDURES
"Wound Debridement    Date/Time: 11/20/2024 7:40 AM    Performed by: Jacob Bernal DPM  Authorized by: Jacob Bernal DPM    Time out: Immediately prior to procedure a "time out" was called to verify the correct patient, procedure, equipment, support staff and site/side marked as required.    Consent Done?:  Yes (Verbal)  Local anesthesia used?: No      Wound Details:    Location:  Right foot    Location:  Right 5th Metatarsal Head    Type of Debridement:  Excisional       Length (cm):  0.9       Width (cm):  0.4       Depth (cm):  0.2       Area (sq cm):  0.36       Percent Debrided (%):  100       Total Area Debrided (sq cm):  0.36    Depth of debridement:  Subcutaneous tissue    Tissue debrided:  Subcutaneous    Devitalized tissue debrided:  Fibrin    Instruments:  Curette  Bleeding:  Minimal  Hemostasis Achieved: Yes  Method Used:  Pressure  Patient tolerance:  Patient tolerated the procedure well with no immediate complications    "

## 2024-11-20 NOTE — PROGRESS NOTES
Subjective:      Patient ID: Keerthi Chase is a 49 y.o. female.    Chief Complaint: Wound Care  Patient presents to clinic for a 1 week wound check of bilateral foot.  Denies pedal pain with today's exam.  Has kept the football dressings CDI for the past week.  Denies noticing issues with slippage of the felt pads with weight bearing.   Denies experiencing N/V/F/C/D.   Denies any additional pedal complaints.       Hemoglobin A1C   Date Value Ref Range Status   2022 10.5 (H) 0.0 - 5.6 % Final     Comment:     Reference Interval:  5.0 - 5.6 Normal   5.7 - 6.4 High Risk   > 6.5 Diabetic      Hgb A1c results are standardized based on the (NGSP) National   Glycohemoglobin Standardization Program.      Hemoglobin A1C levels are related to mean serum/plasma glucose   during the preceding 2-3 months.        2021 11.8 (H) 0.0 - 5.6 % Final     Comment:     Reference Interval:  5.0 - 5.6 Normal   5.7 - 6.4 High Risk   > 6.5 Diabetic      Hgb A1c results are standardized based on the (NGSP) National   Glycohemoglobin Standardization Program.      Hemoglobin A1C levels are related to mean serum/plasma glucose   during the preceding 2-3 months.        2021 8.8 (H) 4.8 - 5.6 % Final     Comment:              Prediabetes: 5.7 - 6.4           Diabetes: >6.4           Glycemic control for adults with diabetes: <7.0         Past Medical History:   Diagnosis Date    Bell's palsy     Coronary artery disease     Pt states Dr. Virk said she did not have a blockage after a stress test.    Diabetes mellitus     GERD (gastroesophageal reflux disease)     Hypertension     Obesity        Past Surgical History:   Procedure Laterality Date     SECTION      x 2    CHOLECYSTECTOMY      ESOPHAGOGASTRODUODENOSCOPY      PERIPHERALLY INSERTED CENTRAL CATHETER INSERTION N/A 10/11/2021    Procedure: INSERTION, PICC;  Surgeon: PICC, PRIYANK;  Location: [facility];  Service: Cardiology;  Laterality: N/A;  Dr Aiken    PERIPHERALLY  "INSERTED CENTRAL CATHETER INSERTION N/A 7/21/2022    Procedure: INSERTION, PICC;  Surgeon: PICC, [facility];  Location: [facility];  Service: Cardiology;  Laterality: N/A;  Dr FongGracy    SESAMOIDECTOMY Left 11/11/2021    Procedure: SESAMOIDECTOMY;  Surgeon: Jacob Bernal DPM;  Location: [facility] OR;  Service: Podiatry;  Laterality: Left;       Family History   Problem Relation Name Age of Onset    Hypertension Mother         Social History     Socioeconomic History    Marital status:    Tobacco Use    Smoking status: Never    Smokeless tobacco: Never   Substance and Sexual Activity    Alcohol use: No    Drug use: No     Social Drivers of Health     Financial Resource Strain: High Risk (11/18/2024)    Overall Financial Resource Strain (CARDIA)     Difficulty of Paying Living Expenses: Hard   Food Insecurity: Food Insecurity Present (11/18/2024)    Hunger Vital Sign     Worried About Running Out of Food in the Last Year: Often true     Ran Out of Food in the Last Year: Sometimes true   Physical Activity: Inactive (11/18/2024)    Exercise Vital Sign     Days of Exercise per Week: 0 days     Minutes of Exercise per Session: 0 min   Stress: Stress Concern Present (11/18/2024)    [employer] - Occupational Stress Questionnaire     Feeling of Stress : To some extent   Housing Stability: High Risk (11/18/2024)    Housing Stability Vital Sign     Unable to Pay for Housing in the Last Year: Yes       Current Outpatient Medications   Medication Sig Dispense Refill    alprazolam (XANAX) 1 MG tablet Take 1 mg by mouth 2 (two) times daily as needed for Anxiety.      BASAGLAR KWIKPEN 100 unit/mL (3 mL) InPn pen 75 Units once daily. (Patient not taking: Reported on 10/15/2024)  3    BD INSULIN PEN NEEDLE UF MINI 31 gauge x 3/16" Ndle   2    BD INSULIN SYRINGE ULTRA-FINE 1 mL 31 gauge x 5/16 Syrg use as directed      chlorthalidone (HYGROTEN) 25 MG Tab Take 1 tablet (25 mg total) by mouth once daily. 30 " tablet 11    ciprofloxacin HCl (CIPRO) 750 MG tablet Take 1 tablet (750 mg total) by mouth every 12 (twelve) hours. 20 tablet 0    fluconazole (DIFLUCAN) 200 MG Tab Take 200 mg by mouth.      hydrocodone-acetaminophen 10-325mg (NORCO)  mg Tab Take 1 tablet by mouth 3 (three) times daily as needed.      hydrocodone-acetaminophen 10-325mg (NORCO)  mg Tab Take 1 tablet by mouth every 8 (eight) hours as needed for Pain. 90 tablet 0    insulin detemir (LEVEMIR) 100 unit/mL injection Inject into the skin every evening.      insulin glargine,hum.rec.anlog (LANTUS SUBQ) Inject into the skin.      LANTUS U-100 INSULIN 100 unit/mL injection SMARTSI Unit(s) SUB-Q Every Night      metroNIDAZOLE (FLAGYL) 500 MG tablet Take 1 tablet (500 mg total) by mouth every 12 (twelve) hours. 14 tablet 0    mupirocin (BACTROBAN) 2 % ointment Apply topically 3 (three) times daily. 30 g 1    OZEMPIC 1 mg/dose (2 mg/1.5 mL) PnIj Inject 60 mg into the skin every 7 days.       OZEMPIC 1 mg/dose (4 mg/3 mL) Inject 1 mg into the skin every 7 days.      pantoprazole (PROTONIX) 40 MG tablet Take 40 mg by mouth.      pramipexole (MIRAPEX) 0.125 MG tablet Take 0.125 mg by mouth.      sulfamethoxazole-trimethoprim 800-160mg (BACTRIM DS) 800-160 mg Tab Take 1 tablet by mouth 2 (two) times daily. 14 tablet 0    sulfamethoxazole-trimethoprim 800-160mg (BACTRIM DS) 800-160 mg Tab Take 1 tablet by mouth 2 (two) times daily. 14 tablet 0    traMADoL (ULTRAM) 50 mg tablet Take 1 tablet (50 mg total) by mouth every 6 (six) hours as needed for Pain. 28 tablet 0     No current facility-administered medications for this visit.       Review of patient's allergies indicates:   Allergen Reactions    Codeine Hives and Rash         Review of Systems   Constitutional: Negative for chills and fever.   Skin:  Positive for color change and nail changes. Negative for poor wound healing.   Gastrointestinal:  Negative for nausea and vomiting.   Neurological:   Positive for numbness.   Psychiatric/Behavioral:  Negative for altered mental status.            Objective:      Physical Exam  Constitutional:       Appearance: Normal appearance. She is not ill-appearing.   Cardiovascular:      Pulses:           Dorsalis pedis pulses are 2+ on the right side and 2+ on the left side.        Posterior tibial pulses are 2+ on the right side and 2+ on the left side.      Comments: CFT is < 3 seconds bilateral.  Pedal hair growth is decreased bilateral.  No lower extremity edema noted bilateral.  Toes are warm to touch bilateral.    Musculoskeletal:         General: Tenderness present. No signs of injury.      Right lower leg: No edema.      Left lower leg: No edema.      Comments: Muscle strength 5/5 in all muscle groups bilateral.  No tenderness nor crepitation with ROM of foot/ankle joints bilateral.  Slight dorsal contracture of the Lt. Hallux.  No gross deformity noted to said digit.  Mild pain with palpation to the pressure injury site of the Rt. Lateral 5th mtp joint.     Skin:     General: Skin is warm and dry.      Capillary Refill: Capillary refill takes 2 to 3 seconds.      Findings: Wound present. No bruising, ecchymosis, erythema, signs of injury, laceration, lesion, petechiae or rash.      Comments: Location: Open wound noted to the Rt. Lateral sub 5th met head.    Base: Down to subQ and comprised of fibrin.      Drainage: None  Malinda wound: Devoid of fluctuance and purulence.  Mild edema, erythema,  and malodor noted.   Pre-debridement measurement: 0.9 x 0.4 x 0.2cm   Post-debridement measurement: 1.1 x 0.6 x 0.2cm     Mature epithelium noted to the Lt. Sub 2nd met head.     Neurological:      Mental Status: She is alert.      Sensory: Sensory deficit present.      Motor: No weakness.      Comments: Decreased protective sensation noted bilateral.  Light touch is absent bilateral.               Assessment:       Encounter Diagnoses   Name Primary?    Diabetic ulcer of  other part of right foot associated with type 2 diabetes mellitus, with fat layer exposed Yes    Healed ulcer of left foot     Diabetic polyneuropathy associated with type 2 diabetes mellitus            Plan:       Kerethi was seen today for wound care.    Diagnoses and all orders for this visit:    Diabetic ulcer of other part of right foot associated with type 2 diabetes mellitus, with fat layer exposed  -     Wound Debridement    Healed ulcer of left foot    Diabetic polyneuropathy associated with type 2 diabetes mellitus        I counseled the patient on her conditions, their implications and medical management.    Performed a selective excisional debridement of the Rt. Forefoot.  See attached procedure note.     Hydrofera blue was applied to the Rt. Foot wound.  Betadine was then applied to the site of mature epithelium of the Lt. Forefoot.  Both sites were offloaded with two full length felt pads with and aperture, and a football dressing was applied bilateral.    Advised to keep the dressings CDI x  1week.    Advised to rest and elevate the affected extremity.    Instructed to minimize weight bearing to facilitate healing.    Advised to ambulate only in the postoperative shoes bilateral.    RTC in 1 week.    Jacob Bernal DPM

## 2024-11-22 NOTE — PROGRESS NOTES
"CC: Ms. Keerthi Chase arrives today for management of Type 2 DM and review of chronic medical conditions, as listed in the Visit Diagnosis section of this encounter.       HPI: Ms. Keerthi Chase was diagnosed with Type 2 DM in 2006, immediately following GDM. She was diagnosed based on lab work. Initial treatment consisted of insulin. + FH of DM in maternal aunt. Denies hospitalizations due to DM.     This is her first time being seen in endocrine.     She is following with Dr. Bernal for R foot wound. Currently on Bactrim. Also on doxycycline for spider bites to L arm.    She states that she takes Ozempic "off and on," due to intolerance. Frequently has side effects - nausea, belching.     Patient reports that PCP recently obtained lab work at [facility]. She states A1c was ~ 10%.     Does not check BG readings because meter broke. Does report past BG as high as 600s.     Hypoglycemia: No    Missing Insulin/PO medication doses: Yes - forgets to take Lantus on schedule so will take at different times of day. Doesn't take Ozempic consistently due to side effects.   Timing prandial insulin 5-15 minutes before meals: n/a    Exercise: No    Dietary Habits:  She eats 2 meals/day. Skips breakfast. Snacks on chips, crackers. Drinks large quantities of Coke. She was told by a past provider that diet Coke was worse for her than regular Coke. States water makes her feel poorly but she makes an effort to drink this.    Last DM education appointment:      CURRENT DIABETIC MEDS:  Ozempic 1 mg weekly, Lantus 80 units daily  Vial or pen: pen  Glucometer type:     Previous DM treatments:  Metformin   Prandial insulin  Jardiance - unsure why stopped      Last Eye Exam: > 1 year. She plans to schedule  Last Podiatry Exam: 11/20/2024    REVIEW OF SYSTEMS  Constitutional: no c/o weakness or weight loss. + fatigue  Cardiac: no palpitations or chest pain.  Respiratory: no cough or dyspnea.  GI: no c/o abdominal pain or nausea. " "Denies h/o pancreatitis   Skin: no lesions or rashes. + wound to R foot. + spider bite to L arm (taking antibiotics).  Neuro: + numbness, tingling in feet.  Endocrine: denies polyphagia. + polydipsia, polyuria      Personally reviewed Past Medical, Surgical, Social History.    Vital Signs  /80   Pulse 71   Ht 5' 8" (1.727 m)   Wt 128.8 kg (284 lb 1 oz)   BMI 43.19 kg/m²     Personally reviewed the below labs:    Hemoglobin A1C   Date Value Ref Range Status   06/03/2022 10.5 (H) 0.0 - 5.6 % Final     Comment:     Reference Interval:  5.0 - 5.6 Normal   5.7 - 6.4 High Risk   > 6.5 Diabetic      Hgb A1c results are standardized based on the (NGSP) National   Glycohemoglobin Standardization Program.      Hemoglobin A1C levels are related to mean serum/plasma glucose   during the preceding 2-3 months.        09/23/2021 11.8 (H) 0.0 - 5.6 % Final     Comment:     Reference Interval:  5.0 - 5.6 Normal   5.7 - 6.4 High Risk   > 6.5 Diabetic      Hgb A1c results are standardized based on the (NGSP) National   Glycohemoglobin Standardization Program.      Hemoglobin A1C levels are related to mean serum/plasma glucose   during the preceding 2-3 months.        06/07/2021 8.8 (H) 4.8 - 5.6 % Final     Comment:              Prediabetes: 5.7 - 6.4           Diabetes: >6.4           Glycemic control for adults with diabetes: <7.0       Chemistry        Component Value Date/Time     (L) 07/19/2022 1040    K 4.5 07/19/2022 1040    CL 99 07/19/2022 1040    CO2 25 07/19/2022 1040    BUN 19 (H) 07/19/2022 1040    CREATININE 0.66 07/19/2022 1040     (H) 07/19/2022 1040        Component Value Date/Time    CALCIUM 9.0 07/19/2022 1040    ALKPHOS 125 07/19/2022 1040    AST 19 07/19/2022 1040    ALT 21 07/19/2022 1040    BILITOT 0.6 07/19/2022 1040    ESTGFRAFRICA >60 07/19/2022 1040    EGFRNONAA >60 07/19/2022 1040          No results found for: "CHOL"  No results found for: "HDL"  No results found for: "LDLCALC"  No " "results found for: "TRIG"  No results found for: "CHOLHDL"    No results found for: "MICALBCREAT"  No results found for: "TSH"    CrCl cannot be calculated (Patient's most recent lab result is older than the maximum 7 days allowed.).    No results found for: "[id number]"      PHYSICAL EXAMINATION  Constitutional: Appears well, no distress  Respiratory: CTA, even and unlabored.  Cardiovascular: RRR, no murmurs.  GI: bowel sounds active, no hernia noted  Skin: warm and dry; + scabbed erythematous lesion noted to L forearm   Neuro: oriented to person, place, time  Feet: both feet in ortho boots     Goals        HEMOGLOBIN A1C < 7               Assessment/Plan  1. Type 2 diabetes mellitus with hyperglycemia, with long-term current use of insulin  -- Chronically uncontrolled. Needs prandial coverage and unable to tolerate Ozempic. Will try Mounjaro before adding prandial insulin.  -- stop Ozempic  -- start Mounjaro 2.5 mg weekly. Will increase to 5 mg after first month. Discussed medication's mechanism of action, side effects, and contraindications. Advised pt to notify me for extreme nausea, abdominal pain, etc.  -- Begin Jardiance 10 mg daily. Discussed mechanism of action and side effects, including genital mycotic infections, UTIs, dehydration, ketoacidosis. Encouraged increased hydration. Avoid diets with extreme carb restriction.   -- change Lantus to Toujeo Max. Continue 80 units daily for now.   -- start Dexcom G7 CGMS. Advised pt to notify me once she receives this from pharmacy so we can schedule training.   -- will review lipid panel, CMP at next appt before adding statin.     -- Discussed diagnosis of DM, A1c goals, progression of disease, long term complications and tx options.  -- Reviewed hypoglycemia management: treat with 4 oz of juice, 4 oz regular soda, or 4 glucose tablets. Monitor and repeat treatment every 15 minutes until BG is >70 Then have a snack, which includes 15 grams of complex " carbohydrates and protein.   Advised patient to check BG before activities, such as driving or exercise.   2. Diabetic ulcer of other part of right foot associated with type 2 diabetes mellitus, with fat layer exposed  -- follows with Dr. Bernal.   -- optimize DM control   3. Primary hypertension  -- stable  -- continue chlorthalidone  -- will continue to monitor since adding Jardiance    4. Morbid obesity with BMI of 40.0-44.9, adult  -- Mounjaro may add benefit  -- increases insulin resistance   Body mass index is 43.19 kg/m².       FOLLOW UP  Follow up in about 3 months (around 2/25/2025).   Patient instructed to bring BG logs to each follow up   Patient encouraged to call for any BG/medication issues, concerns, or questions.    Orders Placed This Encounter   Procedures    Hemoglobin A1C    Comprehensive Metabolic Panel    Lipid Panel    TSH    Microalbumin/Creatinine Ratio, Urine

## 2024-11-25 ENCOUNTER — OFFICE VISIT (OUTPATIENT)
Dept: ENDOCRINOLOGY | Facility: CLINIC | Age: 49
End: 2024-11-25
Payer: MEDICARE

## 2024-11-25 VITALS
DIASTOLIC BLOOD PRESSURE: 80 MMHG | WEIGHT: 284.06 LBS | BODY MASS INDEX: 43.05 KG/M2 | HEIGHT: 68 IN | SYSTOLIC BLOOD PRESSURE: 128 MMHG | HEART RATE: 71 BPM

## 2024-11-25 DIAGNOSIS — Z79.4 TYPE 2 DIABETES MELLITUS WITH HYPERGLYCEMIA, WITH LONG-TERM CURRENT USE OF INSULIN: Primary | ICD-10-CM

## 2024-11-25 DIAGNOSIS — E11.65 TYPE 2 DIABETES MELLITUS WITH HYPERGLYCEMIA, WITH LONG-TERM CURRENT USE OF INSULIN: Primary | ICD-10-CM

## 2024-11-25 DIAGNOSIS — L97.512 DIABETIC ULCER OF OTHER PART OF RIGHT FOOT ASSOCIATED WITH TYPE 2 DIABETES MELLITUS, WITH FAT LAYER EXPOSED: ICD-10-CM

## 2024-11-25 DIAGNOSIS — I10 PRIMARY HYPERTENSION: ICD-10-CM

## 2024-11-25 DIAGNOSIS — E11.621 DIABETIC ULCER OF OTHER PART OF RIGHT FOOT ASSOCIATED WITH TYPE 2 DIABETES MELLITUS, WITH FAT LAYER EXPOSED: ICD-10-CM

## 2024-11-25 DIAGNOSIS — E66.01 MORBID OBESITY WITH BMI OF 40.0-44.9, ADULT: ICD-10-CM

## 2024-11-25 PROCEDURE — 99215 OFFICE O/P EST HI 40 MIN: CPT | Mod: PBBFAC,PO | Performed by: NURSE PRACTITIONER

## 2024-11-25 PROCEDURE — G2211 COMPLEX E/M VISIT ADD ON: HCPCS | Mod: S$PBB,,, | Performed by: NURSE PRACTITIONER

## 2024-11-25 PROCEDURE — 99204 OFFICE O/P NEW MOD 45 MIN: CPT | Mod: S$PBB,,, | Performed by: NURSE PRACTITIONER

## 2024-11-25 PROCEDURE — 99999 PR PBB SHADOW E&M-EST. PATIENT-LVL V: CPT | Mod: PBBFAC,,, | Performed by: NURSE PRACTITIONER

## 2024-11-25 RX ORDER — TIRZEPATIDE 2.5 MG/.5ML
2.5 INJECTION, SOLUTION SUBCUTANEOUS
Qty: 2 ML | Refills: 0 | Status: SHIPPED | OUTPATIENT
Start: 2024-11-25

## 2024-11-25 RX ORDER — BLOOD-GLUCOSE SENSOR
1 EACH MISCELLANEOUS
Qty: 9 EACH | Refills: 3 | Status: SHIPPED | OUTPATIENT
Start: 2024-11-25 | End: 2025-11-25

## 2024-11-25 RX ORDER — INSULIN GLARGINE 300 U/ML
80 INJECTION, SOLUTION SUBCUTANEOUS DAILY
Qty: 12 ML | Refills: 11 | Status: SHIPPED | OUTPATIENT
Start: 2024-11-25 | End: 2025-11-25

## 2024-11-25 RX ORDER — BLOOD-GLUCOSE,RECEIVER,CONT
EACH MISCELLANEOUS
Qty: 1 EACH | Refills: 0 | Status: SHIPPED | OUTPATIENT
Start: 2024-11-25

## 2024-11-25 RX ORDER — TIRZEPATIDE 5 MG/.5ML
5 INJECTION, SOLUTION SUBCUTANEOUS
Qty: 2 ML | Refills: 5 | Status: SHIPPED | OUTPATIENT
Start: 2024-12-23

## 2024-11-25 RX ORDER — DOXYCYCLINE 100 MG/1
100 CAPSULE ORAL 2 TIMES DAILY
COMMUNITY
Start: 2024-11-15

## 2024-11-25 RX ORDER — HYDROCORTISONE 25 MG/G
CREAM TOPICAL
COMMUNITY
Start: 2024-11-15

## 2024-11-25 NOTE — PATIENT INSTRUCTIONS
Notify me once you receive Dexcom G7 so we can schedule you for training.     Change Lantus to Toujeo Max. Give 80 units at the same time of day every day.     Stop Ozempic.     Start Mounjaro 2.5 mg every 7 days. After 1 month, fill prescription for the 5 mg pens and continue on this dose.     Start Jardiance 10 mg daily. Increase water intake.

## 2024-11-26 ENCOUNTER — TELEPHONE (OUTPATIENT)
Dept: ENDOCRINOLOGY | Facility: CLINIC | Age: 49
End: 2024-11-26
Payer: MEDICARE

## 2024-11-26 NOTE — TELEPHONE ENCOUNTER
----- Message from Cecile sent at 11/26/2024  1:23 PM CST -----  Contact: pt  Type: Needs Medical Advice         Who Called: pt  Best Call Back Number:[phone]  Additional Information: Requesting a call back regarding pt asking for office to call her. Pt pharm said the DEXCOM needs PA before they can fill.     DEXCOM G7 SENSOR [place]   DEXCOM G7  Misc     Please Advise- Thank you

## 2024-11-27 ENCOUNTER — OFFICE VISIT (OUTPATIENT)
Dept: PODIATRY | Facility: CLINIC | Age: 49
End: 2024-11-27
Payer: MEDICARE

## 2024-11-27 VITALS — HEIGHT: 68 IN | WEIGHT: 283.94 LBS | BODY MASS INDEX: 43.03 KG/M2

## 2024-11-27 DIAGNOSIS — L97.512 DIABETIC ULCER OF OTHER PART OF RIGHT FOOT ASSOCIATED WITH TYPE 2 DIABETES MELLITUS, WITH FAT LAYER EXPOSED: Primary | ICD-10-CM

## 2024-11-27 DIAGNOSIS — E11.42 DIABETIC POLYNEUROPATHY ASSOCIATED WITH TYPE 2 DIABETES MELLITUS: ICD-10-CM

## 2024-11-27 DIAGNOSIS — E11.621 DIABETIC ULCER OF OTHER PART OF RIGHT FOOT ASSOCIATED WITH TYPE 2 DIABETES MELLITUS, WITH FAT LAYER EXPOSED: Primary | ICD-10-CM

## 2024-11-27 DIAGNOSIS — Z87.2 HEALED ULCER OF LEFT FOOT: ICD-10-CM

## 2024-11-27 PROCEDURE — 99999 PR PBB SHADOW E&M-EST. PATIENT-LVL II: CPT | Mod: PBBFAC,,, | Performed by: PODIATRIST

## 2024-11-27 PROCEDURE — 99212 OFFICE O/P EST SF 10 MIN: CPT | Mod: PBBFAC,PO | Performed by: PODIATRIST

## 2024-11-27 PROCEDURE — 11042 DBRDMT SUBQ TIS 1ST 20SQCM/<: CPT | Mod: PBBFAC,PO | Performed by: PODIATRIST

## 2024-11-27 NOTE — PROGRESS NOTES
Subjective:      Patient ID: Keerthi Chase is a 49 y.o. female.    Chief Complaint: Wound Care  Patient presents to clinic for a 1 week wound check of bilateral foot.  Denies pedal pain with today's exam.  Has kept the football dressings CDI for the past week.  Has been ambulating comfortably in the DARCO shoes.  Denies experiencing N/V/F/C/D.   Denies any additional pedal complaints.       Hemoglobin A1C   Date Value Ref Range Status   2022 10.5 (H) 0.0 - 5.6 % Final     Comment:     Reference Interval:  5.0 - 5.6 Normal   5.7 - 6.4 High Risk   > 6.5 Diabetic      Hgb A1c results are standardized based on the (NGSP) National   Glycohemoglobin Standardization Program.      Hemoglobin A1C levels are related to mean serum/plasma glucose   during the preceding 2-3 months.        2021 11.8 (H) 0.0 - 5.6 % Final     Comment:     Reference Interval:  5.0 - 5.6 Normal   5.7 - 6.4 High Risk   > 6.5 Diabetic      Hgb A1c results are standardized based on the (NGSP) National   Glycohemoglobin Standardization Program.      Hemoglobin A1C levels are related to mean serum/plasma glucose   during the preceding 2-3 months.        2021 8.8 (H) 4.8 - 5.6 % Final     Comment:              Prediabetes: 5.7 - 6.4           Diabetes: >6.4           Glycemic control for adults with diabetes: <7.0         Past Medical History:   Diagnosis Date    Bell's palsy     Coronary artery disease     Pt states Dr. Virk said she did not have a blockage after a stress test.    Diabetes mellitus     GERD (gastroesophageal reflux disease)     Hypertension     Obesity        Past Surgical History:   Procedure Laterality Date     SECTION      x 2    CHOLECYSTECTOMY      ESOPHAGOGASTRODUODENOSCOPY      PERIPHERALLY INSERTED CENTRAL CATHETER INSERTION N/A 10/11/2021    Procedure: INSERTION, PICC;  Surgeon: PICC, PRIYANK;  [email or web address];  Service: Cardiology;  Laterality: N/A;  Dr Aiken    PERIPHERALLY INSERTED  "CENTRAL CATHETER INSERTION N/A 7/21/2022    Procedure: INSERTION, PICC;  Surgeon: PICC, [facility];  Location: [facility] ENDO;  Service: Cardiology;  Laterality: N/A;  Dr Dubose    SESAMOIDECTOMY Left 11/11/2021    Procedure: SESAMOIDECTOMY;  Surgeon: Jacob Bernal DPM;  Location: [facility] OR;  Service: Podiatry;  Laterality: Left;       Family History   Problem Relation Name Age of Onset    Hypertension Mother      Diabetes Maternal Aunt         Social History     Socioeconomic History    Marital status:    Tobacco Use    Smoking status: Never    Smokeless tobacco: Never   Substance and Sexual Activity    Alcohol use: No    Drug use: No     Social Drivers of Health     Financial Resource Strain: High Risk (11/18/2024)    Overall Financial Resource Strain (CARDIA)     Difficulty of Paying Living Expenses: Hard   Food Insecurity: Food Insecurity Present (11/18/2024)    Hunger Vital Sign     Worried About Running Out of Food in the Last Year: Often true     Ran Out of Food in the Last Year: Sometimes true   Physical Activity: Inactive (11/18/2024)    Exercise Vital Sign     Days of Exercise per Week: 0 days     Minutes of Exercise per Session: 0 min   Stress: Stress Concern Present (11/18/2024)    [employer] [place] [employer] - Occupational Stress Questionnaire     Feeling of Stress : To some extent   Housing Stability: High Risk (11/18/2024)    Housing Stability Vital Sign     Unable to Pay for Housing in the Last Year: Yes       Current Outpatient Medications   Medication Sig Dispense Refill    alprazolam (XANAX) 1 MG tablet Take 1 mg by mouth 2 (two) times daily as needed for Anxiety.      BD INSULIN PEN NEEDLE UF MINI 31 gauge x 3/16" Ndle   2    BD INSULIN SYRINGE ULTRA-FINE 1 mL 31 gauge x 5/16 Syrg use as directed      chlorthalidone (HYGROTEN) 25 MG Tab Take 1 tablet (25 mg total) by mouth once daily. 30 tablet 11    ciprofloxacin HCl (CIPRO) 750 MG tablet Take 1 tablet (750 mg " total) by mouth every 12 (twelve) hours. (Patient not taking: Reported on 11/25/2024) 20 tablet 0    DEXCOM G7  Misc Use continuously for glucose monitoring 1 each 0    DEXCOM G7 SENSOR [place] 1 Device by Misc.(Non-Drug; Combo Route) route every 10 days. 9 each 3    doxycycline (MONODOX) 100 MG capsule Take 100 mg by mouth 2 (two) times daily.      empagliflozin (JARDIANCE) 10 mg tablet Take 1 tablet (10 mg total) by mouth once daily. 30 tablet 6    fluconazole (DIFLUCAN) 200 MG Tab Take 200 mg by mouth.      hydrocodone-acetaminophen 10-325mg (NORCO)  mg Tab Take 1 tablet by mouth 3 (three) times daily as needed.      hydrocodone-acetaminophen 10-325mg (NORCO)  mg Tab Take 1 tablet by mouth every 8 (eight) hours as needed for Pain. 90 tablet 0    hydrocortisone 2.5 % cream Apply topically.      metroNIDAZOLE (FLAGYL) 500 MG tablet Take 1 tablet (500 mg total) by mouth every 12 (twelve) hours. 14 tablet 0    MOUNJARO 2.5 mg/0.5 mL PnIj Inject 2.5 mg into the skin every 7 days. 2 mL 0    [START ON 12/23/2024] MOUNJARO 5 mg/0.5 mL PnIj Inject 5 mg into the skin every 7 days. 2 mL 5    mupirocin (BACTROBAN) 2 % ointment Apply topically 3 (three) times daily. 30 g 1    pantoprazole (PROTONIX) 40 MG tablet Take 40 mg by mouth. (Patient not taking: Reported on 11/25/2024)      pramipexole (MIRAPEX) 0.125 MG tablet Take 0.125 mg by mouth. (Patient not taking: Reported on 11/25/2024)      sulfamethoxazole-trimethoprim 800-160mg (BACTRIM DS) 800-160 mg Tab Take 1 tablet by mouth 2 (two) times daily. 14 tablet 0    sulfamethoxazole-trimethoprim 800-160mg (BACTRIM DS) 800-160 mg Tab Take 1 tablet by mouth 2 (two) times daily. 14 tablet 0    TOUJEO MAX U-300 SOLOSTAR 300 unit/mL (3 mL) insulin pen Inject 80 Units into the skin once daily. 12 mL 11     No current facility-administered medications for this visit.       Review of patient's allergies indicates:   Allergen Reactions    Codeine  Hives and Rash         Review of Systems   Constitutional: Negative for chills and fever.   Skin:  Positive for color change and nail changes. Negative for poor wound healing.   Gastrointestinal:  Negative for nausea and vomiting.   Neurological:  Positive for numbness.   Psychiatric/Behavioral:  Negative for altered mental status.            Objective:      Physical Exam  Constitutional:       Appearance: Normal appearance. She is not ill-appearing.   Cardiovascular:      Pulses:           Dorsalis pedis pulses are 2+ on the right side and 2+ on the left side.        Posterior tibial pulses are 2+ on the right side and 2+ on the left side.      Comments: CFT is < 3 seconds bilateral.  Pedal hair growth is decreased bilateral.  No lower extremity edema noted bilateral.  Toes are warm to touch bilateral.    Musculoskeletal:         General: Tenderness present. No signs of injury.      Right lower leg: No edema.      Left lower leg: No edema.      Comments: Muscle strength 5/5 in all muscle groups bilateral.  No tenderness nor crepitation with ROM of foot/ankle joints bilateral.  Slight dorsal contracture of the Lt. Hallux.  No gross deformity noted to said digit.  Mild pain with palpation to the pressure injury site of the Rt. Lateral 5th mtp joint.     Skin:     General: Skin is warm and dry.      Capillary Refill: Capillary refill takes 2 to 3 seconds.      Findings: Wound present. No bruising, ecchymosis, erythema, signs of injury, laceration, lesion, petechiae or rash.      Comments: Location: Open wound noted to the Rt. Lateral sub 5th met head.    Base: Down to subQ and comprised of fibrin.      Drainage: None  Malinda wound: Devoid of fluctuance and purulence.  Mild edema, erythema,  and malodor noted.   Pre-debridement measurement: 0.8 x 0.4 x 0.2cm   Post-debridement measurement: 1 x 0.6 x 0.2cm     Mature epithelium noted to the Lt. Sub 2nd met head.     Neurological:      Mental Status: She is alert.       Sensory: Sensory deficit present.      Motor: No weakness.      Comments: Decreased protective sensation noted bilateral.  Light touch is absent bilateral.             Assessment:       Encounter Diagnoses   Name Primary?    Diabetic ulcer of other part of right foot associated with type 2 diabetes mellitus, with fat layer exposed Yes    Healed ulcer of left foot     Diabetic polyneuropathy associated with type 2 diabetes mellitus            Plan:       Keerthi was seen today for wound care.    Diagnoses and all orders for this visit:    Diabetic ulcer of other part of right foot associated with type 2 diabetes mellitus, with fat layer exposed    Healed ulcer of left foot    Diabetic polyneuropathy associated with type 2 diabetes mellitus        I counseled the patient on her conditions, their implications and medical management.    Performed a selective excisional debridement of the Rt. Forefoot.  See attached procedure note.     Hydrofera blue was applied to the Rt. Foot wound.  Betadine was then applied to the site of mature epithelium of the Lt. Forefoot.  Both sites were offloaded with two full length felt pads with and aperture, and a football dressing was applied bilateral.    Advised to keep the dressings CDI x  1week.    Advised to rest and elevate the affected extremity.    Instructed to minimize weight bearing to facilitate healing.    Advised to ambulate only in the postoperative shoes bilateral.    RTC in 1 week.    Jacob Bernal DPM

## 2024-11-27 NOTE — PROCEDURES
"Wound Debridement    Date/Time: 11/27/2024 7:20 AM    Performed by: Jacob Bernal DPM  Authorized by: Jacob Bernal DPM    Time out: Immediately prior to procedure a "time out" was called to verify the correct patient, procedure, equipment, support staff and site/side marked as required.    Consent Done?:  Yes (Verbal)  Local anesthesia used?: No      Wound Details:    Location:  Right foot    Location:  Right 5th Metatarsal Head    Type of Debridement:  Excisional       Length (cm):  0.8       Width (cm):  0.4       Depth (cm):  0.2       Area (sq cm):  0.32       Percent Debrided (%):  100       Total Area Debrided (sq cm):  0.32    Depth of debridement:  Subcutaneous tissue    Tissue debrided:  Subcutaneous    Devitalized tissue debrided:  Fibrin    Instruments:  Curette  Bleeding:  Minimal  Hemostasis Achieved: Yes  Method Used:  Pressure  Patient tolerance:  Patient tolerated the procedure well with no immediate complications    "

## 2024-12-04 ENCOUNTER — TELEPHONE (OUTPATIENT)
Dept: PODIATRY | Facility: CLINIC | Age: 49
End: 2024-12-04

## 2024-12-04 ENCOUNTER — TELEPHONE (OUTPATIENT)
Dept: ENDOCRINOLOGY | Facility: CLINIC | Age: 49
End: 2024-12-04
Payer: MEDICARE

## 2024-12-04 ENCOUNTER — OFFICE VISIT (OUTPATIENT)
Dept: PODIATRY | Facility: CLINIC | Age: 49
End: 2024-12-04
Payer: MEDICARE

## 2024-12-04 ENCOUNTER — HOSPITAL ENCOUNTER (OUTPATIENT)
Dept: RADIOLOGY | Facility: HOSPITAL | Age: 49
Discharge: HOME | End: 2024-12-04
Attending: PODIATRIST
Payer: MEDICARE

## 2024-12-04 VITALS — WEIGHT: 283.94 LBS | HEIGHT: 68 IN | BODY MASS INDEX: 43.03 KG/M2

## 2024-12-04 DIAGNOSIS — L97.512 DIABETIC ULCER OF OTHER PART OF RIGHT FOOT ASSOCIATED WITH TYPE 2 DIABETES MELLITUS, WITH FAT LAYER EXPOSED: ICD-10-CM

## 2024-12-04 DIAGNOSIS — E11.621 DIABETIC ULCER OF OTHER PART OF RIGHT FOOT ASSOCIATED WITH TYPE 2 DIABETES MELLITUS, WITH FAT LAYER EXPOSED: ICD-10-CM

## 2024-12-04 DIAGNOSIS — E11.621 DIABETIC ULCER OF OTHER PART OF RIGHT FOOT ASSOCIATED WITH TYPE 2 DIABETES MELLITUS, WITH FAT LAYER EXPOSED: Primary | ICD-10-CM

## 2024-12-04 DIAGNOSIS — E11.42 DIABETIC POLYNEUROPATHY ASSOCIATED WITH TYPE 2 DIABETES MELLITUS: ICD-10-CM

## 2024-12-04 DIAGNOSIS — Z87.2 HEALED ULCER OF LEFT FOOT: ICD-10-CM

## 2024-12-04 DIAGNOSIS — L97.512 DIABETIC ULCER OF OTHER PART OF RIGHT FOOT ASSOCIATED WITH TYPE 2 DIABETES MELLITUS, WITH FAT LAYER EXPOSED: Primary | ICD-10-CM

## 2024-12-04 PROCEDURE — 99212 OFFICE O/P EST SF 10 MIN: CPT | Mod: PBBFAC,PO,25 | Performed by: PODIATRIST

## 2024-12-04 PROCEDURE — 87070 CULTURE OTHR SPECIMN AEROBIC: CPT | Performed by: PODIATRIST

## 2024-12-04 PROCEDURE — 99499 UNLISTED E&M SERVICE: CPT | Mod: S$PBB,,, | Performed by: PODIATRIST

## 2024-12-04 PROCEDURE — 73630 X-RAY EXAM OF FOOT: CPT | Mod: 26,RT,, | Performed by: RADIOLOGY

## 2024-12-04 PROCEDURE — 73630 X-RAY EXAM OF FOOT: CPT | Mod: TC,PO,RT

## 2024-12-04 PROCEDURE — 87186 SC STD MICRODIL/AGAR DIL: CPT | Performed by: PODIATRIST

## 2024-12-04 PROCEDURE — 87075 CULTR BACTERIA EXCEPT BLOOD: CPT | Performed by: PODIATRIST

## 2024-12-04 PROCEDURE — 11042 DBRDMT SUBQ TIS 1ST 20SQCM/<: CPT | Mod: PBBFAC,PO | Performed by: PODIATRIST

## 2024-12-04 PROCEDURE — 99999 PR PBB SHADOW E&M-EST. PATIENT-LVL II: CPT | Mod: PBBFAC,,, | Performed by: PODIATRIST

## 2024-12-04 RX ORDER — SULFAMETHOXAZOLE AND TRIMETHOPRIM 800; 160 MG/1; MG/1
1 TABLET ORAL 2 TIMES DAILY
Qty: 14 TABLET | Refills: 0 | Status: SHIPPED | OUTPATIENT
Start: 2024-12-04

## 2024-12-04 NOTE — TELEPHONE ENCOUNTER
S/w pt and notified her pharmacy will be faxing PA to us for Dexcom. Also notified her that per Pharmacist, dexcom is not in formulary,so it might not be covered.Pt verb understanding

## 2024-12-04 NOTE — TELEPHONE ENCOUNTER
Called patient to provide results per provider's request. Phone went to voicemail and a message was left for a call back.

## 2024-12-04 NOTE — TELEPHONE ENCOUNTER
Spoke with the patient and provided results raghu provider's request. Patient expressed understanding and stated that she will be in tomorrow for her appointment to discuss.

## 2024-12-04 NOTE — TELEPHONE ENCOUNTER
----- Message from Jacob Bernal DPM sent at 12/4/2024 11:24 AM CST -----  Please let her know the x-ray looks normal, however, I still think we should proceed with a MRI to ensure nothing else is infected at the site.  ----- Message -----  From: Interface, Rad Results In  Sent: 12/4/2024   8:52 AM CST  To: Jacob Bernal DPM

## 2024-12-04 NOTE — TELEPHONE ENCOUNTER
----- Message from Belkis sent at 12/4/2024 11:34 AM CST -----  Type: Needs Medical Advice  Who Called:  pt  Symptoms (please be specific):    How long has patient had these symptoms:    Pharmacy name and phone #:        [facility] - [place] - [id number]  [id number]  [place] [address]  Phone: [phone] Fax: [phone]      Best Call Back Number: [phone]    Additional Information: pt would like to know what's going on with her Dex com  The pharmacy says thet havent heard anything from the office  Please call to advise

## 2024-12-04 NOTE — PROCEDURES
"Wound Debridement    Date/Time: 12/4/2024 7:40 AM    Performed by: Jacob Bernal DPM  Authorized by: Jacob Bernal DPM    Time out: Immediately prior to procedure a "time out" was called to verify the correct patient, procedure, equipment, support staff and site/side marked as required.    Consent Done?:  Yes (Verbal)  Local anesthesia used?: No      Wound Details:    Location:  Right foot    Location:  Right 5th Metatarsal Head    Type of Debridement:  Excisional       Length (cm):  1       Width (cm):  1       Depth (cm):  0.2       Area (sq cm):  1       Percent Debrided (%):  100       Total Area Debrided (sq cm):  1    Depth of debridement:  Subcutaneous tissue    Tissue debrided:  Subcutaneous    Devitalized tissue debrided:  Fibrin    Instruments:  Curette  Bleeding:  Minimal  Hemostasis Achieved: Yes  Method Used:  Pressure  Patient tolerance:  Patient tolerated the procedure well with no immediate complications    "

## 2024-12-04 NOTE — PROGRESS NOTES
Subjective:      Patient ID: Keerthi Chase is a 49 y.o. female.    Chief Complaint: Wound Care  Patient presents to clinic for a 1 week wound check of bilateral foot.  Has kept the football dressings CDI for the past week.  Notes having an increase in pain from the Rt. Lateral forefoot wound.  Rates pain as a 4/10.  Symptoms have been present with weight bearing and less so with rest.  Has been ambulating  in the DARCO shoes.  Denies experiencing N/V/F/C/D with today's exam, however, was experiencing some chills last night.   Denies any additional pedal complaints.       Hemoglobin A1C   Date Value Ref Range Status   2022 10.5 (H) 0.0 - 5.6 % Final     Comment:     Reference Interval:  5.0 - 5.6 Normal   5.7 - 6.4 High Risk   > 6.5 Diabetic      Hgb A1c results are standardized based on the (NGSP) National   Glycohemoglobin Standardization Program.      Hemoglobin A1C levels are related to mean serum/plasma glucose   during the preceding 2-3 months.        2021 11.8 (H) 0.0 - 5.6 % Final     Comment:     Reference Interval:  5.0 - 5.6 Normal   5.7 - 6.4 High Risk   > 6.5 Diabetic      Hgb A1c results are standardized based on the (NGSP) National   Glycohemoglobin Standardization Program.      Hemoglobin A1C levels are related to mean serum/plasma glucose   during the preceding 2-3 months.        2021 8.8 (H) 4.8 - 5.6 % Final     Comment:              Prediabetes: 5.7 - 6.4           Diabetes: >6.4           Glycemic control for adults with diabetes: <7.0         Past Medical History:   Diagnosis Date    Bell's palsy     Coronary artery disease     Pt states Dr. Virk said she did not have a blockage after a stress test.    Diabetes mellitus     GERD (gastroesophageal reflux disease)     Hypertension     Obesity        Past Surgical History:   Procedure Laterality Date     SECTION      x 2    CHOLECYSTECTOMY      ESOPHAGOGASTRODUODENOSCOPY      PERIPHERALLY INSERTED CENTRAL CATHETER  "INSERTION N/A 10/11/2021    Procedure: INSERTION, PICC;  Surgeon: PICC, [facility];  Location: [facility] ENDO;  Service: Cardiology;  Laterality: N/A;  Dr Aiken    PERIPHERALLY INSERTED CENTRAL CATHETER INSERTION N/A 7/21/2022    Procedure: INSERTION, PICC;  Surgeon: PICC, [facility];  Location: [facility] ENDO;  Service: Cardiology;  Laterality: N/A;  Dr Dubose    SESAMOIDECTOMY Left 11/11/2021    Procedure: SESAMOIDECTOMY;  Surgeon: Jacob Bernal DPM;  Location: [facility] OR;  Service: Podiatry;  Laterality: Left;       Family History   Problem Relation Name Age of Onset    Hypertension Mother      Diabetes Maternal Aunt         Social History     Socioeconomic History    Marital status:    Tobacco Use    Smoking status: Never    Smokeless tobacco: Never   Substance and Sexual Activity    Alcohol use: No    Drug use: No     Social Drivers of Health     Financial Resource Strain: High Risk (11/18/2024)    Overall Financial Resource Strain (CARDIA)     Difficulty of Paying Living Expenses: Hard   Food Insecurity: Food Insecurity Present (11/18/2024)    Hunger Vital Sign     Worried About Running Out of Food in the Last Year: Often true     Ran Out of Food in the Last Year: Sometimes true   Physical Activity: Inactive (11/18/2024)    Exercise Vital Sign     Days of Exercise per Week: 0 days     Minutes of Exercise per Session: 0 min   Stress: Stress Concern Present (11/18/2024)    [employer] [place] [employer] - Occupational Stress Questionnaire     Feeling of Stress : To some extent   Housing Stability: High Risk (11/18/2024)    Housing Stability Vital Sign     Unable to Pay for Housing in the Last Year: Yes       Current Outpatient Medications   Medication Sig Dispense Refill    alprazolam (XANAX) 1 MG tablet Take 1 mg by mouth 2 (two) times daily as needed for Anxiety.      BD INSULIN PEN NEEDLE UF MINI 31 gauge x 3/16" Ndle   2    BD INSULIN SYRINGE ULTRA-FINE 1 mL 31 gauge x 5/16 Syrg use as directed      chlorthalidone " (HYGROTEN) 25 MG Tab Take 1 tablet (25 mg total) by mouth once daily. 30 tablet 11    ciprofloxacin HCl (CIPRO) 750 MG tablet Take 1 tablet (750 mg total) by mouth every 12 (twelve) hours. (Patient not taking: Reported on 11/25/2024) 20 tablet 0    DEXCOM G7  Misc Use continuously for glucose monitoring 1 each 0    DEXCOM G7 SENSOR [place] 1 Device by Misc.(Non-Drug; Combo Route) route every 10 days. 9 each 3    doxycycline (MONODOX) 100 MG capsule Take 100 mg by mouth 2 (two) times daily.      empagliflozin (JARDIANCE) 10 mg tablet Take 1 tablet (10 mg total) by mouth once daily. 30 tablet 6    fluconazole (DIFLUCAN) 200 MG Tab Take 200 mg by mouth.      hydrocodone-acetaminophen 10-325mg (NORCO)  mg Tab Take 1 tablet by mouth 3 (three) times daily as needed.      hydrocodone-acetaminophen 10-325mg (NORCO)  mg Tab Take 1 tablet by mouth every 8 (eight) hours as needed for Pain. 90 tablet 0    hydrocortisone 2.5 % cream Apply topically.      metroNIDAZOLE (FLAGYL) 500 MG tablet Take 1 tablet (500 mg total) by mouth every 12 (twelve) hours. 14 tablet 0    MOUNJARO 2.5 mg/0.5 mL PnIj Inject 2.5 mg into the skin every 7 days. 2 mL 0    [START ON 12/23/2024] MOUNJARO 5 mg/0.5 mL PnIj Inject 5 mg into the skin every 7 days. 2 mL 5    mupirocin (BACTROBAN) 2 % ointment Apply topically 3 (three) times daily. 30 g 1    pantoprazole (PROTONIX) 40 MG tablet Take 40 mg by mouth. (Patient not taking: Reported on 11/25/2024)      pramipexole (MIRAPEX) 0.125 MG tablet Take 0.125 mg by mouth. (Patient not taking: Reported on 11/25/2024)      sulfamethoxazole-trimethoprim 800-160mg (BACTRIM DS) 800-160 mg Tab Take 1 tablet by mouth 2 (two) times daily. 14 tablet 0    sulfamethoxazole-trimethoprim 800-160mg (BACTRIM DS) 800-160 mg Tab Take 1 tablet by mouth 2 (two) times daily. 14 tablet 0    sulfamethoxazole-trimethoprim 800-160mg (BACTRIM DS) 800-160 mg Tab Take 1 tablet by mouth 2 (two) times daily. 14 tablet 0     TOUJEO MAX U-300 SOLOSTAR 300 unit/mL (3 mL) insulin pen Inject 80 Units into the skin once daily. 12 mL 11     No current facility-administered medications for this visit.       Review of patient's allergies indicates:   Allergen Reactions    Codeine Hives and Rash         Review of Systems   Constitutional: Negative for chills and fever.   Skin:  Positive for color change, nail changes and poor wound healing.   Gastrointestinal:  Negative for nausea and vomiting.   Neurological:  Positive for numbness.   Psychiatric/Behavioral:  Negative for altered mental status.            Objective:      Physical Exam  Constitutional:       Appearance: Normal appearance. She is not ill-appearing.   Cardiovascular:      Pulses:           Dorsalis pedis pulses are 2+ on the right side and 2+ on the left side.        Posterior tibial pulses are 2+ on the right side and 2+ on the left side.      Comments: CFT is < 3 seconds bilateral.  Pedal hair growth is decreased bilateral.  Mild edema noted to the Rt. Lateral forefoot.  Toes are warm to touch bilateral.    Musculoskeletal:         General: Tenderness present. No signs of injury.      Right lower leg: No edema.      Left lower leg: No edema.      Comments: Muscle strength 5/5 in all muscle groups bilateral.  No tenderness nor crepitation with ROM of foot/ankle joints bilateral.  Slight dorsal contracture of the Lt. Hallux.  No gross deformity noted to said digit.  Pain with palpation to the pressure injury site of the Rt. Lateral 5th mtp joint.     Skin:     General: Skin is warm and dry.      Capillary Refill: Capillary refill takes 2 to 3 seconds.      Findings: Erythema and wound present. No bruising, ecchymosis, signs of injury, laceration, lesion, petechiae or rash.      Comments: Location: Open wound noted to the Rt. Lateral sub 5th met head.    Base: Down to subQ and comprised of fibrin.      Drainage: None  Malinda wound: Devoid of fluctuance and purulence.  Moderate  edema, erythema,  and malodor noted.   Pre-debridement measurement: 1 x 1 x 0.2cm   Post-debridement measurement: 1.2 x 1.2 x 0.2cm     Mature epithelium noted to the Lt. Sub 2nd met head.     Neurological:      Mental Status: She is alert.      Sensory: Sensory deficit present.      Motor: No weakness.      Comments: Decreased protective sensation noted bilateral.  Light touch is absent bilateral.               Assessment:       Encounter Diagnoses   Name Primary?    Diabetic ulcer of other part of right foot associated with type 2 diabetes mellitus, with fat layer exposed Yes    Healed ulcer of left foot     Diabetic polyneuropathy associated with type 2 diabetes mellitus              Plan:       Keerthi was seen today for wound care.    Diagnoses and all orders for this visit:    Diabetic ulcer of other part of right foot associated with type 2 diabetes mellitus, with fat layer exposed  -     Aerobic culture  -     Culture, Anaerobic  -     X-Ray Foot Complete Right; Future  -     MRI Forefoot WO Contrast RT; Future  -     sulfamethoxazole-trimethoprim 800-160mg (BACTRIM DS) 800-160 mg Tab; Take 1 tablet by mouth 2 (two) times daily.  -     Wound Debridement    Healed ulcer of left foot    Diabetic polyneuropathy associated with type 2 diabetes mellitus          I counseled the patient on her conditions, their implications and medical management.    Performed a selective excisional debridement of the Rt. Forefoot.  See attached procedure note.     Due to deterioration of the wound bed, cultures were taken, orders written for both an x-ray and MRI of the site, and a Rx for Bactrim was sent to the pharmacy.    Iodosorb ointment applied to the Rt. Foot wound.  Betadine was then applied to the site of mature epithelium of the Lt. Forefoot.  Both sites were offloaded with two full length felt pads with and aperture, and a football dressing was applied bilateral.    Advised to keep the dressings CDI x   1week.    Advised to rest and elevate the affected extremity.    Instructed to minimize weight bearing to facilitate healing.    Advised to ambulate only in the postoperative shoes bilateral.    RTC in 1 week.    Jacob Bernal DPM

## 2024-12-05 ENCOUNTER — HOSPITAL ENCOUNTER (OUTPATIENT)
Dept: RADIOLOGY | Facility: HOSPITAL | Age: 49
Discharge: HOME | End: 2024-12-05
Attending: PODIATRIST
Payer: MEDICARE

## 2024-12-05 ENCOUNTER — TELEPHONE (OUTPATIENT)
Dept: ENDOCRINOLOGY | Facility: CLINIC | Age: 49
End: 2024-12-05
Payer: MEDICARE

## 2024-12-05 DIAGNOSIS — E11.621 DIABETIC ULCER OF OTHER PART OF RIGHT FOOT ASSOCIATED WITH TYPE 2 DIABETES MELLITUS, WITH FAT LAYER EXPOSED: ICD-10-CM

## 2024-12-05 DIAGNOSIS — L97.512 DIABETIC ULCER OF OTHER PART OF RIGHT FOOT ASSOCIATED WITH TYPE 2 DIABETES MELLITUS, WITH FAT LAYER EXPOSED: ICD-10-CM

## 2024-12-05 PROCEDURE — 73718 MRI LOWER EXTREMITY W/O DYE: CPT | Mod: TC,PO,RT

## 2024-12-05 PROCEDURE — 73718 MRI LOWER EXTREMITY W/O DYE: CPT | Mod: 26,RT,, | Performed by: RADIOLOGY

## 2024-12-06 ENCOUNTER — TELEPHONE (OUTPATIENT)
Dept: PODIATRY | Facility: CLINIC | Age: 49
End: 2024-12-06
Payer: MEDICARE

## 2024-12-06 LAB
BACTERIA SPEC AEROBE CULT: ABNORMAL
BACTERIA SPEC ANAEROBE CULT: NORMAL

## 2024-12-06 NOTE — TELEPHONE ENCOUNTER
----- Message from Jacob Bernal DPM sent at 12/6/2024  7:11 AM CST -----  Please give her a call about the results.  ----- Message -----  From: Interface, Rad Results In  Sent: 12/5/2024   2:33 PM CST  To: Jacob Bernal DPM

## 2024-12-09 PROBLEM — E11.65 UNCONTROLLED DIABETES MELLITUS WITH HYPERGLYCEMIA, WITH LONG-TERM CURRENT USE OF INSULIN: Status: ACTIVE | Noted: 2024-12-09

## 2024-12-09 PROBLEM — Z79.4 UNCONTROLLED DIABETES MELLITUS WITH HYPERGLYCEMIA, WITH LONG-TERM CURRENT USE OF INSULIN: Status: ACTIVE | Noted: 2024-12-09

## 2024-12-10 PROBLEM — E66.9 OBESE: Status: ACTIVE | Noted: 2024-12-10

## 2024-12-13 PROBLEM — M79.671 RIGHT FOOT PAIN: Status: ACTIVE | Noted: 2024-12-13

## 2024-12-13 PROBLEM — K59.00 CONSTIPATION: Status: ACTIVE | Noted: 2024-12-13

## 2024-12-19 ENCOUNTER — OFFICE VISIT (OUTPATIENT)
Dept: PODIATRY | Facility: CLINIC | Age: 49
End: 2024-12-19
Payer: MEDICARE

## 2024-12-19 VITALS — HEIGHT: 68 IN | BODY MASS INDEX: 41.93 KG/M2 | WEIGHT: 276.69 LBS

## 2024-12-19 DIAGNOSIS — Z87.2 HEALED ULCER OF LEFT FOOT: ICD-10-CM

## 2024-12-19 DIAGNOSIS — E11.42 DIABETIC POLYNEUROPATHY ASSOCIATED WITH TYPE 2 DIABETES MELLITUS: ICD-10-CM

## 2024-12-19 DIAGNOSIS — E11.621 DIABETIC ULCER OF OTHER PART OF RIGHT FOOT ASSOCIATED WITH TYPE 2 DIABETES MELLITUS, WITH FAT LAYER EXPOSED: Primary | ICD-10-CM

## 2024-12-19 DIAGNOSIS — L97.512 DIABETIC ULCER OF OTHER PART OF RIGHT FOOT ASSOCIATED WITH TYPE 2 DIABETES MELLITUS, WITH FAT LAYER EXPOSED: Primary | ICD-10-CM

## 2024-12-19 PROCEDURE — 99212 OFFICE O/P EST SF 10 MIN: CPT | Mod: PBBFAC,PO,25 | Performed by: PODIATRIST

## 2024-12-19 PROCEDURE — 11042 DBRDMT SUBQ TIS 1ST 20SQCM/<: CPT | Mod: PBBFAC,PO | Performed by: PODIATRIST

## 2024-12-19 PROCEDURE — 99999 PR PBB SHADOW E&M-EST. PATIENT-LVL II: CPT | Mod: PBBFAC,,, | Performed by: PODIATRIST

## 2024-12-19 NOTE — PROGRESS NOTES
Subjective:      Patient ID: Keerthi Chase is a 49 y.o. female.    Chief Complaint: Wound Care  Patient presents to clinic 1 week S/P resection of the Rt. 5th mtp joint.   Notes being discharged 4 days ago from [facility].  She is tolerating daptomycin by PICC line quite well.  Notes receiving regular VAC dressing changes per .  She has attempted to minimize weight bearing to the surgical site to aid in healing.   Denies experiencing N/V/F/C/D.  Denies chest pain, SOB, and calf/thigh pain. Denies any additional pedal complaints.       Hemoglobin A1C   Date Value Ref Range Status   12/09/2024 10.0 (H) 4.0 - 5.6 % Final     Comment:     Reference Interval:  5.0 - 5.6 Normal   5.7 - 6.4 High Risk   > 6.5 Diabetic      Hgb A1c results are standardized based on the (NGSP) National   Glycohemoglobin Standardization Program.      Hemoglobin A1C levels are related to mean serum/plasma glucose   during the preceding 2-3 months.        06/03/2022 10.5 (H) 0.0 - 5.6 % Final     Comment:     Reference Interval:  5.0 - 5.6 Normal   5.7 - 6.4 High Risk   > 6.5 Diabetic      Hgb A1c results are standardized based on the (NGSP) National   Glycohemoglobin Standardization Program.      Hemoglobin A1C levels are related to mean serum/plasma glucose   during the preceding 2-3 months.        09/23/2021 11.8 (H) 0.0 - 5.6 % Final     Comment:     Reference Interval:  5.0 - 5.6 Normal   5.7 - 6.4 High Risk   > 6.5 Diabetic      Hgb A1c results are standardized based on the (NGSP) National   Glycohemoglobin Standardization Program.      Hemoglobin A1C levels are related to mean serum/plasma glucose   during the preceding 2-3 months.              Past Medical History:   Diagnosis Date    Bell's palsy     Coronary artery disease     Pt states Dr. Virk said she did not have a blockage after a stress test.    Diabetes mellitus     GERD (gastroesophageal reflux disease)     Hypertension     Obesity        Past Surgical History:   Procedure  Laterality Date     SECTION      x 2    CHOLECYSTECTOMY      ESOPHAGOGASTRODUODENOSCOPY      FOOT AMPUTATION THROUGH METATARSAL Right 12/10/2024    Procedure: resection, FOOT, TRANSMETATARSAL, 5th;  Surgeon: Jacob Bernal DPM;  Location: [facility] OR;  Service: Podiatry;  Laterality: Right;    PERIPHERALLY INSERTED CENTRAL CATHETER INSERTION N/A 10/11/2021    Procedure: INSERTION, PICC;  Surgeon: PICC, [facility];  Location: [facility];  Service: Cardiology;  Laterality: N/A;  Dr Aiken    PERIPHERALLY INSERTED CENTRAL CATHETER INSERTION N/A 2022    Procedure: INSERTION, PICC;  Surgeon: PICC, [facility];  Location: [facility];  Service: Cardiology;  Laterality: N/A;  Dr Dubose    SESAMOIDECTOMY Left 2021    Procedure: SESAMOIDECTOMY;  Surgeon: Jacob Bernal DPM;  Location: [facility] OR;  Service: Podiatry;  Laterality: Left;       Family History   Problem Relation Name Age of Onset    Hypertension Mother      Diabetes Maternal Aunt         Social History     Socioeconomic History    Marital status:    Tobacco Use    Smoking status: Never    Smokeless tobacco: Never   Substance and Sexual Activity    Alcohol use: No    Drug use: No     Social Drivers of Health     Financial Resource Strain: Low Risk  (12/10/2024)    Overall Financial Resource Strain (CARDIA)     Difficulty of Paying Living Expenses: Not hard at all   Recent Concern: Financial Resource Strain - High Risk (2024)    Overall Financial Resource Strain (CARDIA)     Difficulty of Paying Living Expenses: Hard   Food Insecurity: No Food Insecurity (12/10/2024)    Hunger Vital Sign     Worried About Running Out of Food in the Last Year: Never true     Ran Out of Food in the Last Year: Never true   Recent Concern: Food Insecurity - Food Insecurity Present (2024)    Hunger Vital Sign     Worried About Running Out of Food in the Last Year: Often true     Ran Out of Food in the Last Year: Sometimes true   Transportation Needs: No Transportation Needs  (12/10/2024)    TRANSPORTATION NEEDS     Transportation : No   Physical Activity: Inactive (11/18/2024)    Exercise Vital Sign     Days of Exercise per Week: 0 days     Minutes of Exercise per Session: 0 min   Stress: Stress Concern Present (12/9/2024)    [employer] - Occupational Stress Questionnaire     Feeling of Stress : To some extent   Housing Stability: Low Risk  (12/10/2024)    Housing Stability Vital Sign     Unable to Pay for Housing in the Last Year: No     Homeless in the Last Year: No   Recent Concern: Housing Stability - High Risk (12/9/2024)    Housing Stability Vital Sign     Unable to Pay for Housing in the Last Year: Yes     Homeless in the Last Year: No       Current Outpatient Medications   Medication Sig Dispense Refill    alprazolam (XANAX) 1 MG tablet Take 1 mg by mouth 2 (two) times daily as needed for Anxiety.      blood sugar diagnostic Strp To check BG 3 times daily, to use with insurance preferred meter 100 strip 1    blood-glucose meter kit To check BG 3 times daily, to use with insurance preferred meter 1 each 1    ciprofloxacin HCl (CIPRO) 500 MG tablet Take 1 tablet (500 mg total) by mouth every 12 (twelve) hours. for 14 days 28 tablet 0    DAPTOmycin 50 mg/mL in 0.9% NaCl solution Inject 15.06 mLs (753 mg total) into the vein once daily. 466.86 mL 0    DEXCOM G7  Misc Use continuously for glucose monitoring 1 each 0    DEXCOM G7 SENSOR Malou 1 Device by Misc.(Non-Drug; Combo Route) route every 10 days. 9 each 3    empagliflozin (JARDIANCE) 10 mg tablet Take 1 tablet (10 mg total) by mouth once daily. 30 tablet 6    hydrocortisone 2.5 % cream Apply topically.      Lactobacillus rhamnosus GG (CULTURELLE) 10 billion cell capsule Take 1 capsule by mouth once daily. for 14 days 14 capsule 0    lancets Misc To check BG 3 times daily, to use with insurance preferred meter 100 each 1    losartan (COZAAR) 25 MG tablet Take 1 tablet (25 mg total) by mouth  once daily. 30 tablet 0    MOUNJARO 2.5 mg/0.5 mL PnIj Inject 2.5 mg into the skin every 7 days. 2 mL 0    [START ON 12/23/2024] MOUNJARO 5 mg/0.5 mL PnIj Inject 5 mg into the skin every 7 days. 2 mL 5    oxyCODONE-acetaminophen (PERCOCET)  mg per tablet Take 1 tablet by mouth every 6 (six) hours as needed for Pain. 28 tablet 0    polyethylene glycol (GLYCOLAX) 17 gram PwPk Take 17 g by mouth once daily. HOLD IF WITH DIARRHEA 30 each 0    senna-docusate 8.6-50 mg (PERICOLACE) 8.6-50 mg per tablet Take 1 tablet by mouth once daily. HOLD IF WITH DIARRHEA 30 tablet 0    TOUJEO MAX U-300 SOLOSTAR 300 unit/mL (3 mL) insulin pen Inject 80 Units into the skin once daily. 12 mL 11     No current facility-administered medications for this visit.       Review of patient's allergies indicates:   Allergen Reactions    Codeine Hives and Rash         Review of Systems   Constitutional: Negative for chills and fever.   Skin:  Positive for color change, nail changes and poor wound healing.   Gastrointestinal:  Negative for nausea and vomiting.   Neurological:  Positive for numbness.   Psychiatric/Behavioral:  Negative for altered mental status.            Objective:      Physical Exam  Constitutional:       Appearance: Normal appearance. She is not ill-appearing.   Cardiovascular:      Pulses:           Dorsalis pedis pulses are 2+ on the right side and 2+ on the left side.        Posterior tibial pulses are 2+ on the right side and 2+ on the left side.      Comments: CFT is < 3 seconds bilateral.  Pedal hair growth is decreased bilateral.  Mild edema noted to the Rt. Lateral forefoot.  Toes are warm to touch bilateral.    Musculoskeletal:         General: Tenderness present. No signs of injury.      Right lower leg: No edema.      Left lower leg: No edema.      Comments: Muscle strength 5/5 in all muscle groups bilateral.  No tenderness nor crepitation with ROM of foot/ankle joints bilateral.  Slight dorsal contracture of  the Lt. Hallux.  No gross deformity noted to said digit.  (-) for pain with palpation of the Rt. Lateral 5th mtp joint wound.     Skin:     General: Skin is warm and dry.      Capillary Refill: Capillary refill takes 2 to 3 seconds.      Findings: Wound present. No bruising, ecchymosis, erythema, signs of injury, laceration, lesion, petechiae or rash.      Comments: Location: Surgical wound noted to the Rt. Lateral sub 5th met head.    Base: Down to subQ and comprised of fibrin.      Drainage: None  Malinda wound: Devoid of fluctuance and purulence.  Moderate edema, erythema,  and malodor noted.   Pre-debridement measurement: 2 x 2 x 1.5cm   Post-debridement measurement: 2.2 x 2.2 x 1.5cm   Sutures both distal and proximal to the wound appear well approximated and intact.  No evidence of dehiscence, necrosis, ischemia, or infection the length of the incision.      Mature epithelium noted to the Lt. Sub 2nd met head.     Neurological:      Mental Status: She is alert.      Sensory: Sensory deficit present.      Motor: No weakness.      Comments: Decreased protective sensation noted bilateral.  Light touch is absent bilateral.               Assessment:       Encounter Diagnoses   Name Primary?    Diabetic ulcer of other part of right foot associated with type 2 diabetes mellitus, with fat layer exposed Yes    Healed ulcer of left foot     Diabetic polyneuropathy associated with type 2 diabetes mellitus                Plan:       Keerthi was seen today for wound care.    Diagnoses and all orders for this visit:    Diabetic ulcer of other part of right foot associated with type 2 diabetes mellitus, with fat layer exposed  -     Wound Debridement    Healed ulcer of left foot    Diabetic polyneuropathy associated with type 2 diabetes mellitus            I counseled the patient on her conditions, their implications and medical management.    Performed a selective excisional debridement of the Rt. Forefoot.  See attached  procedure note.     Silver alginate was applied to the Rt. Foot wound.  Betadine was then applied to the site of mature epithelium of the Lt. Forefoot.  The Lt. Foot was then offloaded with two full length felt pads with and aperture, and a football dressing was applied bilateral.    Advised to keep the dressings CDI until changed by HH.  Patient to continue with NPWT continuous at 125 mmHg.    Advised to rest and elevate the affected extremity.    Instructed to minimize weight bearing to facilitate healing.    Advised to ambulate only in the postoperative shoes bilateral.    RTC in 1 week.    Jacob Bernal DPM

## 2024-12-19 NOTE — PROCEDURES
"Wound Debridement    Date/Time: 12/19/2024 7:40 AM    Performed by: Jacob Bernal DPM  Authorized by: Jacob Bernal DPM    Time out: Immediately prior to procedure a "time out" was called to verify the correct patient, procedure, equipment, support staff and site/side marked as required.    Consent Done?:  Yes (Verbal)  Local anesthesia used?: No      Wound Details:    Location:  Right foot    Location:  Right 5th Metatarsal Head    Type of Debridement:  Excisional       Length (cm):  2       Width (cm):  2       Depth (cm):  1.5       Area (sq cm):  4       Percent Debrided (%):  100       Total Area Debrided (sq cm):  4    Depth of debridement:  Subcutaneous tissue    Tissue debrided:  Subcutaneous    Devitalized tissue debrided:  Fibrin    Instruments:  Curette  Bleeding:  Minimal  Hemostasis Achieved: Yes  Method Used:  Pressure  Patient tolerance:  Patient tolerated the procedure well with no immediate complications    "

## 2024-12-23 ENCOUNTER — TELEPHONE (OUTPATIENT)
Dept: PODIATRY | Facility: CLINIC | Age: 49
End: 2024-12-23
Payer: MEDICARE

## 2024-12-23 DIAGNOSIS — E11.65 TYPE 2 DIABETES MELLITUS WITH HYPERGLYCEMIA, WITH LONG-TERM CURRENT USE OF INSULIN: ICD-10-CM

## 2024-12-23 DIAGNOSIS — Z79.4 TYPE 2 DIABETES MELLITUS WITH HYPERGLYCEMIA, WITH LONG-TERM CURRENT USE OF INSULIN: ICD-10-CM

## 2024-12-23 RX ORDER — TIRZEPATIDE 5 MG/.5ML
5 INJECTION, SOLUTION SUBCUTANEOUS
Qty: 2 ML | Refills: 6 | Status: SHIPPED | OUTPATIENT
Start: 2024-12-23

## 2024-12-23 NOTE — TELEPHONE ENCOUNTER
----- Message from Compa sent at 12/23/2024  9:15 AM CST -----  Contact: self  Type: Needs Medical Advice  Who Called:  PT    Best Call Back Number: [phone]   Additional Information: Needs a call to discuss 01/06 appt

## 2024-12-30 ENCOUNTER — TELEPHONE (OUTPATIENT)
Dept: ENDOCRINOLOGY | Facility: CLINIC | Age: 49
End: 2024-12-30
Payer: MEDICARE

## 2024-12-30 DIAGNOSIS — E11.65 TYPE 2 DIABETES MELLITUS WITH HYPERGLYCEMIA, WITH LONG-TERM CURRENT USE OF INSULIN: Primary | ICD-10-CM

## 2024-12-30 DIAGNOSIS — Z79.4 TYPE 2 DIABETES MELLITUS WITH HYPERGLYCEMIA, WITH LONG-TERM CURRENT USE OF INSULIN: Primary | ICD-10-CM

## 2024-12-30 RX ORDER — BLOOD-GLUCOSE SENSOR
EACH MISCELLANEOUS
Qty: 2 EACH | Refills: 6 | Status: SHIPPED | OUTPATIENT
Start: 2024-12-30

## 2024-12-30 NOTE — TELEPHONE ENCOUNTER
----- Message from Ifeanyi sent at 12/30/2024  1:39 PM CST -----  Type:  Pharmacy Calling to Clarify an RX    Name of Caller:  April from [facility]  Pharmacy Name:    [facility] - [address] [address]  Phone: [phone] Fax: [phone]    Prescription Name:  freestyle delon  What do they need to clarify?:  They wanted to speak with office as pt request freestyle delon(change of meds), so they would need an order from the dr for it.

## 2024-12-30 NOTE — TELEPHONE ENCOUNTER
Dexcom G7 cancelled and Freestyle Lesly 3 PLUS sent to [facility]. I didn't send the reader but will send one if she can't get the smartphone avery.

## 2025-01-03 ENCOUNTER — TELEPHONE (OUTPATIENT)
Dept: PODIATRY | Facility: CLINIC | Age: 50
End: 2025-01-03
Payer: MEDICARE

## 2025-01-03 NOTE — TELEPHONE ENCOUNTER
Spoke with the patient to schedule an appointment/referral for 2/17/25 @ 8:30 am. She expressed understanding of the message.

## 2025-01-06 ENCOUNTER — TELEPHONE (OUTPATIENT)
Dept: PODIATRY | Facility: CLINIC | Age: 50
End: 2025-01-06

## 2025-01-06 ENCOUNTER — OFFICE VISIT (OUTPATIENT)
Dept: PODIATRY | Facility: CLINIC | Age: 50
End: 2025-01-06
Payer: MEDICARE

## 2025-01-06 VITALS — BODY MASS INDEX: 41.93 KG/M2 | WEIGHT: 276.69 LBS | HEIGHT: 68 IN

## 2025-01-06 DIAGNOSIS — E11.621 DIABETIC ULCER OF OTHER PART OF RIGHT FOOT ASSOCIATED WITH TYPE 2 DIABETES MELLITUS, WITH FAT LAYER EXPOSED: Primary | ICD-10-CM

## 2025-01-06 DIAGNOSIS — E11.42 DIABETIC POLYNEUROPATHY ASSOCIATED WITH TYPE 2 DIABETES MELLITUS: ICD-10-CM

## 2025-01-06 DIAGNOSIS — L97.512 DIABETIC ULCER OF OTHER PART OF RIGHT FOOT ASSOCIATED WITH TYPE 2 DIABETES MELLITUS, WITH FAT LAYER EXPOSED: Primary | ICD-10-CM

## 2025-01-06 DIAGNOSIS — Z87.2 HEALED ULCER OF LEFT FOOT: ICD-10-CM

## 2025-01-06 PROCEDURE — 11042 DBRDMT SUBQ TIS 1ST 20SQCM/<: CPT | Mod: 58,S$GLB,, | Performed by: PODIATRIST

## 2025-01-06 PROCEDURE — 99999 PR PBB SHADOW E&M-EST. PATIENT-LVL III: CPT | Mod: PBBFAC,,, | Performed by: PODIATRIST

## 2025-01-06 PROCEDURE — 1159F MED LIST DOCD IN RCRD: CPT | Mod: CPTII,S$GLB,, | Performed by: PODIATRIST

## 2025-01-06 PROCEDURE — 99024 POSTOP FOLLOW-UP VISIT: CPT | Mod: S$GLB,,, | Performed by: PODIATRIST

## 2025-01-06 NOTE — PROGRESS NOTES
Subjective:      Patient ID: Keerthi Chase is a 49 y.o. female.    Chief Complaint: Post-op Evaluation  Patient presents to clinic 4 weeks S/P resection of the Rt. 5th mtp joint.  Notes continued dressing changes per HH.  She has been tolerating the wound VAC quite well.  She continues to minimize weight bearing to aid in healing.   Denies experiencing N/V/F/C/D.  Notes better control over her blood glucose.  Denies any additional pedal complaints.       Hemoglobin A1C   Date Value Ref Range Status   2024 10.0 (H) 4.0 - 5.6 % Final     Comment:     Reference Interval:  5.0 - 5.6 Normal   5.7 - 6.4 High Risk   > 6.5 Diabetic      Hgb A1c results are standardized based on the (NGSP) National   Glycohemoglobin Standardization Program.      Hemoglobin A1C levels are related to mean serum/plasma glucose   during the preceding 2-3 months.        2022 10.5 (H) 0.0 - 5.6 % Final     Comment:     Reference Interval:  5.0 - 5.6 Normal   5.7 - 6.4 High Risk   > 6.5 Diabetic      Hgb A1c results are standardized based on the (NGSP) National   Glycohemoglobin Standardization Program.      Hemoglobin A1C levels are related to mean serum/plasma glucose   during the preceding 2-3 months.        2021 11.8 (H) 0.0 - 5.6 % Final     Comment:     Reference Interval:  5.0 - 5.6 Normal   5.7 - 6.4 High Risk   > 6.5 Diabetic      Hgb A1c results are standardized based on the (NGSP) National   Glycohemoglobin Standardization Program.      Hemoglobin A1C levels are related to mean serum/plasma glucose   during the preceding 2-3 months.              Past Medical History:   Diagnosis Date    Bell's palsy     Coronary artery disease     Pt states Dr. Virk said she did not have a blockage after a stress test.    Diabetes mellitus     GERD (gastroesophageal reflux disease)     Hypertension     Obesity        Past Surgical History:   Procedure Laterality Date     SECTION      x 2    CHOLECYSTECTOMY       ESOPHAGOGASTRODUODENOSCOPY      FOOT AMPUTATION THROUGH METATARSAL Right 12/10/2024    Procedure: resection, FOOT, TRANSMETATARSAL, 5th;  Surgeon: Jacob Bernal DPM;  Location: [facility] OR;  Service: Podiatry;  Laterality: Right;    PERIPHERALLY INSERTED CENTRAL CATHETER INSERTION N/A 10/11/2021    Procedure: INSERTION, PICC;  Surgeon: PICC, [facility];  Location: [facility];  Service: Cardiology;  Laterality: N/A;  Dr Aiken    PERIPHERALLY INSERTED CENTRAL CATHETER INSERTION N/A 7/21/2022    Procedure: INSERTION, PICC;  Surgeon: PICC, [facility];  Location: [facility];  Service: Cardiology;  Laterality: N/A;  Dr Dubose    SESAMOIDECTOMY Left 11/11/2021    Procedure: SESAMOIDECTOMY;  Surgeon: Jacob Bernal DPM;  Location: [facility] OR;  Service: Podiatry;  Laterality: Left;       Family History   Problem Relation Name Age of Onset    Hypertension Mother      Diabetes Maternal Aunt         Social History     Socioeconomic History    Marital status:    Tobacco Use    Smoking status: Never    Smokeless tobacco: Never   Substance and Sexual Activity    Alcohol use: No    Drug use: No     Social Drivers of Health     Financial Resource Strain: Low Risk  (12/10/2024)    Overall Financial Resource Strain (CARDIA)     Difficulty of Paying Living Expenses: Not hard at all   Recent Concern: Financial Resource Strain - High Risk (12/9/2024)    Overall Financial Resource Strain (CARDIA)     Difficulty of Paying Living Expenses: Hard   Food Insecurity: No Food Insecurity (12/10/2024)    Hunger Vital Sign     Worried About Running Out of Food in the Last Year: Never true     Ran Out of Food in the Last Year: Never true   Recent Concern: Food Insecurity - Food Insecurity Present (12/9/2024)    Hunger Vital Sign     Worried About Running Out of Food in the Last Year: Often true     Ran Out of Food in the Last Year: Sometimes true   Transportation Needs: No Transportation Needs (12/10/2024)    TRANSPORTATION NEEDS     Transportation : No    Physical Activity: Inactive (11/18/2024)    Exercise Vital Sign     Days of Exercise per Week: 0 days     Minutes of Exercise per Session: 0 min   Stress: Stress Concern Present (12/9/2024)    [employer] - Occupational Stress Questionnaire     Feeling of Stress : To some extent   Housing Stability: Low Risk  (12/10/2024)    Housing Stability Vital Sign     Unable to Pay for Housing in the Last Year: No     Homeless in the Last Year: No   Recent Concern: Housing Stability - High Risk (12/9/2024)    Housing Stability Vital Sign     Unable to Pay for Housing in the Last Year: Yes     Homeless in the Last Year: No       Current Outpatient Medications   Medication Sig Dispense Refill    alprazolam (XANAX) 1 MG tablet Take 1 mg by mouth 2 (two) times daily as needed for Anxiety.      blood sugar diagnostic Strp To check BG 3 times daily, to use with insurance preferred meter 100 strip 1    blood-glucose meter kit To check BG 3 times daily, to use with insurance preferred meter 1 each 1    DAPTOmycin 50 mg/mL in 0.9% NaCl solution Inject 15.06 mLs (753 mg total) into the vein once daily. 466.86 mL 0    empagliflozin (JARDIANCE) 10 mg tablet Take 1 tablet (10 mg total) by mouth once daily. 30 tablet 6    FREESTYLE LIDA 3 PLUS SENSOR Malou Change device every 15 days. Use with smartphone avery 2 each 6    hydrocortisone 2.5 % cream Apply topically.      lancets Misc To check BG 3 times daily, to use with insurance preferred meter 100 each 1    losartan (COZAAR) 25 MG tablet Take 1 tablet (25 mg total) by mouth once daily. 30 tablet 0    MOUNJARO 5 mg/0.5 mL PnIj Inject 5 mg into the skin every 7 days. 2 mL 5    oxyCODONE-acetaminophen (PERCOCET)  mg per tablet Take 1 tablet by mouth every 6 (six) hours as needed for Pain. 28 tablet 0    polyethylene glycol (GLYCOLAX) 17 gram PwPk Take 17 g by mouth once daily. HOLD IF WITH DIARRHEA 30 each 0    senna-docusate 8.6-50 mg (PERICOLACE) 8.6-50  mg per tablet Take 1 tablet by mouth once daily. HOLD IF WITH DIARRHEA 30 tablet 0    tirzepatide (MOUNJARO) 5 mg/0.5 mL PnIj Inject 5 mg into the skin every 7 days. 2 mL 6    TOUJEO MAX U-300 SOLOSTAR 300 unit/mL (3 mL) insulin pen Inject 80 Units into the skin once daily. 12 mL 11     No current facility-administered medications for this visit.       Review of patient's allergies indicates:   Allergen Reactions    Codeine Hives and Rash         Review of Systems   Constitutional: Negative for chills and fever.   Skin:  Positive for color change, nail changes and poor wound healing.   Gastrointestinal:  Negative for nausea and vomiting.   Neurological:  Positive for numbness.   Psychiatric/Behavioral:  Negative for altered mental status.            Objective:      Physical Exam  Constitutional:       Appearance: Normal appearance. She is not ill-appearing.   Cardiovascular:      Pulses:           Dorsalis pedis pulses are 2+ on the right side and 2+ on the left side.        Posterior tibial pulses are 2+ on the right side and 2+ on the left side.      Comments: CFT is < 3 seconds bilateral.  Pedal hair growth is decreased bilateral.  Mild edema noted to the Rt. Lateral forefoot.  Toes are warm to touch bilateral.    Musculoskeletal:         General: No tenderness or signs of injury.      Right lower leg: No edema.      Left lower leg: No edema.      Comments: Muscle strength 5/5 in all muscle groups bilateral.  No tenderness nor crepitation with ROM of foot/ankle joints bilateral.  Slight dorsal contracture of the Lt. Hallux.  No gross deformity noted to said digit.  (-) for pain with palpation of the Rt. Lateral 5th mtp joint wound.     Skin:     General: Skin is warm and dry.      Capillary Refill: Capillary refill takes 2 to 3 seconds.      Findings: Wound present. No bruising, ecchymosis, erythema, signs of injury, laceration, lesion, petechiae or rash.      Comments: Location: Surgical wound noted to the Rt.  Lateral sub 5th met head.    Base: Down to subQ and comprised of fibrin.      Drainage: None  Malinda wound: Devoid of fluctuance and purulence.  Moderate edema, erythema,  and malodor noted.   Pre-debridement measurement:  1.5 x 1.3 x 0.8cm   Post-debridement measurement: 1.7 x 1.5 x 0.8cm   Sutures both distal and proximal to the wound appear well approximated and intact.  No evidence of dehiscence, necrosis, ischemia, or infection the length of the incision.      Mature epithelium noted to the Lt. Sub 2nd met head.     Neurological:      Mental Status: She is alert.      Sensory: Sensory deficit present.      Motor: No weakness.      Comments: Decreased protective sensation noted bilateral.  Light touch is absent bilateral.               Assessment:       Encounter Diagnoses   Name Primary?    Diabetic ulcer of other part of right foot associated with type 2 diabetes mellitus, with fat layer exposed Yes    Healed ulcer of left foot     Diabetic polyneuropathy associated with type 2 diabetes mellitus                Plan:       Keerthi was seen today for post-op evaluation.    Diagnoses and all orders for this visit:    Diabetic ulcer of other part of right foot associated with type 2 diabetes mellitus, with fat layer exposed  -     Skin Substitute Authorization  -     Wound Debridement    Healed ulcer of left foot    Diabetic polyneuropathy associated with type 2 diabetes mellitus            I counseled the patient on her conditions, their implications and medical management.    Performed a selective excisional debridement of the Rt. Forefoot.  See attached procedure note.     Silver alginate was applied to the Rt. Foot wound.  Betadine was then applied to the site of mature epithelium of the Lt. Forefoot.  The Lt. Foot was then offloaded with two full length felt pads with and aperture, and a football dressing was applied bilateral.    Advised to keep the dressings CDI x 1 week.    Will discontinue the wound VAC and   services at this time.    Advised to rest and elevate the affected extremity.    Instructed to minimize weight bearing to facilitate healing.    Advised to ambulate only in the postoperative shoes bilateral.    Orders placed for a skin substitute to augment wound healing.      RTC in 1 week.    Jacob Bernal DPM

## 2025-01-06 NOTE — PROCEDURES
"Wound Debridement    Date/Time: 1/6/2025 9:40 AM    Performed by: Jacob Bernal DPM  Authorized by: Jacob Bernal DPM    Time out: Immediately prior to procedure a "time out" was called to verify the correct patient, procedure, equipment, support staff and site/side marked as required.    Consent Done?:  Yes (Verbal)  Local anesthesia used?: No      Wound Details:    Location:  Right foot    Location:  Right 5th Metatarsal Head    Type of Debridement:  Excisional       Length (cm):  1.5       Width (cm):  1.3       Depth (cm):  0.8       Area (sq cm):  1.95       Percent Debrided (%):  100       Total Area Debrided (sq cm):  1.95    Depth of debridement:  Subcutaneous tissue    Tissue debrided:  Subcutaneous    Devitalized tissue debrided:  Fibrin    Instruments:  Curette  Bleeding:  Minimal  Hemostasis Achieved: Yes  Method Used:  Pressure  Patient tolerance:  Patient tolerated the procedure well with no immediate complications    "

## 2025-01-06 NOTE — TELEPHONE ENCOUNTER
----- Message from Evangelina sent at 1/6/2025 10:24 AM CST -----  Contact: Keerthi  Type:  Needs Medical Advice    Who Called: Keerthi  Keerthi wants to speak about the home health services, she has more questions before put the order.  Would the patient rather a call back or a response via [facility]? Call back  Best Call Back Number: [phone]     Thanks!

## 2025-01-06 NOTE — TELEPHONE ENCOUNTER
Patient wanted to let you know that :Home Health still needs to come to do her IV and blood draws.

## 2025-01-10 DIAGNOSIS — Z79.4 TYPE 2 DIABETES MELLITUS WITH HYPERGLYCEMIA, WITH LONG-TERM CURRENT USE OF INSULIN: ICD-10-CM

## 2025-01-10 DIAGNOSIS — E11.65 TYPE 2 DIABETES MELLITUS WITH HYPERGLYCEMIA, WITH LONG-TERM CURRENT USE OF INSULIN: ICD-10-CM

## 2025-01-10 RX ORDER — BLOOD-GLUCOSE SENSOR
EACH MISCELLANEOUS
Qty: 6 EACH | Refills: 4 | Status: CANCELLED | OUTPATIENT
Start: 2025-01-10

## 2025-01-10 NOTE — TELEPHONE ENCOUNTER
Please ask if they have Lesly 3 (not the plus) available. Would rather keep her on the 3 version instead of going to the 2 plus

## 2025-01-10 NOTE — TELEPHONE ENCOUNTER
S/w pharmacy,they said they have the Lesly 3 Plus in b/o and they want to know if you can send Lesly 2 plus instead

## 2025-01-10 NOTE — TELEPHONE ENCOUNTER
Please ask if they have Lesly 3 (not the plus) available. Would rather keep her on the 3 instead of going to the 2 plus

## 2025-01-10 NOTE — TELEPHONE ENCOUNTER
----- Message from Dorothea sent at 1/10/2025  9:25 AM CST -----  Contact: self  Type:  Needs Medical Advice    Who Called: dionne/ angela    Would the patient rather a call back or a response via [facility]? Call back     Best Call Back Number: [phone]    Additional Information: concerning her medication.   Please call back to advise. Thanks!

## 2025-01-13 ENCOUNTER — TELEPHONE (OUTPATIENT)
Dept: INFECTIOUS DISEASES | Facility: CLINIC | Age: 50
End: 2025-01-13
Payer: MEDICARE

## 2025-01-13 NOTE — TELEPHONE ENCOUNTER
Spoke with patient today, pt states she is feeling fine, states that [facility] to d/c PICC line tomorrow. Pt is to cont care with Dr. Bernal.  NO follow up with ID, but always available.  Pt voiced understanding and appreciation .    Jesús at [facility] also informed.

## 2025-01-14 ENCOUNTER — TELEPHONE (OUTPATIENT)
Dept: PODIATRY | Facility: CLINIC | Age: 50
End: 2025-01-14
Payer: MEDICARE

## 2025-01-14 NOTE — TELEPHONE ENCOUNTER
----- Message from Dotty sent at 1/14/2025  4:31 PM CST -----  Regarding: Call  Type:  Patient Returning Call    Who Called:Pt    Who Left Message for Patient:Nurse    Does the patient know what this is regarding?:n/a    Would the patient rather a call back or a response via [facility]? Call    Best Call Back Number:[phone]    Additional Information: Pt requesting a call back. Thanks

## 2025-01-16 ENCOUNTER — OFFICE VISIT (OUTPATIENT)
Dept: PODIATRY | Facility: CLINIC | Age: 50
End: 2025-01-16
Payer: MEDICARE

## 2025-01-16 DIAGNOSIS — E11.42 DIABETIC POLYNEUROPATHY ASSOCIATED WITH TYPE 2 DIABETES MELLITUS: ICD-10-CM

## 2025-01-16 DIAGNOSIS — L97.512 DIABETIC ULCER OF OTHER PART OF RIGHT FOOT ASSOCIATED WITH TYPE 2 DIABETES MELLITUS, WITH FAT LAYER EXPOSED: Primary | ICD-10-CM

## 2025-01-16 DIAGNOSIS — E11.621 DIABETIC ULCER OF OTHER PART OF RIGHT FOOT ASSOCIATED WITH TYPE 2 DIABETES MELLITUS, WITH FAT LAYER EXPOSED: Primary | ICD-10-CM

## 2025-01-16 PROCEDURE — 99499 UNLISTED E&M SERVICE: CPT | Mod: S$GLB,,, | Performed by: PODIATRIST

## 2025-01-16 NOTE — PROGRESS NOTES
Subjective:      Patient ID: Keerthi Chase is a 49 y.o. female.    Chief Complaint: No chief complaint on file.  Patient presents to clinic 5 weeks S/P resection of the Rt. 5th mtp joint.  She has kept the prior football dressing CDI for the past week.  She continues to minimize weight bearing to aid in healing.   Denies experiencing N/V/F/C/D.  Denies any additional pedal complaints.       Hemoglobin A1C   Date Value Ref Range Status   2024 10.0 (H) 4.0 - 5.6 % Final     Comment:     Reference Interval:  5.0 - 5.6 Normal   5.7 - 6.4 High Risk   > 6.5 Diabetic      Hgb A1c results are standardized based on the (NGSP) National   Glycohemoglobin Standardization Program.      Hemoglobin A1C levels are related to mean serum/plasma glucose   during the preceding 2-3 months.        2022 10.5 (H) 0.0 - 5.6 % Final     Comment:     Reference Interval:  5.0 - 5.6 Normal   5.7 - 6.4 High Risk   > 6.5 Diabetic      Hgb A1c results are standardized based on the (NGSP) National   Glycohemoglobin Standardization Program.      Hemoglobin A1C levels are related to mean serum/plasma glucose   during the preceding 2-3 months.        2021 11.8 (H) 0.0 - 5.6 % Final     Comment:     Reference Interval:  5.0 - 5.6 Normal   5.7 - 6.4 High Risk   > 6.5 Diabetic      Hgb A1c results are standardized based on the (NGSP) National   Glycohemoglobin Standardization Program.      Hemoglobin A1C levels are related to mean serum/plasma glucose   during the preceding 2-3 months.              Past Medical History:   Diagnosis Date    Bell's palsy     Coronary artery disease     Pt states Dr. Virk said she did not have a blockage after a stress test.    Diabetes mellitus     GERD (gastroesophageal reflux disease)     Hypertension     Obesity        Past Surgical History:   Procedure Laterality Date     SECTION      x 2    CHOLECYSTECTOMY      ESOPHAGOGASTRODUODENOSCOPY      FOOT AMPUTATION THROUGH METATARSAL Right  12/10/2024    Procedure: resection, FOOT, TRANSMETATARSAL, 5th;  Surgeon: Jacbo Bernal DPM;  Location: [facility] OR;  Service: Podiatry;  Laterality: Right;    PERIPHERALLY INSERTED CENTRAL CATHETER INSERTION N/A 10/11/2021    Procedure: INSERTION, PICC;  Surgeon: PICC, [facility];  Location: [facility];  Service: Cardiology;  Laterality: N/A;  Dr Aiken    PERIPHERALLY INSERTED CENTRAL CATHETER INSERTION N/A 7/21/2022    Procedure: INSERTION, PICC;  Surgeon: PICC, [facility];  Location: [facility];  Service: Cardiology;  Laterality: N/A;  Dr Dubose    SESAMOIDECTOMY Left 11/11/2021    Procedure: SESAMOIDECTOMY;  Surgeon: Jacob Bernal DPM;  Location: [facility] OR;  Service: Podiatry;  Laterality: Left;       Family History   Problem Relation Name Age of Onset    Hypertension Mother      Diabetes Maternal Aunt         Social History     Socioeconomic History    Marital status:    Tobacco Use    Smoking status: Never    Smokeless tobacco: Never   Substance and Sexual Activity    Alcohol use: No    Drug use: No     Social Drivers of Health     Financial Resource Strain: Low Risk  (12/10/2024)    Overall Financial Resource Strain (CARDIA)     Difficulty of Paying Living Expenses: Not hard at all   Recent Concern: Financial Resource Strain - High Risk (12/9/2024)    Overall Financial Resource Strain (CARDIA)     Difficulty of Paying Living Expenses: Hard   Food Insecurity: No Food Insecurity (12/10/2024)    Hunger Vital Sign     Worried About Running Out of Food in the Last Year: Never true     Ran Out of Food in the Last Year: Never true   Recent Concern: Food Insecurity - Food Insecurity Present (12/9/2024)    Hunger Vital Sign     Worried About Running Out of Food in the Last Year: Often true     Ran Out of Food in the Last Year: Sometimes true   Transportation Needs: No Transportation Needs (12/10/2024)    TRANSPORTATION NEEDS     Transportation : No   Physical Activity: Inactive (11/18/2024)    Exercise Vital Sign     Days of  Exercise per Week: 0 days     Minutes of Exercise per Session: 0 min   Stress: Stress Concern Present (12/9/2024)    [employer] [place] [employer] - Occupational Stress Questionnaire     Feeling of Stress : To some extent   Housing Stability: Low Risk  (12/10/2024)    Housing Stability Vital Sign     Unable to Pay for Housing in the Last Year: No     Homeless in the Last Year: No   Recent Concern: Housing Stability - High Risk (12/9/2024)    Housing Stability Vital Sign     Unable to Pay for Housing in the Last Year: Yes     Homeless in the Last Year: No       Current Outpatient Medications   Medication Sig Dispense Refill    alprazolam (XANAX) 1 MG tablet Take 1 mg by mouth 2 (two) times daily as needed for Anxiety.      blood sugar diagnostic Strp To check BG 3 times daily, to use with insurance preferred meter 100 strip 1    blood-glucose meter kit To check BG 3 times daily, to use with insurance preferred meter 1 each 1    empagliflozin (JARDIANCE) 10 mg tablet Take 1 tablet (10 mg total) by mouth once daily. 30 tablet 6    Navmii LIDA 3 PLUS SENSOR Malou Change device every 15 days. Use with smartphone avery 2 each 6    hydrocortisone 2.5 % cream Apply topically.      lancets Misc To check BG 3 times daily, to use with insurance preferred meter 100 each 1    losartan (COZAAR) 25 MG tablet Take 1 tablet (25 mg total) by mouth once daily. 30 tablet 0    MOUNJARO 5 mg/0.5 mL PnIj Inject 5 mg into the skin every 7 days. 2 mL 5    oxyCODONE-acetaminophen (PERCOCET)  mg per tablet Take 1 tablet by mouth every 6 (six) hours as needed for Pain. 28 tablet 0    tirzepatide (MOUNJARO) 5 mg/0.5 mL PnIj Inject 5 mg into the skin every 7 days. 2 mL 6    TOUJEO MAX U-300 SOLOSTAR 300 unit/mL (3 mL) insulin pen Inject 80 Units into the skin once daily. 12 mL 11     No current facility-administered medications for this visit.       Review of patient's allergies indicates:   Allergen Reactions    Codeine Hives  and Rash         Review of Systems   Constitutional: Negative for chills and fever.   Skin:  Positive for color change, nail changes and poor wound healing.   Gastrointestinal:  Negative for nausea and vomiting.   Neurological:  Positive for numbness.   Psychiatric/Behavioral:  Negative for altered mental status.            Objective:      Physical Exam  Constitutional:       Appearance: Normal appearance. She is not ill-appearing.   Cardiovascular:      Pulses:           Dorsalis pedis pulses are 2+ on the right side and 2+ on the left side.        Posterior tibial pulses are 2+ on the right side and 2+ on the left side.      Comments: CFT is < 3 seconds bilateral.  Pedal hair growth is decreased bilateral.  Mild edema noted to the Rt. Lateral forefoot.  Toes are warm to touch bilateral.    Musculoskeletal:         General: No tenderness or signs of injury.      Right lower leg: No edema.      Left lower leg: No edema.      Comments: Muscle strength 5/5 in all muscle groups bilateral.  No tenderness nor crepitation with ROM of foot/ankle joints bilateral.  Slight dorsal contracture of the Lt. Hallux.  No gross deformity noted to said digit.  (-) for pain with palpation of the Rt. Lateral 5th mtp joint wound.     Skin:     General: Skin is warm and dry.      Capillary Refill: Capillary refill takes 2 to 3 seconds.      Findings: Wound present. No bruising, ecchymosis, erythema, signs of injury, laceration, lesion, petechiae or rash.      Comments: Location: Surgical wound noted to the Rt. Lateral sub 5th met head.    Base: Down to subQ and comprised of fibrin.      Drainage: None  Malinda wound: Devoid of erythema, malodor, fluctuance and purulence.  Malinda wound edema noted.  Pre-debridement measurement:  1.3 x 1 x 0.5cm   Post-debridement measurement: 1.5 x 1.2 x 0.5cm      Mature epithelium noted to the Lt. Sub 2nd met head.     Neurological:      Mental Status: She is alert.      Sensory: Sensory deficit present.       Motor: No weakness.      Comments: Decreased protective sensation noted bilateral.  Light touch is absent bilateral.               Assessment:       Encounter Diagnoses   Name Primary?    Diabetic ulcer of other part of right foot associated with type 2 diabetes mellitus, with fat layer exposed Yes    Diabetic polyneuropathy associated with type 2 diabetes mellitus                Plan:       Diagnoses and all orders for this visit:    Diabetic ulcer of other part of right foot associated with type 2 diabetes mellitus, with fat layer exposed    Diabetic polyneuropathy associated with type 2 diabetes mellitus            I counseled the patient on her conditions, their implications and medical management.    Performed a selective excisional debridement of the Lt. foot/ankle.  See attached procedure note.      Affinity skin graft applied to the Lt. Medial ankle.  The graft was secured with steri strips, mepitel, and secured with cast padding and an ACE bandage.    To keep the skin substitute CDI x 1 week.    To continue wearing shoe gear that minimizes pressure to the site.    Orders placed for a skin substitute #2.       Application for Assessment: Rt. forefoot  Skin Substitute: Affinity  Lot#: [id number]  Exp: 1/15/25  UPC:[id number]  Performed By: Jacob Bernal DPM  Time-out Taken: Yes  Location:                           [x] Genitalia/Hands/Feet/Multiple Digits                          [] Scalp/Face/Neck/Ears                          []Trunk/Arms/Legs  Application Area: (sq cm): 1.3 sq cm  Product Applied: (sq cm): 6.25 sq cm  Product Waste (sq cm): 0 sq cm  Fenestrated: No              Instrument:                          [] Blade          [] Curette        []Forceps       []Nippers                          [] Rongeur     [] Scissors       []Others:   Secured: yes              Secured with: Steri-strips  Dressing Applied: Yes  Response to Treatment:Well tolerated by patient.      RTC in 1 week for wound  check.    Jacob Bernal DPM

## 2025-01-17 NOTE — PROCEDURES
"Wound Debridement    Date/Time: 1/16/2025 3:00 PM    Performed by: Jacob Bernal DPM  Authorized by: Jacob Bernal DPM    Time out: Immediately prior to procedure a "time out" was called to verify the correct patient, procedure, equipment, support staff and site/side marked as required.    Consent Done?:  Yes (Verbal)  Local anesthesia used?: No      Wound Details:    Location:  Right foot    Location:  Right 5th Metatarsal Head    Type of Debridement:  Excisional       Length (cm):  1.3       Width (cm):  1       Depth (cm):  0.5       Area (sq cm):  1.3       Percent Debrided (%):  100       Total Area Debrided (sq cm):  1.3    Depth of debridement:  Subcutaneous tissue    Tissue debrided:  Subcutaneous    Devitalized tissue debrided:  Fibrin    Instruments:  Curette  Bleeding:  Minimal  Hemostasis Achieved: Yes  Method Used:  Pressure  Patient tolerance:  Patient tolerated the procedure well with no immediate complications    "

## 2025-01-24 ENCOUNTER — LAB VISIT (OUTPATIENT)
Dept: LAB | Facility: HOSPITAL | Age: 50
End: 2025-01-24
Attending: NURSE PRACTITIONER
Payer: MEDICARE

## 2025-01-24 ENCOUNTER — OFFICE VISIT (OUTPATIENT)
Dept: PODIATRY | Facility: CLINIC | Age: 50
End: 2025-01-24
Payer: MEDICARE

## 2025-01-24 VITALS — BODY MASS INDEX: 41.93 KG/M2 | WEIGHT: 276.69 LBS | HEIGHT: 68 IN

## 2025-01-24 DIAGNOSIS — E11.621 DIABETIC ULCER OF OTHER PART OF RIGHT FOOT ASSOCIATED WITH TYPE 2 DIABETES MELLITUS, WITH FAT LAYER EXPOSED: Primary | ICD-10-CM

## 2025-01-24 DIAGNOSIS — L97.512 DIABETIC ULCER OF OTHER PART OF RIGHT FOOT ASSOCIATED WITH TYPE 2 DIABETES MELLITUS, WITH FAT LAYER EXPOSED: Primary | ICD-10-CM

## 2025-01-24 DIAGNOSIS — Z79.4 TYPE 2 DIABETES MELLITUS WITH HYPERGLYCEMIA, WITH LONG-TERM CURRENT USE OF INSULIN: ICD-10-CM

## 2025-01-24 DIAGNOSIS — E11.65 TYPE 2 DIABETES MELLITUS WITH HYPERGLYCEMIA, WITH LONG-TERM CURRENT USE OF INSULIN: ICD-10-CM

## 2025-01-24 DIAGNOSIS — E11.42 DIABETIC POLYNEUROPATHY ASSOCIATED WITH TYPE 2 DIABETES MELLITUS: ICD-10-CM

## 2025-01-24 LAB
ALBUMIN/CREAT UR: 6.7 UG/MG (ref 0–30)
CREAT UR-MCNC: 119 MG/DL (ref 15–325)
MICROALBUMIN UR DL<=1MG/L-MCNC: 8 UG/ML

## 2025-01-24 PROCEDURE — 99999 PR PBB SHADOW E&M-EST. PATIENT-LVL III: CPT | Mod: PBBFAC,,, | Performed by: PODIATRIST

## 2025-01-24 PROCEDURE — 82043 UR ALBUMIN QUANTITATIVE: CPT | Performed by: NURSE PRACTITIONER

## 2025-01-24 RX ORDER — FLUOROMETHOLONE 1 MG/ML
1 SUSPENSION/ DROPS OPHTHALMIC 4 TIMES DAILY
COMMUNITY
Start: 2024-12-03

## 2025-01-24 NOTE — PROGRESS NOTES
Subjective:      Patient ID: Keerthi Chase is a 49 y.o. female.    Chief Complaint: Wound Care  Patient presents to clinic 6 weeks S/P resection of the Rt. 5th mtp joint.  She has kept the prior football dressing CDI for the past week. Notes weight bearing only in the DARCO shoe.   Denies experiencing N/V/F/C/D.  Denies any additional pedal complaints.       Hemoglobin A1C   Date Value Ref Range Status   2024 10.0 (H) 4.0 - 5.6 % Final     Comment:     Reference Interval:  5.0 - 5.6 Normal   5.7 - 6.4 High Risk   > 6.5 Diabetic      Hgb A1c results are standardized based on the (NGSP) National   Glycohemoglobin Standardization Program.      Hemoglobin A1C levels are related to mean serum/plasma glucose   during the preceding 2-3 months.        2022 10.5 (H) 0.0 - 5.6 % Final     Comment:     Reference Interval:  5.0 - 5.6 Normal   5.7 - 6.4 High Risk   > 6.5 Diabetic      Hgb A1c results are standardized based on the (NGSP) National   Glycohemoglobin Standardization Program.      Hemoglobin A1C levels are related to mean serum/plasma glucose   during the preceding 2-3 months.        2021 11.8 (H) 0.0 - 5.6 % Final     Comment:     Reference Interval:  5.0 - 5.6 Normal   5.7 - 6.4 High Risk   > 6.5 Diabetic      Hgb A1c results are standardized based on the (NGSP) National   Glycohemoglobin Standardization Program.      Hemoglobin A1C levels are related to mean serum/plasma glucose   during the preceding 2-3 months.              Past Medical History:   Diagnosis Date    Bell's palsy     Coronary artery disease     Pt states Dr. Virk said she did not have a blockage after a stress test.    Diabetes mellitus     GERD (gastroesophageal reflux disease)     Hypertension     Obesity        Past Surgical History:   Procedure Laterality Date     SECTION      x 2    CHOLECYSTECTOMY      ESOPHAGOGASTRODUODENOSCOPY      FOOT AMPUTATION THROUGH METATARSAL Right 12/10/2024    Procedure: resection,  FOOT, TRANSMETATARSAL, 5th;  Surgeon: Jacob Bernal DPM;  [email or web address] OR;  Service: Podiatry;  Laterality: Right;    PERIPHERALLY INSERTED CENTRAL CATHETER INSERTION N/A 10/11/2021    Procedure: INSERTION, PICC;  Surgeon: PICC, [facility];  Location: [facility];  Service: Cardiology;  Laterality: N/A;  Dr Akien    PERIPHERALLY INSERTED CENTRAL CATHETER INSERTION N/A 7/21/2022    Procedure: INSERTION, PICC;  Surgeon: PICC, [facility];  Location: [facility];  Service: Cardiology;  Laterality: N/A;  Dr Dubose    SESAMOIDECTOMY Left 11/11/2021    Procedure: SESAMOIDECTOMY;  Surgeon: Jacob Bernal DPM;  Location: [facility] OR;  Service: Podiatry;  Laterality: Left;       Family History   Problem Relation Name Age of Onset    Hypertension Mother      Diabetes Maternal Aunt         Social History     Socioeconomic History    Marital status:    Tobacco Use    Smoking status: Never    Smokeless tobacco: Never   Substance and Sexual Activity    Alcohol use: No    Drug use: No     Social Drivers of Health     Financial Resource Strain: Low Risk  (12/10/2024)    Overall Financial Resource Strain (CARDIA)     Difficulty of Paying Living Expenses: Not hard at all   Recent Concern: Financial Resource Strain - High Risk (12/9/2024)    Overall Financial Resource Strain (CARDIA)     Difficulty of Paying Living Expenses: Hard   Food Insecurity: No Food Insecurity (12/10/2024)    Hunger Vital Sign     Worried About Running Out of Food in the Last Year: Never true     Ran Out of Food in the Last Year: Never true   Recent Concern: Food Insecurity - Food Insecurity Present (12/9/2024)    Hunger Vital Sign     Worried About Running Out of Food in the Last Year: Often true     Ran Out of Food in the Last Year: Sometimes true   Transportation Needs: No Transportation Needs (12/10/2024)    TRANSPORTATION NEEDS     Transportation : No   Physical Activity: Inactive (11/18/2024)    Exercise Vital Sign     Days of Exercise per Week: 0 days     Minutes  of Exercise per Session: 0 min   Stress: Stress Concern Present (12/9/2024)    [employer] - Occupational Stress Questionnaire     Feeling of Stress : To some extent   Housing Stability: Low Risk  (12/10/2024)    Housing Stability Vital Sign     Unable to Pay for Housing in the Last Year: No     Homeless in the Last Year: No   Recent Concern: Housing Stability - High Risk (12/9/2024)    Housing Stability Vital Sign     Unable to Pay for Housing in the Last Year: Yes     Homeless in the Last Year: No       Current Outpatient Medications   Medication Sig Dispense Refill    alprazolam (XANAX) 1 MG tablet Take 1 mg by mouth 2 (two) times daily as needed for Anxiety.      blood sugar diagnostic Strp To check BG 3 times daily, to use with insurance preferred meter 100 strip 1    blood-glucose meter kit To check BG 3 times daily, to use with insurance preferred meter 1 each 1    empagliflozin (JARDIANCE) 10 mg tablet Take 1 tablet (10 mg total) by mouth once daily. 30 tablet 6    fluorometholone 0.1% (FML) 0.1 % DrpS Place 1 drop into both eyes 4 (four) times daily.      [employer] 3 PLUS SENSOR Malou Change device every 15 days. Use with smartphone avery 2 each 6    hydrocortisone 2.5 % cream Apply topically.      lancets Misc To check BG 3 times daily, to use with insurance preferred meter 100 each 1    MOUNJARO 5 mg/0.5 mL PnIj Inject 5 mg into the skin every 7 days. 2 mL 5    oxyCODONE-acetaminophen (PERCOCET)  mg per tablet Take 1 tablet by mouth every 6 (six) hours as needed for Pain. 28 tablet 0    tirzepatide (MOUNJARO) 5 mg/0.5 mL PnIj Inject 5 mg into the skin every 7 days. 2 mL 6    TOUJEO MAX U-300 SOLOSTAR 300 unit/mL (3 mL) insulin pen Inject 80 Units into the skin once daily. 12 mL 11    losartan (COZAAR) 25 MG tablet Take 1 tablet (25 mg total) by mouth once daily. 30 tablet 0     No current facility-administered medications for this visit.       Review of patient's  allergies indicates:   Allergen Reactions    Codeine Hives and Rash         Review of Systems   Constitutional: Negative for chills and fever.   Skin:  Positive for color change, nail changes and poor wound healing.   Gastrointestinal:  Negative for nausea and vomiting.   Neurological:  Positive for numbness.   Psychiatric/Behavioral:  Negative for altered mental status.            Objective:      Physical Exam  Constitutional:       Appearance: Normal appearance. She is not ill-appearing.   Cardiovascular:      Pulses:           Dorsalis pedis pulses are 2+ on the right side and 2+ on the left side.        Posterior tibial pulses are 2+ on the right side and 2+ on the left side.      Comments: CFT is < 3 seconds bilateral.  Pedal hair growth is decreased bilateral.  Mild edema noted to the Rt. Lateral forefoot.  Toes are warm to touch bilateral.    Musculoskeletal:         General: No tenderness or signs of injury.      Right lower leg: No edema.      Left lower leg: No edema.      Comments: Muscle strength 5/5 in all muscle groups bilateral.  No tenderness nor crepitation with ROM of foot/ankle joints bilateral.  Slight dorsal contracture of the Lt. Hallux.  No gross deformity noted to said digit.  (-) for pain with palpation of the Rt. Lateral 5th mtp joint wound.     Skin:     General: Skin is warm and dry.      Capillary Refill: Capillary refill takes 2 to 3 seconds.      Findings: Wound present. No bruising, ecchymosis, erythema, signs of injury, laceration, lesion, petechiae or rash.      Comments: Location: Surgical wound noted to the Rt. Lateral sub 5th met head.    Base: Down to subQ and comprised of fibrin.      Drainage: None  Malinda wound: Devoid of edema, erythema, malodor, fluctuance and purulence.    Pre-debridement measurement:  1 x 0.8 x 0.3cm   Post-debridement measurement: 1.2 x 1 x 0.3cm      Mature epithelium noted to the Lt. Sub 2nd met head.     Neurological:      Mental Status: She is alert.       Sensory: Sensory deficit present.      Motor: No weakness.      Comments: Decreased protective sensation noted bilateral.  Light touch is absent bilateral.               Assessment:       Encounter Diagnoses   Name Primary?    Diabetic ulcer of other part of right foot associated with type 2 diabetes mellitus, with fat layer exposed Yes    Diabetic polyneuropathy associated with type 2 diabetes mellitus                  Plan:       Keerthi was seen today for wound care.    Diagnoses and all orders for this visit:    Diabetic ulcer of other part of right foot associated with type 2 diabetes mellitus, with fat layer exposed  -     Wound Debridement    Diabetic polyneuropathy associated with type 2 diabetes mellitus              I counseled the patient on her conditions, their implications and medical management.    Performed a selective excisional debridement of the Rt. foot/ankle.  See attached procedure note.      Josephine was applied to the Rt. Foot wound, the prior site of the Lt. Foot ulceration was offloaded with two full length pads with an aperture, and bilateral footballs were applied.      To keep the dressings CDI x 1 week.    To continue use of DARCO shoes with all weight bearing.    Orders placed for a skin substitute #2.         RTC in 1 week for wound check.    Jacob Bernal DPM

## 2025-01-24 NOTE — PROCEDURES
"Wound Debridement    Date/Time: 1/24/2025 7:40 AM    Performed by: Jacob Bernal DPM  Authorized by: Jacob Bernal DPM    Time out: Immediately prior to procedure a "time out" was called to verify the correct patient, procedure, equipment, support staff and site/side marked as required.    Consent Done?:  Yes (Verbal)  Local anesthesia used?: No      Wound Details:    Location:  Right foot    Location:  Right 5th Metatarsal Head    Type of Debridement:  Excisional       Length (cm):  1       Width (cm):  0.8       Depth (cm):  0.3       Area (sq cm):  0.8       Percent Debrided (%):  100       Total Area Debrided (sq cm):  0.8    Depth of debridement:  Subcutaneous tissue    Tissue debrided:  Subcutaneous    Devitalized tissue debrided:  Fibrin    Instruments:  Curette  Bleeding:  Minimal  Hemostasis Achieved: Yes  Method Used:  Pressure  Patient tolerance:  Patient tolerated the procedure well with no immediate complications    "

## 2025-01-27 RX ORDER — BLOOD-GLUCOSE SENSOR
EACH MISCELLANEOUS
Qty: 2 EACH | Refills: 6 | Status: SHIPPED | OUTPATIENT
Start: 2025-01-27

## 2025-01-28 ENCOUNTER — PATIENT MESSAGE (OUTPATIENT)
Dept: ENDOCRINOLOGY | Facility: CLINIC | Age: 50
End: 2025-01-28
Payer: MEDICARE

## 2025-01-29 ENCOUNTER — OFFICE VISIT (OUTPATIENT)
Dept: PODIATRY | Facility: CLINIC | Age: 50
End: 2025-01-29
Payer: MEDICARE

## 2025-01-29 VITALS — BODY MASS INDEX: 41.93 KG/M2 | HEIGHT: 68 IN | WEIGHT: 276.69 LBS

## 2025-01-29 DIAGNOSIS — T14.8XXA BLOOD BLISTER: ICD-10-CM

## 2025-01-29 DIAGNOSIS — L97.512 DIABETIC ULCER OF OTHER PART OF RIGHT FOOT ASSOCIATED WITH TYPE 2 DIABETES MELLITUS, WITH FAT LAYER EXPOSED: Primary | ICD-10-CM

## 2025-01-29 DIAGNOSIS — E11.42 DIABETIC POLYNEUROPATHY ASSOCIATED WITH TYPE 2 DIABETES MELLITUS: ICD-10-CM

## 2025-01-29 DIAGNOSIS — E11.621 DIABETIC ULCER OF OTHER PART OF RIGHT FOOT ASSOCIATED WITH TYPE 2 DIABETES MELLITUS, WITH FAT LAYER EXPOSED: Primary | ICD-10-CM

## 2025-01-29 PROCEDURE — 99999 PR PBB SHADOW E&M-EST. PATIENT-LVL II: CPT | Mod: PBBFAC,,, | Performed by: PODIATRIST

## 2025-01-29 NOTE — PROGRESS NOTES
Subjective:      Patient ID: Keerthi Chase is a 49 y.o. female.    Chief Complaint: Wound Care  Patient presents to clinic 7 weeks S/P resection of the Rt. 5th mtp joint.  She has kept the prior football dressings CDI for the past week.  Continues weightbearing only in the DAR[employer] shoes.   Denies experiencing N/V/F/C/D.  Denies any additional pedal complaints.       Hemoglobin A1C   Date Value Ref Range Status   2025 6.7 (H) 4.0 - 5.6 % Final     Comment:     ADA Screening Guidelines:  5.7-6.4%  Consistent with prediabetes  >or=6.5%  Consistent with diabetes    High levels of fetal hemoglobin interfere with the HbA1C  assay. Heterozygous hemoglobin variants (HbS, HgC, etc)do  not significantly interfere with this assay.   However, presence of multiple variants may affect accuracy.     2024 10.0 (H) 4.0 - 5.6 % Final     Comment:     Reference Interval:  5.0 - 5.6 Normal   5.7 - 6.4 High Risk   > 6.5 Diabetic      Hgb A1c results are standardized based on the (NGSP) National   Glycohemoglobin Standardization Program.      Hemoglobin A1C levels are related to mean serum/plasma glucose   during the preceding 2-3 months.        2022 10.5 (H) 0.0 - 5.6 % Final     Comment:     Reference Interval:  5.0 - 5.6 Normal   5.7 - 6.4 High Risk   > 6.5 Diabetic      Hgb A1c results are standardized based on the (NGSP) National   Glycohemoglobin Standardization Program.      Hemoglobin A1C levels are related to mean serum/plasma glucose   during the preceding 2-3 months.              Past Medical History:   Diagnosis Date    Bell's palsy     Coronary artery disease     Pt states Dr. Virk said she did not have a blockage after a stress test.    Diabetes mellitus     GERD (gastroesophageal reflux disease)     Hypertension     Obesity        Past Surgical History:   Procedure Laterality Date     SECTION      x 2    CHOLECYSTECTOMY      ESOPHAGOGASTRODUODENOSCOPY      FOOT AMPUTATION THROUGH METATARSAL  Right 12/10/2024    Procedure: resection, FOOT, TRANSMETATARSAL, 5th;  Surgeon: Jacob Bernal DPM;  Location: [facility] OR;  Service: Podiatry;  Laterality: Right;    PERIPHERALLY INSERTED CENTRAL CATHETER INSERTION N/A 10/11/2021    Procedure: INSERTION, PICC;  Surgeon: PICC, [facility];  Location: [facility];  Service: Cardiology;  Laterality: N/A;  Dr Aiken    PERIPHERALLY INSERTED CENTRAL CATHETER INSERTION N/A 7/21/2022    Procedure: INSERTION, PICC;  Surgeon: PICC, [facility];  Location: [facility];  Service: Cardiology;  Laterality: N/A;   [facility]    SESAMOIDECTOMY Left 11/11/2021    Procedure: SESAMOIDECTOMY;  Surgeon: Jacob Bernal DPM;  Location: [facility] OR;  Service: Podiatry;  Laterality: Left;       Family History   Problem Relation Name Age of Onset    Hypertension Mother      Diabetes Maternal Aunt         Social History     Socioeconomic History    Marital status:    Tobacco Use    Smoking status: Never    Smokeless tobacco: Never   Substance and Sexual Activity    Alcohol use: No    Drug use: No     Social Drivers of Health     Financial Resource Strain: Low Risk  (12/10/2024)    Overall Financial Resource Strain (CARDIA)     Difficulty of Paying Living Expenses: Not hard at all   Recent Concern: Financial Resource Strain - High Risk (12/9/2024)    Overall Financial Resource Strain (CARDIA)     Difficulty of Paying Living Expenses: Hard   Food Insecurity: No Food Insecurity (12/10/2024)    Hunger Vital Sign     Worried About Running Out of Food in the Last Year: Never true     Ran Out of Food in the Last Year: Never true   Recent Concern: Food Insecurity - Food Insecurity Present (12/9/2024)    Hunger Vital Sign     Worried About Running Out of Food in the Last Year: Often true     Ran Out of Food in the Last Year: Sometimes true   Transportation Needs: No Transportation Needs (12/10/2024)    TRANSPORTATION NEEDS     Transportation : No   Physical Activity: Inactive (11/18/2024)    Exercise Vital Sign     Days  of Exercise per Week: 0 days     Minutes of Exercise per Session: 0 min   Stress: Stress Concern Present (12/9/2024)    [employer] [place] [employer] - Occupational Stress Questionnaire     Feeling of Stress : To some extent   Housing Stability: Low Risk  (12/10/2024)    Housing Stability Vital Sign     Unable to Pay for Housing in the Last Year: No     Homeless in the Last Year: No   Recent Concern: Housing Stability - High Risk (12/9/2024)    Housing Stability Vital Sign     Unable to Pay for Housing in the Last Year: Yes     Homeless in the Last Year: No       Current Outpatient Medications   Medication Sig Dispense Refill    alprazolam (XANAX) 1 MG tablet Take 1 mg by mouth 2 (two) times daily as needed for Anxiety.      blood sugar diagnostic Strp To check BG 3 times daily, to use with insurance preferred meter 100 strip 1    blood-glucose meter kit To check BG 3 times daily, to use with insurance preferred meter 1 each 1    empagliflozin (JARDIANCE) 10 mg tablet Take 1 tablet (10 mg total) by mouth once daily. 30 tablet 6    fluorometholone 0.1% (FML) 0.1 % DrpS Place 1 drop into both eyes 4 (four) times daily.      [email or web address] LIDA 3 PLUS SENSOR Malou Change device every 15 days. Use with smartphone avery 2 each 6    hydrocortisone 2.5 % cream Apply topically.      lancets Misc To check BG 3 times daily, to use with insurance preferred meter 100 each 1    losartan (COZAAR) 25 MG tablet Take 1 tablet (25 mg total) by mouth once daily. 30 tablet 0    MOUNJARO 5 mg/0.5 mL PnIj Inject 5 mg into the skin every 7 days. 2 mL 5    oxyCODONE-acetaminophen (PERCOCET)  mg per tablet Take 1 tablet by mouth every 6 (six) hours as needed for Pain. 28 tablet 0    tirzepatide (MOUNJARO) 5 mg/0.5 mL PnIj Inject 5 mg into the skin every 7 days. 2 mL 6    TOUJEO MAX U-300 SOLOSTAR 300 unit/mL (3 mL) insulin pen Inject 80 Units into the skin once daily. 12 mL 11     No current facility-administered medications for  this visit.       Review of patient's allergies indicates:   Allergen Reactions    Codeine Hives and Rash         Review of Systems   Constitutional: Negative for chills and fever.   Skin:  Positive for color change, nail changes and poor wound healing.   Gastrointestinal:  Negative for nausea and vomiting.   Neurological:  Positive for numbness.   Psychiatric/Behavioral:  Negative for altered mental status.            Objective:      Physical Exam  Constitutional:       Appearance: Normal appearance. She is not ill-appearing.   Cardiovascular:      Pulses:           Dorsalis pedis pulses are 2+ on the right side and 2+ on the left side.        Posterior tibial pulses are 2+ on the right side and 2+ on the left side.      Comments: CFT is < 3 seconds bilateral.  Pedal hair growth is decreased bilateral.  Mild edema noted to the Rt. Lateral forefoot.  Toes are warm to touch bilateral.    Musculoskeletal:         General: No tenderness or signs of injury.      Right lower leg: No edema.      Left lower leg: No edema.      Comments: Muscle strength 5/5 in all muscle groups bilateral.  No tenderness nor crepitation with ROM of foot/ankle joints bilateral.  Slight dorsal contracture of the Lt. Hallux.  No gross deformity noted to said digit.  (-) for pain with palpation of the Rt. Lateral 5th mtp joint wound.     Skin:     General: Skin is warm and dry.      Capillary Refill: Capillary refill takes 2 to 3 seconds.      Findings: Wound present. No bruising, ecchymosis, erythema, signs of injury, laceration, lesion, petechiae or rash.      Comments: Location: Surgical wound noted to the Rt. Lateral sub 5th met head.    Base: Down to subQ and comprised of fibrin.      Drainage: None  Malinda wound: Devoid of edema, erythema, malodor, fluctuance and purulence.    Pre-debridement measurement:  1 x 0.5 x 0.3cm   Post-debridement measurement: 1.2 x 0.7 x 0.3cm      Loosely adhering blood blister noted to the Lt. Sub 2nd met head.   Underlying wound is down to dermis and comprised of granulation tissue.  Measures: 0.5 x 0.2 x 0.1cm   Neurological:      Mental Status: She is alert.      Sensory: Sensory deficit present.      Motor: No weakness.      Comments: Decreased protective sensation noted bilateral.  Light touch is absent bilateral.               Assessment:       Encounter Diagnoses   Name Primary?    Diabetic ulcer of other part of right foot associated with type 2 diabetes mellitus, with fat layer exposed Yes    Blood blister - Left Foot     Diabetic polyneuropathy associated with type 2 diabetes mellitus                    Plan:       Keerthi was seen today for wound care.    Diagnoses and all orders for this visit:    Diabetic ulcer of other part of right foot associated with type 2 diabetes mellitus, with fat layer exposed  -     Wound Debridement    Blood blister - Left Foot    Diabetic polyneuropathy associated with type 2 diabetes mellitus                I counseled the patient on her conditions, their implications and medical management.    Performed a selective excisional debridement of the Rt. foot/ankle.  See attached procedure note.      Josephine was applied to the Rt. Foot wound.  The area of blood blister development, Lt. Foot, was covered with josephine, offloaded with two full length pads with an aperture, and bilateral footballs were applied.      To keep the dressings CDI x 1 week.    To continue use of [employer] shoes with all weight bearing.    Awaiting delivery of skin substitute #2.         RTC in 1 week for wound check.    Jacob Bernal DPM

## 2025-01-29 NOTE — PROCEDURES
"Wound Debridement    Date/Time: 1/29/2025 7:40 AM    Performed by: Jacob Bernal DPM  Authorized by: Jacob Bernal DPM    Time out: Immediately prior to procedure a "time out" was called to verify the correct patient, procedure, equipment, support staff and site/side marked as required.    Consent Done?:  Yes (Verbal)  Local anesthesia used?: No      Wound Details:    Location:  Right foot    Location:  Right 5th Metatarsal Head    Type of Debridement:  Excisional       Length (cm):  1       Width (cm):  0.5       Depth (cm):  0.3       Area (sq cm):  0.5       Percent Debrided (%):  100       Total Area Debrided (sq cm):  0.5    Depth of debridement:  Subcutaneous tissue    Tissue debrided:  Subcutaneous    Devitalized tissue debrided:  Fibrin    Instruments:  Curette  Bleeding:  Minimal  Hemostasis Achieved: Yes  Method Used:  Pressure  Patient tolerance:  Patient tolerated the procedure well with no immediate complications    "

## 2025-02-05 ENCOUNTER — OFFICE VISIT (OUTPATIENT)
Dept: PODIATRY | Facility: CLINIC | Age: 50
End: 2025-02-05
Payer: MEDICARE

## 2025-02-05 VITALS — HEIGHT: 68 IN | WEIGHT: 276.69 LBS | BODY MASS INDEX: 41.93 KG/M2

## 2025-02-05 DIAGNOSIS — L97.512 DIABETIC ULCER OF OTHER PART OF RIGHT FOOT ASSOCIATED WITH TYPE 2 DIABETES MELLITUS, WITH FAT LAYER EXPOSED: Primary | ICD-10-CM

## 2025-02-05 DIAGNOSIS — E11.42 DIABETIC POLYNEUROPATHY ASSOCIATED WITH TYPE 2 DIABETES MELLITUS: ICD-10-CM

## 2025-02-05 DIAGNOSIS — E11.621 DIABETIC ULCER OF OTHER PART OF RIGHT FOOT ASSOCIATED WITH TYPE 2 DIABETES MELLITUS, WITH FAT LAYER EXPOSED: Primary | ICD-10-CM

## 2025-02-05 PROCEDURE — 11042 DBRDMT SUBQ TIS 1ST 20SQCM/<: CPT | Mod: PBBFAC,PO | Performed by: PODIATRIST

## 2025-02-05 PROCEDURE — 99213 OFFICE O/P EST LOW 20 MIN: CPT | Mod: PBBFAC,PO,25 | Performed by: PODIATRIST

## 2025-02-05 PROCEDURE — 99999 PR PBB SHADOW E&M-EST. PATIENT-LVL III: CPT | Mod: PBBFAC,,, | Performed by: PODIATRIST

## 2025-02-05 NOTE — PROGRESS NOTES
Subjective:      Patient ID: Keerthi Chase is a 49 y.o. female.    Chief Complaint: Wound Care  Patient presents to clinic 8 weeks S/P resection of the Rt. 5th mtp joint.  She has kept the prior football dressings CDI for the past week.  Continues weightbearing only in the [employer] shoes.   Denies experiencing N/V/F/C/D.  Denies any additional pedal complaints.       Hemoglobin A1C   Date Value Ref Range Status   2025 6.7 (H) 4.0 - 5.6 % Final     Comment:     ADA Screening Guidelines:  5.7-6.4%  Consistent with prediabetes  >or=6.5%  Consistent with diabetes    High levels of fetal hemoglobin interfere with the HbA1C  assay. Heterozygous hemoglobin variants (HbS, HgC, etc)do  not significantly interfere with this assay.   However, presence of multiple variants may affect accuracy.     2024 10.0 (H) 4.0 - 5.6 % Final     Comment:     Reference Interval:  5.0 - 5.6 Normal   5.7 - 6.4 High Risk   > 6.5 Diabetic      Hgb A1c results are standardized based on the (NGSP) National   Glycohemoglobin Standardization Program.      Hemoglobin A1C levels are related to mean serum/plasma glucose   during the preceding 2-3 months.        2022 10.5 (H) 0.0 - 5.6 % Final     Comment:     Reference Interval:  5.0 - 5.6 Normal   5.7 - 6.4 High Risk   > 6.5 Diabetic      Hgb A1c results are standardized based on the (NGSP) National   Glycohemoglobin Standardization Program.      Hemoglobin A1C levels are related to mean serum/plasma glucose   during the preceding 2-3 months.              Past Medical History:   Diagnosis Date    Bell's palsy     Coronary artery disease     Pt states Dr. Virk said she did not have a blockage after a stress test.    Diabetes mellitus     GERD (gastroesophageal reflux disease)     Hypertension     Obesity        Past Surgical History:   Procedure Laterality Date     SECTION      x 2    CHOLECYSTECTOMY      ESOPHAGOGASTRODUODENOSCOPY      FOOT AMPUTATION THROUGH METATARSAL  Right 12/10/2024    Procedure: resection, FOOT, TRANSMETATARSAL, 5th;  Surgeon: Jacob Bernal DPM;  Location: [facility] OR;  Service: Podiatry;  Laterality: Right;    PERIPHERALLY INSERTED CENTRAL CATHETER INSERTION N/A 10/11/2021    Procedure: INSERTION, PICC;  Surgeon: PICC, [facility];  Location: [facility];  Service: Cardiology;  Laterality: N/A;  Dr Aiken    PERIPHERALLY INSERTED CENTRAL CATHETER INSERTION N/A 7/21/2022    Procedure: INSERTION, PICC;  Surgeon: PICC, [facility];  Location: [facility];  Service: Cardiology;  Laterality: N/A;   [facility]    SESAMOIDECTOMY Left 11/11/2021    Procedure: SESAMOIDECTOMY;  Surgeon: Jacob Bernal DPM;  Location: [facility] OR;  Service: Podiatry;  Laterality: Left;       Family History   Problem Relation Name Age of Onset    Hypertension Mother      Diabetes Maternal Aunt         Social History     Socioeconomic History    Marital status:    Tobacco Use    Smoking status: Never    Smokeless tobacco: Never   Substance and Sexual Activity    Alcohol use: No    Drug use: No     Social Drivers of Health     Financial Resource Strain: Low Risk  (12/10/2024)    Overall Financial Resource Strain (CARDIA)     Difficulty of Paying Living Expenses: Not hard at all   Recent Concern: Financial Resource Strain - High Risk (12/9/2024)    Overall Financial Resource Strain (CARDIA)     Difficulty of Paying Living Expenses: Hard   Food Insecurity: No Food Insecurity (12/10/2024)    Hunger Vital Sign     Worried About Running Out of Food in the Last Year: Never true     Ran Out of Food in the Last Year: Never true   Recent Concern: Food Insecurity - Food Insecurity Present (12/9/2024)    Hunger Vital Sign     Worried About Running Out of Food in the Last Year: Often true     Ran Out of Food in the Last Year: Sometimes true   Transportation Needs: No Transportation Needs (12/10/2024)    TRANSPORTATION NEEDS     Transportation : No   Physical Activity: Inactive (11/18/2024)    Exercise Vital Sign     Days  of Exercise per Week: 0 days     Minutes of Exercise per Session: 0 min   Stress: Stress Concern Present (12/9/2024)    [employer] - Occupational Stress Questionnaire     Feeling of Stress : To some extent   Housing Stability: Low Risk  (12/10/2024)    Housing Stability Vital Sign     Unable to Pay for Housing in the Last Year: No     Homeless in the Last Year: No   Recent Concern: Housing Stability - High Risk (12/9/2024)    Housing Stability Vital Sign     Unable to Pay for Housing in the Last Year: Yes     Homeless in the Last Year: No       Current Outpatient Medications   Medication Sig Dispense Refill    alprazolam (XANAX) 1 MG tablet Take 1 mg by mouth 2 (two) times daily as needed for Anxiety.      blood sugar diagnostic Strp To check BG 3 times daily, to use with insurance preferred meter 100 strip 1    blood-glucose meter kit To check BG 3 times daily, to use with insurance preferred meter 1 each 1    empagliflozin (JARDIANCE) 10 mg tablet Take 1 tablet (10 mg total) by mouth once daily. 30 tablet 6    fluorometholone 0.1% (FML) 0.1 % DrpS Place 1 drop into both eyes 4 (four) times daily.      [employer] LIDA 3 PLUS SENSOR Malou Change device every 15 days. Use with smartphone avery 2 each 6    hydrocortisone 2.5 % cream Apply topically.      lancets Misc To check BG 3 times daily, to use with insurance preferred meter 100 each 1    MOUNJARO 5 mg/0.5 mL PnIj Inject 5 mg into the skin every 7 days. 2 mL 5    oxyCODONE-acetaminophen (PERCOCET)  mg per tablet Take 1 tablet by mouth every 6 (six) hours as needed for Pain. 28 tablet 0    tirzepatide (MOUNJARO) 5 mg/0.5 mL PnIj Inject 5 mg into the skin every 7 days. 2 mL 6    TOUJEO MAX U-300 SOLOSTAR 300 unit/mL (3 mL) insulin pen Inject 80 Units into the skin once daily. 12 mL 11    losartan (COZAAR) 25 MG tablet Take 1 tablet (25 mg total) by mouth once daily. 30 tablet 0     No current facility-administered medications for  this visit.       Review of patient's allergies indicates:   Allergen Reactions    Codeine Hives and Rash         Review of Systems   Constitutional: Negative for chills and fever.   Skin:  Positive for color change, nail changes and poor wound healing.   Gastrointestinal:  Negative for nausea and vomiting.   Neurological:  Positive for numbness.   Psychiatric/Behavioral:  Negative for altered mental status.            Objective:      Physical Exam  Constitutional:       Appearance: Normal appearance. She is not ill-appearing.   Cardiovascular:      Pulses:           Dorsalis pedis pulses are 2+ on the right side and 2+ on the left side.        Posterior tibial pulses are 2+ on the right side and 2+ on the left side.      Comments: CFT is < 3 seconds bilateral.  Pedal hair growth is decreased bilateral.  Mild edema noted to the Rt. Lateral forefoot.  Toes are warm to touch bilateral.    Musculoskeletal:         General: No tenderness or signs of injury.      Right lower leg: No edema.      Left lower leg: No edema.      Comments: Muscle strength 5/5 in all muscle groups bilateral.  No tenderness nor crepitation with ROM of foot/ankle joints bilateral.  Slight dorsal contracture of the Lt. Hallux.  No gross deformity noted to said digit.  (-) for pain with palpation of the Rt. Lateral 5th mtp joint wound.     Skin:     General: Skin is warm and dry.      Capillary Refill: Capillary refill takes 2 to 3 seconds.      Findings: Wound present. No bruising, ecchymosis, erythema, signs of injury, laceration, lesion, petechiae or rash.      Comments: Location: Surgical wound noted to the Rt. Lateral sub 5th met head.    Base: Down to subQ and comprised of fibrin.      Drainage: None  Malinda wound: Devoid of edema, erythema, malodor, fluctuance and purulence.    Pre-debridement measurement:  1 x 0.3 x 0.3cm   Post-debridement measurement: 1.2 x 0.5 x 0.3cm      Wound noted to the Lt. Sub 2nd met head.  Underlying wound is down  to dermis and comprised of granulation tissue.  Measures: 0.1 x 0.1 x 0.1cm   Neurological:      Mental Status: She is alert.      Sensory: Sensory deficit present.      Motor: No weakness.      Comments: Decreased protective sensation noted bilateral.  Light touch is absent bilateral.               Assessment:       Encounter Diagnoses   Name Primary?    Diabetic ulcer of other part of right foot associated with type 2 diabetes mellitus, with fat layer exposed Yes    Diabetic polyneuropathy associated with type 2 diabetes mellitus                    Plan:       Keerthi was seen today for wound care.    Diagnoses and all orders for this visit:    Diabetic ulcer of other part of right foot associated with type 2 diabetes mellitus, with fat layer exposed  -     Wound Debridement    Diabetic polyneuropathy associated with type 2 diabetes mellitus        I counseled the patient on her conditions, their implications and medical management.    Performed a selective excisional debridement of the Rt. foot/ankle.  See attached procedure note.      Josephine was applied to bilateral foot wounds, offloaded the Lt. Plantar wound with two full length pads with an aperture, and bilateral footballs were applied.      To keep the dressings CDI x 1 week.    To continue use of [employer] shoes with all weight bearing.    Awaiting delivery of skin substitute #2, as the one that was delivered was .         RTC in 1 week for wound check.    Jacob Bernal DPM

## 2025-02-05 NOTE — PROCEDURES
"Wound Debridement    Date/Time: 2/5/2025 7:40 AM    Performed by: Jacob Bernal DPM  Authorized by: Jacob Bernal DPM    Time out: Immediately prior to procedure a "time out" was called to verify the correct patient, procedure, equipment, support staff and site/side marked as required.    Consent Done?:  Yes (Verbal)  Local anesthesia used?: No      Wound Details:    Location:  Right foot    Location:  Right 5th Metatarsal Head    Type of Debridement:  Excisional       Length (cm):  1       Width (cm):  0.3       Depth (cm):  0.3       Area (sq cm):  0.3       Percent Debrided (%):  100       Total Area Debrided (sq cm):  0.3    Depth of debridement:  Subcutaneous tissue    Tissue debrided:  Subcutaneous    Devitalized tissue debrided:  Fibrin    Instruments:  Blade  Bleeding:  Minimal  Hemostasis Achieved: Yes  Method Used:  Pressure  Patient tolerance:  Patient tolerated the procedure well with no immediate complications    "

## 2025-02-12 ENCOUNTER — OFFICE VISIT (OUTPATIENT)
Dept: PODIATRY | Facility: CLINIC | Age: 50
End: 2025-02-12
Payer: MEDICARE

## 2025-02-12 VITALS — BODY MASS INDEX: 41.93 KG/M2 | WEIGHT: 276.69 LBS | HEIGHT: 68 IN

## 2025-02-12 DIAGNOSIS — E11.621 DIABETIC ULCER OF OTHER PART OF RIGHT FOOT ASSOCIATED WITH TYPE 2 DIABETES MELLITUS, WITH FAT LAYER EXPOSED: Primary | ICD-10-CM

## 2025-02-12 DIAGNOSIS — L97.512 DIABETIC ULCER OF OTHER PART OF RIGHT FOOT ASSOCIATED WITH TYPE 2 DIABETES MELLITUS, WITH FAT LAYER EXPOSED: Primary | ICD-10-CM

## 2025-02-12 DIAGNOSIS — E11.42 DIABETIC POLYNEUROPATHY ASSOCIATED WITH TYPE 2 DIABETES MELLITUS: ICD-10-CM

## 2025-02-12 PROCEDURE — 99999 PR PBB SHADOW E&M-EST. PATIENT-LVL III: CPT | Mod: PBBFAC,,, | Performed by: PODIATRIST

## 2025-02-12 PROCEDURE — 99499 UNLISTED E&M SERVICE: CPT | Mod: S$GLB,,, | Performed by: PODIATRIST

## 2025-02-12 NOTE — PROCEDURES
"Wound Debridement    Date/Time: 2/12/2025 7:40 AM    Performed by: Jacob Bernal DPM  Authorized by: Jacob Bernal DPM    Time out: Immediately prior to procedure a "time out" was called to verify the correct patient, procedure, equipment, support staff and site/side marked as required.    Consent Done?:  Yes (Verbal)  Local anesthesia used?: No      Wound Details:    Location:  Right foot    Location:  Right 5th Metatarsal Head       Length (cm):  1       Width (cm):  0.4       Depth (cm):  0.3       Area (sq cm):  0.4       Percent Debrided (%):  100       Total Area Debrided (sq cm):  0.4    Depth of debridement:  Subcutaneous tissue    Tissue debrided:  Subcutaneous    Devitalized tissue debrided:  Fibrin    Instruments:  Curette  Bleeding:  Minimal  Hemostasis Achieved: Yes  Method Used:  Pressure  Patient tolerance:  Patient tolerated the procedure well with no immediate complications    "

## 2025-02-12 NOTE — PROGRESS NOTES
Subjective:      Patient ID: Keerthi Chase is a 49 y.o. female.    Chief Complaint: Wound Care  Patient presents to clinic 9 weeks S/P resection of the Rt. 5th mtp joint.  She has kept the prior football dressings CDI for the past week.  Continues weightbearing only in the DARCO shoes.   Denies experiencing N/V/F/C/D.  Denies any additional pedal complaints.       Hemoglobin A1C   Date Value Ref Range Status   2025 6.7 (H) 4.0 - 5.6 % Final     Comment:     ADA Screening Guidelines:  5.7-6.4%  Consistent with prediabetes  >or=6.5%  Consistent with diabetes    High levels of fetal hemoglobin interfere with the HbA1C  assay. Heterozygous hemoglobin variants (HbS, HgC, etc)do  not significantly interfere with this assay.   However, presence of multiple variants may affect accuracy.     2024 10.0 (H) 4.0 - 5.6 % Final     Comment:     Reference Interval:  5.0 - 5.6 Normal   5.7 - 6.4 High Risk   > 6.5 Diabetic      Hgb A1c results are standardized based on the (NGSP) National   Glycohemoglobin Standardization Program.      Hemoglobin A1C levels are related to mean serum/plasma glucose   during the preceding 2-3 months.        2022 10.5 (H) 0.0 - 5.6 % Final     Comment:     Reference Interval:  5.0 - 5.6 Normal   5.7 - 6.4 High Risk   > 6.5 Diabetic      Hgb A1c results are standardized based on the (NGSP) National   Glycohemoglobin Standardization Program.      Hemoglobin A1C levels are related to mean serum/plasma glucose   during the preceding 2-3 months.              Past Medical History:   Diagnosis Date    Bell's palsy     Coronary artery disease     Pt states Dr. Virk said she did not have a blockage after a stress test.    Diabetes mellitus     GERD (gastroesophageal reflux disease)     Hypertension     Obesity        Past Surgical History:   Procedure Laterality Date     SECTION      x 2    CHOLECYSTECTOMY      ESOPHAGOGASTRODUODENOSCOPY      FOOT AMPUTATION THROUGH METATARSAL  Right 12/10/2024    Procedure: resection, FOOT, TRANSMETATARSAL, 5th;  Surgeon: Jacob Bernal DPM;  Location: [facility] OR;  Service: Podiatry;  Laterality: Right;    PERIPHERALLY INSERTED CENTRAL CATHETER INSERTION N/A 10/11/2021    Procedure: INSERTION, PICC;  Surgeon: PICC, [facility];  Location: [facility];  Service: Cardiology;  Laterality: N/A;  Dr Aiken    PERIPHERALLY INSERTED CENTRAL CATHETER INSERTION N/A 7/21/2022    Procedure: INSERTION, PICC;  Surgeon: PICC, [facility];  Location: [facility];  Service: Cardiology;  Laterality: N/A;   Gracy    SESAMOIDECTOMY Left 11/11/2021    Procedure: SESAMOIDECTOMY;  Surgeon: Jacob Bernal DPM;  Location: [facility] OR;  Service: Podiatry;  Laterality: Left;       Family History   Problem Relation Name Age of Onset    Hypertension Mother      Diabetes Maternal Aunt         Social History     Socioeconomic History    Marital status:    Tobacco Use    Smoking status: Never    Smokeless tobacco: Never   Substance and Sexual Activity    Alcohol use: No    Drug use: No     Social Drivers of Health     Financial Resource Strain: Low Risk  (12/10/2024)    Overall Financial Resource Strain (CARDIA)     Difficulty of Paying Living Expenses: Not hard at all   Recent Concern: Financial Resource Strain - High Risk (12/9/2024)    Overall Financial Resource Strain (CARDIA)     Difficulty of Paying Living Expenses: Hard   Food Insecurity: No Food Insecurity (12/10/2024)    Hunger Vital Sign     Worried About Running Out of Food in the Last Year: Never true     Ran Out of Food in the Last Year: Never true   Recent Concern: Food Insecurity - Food Insecurity Present (12/9/2024)    Hunger Vital Sign     Worried About Running Out of Food in the Last Year: Often true     Ran Out of Food in the Last Year: Sometimes true   Transportation Needs: No Transportation Needs (12/10/2024)    TRANSPORTATION NEEDS     Transportation : No   Physical Activity: Inactive (11/18/2024)    Exercise Vital Sign     Days  of Exercise per Week: 0 days     Minutes of Exercise per Session: 0 min   Stress: Stress Concern Present (12/9/2024)    [employer] [place] [employer] - Occupational Stress Questionnaire     Feeling of Stress : To some extent   Housing Stability: Unknown (12/10/2024)    Housing Stability Vital Sign     Unable to Pay for Housing in the Last Year: No     Homeless in the Last Year: No   Recent Concern: Housing Stability - High Risk (12/9/2024)    Housing Stability Vital Sign     Unable to Pay for Housing in the Last Year: Yes     Homeless in the Last Year: No       Current Outpatient Medications   Medication Sig Dispense Refill    alprazolam (XANAX) 1 MG tablet Take 1 mg by mouth 2 (two) times daily as needed for Anxiety.      blood sugar diagnostic Strp To check BG 3 times daily, to use with insurance preferred meter 100 strip 1    blood-glucose meter kit To check BG 3 times daily, to use with insurance preferred meter 1 each 1    empagliflozin (JARDIANCE) 10 mg tablet Take 1 tablet (10 mg total) by mouth once daily. 30 tablet 6    fluorometholone 0.1% (FML) 0.1 % DrpS Place 1 drop into both eyes 4 (four) times daily.      Meilele LIDA 3 PLUS SENSOR Malou Change device every 15 days. Use with smartphone avery 2 each 6    hydrocortisone 2.5 % cream Apply topically.      lancets Misc To check BG 3 times daily, to use with insurance preferred meter 100 each 1    MOUNJARO 5 mg/0.5 mL PnIj Inject 5 mg into the skin every 7 days. 2 mL 5    oxyCODONE-acetaminophen (PERCOCET)  mg per tablet Take 1 tablet by mouth every 6 (six) hours as needed for Pain. 28 tablet 0    tirzepatide (MOUNJARO) 5 mg/0.5 mL PnIj Inject 5 mg into the skin every 7 days. 2 mL 6    TOUJEO MAX U-300 SOLOSTAR 300 unit/mL (3 mL) insulin pen Inject 80 Units into the skin once daily. 12 mL 11    losartan (COZAAR) 25 MG tablet Take 1 tablet (25 mg total) by mouth once daily. 30 tablet 0     No current facility-administered medications for  this visit.       Review of patient's allergies indicates:   Allergen Reactions    Codeine Hives and Rash         Review of Systems   Constitutional: Negative for chills and fever.   Skin:  Positive for color change, nail changes and poor wound healing.   Gastrointestinal:  Negative for nausea and vomiting.   Neurological:  Positive for numbness.   Psychiatric/Behavioral:  Negative for altered mental status.            Objective:      Physical Exam  Constitutional:       Appearance: Normal appearance. She is not ill-appearing.   Cardiovascular:      Pulses:           Dorsalis pedis pulses are 2+ on the right side and 2+ on the left side.        Posterior tibial pulses are 2+ on the right side and 2+ on the left side.      Comments: CFT is < 3 seconds bilateral.  Pedal hair growth is decreased bilateral.  Mild edema noted to the Rt. Lateral forefoot.  Toes are warm to touch bilateral.    Musculoskeletal:         General: No tenderness or signs of injury.      Right lower leg: No edema.      Left lower leg: No edema.      Comments: Muscle strength 5/5 in all muscle groups bilateral.  No tenderness nor crepitation with ROM of foot/ankle joints bilateral.  Slight dorsal contracture of the Lt. Hallux.  No gross deformity noted to said digit.  (-) for pain with palpation of the Rt. Lateral 5th mtp joint wound.     Skin:     General: Skin is warm and dry.      Capillary Refill: Capillary refill takes 2 to 3 seconds.      Findings: Wound present. No bruising, ecchymosis, erythema, signs of injury, laceration, lesion, petechiae or rash.      Comments: Location: Surgical wound noted to the Rt. Lateral sub 5th met head.    Base: Down to subQ and comprised of fibrin.      Drainage: None  Malinda wound: Devoid of edema, erythema, malodor, fluctuance and purulence.    Pre-debridement measurement:  1 x 0.4 x 0.3cm   Post-debridement measurement: 1.2 x 0.6 x 0.3cm      Fragile epithelium noted to the Lt. Sub 2nd met head.      Neurological:      Mental Status: She is alert.      Sensory: Sensory deficit present.      Motor: No weakness.      Comments: Decreased protective sensation noted bilateral.  Light touch is absent bilateral.               Assessment:       Encounter Diagnoses   Name Primary?    Diabetic ulcer of other part of right foot associated with type 2 diabetes mellitus, with fat layer exposed Yes    Diabetic polyneuropathy associated with type 2 diabetes mellitus                    Plan:       Keerthi was seen today for wound care.    Diagnoses and all orders for this visit:    Diabetic ulcer of other part of right foot associated with type 2 diabetes mellitus, with fat layer exposed  -     Wound Debridement    Diabetic polyneuropathy associated with type 2 diabetes mellitus        I counseled the patient on her conditions, their implications and medical management.      Performed a selective excisional debridement of the Rt. foot/ankle.  See attached procedure note.       Affinity skin graft applied to the Rt. Lateral forefoot. The graft was secured with steri strips, mepitel, and secured with cast padding and an ACE bandage.  A football dressing was then applied to the Lt. In addition to felt padding.     To keep bilateral football dressings CDI x 1 week.     To continue wearing shoe gear that minimizes pressure to the site.    Orders placed for a skin substitute #3.       Application for Assessment: Rt. forefoot  Skin Substitute: Affinity  Lot#: [id number]  Exp: 1/15/25  UPC:[id number]  Performed By: Jacob Bernal DPM  Time-out Taken: Yes  Location:                           [x] Genitalia/Hands/Feet/Multiple Digits                          [] Scalp/Face/Neck/Ears                          []Trunk/Arms/Legs  Application Area: (sq cm): 0.4 sq cm  Product Applied: (sq cm): 6.25 sq cm  Product Waste (sq cm): 0 sq cm  Fenestrated: No              Instrument:                          [] Blade          [] Curette         []Forceps       []Nippers                          [] Rongeur     [] Scissors       []Others:   Secured: yes              Secured with: Steri-strips  Dressing Applied: Yes  Response to Treatment:Well tolerated by patient.      RTC in 1 week for wound check.     Jacob Bernal DPM

## 2025-02-15 PROBLEM — E66.01 MORBID (SEVERE) OBESITY DUE TO EXCESS CALORIES: Status: ACTIVE | Noted: 2024-12-10

## 2025-02-16 NOTE — PROGRESS NOTES
SUBJECTIVE:    Patient ID:  Keerthi Chase is a 49 y.o. female who was referred to cardiology clinic as a new patient for evaluation of HTN.      Medical history:  CAD?  HTN  DM2 (historically poorly controlled)  Osteomyelitis s/p resection of 5th MTP joint 12/10/24  Morbid obesity  GERD    Referred by Jacob Bernal with podiatry for HTN management.   She was previously seen by Dr. Hernandez at [facility] 2019  Previous coronary angiogram  which was reportedly negative. She has a long history of poorly controlled diabetes which most recently appears better controlled with an A1c 6.7.    She has a longstanding documentation of CAD; however, she has not undergone PCI.  She has several risk factors for CAD.    Septic arthritis of 5th MTP, osteomyelitis with extensor tendo tenosynovitis. Was on Dapto x 6 weeks (EOC 25)    TTE 10/2019: EF 60%.   All ECGs in EMR personally reviewed which show sinus rhythm    Relevant labs: cr 0.7, TSH 2.6, LDL 60  Relevant Rx: losartan 25 mg QD    02/15/2025  Reports pain in lower legs and feet for a few years. Worsened with cold weather.   Reports chest pain 1-2 times every couples weeks. Lasts for a couple of minutes. Substernal. Aching discomfort.   Has been out of losartan for a few weeks now  Not checking blood pressure at home    I personally reviewed the ECG/telemetry strip today. My interpretation is normal sinus rhythm with LVH criteria.  Heart rate 63 beats per minute.   milliseconds.    Past Medical History:   Diagnosis Date    Bell's palsy     Coronary artery disease     Pt states Dr. Virk said she did not have a blockage after a stress test.    Diabetes mellitus     GERD (gastroesophageal reflux disease)     Hypertension     Obesity        Past Surgical History:   Procedure Laterality Date     SECTION      x 2    CHOLECYSTECTOMY      ESOPHAGOGASTRODUODENOSCOPY      FOOT AMPUTATION THROUGH METATARSAL Right 12/10/2024    Procedure: resection, FOOT,  TRANSMETATARSAL, 5th;  Surgeon: Jacob Bernal DPM;  [facility];  Service: Podiatry;  Laterality: Right;    PERIPHERALLY INSERTED CENTRAL CATHETER INSERTION N/A 10/11/2021    Procedure: INSERTION, PICC;  Surgeon: PICC, [facility];  Location: [facility];  Service: Cardiology;  Laterality: N/A;  Dr Aiken    PERIPHERALLY INSERTED CENTRAL CATHETER INSERTION N/A 7/21/2022    Procedure: INSERTION, PICC;  Surgeon: PICC, [facility];  Location: [facility];  Service: Cardiology;  Laterality: N/A;  Dr Dubose    SESAMOIDECTOMY Left 11/11/2021    Procedure: SESAMOIDECTOMY;  Surgeon: Jacob Bernal DPM;  Location: [facility] OR;  Service: Podiatry;  Laterality: Left;       Family History   Problem Relation Name Age of Onset    Hypertension Mother      Diabetes Maternal Aunt         Social History     Socioeconomic History    Marital status:    Tobacco Use    Smoking status: Never    Smokeless tobacco: Never   Substance and Sexual Activity    Alcohol use: No    Drug use: No     Social Drivers of Health     Financial Resource Strain: Low Risk  (12/10/2024)    Overall Financial Resource Strain (CARDIA)     Difficulty of Paying Living Expenses: Not hard at all   Recent Concern: Financial Resource Strain - High Risk (12/9/2024)    Overall Financial Resource Strain (CARDIA)     Difficulty of Paying Living Expenses: Hard   Food Insecurity: No Food Insecurity (12/10/2024)    Hunger Vital Sign     Worried About Running Out of Food in the Last Year: Never true     Ran Out of Food in the Last Year: Never true   Recent Concern: Food Insecurity - Food Insecurity Present (12/9/2024)    Hunger Vital Sign     Worried About Running Out of Food in the Last Year: Often true     Ran Out of Food in the Last Year: Sometimes true   Transportation Needs: No Transportation Needs (12/10/2024)    TRANSPORTATION NEEDS     Transportation : No   Physical Activity: Inactive (11/18/2024)    Exercise Vital Sign     Days of Exercise per Week: 0 days     Minutes of  Exercise per Session: 0 min   Stress: Stress Concern Present (12/9/2024)    [employer] [place] [employer] - Occupational Stress Questionnaire     Feeling of Stress : To some extent   Housing Stability: Unknown (12/10/2024)    Housing Stability Vital Sign     Unable to Pay for Housing in the Last Year: No     Homeless in the Last Year: No   Recent Concern: Housing Stability - High Risk (12/9/2024)    Housing Stability Vital Sign     Unable to Pay for Housing in the Last Year: Yes     Homeless in the Last Year: No       Review of patient's allergies indicates:   Allergen Reactions    Codeine Hives and Rash       Review of Systems   Constitutional: Negative for chills and fever.   HENT:  Negative for congestion and hearing loss.    Eyes:  Negative for blurred vision and double vision.   Cardiovascular:         See HPI   Respiratory:  Negative for cough, hemoptysis and shortness of breath.    Endocrine: Positive for cold intolerance. Negative for heat intolerance.   Musculoskeletal:  Negative for joint pain and joint swelling.   Gastrointestinal:  Negative for abdominal pain, nausea and vomiting.   Genitourinary:  Negative for dysuria and hematuria.   Neurological:  Negative for focal weakness and headaches.   Psychiatric/Behavioral:  Negative for altered mental status.    All other systems reviewed and are negative.           OBJECTIVE:     Vitals:    02/17/25 0820   BP: (!) 150/78   Pulse: (!) 59         BP Readings from Last 5 Encounters:   12/13/24 (!) 152/62   11/25/24 128/80   09/16/22 138/69   09/09/22 (!) 157/75   09/02/22 (!) 141/65        Physical Exam  Vitals and nursing note reviewed.   Constitutional:       General: She is not in acute distress.     Appearance: She is well-developed. She is obese. She is not diaphoretic.   HENT:      Head: Normocephalic and atraumatic.   Eyes:      General: No scleral icterus.        Right eye: No discharge.         Left eye: No discharge.   Cardiovascular:       Rate and Rhythm: Normal rate and regular rhythm. No extrasystoles are present.     Pulses: Normal pulses and intact distal pulses.           Radial pulses are 2+ on the right side and 2+ on the left side.      Heart sounds: Normal heart sounds, S1 normal and S2 normal. Heart sounds not distant. No opening snap. No murmur heard.     No friction rub. No gallop. No S3 or S4 sounds.   Pulmonary:      Effort: Pulmonary effort is normal. No respiratory distress.      Breath sounds: No wheezing or rales.   Abdominal:      General: Bowel sounds are normal. There is no distension.      Palpations: Abdomen is soft.      Tenderness: There is no abdominal tenderness.   Musculoskeletal:         General: No deformity. Normal range of motion.      Cervical back: Normal range of motion and neck supple.      Comments: Boots to bilateral feet   Skin:     General: Skin is warm and dry.      Findings: No erythema or rash.   Neurological:      Mental Status: She is alert.             Current Outpatient Medications   Medication Instructions    ALPRAZolam (XANAX) 1 mg, 2 times daily PRN    blood sugar diagnostic Strp To check BG 3 times daily, to use with insurance preferred meter    blood-glucose meter kit To check BG 3 times daily, to use with insurance preferred meter    empagliflozin (JARDIANCE) 10 mg, Oral, Daily    fluorometholone 0.1% (FML) 0.1 % DrpS 1 drop, 4 times daily    FREESTYLE LIDA 3 PLUS SENSOR Malou Change device every 15 days. Use with smartphone avery    hydrocortisone 2.5 % cream Apply topically.    lancets Misc To check BG 3 times daily, to use with insurance preferred meter    losartan (COZAAR) 25 mg, Oral, Daily    MOUNJARO 5 mg, Subcutaneous, Every 7 days    MOUNJARO 5 mg, Subcutaneous, Every 7 days    oxyCODONE-acetaminophen (PERCOCET)  mg per tablet 1 tablet, Oral, Every 6 hours PRN    TOUJEO MAX U-300 SOLOSTAR 80 Units, Subcutaneous, Daily       Lipid Panel:   Lab Results   Component Value Date    CHOL 142  01/24/2025    HDL 35 (L) 01/24/2025    LDLCALC 60.4 (L) 01/24/2025    TRIG 233 (H) 01/24/2025    CHOLHDL 24.6 01/24/2025         The 10-year ASCVD risk score (Abril WILEY, et al., 2019) is: 2.8%    Values used to calculate the score:      Age: 49 years      Sex: Female      Is Non- : No      Diabetic: Yes      Tobacco smoker: No      Systolic Blood Pressure: 132 mmHg      Is BP treated: No      HDL Cholesterol: 35 mg/dL      Total Cholesterol: 142 mg/dL    Most Recent EKG Results  Results for orders placed or performed during the hospital encounter of 12/09/24   EKG 12-lead    Collection Time: 12/11/24 10:13 AM   Result Value Ref Range    QRS Duration 90 ms    [facility] QTC Calculation 434 ms    Narrative    Test Reason : R94.31,    Vent. Rate :  76 BPM     Atrial Rate :  76 BPM     P-R Int : 168 ms          QRS Dur :  90 ms      QT Int : 386 ms       P-R-T Axes :  21  -3  71 degrees    QTcB Int : 434 ms    Normal sinus rhythm  Minimal voltage criteria for LVH, may be normal variant ( R in aVL )  Nonspecific T wave abnormality  Abnormal ECG  No previous ECGs available  Confirmed by Kaleb Cage ([id number]) on 12/12/2024 4:45:55 PM    Referred By: AAAREFERRAL SELF           Confirmed By: Kaleb Cage       Most Recent Echocardiogram Results  Results for orders placed in visit on 10/22/19    Echo    Interpretation Summary  · Technically difficult study  · Normal left ventricular systolic function. The estimated ejection fraction is 60%      Most Recent Nuclear Stress Test Results  No results found for this or any previous visit.      Most Recent Cardiac PET Stress Test Results  No results found for this or any previous visit.      Most Recent Cardiovascular Angiogram results  No results found for this or any previous visit.      Other Most Recent Cardiology Results  Results for orders placed during the hospital encounter of 12/09/24    Cardiac monitoring strips        All pertinent data including labs,  imaging, EKGs, and studies listed above were reviewed.  All EKG tracing in [facility] were personally interpreted by this provider.    ASSESSMENT:   Keerthi Chase is a 49 y.o. female who presents for evaluation of HTN and CAD (documented since 2016).    1. Coronary artery disease involving native coronary artery of native heart without angina pectoris        2. Morbid (severe) obesity due to excess calories        3. Primary hypertension          PLAN FOR TREATMENT OF ABOVE DIAGNOSES:     Echo  SPECT  Lower extremity arterial ultrasound  Start a moderate intensity statin given diabetes  Restart losartan 25 mg daily with labs in 1 week  We discussed the importance of a low-sodium diet less than 2000 mg per day   Patient is interested in digital hypertension referral sent over.  Discussed checking blood pressures at home and bring me these readings at follow up.      F/u in 2-3 months    Gordo Archuleta MD  Cardiac Electrophysiology    This note was partially generated using voice recognition and generative artificial intelligence software. While every effort has been made to ensure its accuracy, transcription errors may exist. Please reach out to me with any questions or if clarification is needed.

## 2025-02-17 ENCOUNTER — OFFICE VISIT (OUTPATIENT)
Dept: CARDIOLOGY | Facility: CLINIC | Age: 50
End: 2025-02-17
Payer: MEDICARE

## 2025-02-17 VITALS
HEIGHT: 68 IN | SYSTOLIC BLOOD PRESSURE: 150 MMHG | WEIGHT: 278 LBS | BODY MASS INDEX: 42.13 KG/M2 | DIASTOLIC BLOOD PRESSURE: 78 MMHG | HEART RATE: 59 BPM

## 2025-02-17 DIAGNOSIS — E66.01 MORBID (SEVERE) OBESITY DUE TO EXCESS CALORIES: ICD-10-CM

## 2025-02-17 DIAGNOSIS — R07.9 CHEST PAIN, UNSPECIFIED TYPE: ICD-10-CM

## 2025-02-17 DIAGNOSIS — I25.10 CORONARY ARTERY DISEASE INVOLVING NATIVE CORONARY ARTERY OF NATIVE HEART WITHOUT ANGINA PECTORIS: ICD-10-CM

## 2025-02-17 DIAGNOSIS — I10 PRIMARY HYPERTENSION: Primary | ICD-10-CM

## 2025-02-17 DIAGNOSIS — I73.9 CLAUDICATION: ICD-10-CM

## 2025-02-17 LAB
OHS QRS DURATION: 88 MS
OHS QTC CALCULATION: 433 MS

## 2025-02-17 PROCEDURE — 93005 ELECTROCARDIOGRAM TRACING: CPT | Mod: PO

## 2025-02-17 PROCEDURE — 99213 OFFICE O/P EST LOW 20 MIN: CPT | Mod: PBBFAC,25,PO | Performed by: INTERNAL MEDICINE

## 2025-02-17 RX ORDER — LOSARTAN POTASSIUM 25 MG/1
25 TABLET ORAL DAILY
Qty: 30 TABLET | Refills: 0 | Status: SHIPPED | OUTPATIENT
Start: 2025-02-17 | End: 2025-02-19 | Stop reason: SDUPTHER

## 2025-02-17 RX ORDER — ATORVASTATIN CALCIUM 20 MG/1
20 TABLET, FILM COATED ORAL DAILY
Qty: 90 TABLET | Refills: 3 | Status: SHIPPED | OUTPATIENT
Start: 2025-02-17 | End: 2026-02-17

## 2025-02-19 ENCOUNTER — OFFICE VISIT (OUTPATIENT)
Dept: PODIATRY | Facility: CLINIC | Age: 50
End: 2025-02-19
Payer: MEDICARE

## 2025-02-19 VITALS — WEIGHT: 278 LBS | BODY MASS INDEX: 42.13 KG/M2 | HEIGHT: 68 IN

## 2025-02-19 DIAGNOSIS — E11.621 DIABETIC ULCER OF OTHER PART OF RIGHT FOOT ASSOCIATED WITH TYPE 2 DIABETES MELLITUS, WITH FAT LAYER EXPOSED: Primary | ICD-10-CM

## 2025-02-19 DIAGNOSIS — I10 PRIMARY HYPERTENSION: ICD-10-CM

## 2025-02-19 DIAGNOSIS — E11.42 DIABETIC POLYNEUROPATHY ASSOCIATED WITH TYPE 2 DIABETES MELLITUS: ICD-10-CM

## 2025-02-19 DIAGNOSIS — L97.512 DIABETIC ULCER OF OTHER PART OF RIGHT FOOT ASSOCIATED WITH TYPE 2 DIABETES MELLITUS, WITH FAT LAYER EXPOSED: Primary | ICD-10-CM

## 2025-02-19 PROCEDURE — 99212 OFFICE O/P EST SF 10 MIN: CPT | Mod: PBBFAC,PO | Performed by: PODIATRIST

## 2025-02-19 RX ORDER — LOSARTAN POTASSIUM 25 MG/1
25 TABLET ORAL DAILY
Qty: 30 TABLET | Refills: 0 | Status: SHIPPED | OUTPATIENT
Start: 2025-02-19 | End: 2025-03-21

## 2025-02-20 NOTE — PROCEDURES
"Wound Debridement    Date/Time: 2/19/2025 7:40 AM    Performed by: Jacob Bernal DPM  Authorized by: Jacob Bernal DPM    Time out: Immediately prior to procedure a "time out" was called to verify the correct patient, procedure, equipment, support staff and site/side marked as required.    Consent Done?:  Yes (Verbal)  Local anesthesia used?: No      Wound Details:    Location:  Right foot    Location:  Right 5th Metatarsal Head    Type of Debridement:  Excisional       Length (cm):  1       Width (cm):  0.1       Depth (cm):  0.1       Area (sq cm):  0.1       Percent Debrided (%):  100       Total Area Debrided (sq cm):  0.1    Depth of debridement:  Epidermis/Dermis    Tissue debrided:  Dermis    Devitalized tissue debrided:  Fibrin    Instruments:  Blade  Bleeding:  Minimal  Hemostasis Achieved: Yes  Method Used:  Pressure  Patient tolerance:  Patient tolerated the procedure well with no immediate complications    "

## 2025-02-20 NOTE — PROGRESS NOTES
Subjective:      Patient ID: Keerthi Chase is a 49 y.o. female.    Chief Complaint: Wound Care  Patient presents to clinic 10 weeks S/P resection of the Rt. 5th mtp joint.  She has kept the prior football dressings CDI for the past week.  Continues weightbearing only in the DARCO shoes.   Denies experiencing N/V/F/C/D.  Denies any additional pedal complaints.       Hemoglobin A1C   Date Value Ref Range Status   2025 6.7 (H) 4.0 - 5.6 % Final     Comment:     ADA Screening Guidelines:  5.7-6.4%  Consistent with prediabetes  >or=6.5%  Consistent with diabetes    High levels of fetal hemoglobin interfere with the HbA1C  assay. Heterozygous hemoglobin variants (HbS, HgC, etc)do  not significantly interfere with this assay.   However, presence of multiple variants may affect accuracy.     2024 10.0 (H) 4.0 - 5.6 % Final     Comment:     Reference Interval:  5.0 - 5.6 Normal   5.7 - 6.4 High Risk   > 6.5 Diabetic      Hgb A1c results are standardized based on the (NGSP) National   Glycohemoglobin Standardization Program.      Hemoglobin A1C levels are related to mean serum/plasma glucose   during the preceding 2-3 months.        2022 10.5 (H) 0.0 - 5.6 % Final     Comment:     Reference Interval:  5.0 - 5.6 Normal   5.7 - 6.4 High Risk   > 6.5 Diabetic      Hgb A1c results are standardized based on the (NGSP) National   Glycohemoglobin Standardization Program.      Hemoglobin A1C levels are related to mean serum/plasma glucose   during the preceding 2-3 months.              Past Medical History:   Diagnosis Date    Bell's palsy     Coronary artery disease     Pt states Dr. Virk said she did not have a blockage after a stress test.    Diabetes mellitus     GERD (gastroesophageal reflux disease)     Hypertension     Obesity        Past Surgical History:   Procedure Laterality Date     SECTION      x 2    CHOLECYSTECTOMY      ESOPHAGOGASTRODUODENOSCOPY      FOOT AMPUTATION THROUGH METATARSAL  Right 12/10/2024    Procedure: resection, FOOT, TRANSMETATARSAL, 5th;  Surgeon: Jacob Bernal DPM;  Location: [facility] OR;  Service: Podiatry;  Laterality: Right;    PERIPHERALLY INSERTED CENTRAL CATHETER INSERTION N/A 10/11/2021    Procedure: INSERTION, PICC;  Surgeon: PICC, [facility];  Location: [facility];  Service: Cardiology;  Laterality: N/A;  Dr Aiken    PERIPHERALLY INSERTED CENTRAL CATHETER INSERTION N/A 7/21/2022    Procedure: INSERTION, PICC;  Surgeon: PICC, [facility];  Location: [facility];  Service: Cardiology;  Laterality: N/A;   [place]    SESAMOIDECTOMY Left 11/11/2021    Procedure: SESAMOIDECTOMY;  Surgeon: Jacob Bernal DPM;  Location: [facility] OR;  Service: Podiatry;  Laterality: Left;       Family History   Problem Relation Name Age of Onset    Hypertension Mother      Diabetes Maternal Aunt         Social History     Socioeconomic History    Marital status:    Tobacco Use    Smoking status: Never    Smokeless tobacco: Never   Substance and Sexual Activity    Alcohol use: No    Drug use: No     Social Drivers of Health     Financial Resource Strain: Low Risk  (12/10/2024)    Overall Financial Resource Strain (CARDIA)     Difficulty of Paying Living Expenses: Not hard at all   Recent Concern: Financial Resource Strain - High Risk (12/9/2024)    Overall Financial Resource Strain (CARDIA)     Difficulty of Paying Living Expenses: Hard   Food Insecurity: No Food Insecurity (12/10/2024)    Hunger Vital Sign     Worried About Running Out of Food in the Last Year: Never true     Ran Out of Food in the Last Year: Never true   Recent Concern: Food Insecurity - Food Insecurity Present (12/9/2024)    Hunger Vital Sign     Worried About Running Out of Food in the Last Year: Often true     Ran Out of Food in the Last Year: Sometimes true   Transportation Needs: No Transportation Needs (12/10/2024)    TRANSPORTATION NEEDS     Transportation : No   Physical Activity: Inactive (11/18/2024)    Exercise Vital Sign     Days  of Exercise per Week: 0 days     Minutes of Exercise per Session: 0 min   Stress: Stress Concern Present (12/9/2024)    [employer] - Occupational Stress Questionnaire     Feeling of Stress : To some extent   Housing Stability: Unknown (12/10/2024)    Housing Stability Vital Sign     Unable to Pay for Housing in the Last Year: No     Homeless in the Last Year: No   Recent Concern: Housing Stability - High Risk (12/9/2024)    Housing Stability Vital Sign     Unable to Pay for Housing in the Last Year: Yes     Homeless in the Last Year: No       Current Outpatient Medications   Medication Sig Dispense Refill    alprazolam (XANAX) 1 MG tablet Take 1 mg by mouth 2 (two) times daily as needed for Anxiety.      atorvastatin (LIPITOR) 20 MG tablet Take 1 tablet (20 mg total) by mouth once daily. 90 tablet 3    blood sugar diagnostic Strp To check BG 3 times daily, to use with insurance preferred meter 100 strip 1    blood-glucose meter kit To check BG 3 times daily, to use with insurance preferred meter 1 each 1    empagliflozin (JARDIANCE) 10 mg tablet Take 1 tablet (10 mg total) by mouth once daily. 30 tablet 6    fluorometholone 0.1% (FML) 0.1 % DrpS Place 1 drop into both eyes 4 (four) times daily.      PolyhealE 3 PLUS SENSOR Malou Change device every 15 days. Use with smartphone avery 2 each 6    hydrocortisone 2.5 % cream Apply topically. (Patient not taking: Reported on 2/17/2025)      lancets Misc To check BG 3 times daily, to use with insurance preferred meter 100 each 1    losartan (COZAAR) 25 MG tablet Take 1 tablet (25 mg total) by mouth once daily. 30 tablet 0    MOUNJARO 5 mg/0.5 mL PnIj Inject 5 mg into the skin every 7 days. 2 mL 5    oxyCODONE-acetaminophen (PERCOCET)  mg per tablet Take 1 tablet by mouth every 6 (six) hours as needed for Pain. 28 tablet 0    tirzepatide (MOUNJARO) 5 mg/0.5 mL PnIj Inject 5 mg into the skin every 7 days. 2 mL 6    TOUJEO MAX U-300  SOLOSTAR 300 unit/mL (3 mL) insulin pen Inject 80 Units into the skin once daily. 12 mL 11     No current facility-administered medications for this visit.       Review of patient's allergies indicates:   Allergen Reactions    Codeine Hives and Rash         Review of Systems   Constitutional: Negative for chills and fever.   Skin:  Positive for color change, nail changes and poor wound healing.   Gastrointestinal:  Negative for nausea and vomiting.   Neurological:  Positive for numbness.   Psychiatric/Behavioral:  Negative for altered mental status.            Objective:      Physical Exam  Constitutional:       Appearance: Normal appearance. She is not ill-appearing.   Cardiovascular:      Pulses:           Dorsalis pedis pulses are 2+ on the right side and 2+ on the left side.        Posterior tibial pulses are 2+ on the right side and 2+ on the left side.      Comments: CFT is < 3 seconds bilateral.  Pedal hair growth is decreased bilateral.  Mild edema noted to the Rt. Lateral forefoot.  Toes are warm to touch bilateral.    Musculoskeletal:         General: No tenderness or signs of injury.      Right lower leg: No edema.      Left lower leg: No edema.      Comments: Muscle strength 5/5 in all muscle groups bilateral.  No tenderness nor crepitation with ROM of foot/ankle joints bilateral.  Slight dorsal contracture of the Lt. Hallux.  No gross deformity noted to said digit.  (-) for pain with palpation of the Rt. Lateral 5th mtp joint wound.     Skin:     General: Skin is warm and dry.      Capillary Refill: Capillary refill takes 2 to 3 seconds.      Findings: Wound present. No bruising, ecchymosis, erythema, signs of injury, laceration, lesion, petechiae or rash.      Comments: Location: Surgical wound noted to the Rt. Lateral sub 5th met head.    Base: Down to dermis and comprised of fibrin.      Drainage: None  Malinda wound: Devoid of edema, erythema, malodor, fluctuance and purulence.    Pre-debridement  measurement:  1 x 0.1 x 0.1cm   Post-debridement measurement: 1.2 x 0.3 x 0.1cm      Mature epithelium noted to the Lt. Sub 2nd met head.     Neurological:      Mental Status: She is alert.      Sensory: Sensory deficit present.      Motor: No weakness.      Comments: Decreased protective sensation noted bilateral.  Light touch is absent bilateral.               Assessment:       Encounter Diagnoses   Name Primary?    Diabetic ulcer of other part of right foot associated with type 2 diabetes mellitus, with fat layer exposed Yes    Diabetic polyneuropathy associated with type 2 diabetes mellitus                      Plan:       Keerthi was seen today for wound care.    Diagnoses and all orders for this visit:    Diabetic ulcer of other part of right foot associated with type 2 diabetes mellitus, with fat layer exposed    Diabetic polyneuropathy associated with type 2 diabetes mellitus          I counseled the patient on her conditions, their implications and medical management.      Performed a selective excisional debridement of the Rt. foot/ankle.  See attached procedure note.       Affinity skin graft applied to the Rt. Lateral forefoot. The graft was secured with steri strips, mepitel, and secured with cast padding and an ACE bandage.  A football dressing was then applied to the Lt. In addition to felt padding.     To keep bilateral football dressings CDI x 1 week.     To continue wearing shoe gear that minimizes pressure to the site.    Orders placed for a skin substitute #3.       Application for Assessment: Rt. forefoot  Skin Substitute: Affinity  Lot#: [id number]  Exp: 2/20/25  UPC:[id number]  Performed By: Jacob Bernal DPM  Time-out Taken: Yes  Location:                           [x] Genitalia/Hands/Feet/Multiple Digits                          [] Scalp/Face/Neck/Ears                          []Trunk/Arms/Legs  Application Area: (sq cm): 0.1 sq cm  Product Applied: (sq cm): 6.25 sq cm  Product Waste (sq  cm): 0 sq cm  Fenestrated: No              Instrument:                          [] Blade          [] Curette        []Forceps       []Nippers                          [] Rongeur     [] Scissors       []Others:   Secured: yes              Secured with: Steri-strips  Dressing Applied: Yes  Response to Treatment:Well tolerated by patient.      RTC in 1 week for wound check.     Jacob Bernal DPM

## 2025-02-24 ENCOUNTER — TELEPHONE (OUTPATIENT)
Dept: ENDOCRINOLOGY | Facility: CLINIC | Age: 50
End: 2025-02-24

## 2025-02-24 ENCOUNTER — OFFICE VISIT (OUTPATIENT)
Dept: ENDOCRINOLOGY | Facility: CLINIC | Age: 50
End: 2025-02-24
Payer: MEDICARE

## 2025-02-24 ENCOUNTER — LAB VISIT (OUTPATIENT)
Dept: LAB | Facility: HOSPITAL | Age: 50
End: 2025-02-24
Attending: INTERNAL MEDICINE
Payer: MEDICARE

## 2025-02-24 VITALS
WEIGHT: 276.38 LBS | SYSTOLIC BLOOD PRESSURE: 120 MMHG | HEART RATE: 68 BPM | HEIGHT: 68 IN | BODY MASS INDEX: 41.89 KG/M2 | DIASTOLIC BLOOD PRESSURE: 80 MMHG

## 2025-02-24 DIAGNOSIS — E66.01 MORBID OBESITY WITH BMI OF 40.0-44.9, ADULT: ICD-10-CM

## 2025-02-24 DIAGNOSIS — Z79.4 TYPE 2 DIABETES MELLITUS WITHOUT COMPLICATION, WITH LONG-TERM CURRENT USE OF INSULIN: Primary | ICD-10-CM

## 2025-02-24 DIAGNOSIS — I10 PRIMARY HYPERTENSION: ICD-10-CM

## 2025-02-24 DIAGNOSIS — E11.9 TYPE 2 DIABETES MELLITUS WITHOUT COMPLICATION, WITH LONG-TERM CURRENT USE OF INSULIN: Primary | ICD-10-CM

## 2025-02-24 DIAGNOSIS — L97.512 DIABETIC ULCER OF OTHER PART OF RIGHT FOOT ASSOCIATED WITH TYPE 2 DIABETES MELLITUS, WITH FAT LAYER EXPOSED: ICD-10-CM

## 2025-02-24 DIAGNOSIS — E11.621 DIABETIC ULCER OF OTHER PART OF RIGHT FOOT ASSOCIATED WITH TYPE 2 DIABETES MELLITUS, WITH FAT LAYER EXPOSED: ICD-10-CM

## 2025-02-24 LAB
ANION GAP SERPL CALC-SCNC: 10 MMOL/L (ref 8–16)
BUN SERPL-MCNC: 17 MG/DL (ref 6–20)
CALCIUM SERPL-MCNC: 8.9 MG/DL (ref 8.7–10.5)
CHLORIDE SERPL-SCNC: 106 MMOL/L (ref 95–110)
CO2 SERPL-SCNC: 24 MMOL/L (ref 23–29)
CREAT SERPL-MCNC: 0.6 MG/DL (ref 0.5–1.4)
EST. GFR  (NO RACE VARIABLE): >60 ML/MIN/1.73 M^2
GLUCOSE SERPL-MCNC: 169 MG/DL (ref 70–110)
POTASSIUM SERPL-SCNC: 4 MMOL/L (ref 3.5–5.1)
SODIUM SERPL-SCNC: 140 MMOL/L (ref 136–145)

## 2025-02-24 PROCEDURE — 1160F RVW MEDS BY RX/DR IN RCRD: CPT | Mod: CPTII,S$GLB,, | Performed by: NURSE PRACTITIONER

## 2025-02-24 PROCEDURE — 3008F BODY MASS INDEX DOCD: CPT | Mod: CPTII,S$GLB,, | Performed by: NURSE PRACTITIONER

## 2025-02-24 PROCEDURE — 99999 PR PBB SHADOW E&M-EST. PATIENT-LVL III: CPT | Mod: PBBFAC,,, | Performed by: NURSE PRACTITIONER

## 2025-02-24 PROCEDURE — 4010F ACE/ARB THERAPY RXD/TAKEN: CPT | Mod: CPTII,S$GLB,, | Performed by: NURSE PRACTITIONER

## 2025-02-24 PROCEDURE — 3061F NEG MICROALBUMINURIA REV: CPT | Mod: CPTII,S$GLB,, | Performed by: NURSE PRACTITIONER

## 2025-02-24 PROCEDURE — 95251 CONT GLUC MNTR ANALYSIS I&R: CPT | Mod: S$GLB,,, | Performed by: NURSE PRACTITIONER

## 2025-02-24 PROCEDURE — 3066F NEPHROPATHY DOC TX: CPT | Mod: CPTII,S$GLB,, | Performed by: NURSE PRACTITIONER

## 2025-02-24 PROCEDURE — G2211 COMPLEX E/M VISIT ADD ON: HCPCS | Mod: S$GLB,,, | Performed by: NURSE PRACTITIONER

## 2025-02-24 PROCEDURE — 3044F HG A1C LEVEL LT 7.0%: CPT | Mod: CPTII,S$GLB,, | Performed by: NURSE PRACTITIONER

## 2025-02-24 PROCEDURE — 80048 BASIC METABOLIC PNL TOTAL CA: CPT | Performed by: INTERNAL MEDICINE

## 2025-02-24 PROCEDURE — 36415 COLL VENOUS BLD VENIPUNCTURE: CPT | Mod: PO | Performed by: INTERNAL MEDICINE

## 2025-02-24 PROCEDURE — 1159F MED LIST DOCD IN RCRD: CPT | Mod: CPTII,S$GLB,, | Performed by: NURSE PRACTITIONER

## 2025-02-24 PROCEDURE — 3074F SYST BP LT 130 MM HG: CPT | Mod: CPTII,S$GLB,, | Performed by: NURSE PRACTITIONER

## 2025-02-24 PROCEDURE — 99214 OFFICE O/P EST MOD 30 MIN: CPT | Mod: S$GLB,,, | Performed by: NURSE PRACTITIONER

## 2025-02-24 PROCEDURE — 3079F DIAST BP 80-89 MM HG: CPT | Mod: CPTII,S$GLB,, | Performed by: NURSE PRACTITIONER

## 2025-02-24 RX ORDER — TIRZEPATIDE 7.5 MG/.5ML
7.5 INJECTION, SOLUTION SUBCUTANEOUS
Qty: 2 ML | Refills: 6 | Status: SHIPPED | OUTPATIENT
Start: 2025-02-24

## 2025-02-24 RX ORDER — IBUPROFEN 800 MG/1
800 TABLET ORAL EVERY 6 HOURS PRN
COMMUNITY
Start: 2025-02-17

## 2025-02-24 RX ORDER — PEN NEEDLE, DIABETIC 30 GX3/16"
NEEDLE, DISPOSABLE MISCELLANEOUS
Qty: 200 EACH | Refills: 3 | Status: SHIPPED | OUTPATIENT
Start: 2025-02-24

## 2025-02-24 RX ORDER — BLOOD-GLUCOSE SENSOR
EACH MISCELLANEOUS
Qty: 9 EACH | Refills: 3 | Status: SHIPPED | OUTPATIENT
Start: 2025-02-24

## 2025-02-24 NOTE — PROGRESS NOTES
"CC: Ms. Keerthi Chase arrives today for management of Type 2 DM and review of chronic medical conditions, as listed in the Visit Diagnosis section of this encounter.       HPI: Ms. Keerthi Chase was diagnosed with Type 2 DM in 2006, immediately following GDM. She was diagnosed based on lab work. Initial treatment consisted of insulin. + FH of DM in maternal aunt. Denies hospitalizations due to DM.     Patient was last seen by me in November. At that time, she couldn't tolerate Ozempic so this was changed to Mounjaro. Jardiance was also added and Dexcom G7 was ordered.    She is following with Dr. Bernal for R foot wound.     BG monitoring per Dexcom G7.     Hypoglycemia: Rare  Symptoms: none  Treatment: PB crackers    Missing Insulin/PO medication doses: Rare    Exercise: No    Dietary Habits:  She eats 3 meals/day. Occasional snack. Has cut out sodas.    Last DM education appointment:      CURRENT DIABETIC MEDS:  Jardiance 10 mg daily, Mounjaro 5 mg weekly, Toujeo Max 40 units BID (pt's choice to take twice daily)  Vial or pen: pen  Glucometer type:     Previous DM treatments:  Metformin   Prandial insulin  Jardiance - unsure why stopped  Ozempic - nausea, belching      Last [facility] Ex[facility]: [facility]20[facility] [place]  Last Podiatry Exam: 2/2025    REVIEW OF SYSTEMS  Constitutional: no c/o fatigue, weakness. + 8# weight loss.  Cardiac: no palpitations or chest pain.  Respiratory: no cough or dyspnea.  GI: no c/o abdominal pain or nausea. Denies h/o pancreatitis. + mild constipation. Uses Miralax as needed with relief  Skin: no lesions or rashes. + wound to R foot.   Neuro: + numbness, tingling in feet.  Endocrine: denies polyphagia, polydipsia, polyuria      Personally reviewed Past Medical, Surgical, Social History.    Vital Signs  /80   Pulse 68   Ht 5' 8" (1.727 m)   Wt 125.4 kg (276 lb 5.6 oz)   BMI 42.02 kg/m²     Personally reviewed the below labs:    Hemoglobin A1C   Date Value Ref " Range Status   01/24/2025 6.7 (H) 4.0 - 5.6 % Final     Comment:     ADA Screening Guidelines:  5.7-6.4%  Consistent with prediabetes  >or=6.5%  Consistent with diabetes    High levels of fetal hemoglobin interfere with the HbA1C  assay. Heterozygous hemoglobin variants (HbS, HgC, etc)do  not significantly interfere with this assay.   However, presence of multiple variants may affect accuracy.     12/09/2024 10.0 (H) 4.0 - 5.6 % Final     Comment:     Reference Interval:  5.0 - 5.6 Normal   5.7 - 6.4 High Risk   > 6.5 Diabetic      Hgb A1c results are standardized based on the (NGSP) National   Glycohemoglobin Standardization Program.      Hemoglobin A1C levels are related to mean serum/plasma glucose   during the preceding 2-3 months.        06/03/2022 10.5 (H) 0.0 - 5.6 % Final     Comment:     Reference Interval:  5.0 - 5.6 Normal   5.7 - 6.4 High Risk   > 6.5 Diabetic      Hgb A1c results are standardized based on the (NGSP) National   Glycohemoglobin Standardization Program.      Hemoglobin A1C levels are related to mean serum/plasma glucose   during the preceding 2-3 months.            Chemistry        Component Value Date/Time     01/24/2025 0717    K 4.1 01/24/2025 0717     01/24/2025 0717    CO2 23 01/24/2025 0717    BUN 20 01/24/2025 0717    CREATININE 0.7 01/24/2025 0717     (H) 01/24/2025 0717        Component Value Date/Time    CALCIUM 9.1 01/24/2025 0717    ALKPHOS 104 01/24/2025 0717    AST 17 01/24/2025 0717    ALT 28 01/24/2025 0717    BILITOT 0.7 01/24/2025 0717    ESTGFRAFRICA >60 07/19/2022 1040    EGFRNONAA >60 07/19/2022 1040          Lab Results   Component Value Date    CHOL 142 01/24/2025     Lab Results   Component Value Date    HDL 35 (L) 01/24/2025     Lab Results   Component Value Date    LDLCALC 60.4 (L) 01/24/2025     Lab Results   Component Value Date    TRIG 233 (H) 01/24/2025     Lab Results   Component Value Date    CHOLHDL 24.6 01/24/2025       Lab Results  "  Component Value Date    MICALBCREAT 6.7 01/24/2025     Lab Results   Component Value Date    TSH 2.580 01/24/2025       CrCl cannot be calculated (Patient's most recent lab result is older than the maximum 7 days allowed.).    No results found for: "[id number]"      PHYSICAL EXAMINATION  Constitutional: Appears well, no distress  Respiratory: CTA, even and unlabored.  Cardiovascular: RRR, no murmurs.  GI: bowel sounds active, no hernia noted  Skin: warm and dry  Neuro: oriented to person, place, time  Feet: both feet in ortho boots    DEXCOM G7 DOWNLOAD: Fasting glucoses are mostly stable. Occasional prandial excursions. Rare hypoglycemia.          Goals        HEMOGLOBIN A1C < 7               Assessment/Plan  1. Type 2 diabetes mellitus without complication, with long-term current use of insulin  -- Controlled but having some prandial excursions.  -- increase Mounjaro to 7.5 mg weekly. Try adding daily fiber supplement.   -- continue Jardiance, Toujeo Max at current doses.  -- continue Dexcom G7     -- Discussed diagnosis of DM, A1c goals, progression of disease, long term complications and tx options.  -- Reviewed hypoglycemia management: treat with 4 oz of juice, 4 oz regular soda, or 4 glucose tablets. Monitor and repeat treatment every 15 minutes until BG is >70 Then have a snack, which includes 15 grams of complex carbohydrates and protein.   Advised patient to check BG before activities, such as driving or exercise.  -- takes losartan, atorvastatin    2. Diabetic ulcer of other part of right foot associated with type 2 diabetes mellitus, with fat layer exposed  -- follows with Dr. Bernal.   -- optimize DM control   3. Primary hypertension  -- stable  -- now on losartan   4. Morbid obesity with BMI of 40.0-44.9, adult  -- Mounjaro may add benefit  -- improving   Body mass index is 42.02 kg/m².       FOLLOW UP  Follow up in about 4 months (around 6/24/2025).   Patient instructed to bring BG logs to each " follow up   Patient encouraged to call for any BG/medication issues, concerns, or questions.    Orders Placed This Encounter   Procedures    Hemoglobin A1C    Basic Metabolic Panel

## 2025-02-24 NOTE — TELEPHONE ENCOUNTER
Pt states dexcom out of stock at her pharmacy. [facility] said they will have some tomorrow.Please send dexcom to [facility].Pt v/a

## 2025-02-26 ENCOUNTER — OFFICE VISIT (OUTPATIENT)
Dept: PODIATRY | Facility: CLINIC | Age: 50
End: 2025-02-26
Payer: MEDICARE

## 2025-02-26 VITALS — WEIGHT: 276.44 LBS | HEIGHT: 68 IN | BODY MASS INDEX: 41.9 KG/M2

## 2025-02-26 DIAGNOSIS — E11.621 DIABETIC ULCER OF OTHER PART OF RIGHT FOOT ASSOCIATED WITH TYPE 2 DIABETES MELLITUS, WITH FAT LAYER EXPOSED: Primary | ICD-10-CM

## 2025-02-26 DIAGNOSIS — Z87.2 HEALED ULCER OF LEFT FOOT: ICD-10-CM

## 2025-02-26 DIAGNOSIS — L97.512 DIABETIC ULCER OF OTHER PART OF RIGHT FOOT ASSOCIATED WITH TYPE 2 DIABETES MELLITUS, WITH FAT LAYER EXPOSED: Primary | ICD-10-CM

## 2025-02-26 DIAGNOSIS — E11.42 DIABETIC POLYNEUROPATHY ASSOCIATED WITH TYPE 2 DIABETES MELLITUS: ICD-10-CM

## 2025-02-26 PROCEDURE — 99999 PR PBB SHADOW E&M-EST. PATIENT-LVL II: CPT | Mod: PBBFAC,,, | Performed by: PODIATRIST

## 2025-02-26 PROCEDURE — 99499 UNLISTED E&M SERVICE: CPT | Mod: S$GLB,,, | Performed by: PODIATRIST

## 2025-02-26 NOTE — PROCEDURES
"Wound Debridement    Date/Time: 2/26/2025 7:40 AM    Performed by: Jacob Bernal DPM  Authorized by: Jacob Bernal DPM    Time out: Immediately prior to procedure a "time out" was called to verify the correct patient, procedure, equipment, support staff and site/side marked as required.    Consent Done?:  Yes (Verbal)  Local anesthesia used?: No      Wound Details:    Location:  Right foot    Location:  Right 5th Metatarsal Head    Type of Debridement:  Excisional       Length (cm):  0.2       Width (cm):  0.5       Depth (cm):  0.1       Area (sq cm):  0.08       Percent Debrided (%):  100       Total Area Debrided (sq cm):  0.08    Depth of debridement:  Epidermis/Dermis    Tissue debrided:  Dermis    Devitalized tissue debrided:  Fibrin    Instruments:  Blade  Bleeding:  Minimal  Hemostasis Achieved: Yes  Method Used:  Pressure  Patient tolerance:  Patient tolerated the procedure well with no immediate complications    "

## 2025-02-26 NOTE — PROGRESS NOTES
Subjective:      Patient ID: Keerthi Chase is a 49 y.o. female.    Chief Complaint: Wound Care  Patient presents to clinic 11 weeks S/P resection of the Rt. 5th mtp joint.  She has kept the prior football dressings CDI for the past week.  Continues weightbearing only in the DARCO shoes.   Denies experiencing N/V/F/C/D.  Denies any additional pedal complaints.       Hemoglobin A1C   Date Value Ref Range Status   2025 6.7 (H) 4.0 - 5.6 % Final     Comment:     ADA Screening Guidelines:  5.7-6.4%  Consistent with prediabetes  >or=6.5%  Consistent with diabetes    High levels of fetal hemoglobin interfere with the HbA1C  assay. Heterozygous hemoglobin variants (HbS, HgC, etc)do  not significantly interfere with this assay.   However, presence of multiple variants may affect accuracy.     2024 10.0 (H) 4.0 - 5.6 % Final     Comment:     Reference Interval:  5.0 - 5.6 Normal   5.7 - 6.4 High Risk   > 6.5 Diabetic      Hgb A1c results are standardized based on the (NGSP) National   Glycohemoglobin Standardization Program.      Hemoglobin A1C levels are related to mean serum/plasma glucose   during the preceding 2-3 months.        2022 10.5 (H) 0.0 - 5.6 % Final     Comment:     Reference Interval:  5.0 - 5.6 Normal   5.7 - 6.4 High Risk   > 6.5 Diabetic      Hgb A1c results are standardized based on the (NGSP) National   Glycohemoglobin Standardization Program.      Hemoglobin A1C levels are related to mean serum/plasma glucose   during the preceding 2-3 months.              Past Medical History:   Diagnosis Date    Bell's palsy     Coronary artery disease     Pt states Dr. Virk said she did not have a blockage after a stress test.    Diabetes mellitus     GERD (gastroesophageal reflux disease)     Hypertension     Obesity        Past Surgical History:   Procedure Laterality Date     SECTION      x 2    CHOLECYSTECTOMY      ESOPHAGOGASTRODUODENOSCOPY      FOOT AMPUTATION THROUGH METATARSAL  Right 12/10/2024    Procedure: resection, FOOT, TRANSMETATARSAL, 5th;  Surgeon: Jacob Bernal DPM;  Location: [facility] OR;  Service: Podiatry;  Laterality: Right;    PERIPHERALLY INSERTED CENTRAL CATHETER INSERTION N/A 10/11/2021    Procedure: INSERTION, PICC;  Surgeon: PICC, [facility];  Location: [facility];  Service: Cardiology;  Laterality: N/A;  Dr Aiken    PERIPHERALLY INSERTED CENTRAL CATHETER INSERTION N/A 7/21/2022    Procedure: INSERTION, PICC;  Surgeon: PICC, [facility];  Location: [facility];  Service: Cardiology;  Laterality: N/A;   [facility]    SESAMOIDECTOMY Left 11/11/2021    Procedure: SESAMOIDECTOMY;  Surgeon: Jacob Bernal DPM;  Location: [facility] OR;  Service: Podiatry;  Laterality: Left;       Family History   Problem Relation Name Age of Onset    Hypertension Mother      Diabetes Maternal Aunt         Social History     Socioeconomic History    Marital status:    Tobacco Use    Smoking status: Never    Smokeless tobacco: Never   Substance and Sexual Activity    Alcohol use: No    Drug use: No     Social Drivers of Health     Financial Resource Strain: Medium Risk (2/21/2025)    Overall Financial Resource Strain (CARDIA)     Difficulty of Paying Living Expenses: Somewhat hard   Food Insecurity: Food Insecurity Present (2/21/2025)    Hunger Vital Sign     Worried About Running Out of Food in the Last Year: Sometimes true     Ran Out of Food in the Last Year: Sometimes true   Transportation Needs: No Transportation Needs (2/21/2025)    PRAPARE - Transportation     Lack of Transportation (Medical): No     Lack of Transportation (Non-Medical): No   Physical Activity: Insufficiently Active (2/21/2025)    Exercise Vital Sign     Days of Exercise per Week: 2 days     Minutes of Exercise per Session: 10 min   Stress: No Stress Concern Present (2/21/2025)    [employer] [place] [employer] - Occupational Stress Questionnaire     Feeling of Stress : Only a little   Recent Concern: Stress - Stress Concern  "Present (12/9/2024)    [employer] [place] [employer] - Occupational Stress Questionnaire     Feeling of Stress : To some extent   Housing Stability: High Risk (2/21/2025)    Housing Stability Vital Sign     Unable to Pay for Housing in the Last Year: Yes     Number of Times Moved in the Last Year: 0     Homeless in the Last Year: No       Current Outpatient Medications   Medication Sig Dispense Refill    alprazolam (XANAX) 1 MG tablet Take 1 mg by mouth 2 (two) times daily as needed for Anxiety.      atorvastatin (LIPITOR) 20 MG tablet Take 1 tablet (20 mg total) by mouth once daily. 90 tablet 3    blood sugar diagnostic Strp To check BG 3 times daily, to use with insurance preferred meter 100 strip 1    blood-glucose meter kit To check BG 3 times daily, to use with insurance preferred meter 1 each 1    blood-glucose sensor (DEXCOM G7 SENSOR) Malou Ramos every 10 days 9 each 3    empagliflozin (JARDIANCE) 10 mg tablet Take 1 tablet (10 mg total) by mouth once daily. 30 tablet 6    hydrocortisone 2.5 % cream Apply topically. (Patient not taking: Reported on 2/17/2025)      ibuprofen (ADVIL,MOTRIN) 800 MG tablet Take 800 mg by mouth every 6 (six) hours as needed.      lancets Misc To check BG 3 times daily, to use with insurance preferred meter 100 each 1    losartan (COZAAR) 25 MG tablet Take 1 tablet (25 mg total) by mouth once daily. 30 tablet 0    MOUNJARO 7.5 mg/0.5 mL PnIj Inject 7.5 mg into the skin every 7 days. 2 mL 6    pen needle, diabetic (BD ULTRA-FINE GARIMA PEN NEEDLE) 32 gauge x 5/32" Ndle Uses 2 daily, on multiple daily insulin injections 200 each 3    TOUJEO MAX U-300 SOLOSTAR 300 unit/mL (3 mL) insulin pen Inject 80 Units into the skin once daily. (Patient taking differently: Inject 80 Units into the skin once daily. 40 units in the AM, 40 units at PM) 12 mL 11     No current facility-administered medications for this visit.       Review of patient's allergies indicates:   Allergen Reactions "    Codeine Hives and Rash         Review of Systems   Constitutional: Negative for chills and fever.   Skin:  Positive for color change, nail changes and poor wound healing.   Gastrointestinal:  Negative for nausea and vomiting.   Neurological:  Positive for numbness.   Psychiatric/Behavioral:  Negative for altered mental status.            Objective:      Physical Exam  Constitutional:       Appearance: Normal appearance. She is not ill-appearing.   Cardiovascular:      Pulses:           Dorsalis pedis pulses are 2+ on the right side and 2+ on the left side.        Posterior tibial pulses are 2+ on the right side and 2+ on the left side.      Comments: CFT is < 3 seconds bilateral.  Pedal hair growth is decreased bilateral.  Mild edema noted to the Rt. Lateral forefoot.  Toes are warm to touch bilateral.    Musculoskeletal:         General: No tenderness or signs of injury.      Right lower leg: No edema.      Left lower leg: No edema.      Comments: Muscle strength 5/5 in all muscle groups bilateral.  No tenderness nor crepitation with ROM of foot/ankle joints bilateral.  Slight dorsal contracture of the Lt. Hallux.  No gross deformity noted to said digit.  (-) for pain with palpation of the Rt. Lateral 5th mtp joint wound.     Skin:     General: Skin is warm and dry.      Capillary Refill: Capillary refill takes 2 to 3 seconds.      Findings: Wound present. No bruising, ecchymosis, erythema, signs of injury, laceration, lesion, petechiae or rash.      Comments: Location: Surgical wound noted to the Rt. Lateral sub 5th met head.    Base: Down to dermis and comprised of fibrin.      Drainage: None  Malinda wound: Devoid of edema, erythema, malodor, fluctuance and purulence.    Pre-debridement measurement:  0.2 x 0.5 x 0.1cm   Post-debridement measurement: 0.4 x 0.7 x 0.1cm      Mature epithelium noted to the Lt. Sub 2nd met head.     Neurological:      Mental Status: She is alert.      Sensory: Sensory deficit  present.      Motor: No weakness.      Comments: Decreased protective sensation noted bilateral.  Light touch is absent bilateral.               Assessment:       Encounter Diagnoses   Name Primary?    Diabetic ulcer of other part of right foot associated with type 2 diabetes mellitus, with fat layer exposed Yes    Diabetic polyneuropathy associated with type 2 diabetes mellitus     Healed ulcer of left foot                      Plan:       Keerthi was seen today for wound care.    Diagnoses and all orders for this visit:    Diabetic ulcer of other part of right foot associated with type 2 diabetes mellitus, with fat layer exposed  -     Wound Debridement    Diabetic polyneuropathy associated with type 2 diabetes mellitus    Healed ulcer of left foot          I counseled the patient on her conditions, their implications and medical management.      Performed a selective excisional debridement of the Rt. foot/ankle.  See attached procedure note.       Affinity skin graft applied to the Rt. Lateral forefoot. The graft was secured with steri strips, mepitel, and secured with cast padding and an ACE bandage.  A football dressing was then applied to the Lt. In addition to felt padding.     To keep bilateral football dressings CDI x 1 week.     To continue wearing shoe gear that minimizes pressure to the site.     Application for Assessment: Rt. forefoot  Skin Substitute: Affinity  Lot#: [id number]  Exp: 2/27/25  UPC:[id number]  Performed By: Jacob Bernal DPM  Time-out Taken: Yes  Location:                           [x] Genitalia/Hands/Feet/Multiple Digits                          [] Scalp/Face/Neck/Ears                          []Trunk/Arms/Legs  Application Area: (sq cm): 0.1 sq cm  Product Applied: (sq cm): 6.25 sq cm  Product Waste (sq cm): 0 sq cm  Fenestrated: No              Instrument:                          [] Blade          [] Curette        []Forceps       []Nippers                          [] Rongeur      [] Scissors       []Others:   Secured: yes              Secured with: Steri-strips  Dressing Applied: Yes  Response to Treatment:Well tolerated by patient.      RTC in 1 week for wound check.     Jacob Bernal DPM

## 2025-02-28 ENCOUNTER — PATIENT MESSAGE (OUTPATIENT)
Dept: CARDIOLOGY | Facility: HOSPITAL | Age: 50
End: 2025-02-28
Payer: MEDICARE

## 2025-03-05 ENCOUNTER — HOSPITAL ENCOUNTER (OUTPATIENT)
Dept: RADIOLOGY | Facility: HOSPITAL | Age: 50
Discharge: HOME | End: 2025-03-05
Attending: INTERNAL MEDICINE
Payer: MEDICARE

## 2025-03-05 ENCOUNTER — HOSPITAL ENCOUNTER (OUTPATIENT)
Dept: CARDIOLOGY | Facility: HOSPITAL | Age: 50
Discharge: HOME | End: 2025-03-05
Attending: INTERNAL MEDICINE
Payer: MEDICARE

## 2025-03-05 ENCOUNTER — OFFICE VISIT (OUTPATIENT)
Dept: PODIATRY | Facility: CLINIC | Age: 50
End: 2025-03-05
Payer: MEDICARE

## 2025-03-05 VITALS — WEIGHT: 278 LBS | HEIGHT: 68 IN | BODY MASS INDEX: 42.13 KG/M2

## 2025-03-05 VITALS — BODY MASS INDEX: 41.9 KG/M2 | HEIGHT: 68 IN | WEIGHT: 276.44 LBS

## 2025-03-05 DIAGNOSIS — E11.621 DIABETIC ULCER OF OTHER PART OF RIGHT FOOT ASSOCIATED WITH TYPE 2 DIABETES MELLITUS, WITH FAT LAYER EXPOSED: Primary | ICD-10-CM

## 2025-03-05 DIAGNOSIS — L97.512 DIABETIC ULCER OF OTHER PART OF RIGHT FOOT ASSOCIATED WITH TYPE 2 DIABETES MELLITUS, WITH FAT LAYER EXPOSED: Primary | ICD-10-CM

## 2025-03-05 DIAGNOSIS — I25.10 CORONARY ARTERY DISEASE INVOLVING NATIVE CORONARY ARTERY OF NATIVE HEART WITHOUT ANGINA PECTORIS: ICD-10-CM

## 2025-03-05 DIAGNOSIS — E11.42 DIABETIC POLYNEUROPATHY ASSOCIATED WITH TYPE 2 DIABETES MELLITUS: ICD-10-CM

## 2025-03-05 DIAGNOSIS — R07.9 CHEST PAIN, UNSPECIFIED TYPE: ICD-10-CM

## 2025-03-05 DIAGNOSIS — Z87.2 HEALED ULCER OF LEFT FOOT: ICD-10-CM

## 2025-03-05 DIAGNOSIS — I10 PRIMARY HYPERTENSION: ICD-10-CM

## 2025-03-05 LAB
CV PHARM DOSE: 0.4 MG
CV STRESS BASE HR: 65 BPM
DIASTOLIC BLOOD PRESSURE: 68 MMHG
NUC STRESS EJECTION FRACTION: 61 %
OHS CV CPX 1 MINUTE RECOVERY HEART RATE: 96 BPM
OHS CV CPX 85 PERCENT MAX PREDICTED HEART RATE MALE: 145
OHS CV CPX MAX PREDICTED HEART RATE: 170
OHS CV CPX PATIENT IS FEMALE: 1
OHS CV CPX PATIENT IS MALE: 0
OHS CV CPX PEAK DIASTOLIC BLOOD PRESSURE: 81 MMHG
OHS CV CPX PEAK HEAR RATE: 98 BPM
OHS CV CPX PEAK RATE PRESSURE PRODUCT: NORMAL
OHS CV CPX PEAK SYSTOLIC BLOOD PRESSURE: 160 MMHG
OHS CV CPX PERCENT MAX PREDICTED HEART RATE ACHIEVED: 60
OHS CV CPX RATE PRESSURE PRODUCT PRESENTING: NORMAL
OHS CV INITIAL DOSE: 15 MCG/KG/MIN
OHS CV PEAK DOSE: 49 MCG/KG/MIN
OHS CV PHARM TIME: 1340 MIN
SYSTOLIC BLOOD PRESSURE: 158 MMHG

## 2025-03-05 PROCEDURE — 93016 CV STRESS TEST SUPVJ ONLY: CPT | Mod: ,,, | Performed by: INTERNAL MEDICINE

## 2025-03-05 PROCEDURE — 99999 PR PBB SHADOW E&M-EST. PATIENT-LVL II: CPT | Mod: PBBFAC,,, | Performed by: PODIATRIST

## 2025-03-05 PROCEDURE — 78452 HT MUSCLE IMAGE SPECT MULT: CPT | Mod: 26,,, | Performed by: INTERNAL MEDICINE

## 2025-03-05 PROCEDURE — 93017 CV STRESS TEST TRACING ONLY: CPT | Mod: PO

## 2025-03-05 PROCEDURE — A9502 TC99M TETROFOSMIN: HCPCS | Mod: PO | Performed by: INTERNAL MEDICINE

## 2025-03-05 PROCEDURE — 78452 HT MUSCLE IMAGE SPECT MULT: CPT | Mod: PO

## 2025-03-05 PROCEDURE — 63600175 PHARM REV CODE 636 W HCPCS: Mod: PO | Performed by: INTERNAL MEDICINE

## 2025-03-05 PROCEDURE — 93018 CV STRESS TEST I&R ONLY: CPT | Mod: ,,, | Performed by: INTERNAL MEDICINE

## 2025-03-05 RX ORDER — REGADENOSON 0.08 MG/ML
0.4 INJECTION, SOLUTION INTRAVENOUS
Status: COMPLETED | OUTPATIENT
Start: 2025-03-05 | End: 2025-03-05

## 2025-03-05 RX ADMIN — TETROFOSMIN 49 MILLICURIE: 1.38 INJECTION, POWDER, LYOPHILIZED, FOR SOLUTION INTRAVENOUS at 01:03

## 2025-03-05 RX ADMIN — REGADENOSON 0.4 MG: 0.08 INJECTION, SOLUTION INTRAVENOUS at 01:03

## 2025-03-05 RX ADMIN — TETROFOSMIN 15 MILLICURIE: 1.38 INJECTION, POWDER, LYOPHILIZED, FOR SOLUTION INTRAVENOUS at 12:03

## 2025-03-05 NOTE — PROGRESS NOTES
Subjective:      Patient ID: Keerthi Chase is a 49 y.o. female.    Chief Complaint: Wound Care  Patient presents to clinic 12 weeks S/P resection of the Rt. 5th mtp joint.  She has kept the prior football dressings CDI for the past week.  Continues weightbearing only in the DAR[employer] shoes.   Denies experiencing N/V/F/C/D.  Denies any additional pedal complaints.       Hemoglobin A1C   Date Value Ref Range Status   2025 6.7 (H) 4.0 - 5.6 % Final     Comment:     ADA Screening Guidelines:  5.7-6.4%  Consistent with prediabetes  >or=6.5%  Consistent with diabetes    High levels of fetal hemoglobin interfere with the HbA1C  assay. Heterozygous hemoglobin variants (HbS, HgC, etc)do  not significantly interfere with this assay.   However, presence of multiple variants may affect accuracy.     2024 10.0 (H) 4.0 - 5.6 % Final     Comment:     Reference Interval:  5.0 - 5.6 Normal   5.7 - 6.4 High Risk   > 6.5 Diabetic      Hgb A1c results are standardized based on the (NGSP) National   Glycohemoglobin Standardization Program.      Hemoglobin A1C levels are related to mean serum/plasma glucose   during the preceding 2-3 months.        2022 10.5 (H) 0.0 - 5.6 % Final     Comment:     Reference Interval:  5.0 - 5.6 Normal   5.7 - 6.4 High Risk   > 6.5 Diabetic      Hgb A1c results are standardized based on the (NGSP) National   Glycohemoglobin Standardization Program.      Hemoglobin A1C levels are related to mean serum/plasma glucose   during the preceding 2-3 months.              Past Medical History:   Diagnosis Date    Bell's palsy     Coronary artery disease     Pt states Dr. Virk said she did not have a blockage after a stress test.    Diabetes mellitus     GERD (gastroesophageal reflux disease)     Hypertension     Obesity        Past Surgical History:   Procedure Laterality Date     SECTION      x 2    CHOLECYSTECTOMY      ESOPHAGOGASTRODUODENOSCOPY      FOOT AMPUTATION THROUGH METATARSAL  Right 12/10/2024    Procedure: resection, FOOT, TRANSMETATARSAL, 5th;  Surgeon: Jacob Bernal DPM;  Location: [facility] OR;  Service: Podiatry;  Laterality: Right;    PERIPHERALLY INSERTED CENTRAL CATHETER INSERTION N/A 10/11/2021    Procedure: INSERTION, PICC;  Surgeon: PICC, [facility];  Location: [facility];  Service: Cardiology;  Laterality: N/A;  Dr Aiken    PERIPHERALLY INSERTED CENTRAL CATHETER INSERTION N/A 7/21/2022    Procedure: INSERTION, PICC;  Surgeon: PICC, [facility];  Location: [facility];  Service: Cardiology;  Laterality: N/A;   [facility]    SESAMOIDECTOMY Left 11/11/2021    Procedure: SESAMOIDECTOMY;  Surgeon: Jacob Bernal DPM;  Location: [facility] OR;  Service: Podiatry;  Laterality: Left;       Family History   Problem Relation Name Age of Onset    Hypertension Mother      Diabetes Maternal Aunt         Social History     Socioeconomic History    Marital status:    Tobacco Use    Smoking status: Never    Smokeless tobacco: Never   Substance and Sexual Activity    Alcohol use: No    Drug use: No     Social Drivers of Health     Financial Resource Strain: Medium Risk (2/21/2025)    Overall Financial Resource Strain (CARDIA)     Difficulty of Paying Living Expenses: Somewhat hard   Food Insecurity: Food Insecurity Present (2/21/2025)    Hunger Vital Sign     Worried About Running Out of Food in the Last Year: Sometimes true     Ran Out of Food in the Last Year: Sometimes true   Transportation Needs: No Transportation Needs (2/21/2025)    PRAPARE - Transportation     Lack of Transportation (Medical): No     Lack of Transportation (Non-Medical): No   Physical Activity: Insufficiently Active (2/21/2025)    Exercise Vital Sign     Days of Exercise per Week: 2 days     Minutes of Exercise per Session: 10 min   Stress: No Stress Concern Present (2/21/2025)    [employer] [place] [employer]ational [employer] - Occupational Stress Questionnaire     Feeling of Stress : Only a little   Recent Concern: Stress - Stress Concern  "Present (12/9/2024)    [employer] [place] [employer] - Occupational Stress Questionnaire     Feeling of Stress : To some extent   Housing Stability: High Risk (2/21/2025)    Housing Stability Vital Sign     Unable to Pay for Housing in the Last Year: Yes     Number of Times Moved in the Last Year: 0     Homeless in the Last Year: No       Current Outpatient Medications   Medication Sig Dispense Refill    alprazolam (XANAX) 1 MG tablet Take 1 mg by mouth 2 (two) times daily as needed for Anxiety.      atorvastatin (LIPITOR) 20 MG tablet Take 1 tablet (20 mg total) by mouth once daily. 90 tablet 3    blood sugar diagnostic Strp To check BG 3 times daily, to use with insurance preferred meter 100 strip 1    blood-glucose meter kit To check BG 3 times daily, to use with insurance preferred meter 1 each 1    blood-glucose sensor (DEXCOM G7 SENSOR) Malou Ramos every 10 days 9 each 3    empagliflozin (JARDIANCE) 10 mg tablet Take 1 tablet (10 mg total) by mouth once daily. 30 tablet 6    hydrocortisone 2.5 % cream Apply topically. (Patient not taking: Reported on 2/17/2025)      ibuprofen (ADVIL,MOTRIN) 800 MG tablet Take 800 mg by mouth every 6 (six) hours as needed.      lancets Misc To check BG 3 times daily, to use with insurance preferred meter 100 each 1    losartan (COZAAR) 25 MG tablet Take 1 tablet (25 mg total) by mouth once daily. 30 tablet 0    MOUNJARO 7.5 mg/0.5 mL PnIj Inject 7.5 mg into the skin every 7 days. 2 mL 6    pen needle, diabetic (BD ULTRA-FINE GARIMA PEN NEEDLE) 32 gauge x 5/32" Ndle Uses 2 daily, on multiple daily insulin injections 200 each 3    TOUJEO MAX U-300 SOLOSTAR 300 unit/mL (3 mL) insulin pen Inject 80 Units into the skin once daily. (Patient taking differently: Inject 80 Units into the skin once daily. 40 units in the AM, 40 units at PM) 12 mL 11     No current facility-administered medications for this visit.       Review of patient's allergies indicates:   Allergen Reactions "    Codeine Hives and Rash         Review of Systems   Constitutional: Negative for chills and fever.   Skin:  Positive for color change, nail changes and poor wound healing.   Gastrointestinal:  Negative for nausea and vomiting.   Neurological:  Positive for numbness.   Psychiatric/Behavioral:  Negative for altered mental status.            Objective:      Physical Exam  Constitutional:       Appearance: Normal appearance. She is not ill-appearing.   Cardiovascular:      Pulses:           Dorsalis pedis pulses are 2+ on the right side and 2+ on the left side.        Posterior tibial pulses are 2+ on the right side and 2+ on the left side.      Comments: CFT is < 3 seconds bilateral.  Pedal hair growth is decreased bilateral.  Mild edema noted to the Rt. Lateral forefoot.  Toes are warm to touch bilateral.    Musculoskeletal:         General: No tenderness or signs of injury.      Right lower leg: No edema.      Left lower leg: No edema.      Comments: Muscle strength 5/5 in all muscle groups bilateral.  No tenderness nor crepitation with ROM of foot/ankle joints bilateral.  Slight dorsal contracture of the Lt. Hallux.  No gross deformity noted to said digit.  (-) for pain with palpation of the Rt. Lateral 5th mtp joint wound.     Skin:     General: Skin is warm and dry.      Capillary Refill: Capillary refill takes 2 to 3 seconds.      Findings: Wound present. No bruising, ecchymosis, erythema, signs of injury, laceration, lesion, petechiae or rash.      Comments: Location: Surgical wound noted to the Rt. Lateral sub 5th met head.    Base: Down to dermis and comprised of fibrin.      Drainage: None  Malinda wound: Devoid of edema, erythema, malodor, fluctuance and purulence.    Pre-debridement measurement:  0.2 x 0.2 x 0.1cm   Post-debridement measurement: 0.4 x 0.4 x 0.1cm      Mature epithelium noted to the Lt. Sub 2nd met head.     Neurological:      Mental Status: She is alert.      Sensory: Sensory deficit  present.      Motor: No weakness.      Comments: Decreased protective sensation noted bilateral.  Light touch is absent bilateral.               Assessment:       Encounter Diagnoses   Name Primary?    Diabetic ulcer of other part of right foot associated with type 2 diabetes mellitus, with fat layer exposed Yes    Healed ulcer of left foot     Diabetic polyneuropathy associated with type 2 diabetes mellitus                        Plan:       Keerthi was seen today for wound care.    Diagnoses and all orders for this visit:    Diabetic ulcer of other part of right foot associated with type 2 diabetes mellitus, with fat layer exposed  -     Wound Debridement    Healed ulcer of left foot    Diabetic polyneuropathy associated with type 2 diabetes mellitus            I counseled the patient on her conditions, their implications and medical management.      Performed a selective excisional debridement of the Rt. foot/ankle.  See attached procedure note.       The Rt. Lateral forefoot wound was covered with silver alginate, and a football dressing was then applied bilateral.     To keep bilateral football dressings CDI x 1 week.     To continue wearing shoe gear that minimizes pressure to the site.     RTC in 1 week for wound check.     Jacob Bernal DPM

## 2025-03-05 NOTE — PROCEDURES
"Wound Debridement    Date/Time: 3/5/2025 7:20 AM    Performed by: Jacob Bernal DPM  Authorized by: Jacob Bernal DPM    Time out: Immediately prior to procedure a "time out" was called to verify the correct patient, procedure, equipment, support staff and site/side marked as required.    Consent Done?:  Yes (Verbal)  Local anesthesia used?: No      Wound Details:    Location:  Right foot    Location:  Right 5th Metatarsal Head    Type of Debridement:  Excisional       Length (cm):  0.2       Width (cm):  0.2       Depth (cm):  0.1       Area (sq cm):  0.03       Percent Debrided (%):  100       Total Area Debrided (sq cm):  0.03    Depth of debridement:  Epidermis/Dermis    Tissue debrided:  Dermis    Devitalized tissue debrided:  Fibrin    Instruments:  Blade  Bleeding:  Minimal  Hemostasis Achieved: Yes  Method Used:  Pressure  Patient tolerance:  Patient tolerated the procedure well with no immediate complications    "

## 2025-03-11 ENCOUNTER — HOSPITAL ENCOUNTER (OUTPATIENT)
Dept: CARDIOLOGY | Facility: HOSPITAL | Age: 50
Discharge: HOME | End: 2025-03-11
Attending: INTERNAL MEDICINE
Payer: MEDICARE

## 2025-03-11 VITALS — WEIGHT: 278 LBS | BODY MASS INDEX: 42.13 KG/M2 | HEIGHT: 68 IN

## 2025-03-11 DIAGNOSIS — R07.9 CHEST PAIN, UNSPECIFIED TYPE: ICD-10-CM

## 2025-03-11 DIAGNOSIS — I73.9 CLAUDICATION: ICD-10-CM

## 2025-03-11 DIAGNOSIS — I25.10 CORONARY ARTERY DISEASE INVOLVING NATIVE CORONARY ARTERY OF NATIVE HEART WITHOUT ANGINA PECTORIS: ICD-10-CM

## 2025-03-11 DIAGNOSIS — I10 PRIMARY HYPERTENSION: ICD-10-CM

## 2025-03-11 PROCEDURE — 93925 LOWER EXTREMITY STUDY: CPT | Mod: PO

## 2025-03-11 PROCEDURE — 93306 TTE W/DOPPLER COMPLETE: CPT | Mod: PO

## 2025-03-11 PROCEDURE — 93306 TTE W/DOPPLER COMPLETE: CPT | Mod: 26,,, | Performed by: INTERNAL MEDICINE

## 2025-03-11 PROCEDURE — 93925 LOWER EXTREMITY STUDY: CPT | Mod: 26,,, | Performed by: INTERNAL MEDICINE

## 2025-03-12 ENCOUNTER — OFFICE VISIT (OUTPATIENT)
Dept: PODIATRY | Facility: CLINIC | Age: 50
End: 2025-03-12
Payer: MEDICARE

## 2025-03-12 VITALS — HEIGHT: 68 IN | BODY MASS INDEX: 42.13 KG/M2 | WEIGHT: 278 LBS

## 2025-03-12 DIAGNOSIS — Z87.2 HEALED ULCER OF LEFT FOOT: ICD-10-CM

## 2025-03-12 DIAGNOSIS — E11.621 DIABETIC ULCER OF OTHER PART OF RIGHT FOOT ASSOCIATED WITH TYPE 2 DIABETES MELLITUS, WITH FAT LAYER EXPOSED: Primary | ICD-10-CM

## 2025-03-12 DIAGNOSIS — L97.512 DIABETIC ULCER OF OTHER PART OF RIGHT FOOT ASSOCIATED WITH TYPE 2 DIABETES MELLITUS, WITH FAT LAYER EXPOSED: Primary | ICD-10-CM

## 2025-03-12 DIAGNOSIS — E11.42 DIABETIC POLYNEUROPATHY ASSOCIATED WITH TYPE 2 DIABETES MELLITUS: ICD-10-CM

## 2025-03-12 LAB
AV INDEX (PROSTH): 0.72
AV MEAN GRADIENT: 7 MMHG
AV PEAK GRADIENT: 14 MMHG
AV VALVE AREA BY VELOCITY RATIO: 2.6 CM²
AV VALVE AREA: 2.5 CM²
AV VELOCITY RATIO: 0.74
BSA FOR ECHO PROCEDURE: 2.46 M2
DOP CALC AO PEAK VEL: 1.9 M/S
DOP CALC AO VTI: 43.6 CM
DOP CALC LVOT AREA: 3.5 CM2
DOP CALC LVOT DIAMETER: 2.1 CM
DOP CALC LVOT PEAK VEL: 1.4 M/S
DOP CALC LVOT STROKE VOLUME: 108.7 CM3
DOP CALCLVOT PEAK VEL VTI: 31.4 CM
E WAVE DECELERATION TIME: 229 MSEC
E/A RATIO: 1.06
E/E' RATIO: 14 M/S
FRACTIONAL SHORTENING: 32.7 % (ref 28–44)
IVRT: 83 MSEC
LEFT ANT TIBIAL SYS PSV: 115 CM/S
LEFT ATRIUM AREA SYSTOLIC (APICAL 2 CHAMBER): 20.36 CM2
LEFT ATRIUM AREA SYSTOLIC (APICAL 4 CHAMBER): 21.96 CM2
LEFT ATRIUM SIZE: 4 CM
LEFT ATRIUM VOLUME INDEX MOD: 27 ML/M2
LEFT ATRIUM VOLUME MOD: 64 ML
LEFT CFA PSV: 164 CM/S
LEFT INTERNAL DIMENSION IN SYSTOLE: 3.7 CM (ref 2.1–4)
LEFT PERONEAL SYS PSV: 22 CM/S
LEFT POPLITEAL PSV: 100 CM/S
LEFT POST TIBIAL SYS PSV: 88 CM/S
LEFT PROFUNDA SYS PSV: 75 CM/S
LEFT SUPER FEMORAL DIST SYS PSV: 108 CM/S
LEFT SUPER FEMORAL MID SYS PSV: 115 CM/S
LEFT SUPER FEMORAL OSTIAL SYS PSV: 151 CM/S
LEFT SUPER FEMORAL PROX SYS PSV: 108 CM/S
LEFT TIB/PER TRUNK SYS PSV: 110 CM/S
LEFT VENTRICLE DIASTOLIC VOLUME INDEX: 63.4 ML/M2
LEFT VENTRICLE DIASTOLIC VOLUME: 149 ML
LEFT VENTRICLE END SYSTOLIC VOLUME APICAL 2 CHAMBER: 59.26 ML
LEFT VENTRICLE END SYSTOLIC VOLUME APICAL 4 CHAMBER: 66.74 ML
LEFT VENTRICLE SYSTOLIC VOLUME INDEX: 24.7 ML/M2
LEFT VENTRICLE SYSTOLIC VOLUME: 58 ML
LEFT VENTRICULAR INTERNAL DIMENSION IN DIASTOLE: 5.5 CM (ref 3.5–6)
LV LATERAL E/E' RATIO: 15.4 M/S
LV SEPTAL E/E' RATIO: 13.5 M/S
LVED V (TEICH): 149.04 ML
LVES V (TEICH): 58.19 ML
LVOT MG: 3.68 MMHG
LVOT MV: 0.87 CM/S
MV PEAK A VEL: 1.02 M/S
MV PEAK E VEL: 1.08 M/S
MV STENOSIS PRESSURE HALF TIME: 66.43 MS
MV VALVE AREA P 1/2 METHOD: 3.31 CM2
OHS CV LEFT LOWER EXTREMITY ABI (NO CALC): 1.22
OHS CV RIGHT ABI LOWER EXTREMITY (NO CALC): 1.26
OHS CV RV/LV RATIO: 0.64 CM
PISA TR MAX VEL: 1.7 M/S
PULM VEIN S/D RATIO: 1.19
PV PEAK D VEL: 0.59 M/S
PV PEAK S VEL: 0.7 M/S
RA PRESSURE ESTIMATED: 3 MMHG
RIGHT ANT TIBIAL SYS PSV: 143 CM/S
RIGHT CFA PSV: 145 CM/S
RIGHT PERONEAL SYS PSV: 58 CM/S
RIGHT POPLITEAL PSV: 107 CM/S
RIGHT POST TIBIAL SYS PSV: 137 CM/S
RIGHT PROFUNDA SYS PSV: 89 CM/S
RIGHT SUPER FEMORAL DIST SYS PSV: 114 CM/S
RIGHT SUPER FEMORAL MID SYS PSV: 114 CM/S
RIGHT SUPER FEMORAL OSTIAL SYS PSV: 143 CM/S
RIGHT SUPER FEMORAL PROX SYS PSV: 130 CM/S
RIGHT TIB/PER TRUNK SYS PSV: 112 CM/S
RIGHT VENTRICLE DIASTOLIC BASEL DIMENSION: 3.5 CM
RIGHT VENTRICLE DIASTOLIC LENGTH: 6.8 CM
RIGHT VENTRICLE DIASTOLIC MID DIMENSION: 2.3 CM
RIGHT VENTRICULAR END-DIASTOLIC DIMENSION: 3.5 CM
RIGHT VENTRICULAR LENGTH IN DIASTOLE (APICAL 4-CHAMBER VIEW): 6.84 CM
RV MID DIAMA: 2.3 CM
RV TB RVSP: 5 MMHG
RV TISSUE DOPPLER FREE WALL SYSTOLIC VELOCITY 1 (APICAL 4 CHAMBER VIEW): 17.12 CM/S
SINUS: 2.78 CM
STJ: 2.51 CM
TDI LATERAL: 0.07 M/S
TDI SEPTAL: 0.08 M/S
TDI: 0.08 M/S
TR MAX PG: 11 MMHG
TRICUSPID ANNULAR PLANE SYSTOLIC EXCURSION: 3.1 CM
TV REST PULMONARY ARTERY PRESSURE: 15 MMHG
Z-SCORE OF LEFT VENTRICULAR DIMENSION IN END DIASTOLE: -5.81
Z-SCORE OF LEFT VENTRICULAR DIMENSION IN END SYSTOLE: -3.7

## 2025-03-12 PROCEDURE — 99999 PR PBB SHADOW E&M-EST. PATIENT-LVL II: CPT | Mod: PBBFAC,,, | Performed by: PODIATRIST

## 2025-03-12 PROCEDURE — 99499 UNLISTED E&M SERVICE: CPT | Mod: S$GLB,,, | Performed by: PODIATRIST

## 2025-03-12 PROCEDURE — 97597 DBRDMT OPN WND 1ST 20 CM/<: CPT | Mod: S$GLB,,, | Performed by: PODIATRIST

## 2025-03-12 NOTE — PROCEDURES
"Wound Debridement    Date/Time: 3/12/2025 7:20 AM    Performed by: Jacob Bernal DPM  Authorized by: Jacob Bernal DPM    Time out: Immediately prior to procedure a "time out" was called to verify the correct patient, procedure, equipment, support staff and site/side marked as required.    Consent Done?:  Yes (Verbal)  Local anesthesia used?: No      Wound Details:    Location:  Right foot    Location:  Right 5th Metatarsal Head    Type of Debridement:  Excisional       Length (cm):  0.1       Width (cm):  0.1       Depth (cm):  0.1       Area (sq cm):  0.01       Percent Debrided (%):  100       Total Area Debrided (sq cm):  0.01    Depth of debridement:  Epidermis/Dermis    Tissue debrided:  Dermis    Devitalized tissue debrided:  Fibrin    Instruments:  Blade  Bleeding:  Minimal  Hemostasis Achieved: Yes  Method Used:  Pressure  Patient tolerance:  Patient tolerated the procedure well with no immediate complications    "

## 2025-03-12 NOTE — PROGRESS NOTES
Subjective:      Patient ID: Keerthi Chase is a 49 y.o. female.    Chief Complaint: Wound Care  Patient presents to clinic for a 1 week wound check of the Rt. Forefoot.   She has kept the prior football dressings CDI for the past week.  Continues weightbearing only in the DARCO shoes.   Denies experiencing N/V/F/C/D.  Denies any additional pedal complaints.       Hemoglobin A1C   Date Value Ref Range Status   2025 6.7 (H) 4.0 - 5.6 % Final     Comment:     ADA Screening Guidelines:  5.7-6.4%  Consistent with prediabetes  >or=6.5%  Consistent with diabetes    High levels of fetal hemoglobin interfere with the HbA1C  assay. Heterozygous hemoglobin variants (HbS, HgC, etc)do  not significantly interfere with this assay.   However, presence of multiple variants may affect accuracy.     2024 10.0 (H) 4.0 - 5.6 % Final     Comment:     Reference Interval:  5.0 - 5.6 Normal   5.7 - 6.4 High Risk   > 6.5 Diabetic      Hgb A1c results are standardized based on the (NGSP) National   Glycohemoglobin Standardization Program.      Hemoglobin A1C levels are related to mean serum/plasma glucose   during the preceding 2-3 months.        2022 10.5 (H) 0.0 - 5.6 % Final     Comment:     Reference Interval:  5.0 - 5.6 Normal   5.7 - 6.4 High Risk   > 6.5 Diabetic      Hgb A1c results are standardized based on the (NGSP) National   Glycohemoglobin Standardization Program.      Hemoglobin A1C levels are related to mean serum/plasma glucose   during the preceding 2-3 months.              Past Medical History:   Diagnosis Date    Bell's palsy     Coronary artery disease     Pt states Dr. Virk said she did not have a blockage after a stress test.    Diabetes mellitus     GERD (gastroesophageal reflux disease)     Hypertension     Obesity        Past Surgical History:   Procedure Laterality Date     SECTION      x 2    CHOLECYSTECTOMY      ESOPHAGOGASTRODUODENOSCOPY      FOOT AMPUTATION THROUGH METATARSAL  Right 12/10/2024    Procedure: resection, FOOT, TRANSMETATARSAL, 5th;  Surgeon: Jacob Bernal DPM;  Location: [facility] OR;  Service: Podiatry;  Laterality: Right;    PERIPHERALLY INSERTED CENTRAL CATHETER INSERTION N/A 10/11/2021    Procedure: INSERTION, PICC;  Surgeon: PICC, [facility];  Location: [facility];  Service: Cardiology;  Laterality: N/A;  Dr Aiken    PERIPHERALLY INSERTED CENTRAL CATHETER INSERTION N/A 7/21/2022    Procedure: INSERTION, PICC;  Surgeon: PICC, [facility];  Location: [facility];  Service: Cardiology;  Laterality: N/A;   Gracy    SESAMOIDECTOMY Left 11/11/2021    Procedure: SESAMOIDECTOMY;  Surgeon: Jacob Bernal DPM;  Location: [facility] OR;  Service: Podiatry;  Laterality: Left;       Family History   Problem Relation Name Age of Onset    Hypertension Mother      Diabetes Maternal Aunt         Social History     Socioeconomic History    Marital status:    Tobacco Use    Smoking status: Never    Smokeless tobacco: Never   Substance and Sexual Activity    Alcohol use: No    Drug use: No     Social Drivers of Health     Financial Resource Strain: Medium Risk (2/21/2025)    Overall Financial Resource Strain (CARDIA)     Difficulty of Paying Living Expenses: Somewhat hard   Food Insecurity: Food Insecurity Present (2/21/2025)    Hunger Vital Sign     Worried About Running Out of Food in the Last Year: Sometimes true     Ran Out of Food in the Last Year: Sometimes true   Transportation Needs: No Transportation Needs (2/21/2025)    PRAPARE - Transportation     Lack of Transportation (Medical): No     Lack of Transportation (Non-Medical): No   Physical Activity: Insufficiently Active (2/21/2025)    Exercise Vital Sign     Days of Exercise per Week: 2 days     Minutes of Exercise per Session: 10 min   Stress: No Stress Concern Present (2/21/2025)    [place] [employer] - Occupational Stress Questionnaire     Feeling of Stress : Only a little   Recent Concern: Stress - Stress Concern  "Present (12/9/2024)    [place] [employer]ational [employer] - Occupational Stress Questionnaire     Feeling of Stress : To some extent   Housing Stability: High Risk (2/21/2025)    Housing Stability Vital Sign     Unable to Pay for Housing in the Last Year: Yes     Number of Times Moved in the Last Year: 0     Homeless in the Last Year: No       Current Outpatient Medications   Medication Sig Dispense Refill    alprazolam (XANAX) 1 MG tablet Take 1 mg by mouth 2 (two) times daily as needed for Anxiety.      atorvastatin (LIPITOR) 20 MG tablet Take 1 tablet (20 mg total) by mouth once daily. 90 tablet 3    blood sugar diagnostic Strp To check BG 3 times daily, to use with insurance preferred meter 100 strip 1    blood-glucose meter kit To check BG 3 times daily, to use with insurance preferred meter 1 each 1    blood-glucose sensor (DEXCOM G7 SENSOR) Malou Ramos every 10 days 9 each 3    empagliflozin (JARDIANCE) 10 mg tablet Take 1 tablet (10 mg total) by mouth once daily. 30 tablet 6    hydrocortisone 2.5 % cream Apply topically. (Patient not taking: Reported on 2/17/2025)      ibuprofen (ADVIL,MOTRIN) 800 MG tablet Take 800 mg by mouth every 6 (six) hours as needed.      lancets Misc To check BG 3 times daily, to use with insurance preferred meter 100 each 1    losartan (COZAAR) 25 MG tablet Take 1 tablet (25 mg total) by mouth once daily. 30 tablet 0    MOUNJARO 7.5 mg/0.5 mL PnIj Inject 7.5 mg into the skin every 7 days. 2 mL 6    pen needle, diabetic (BD ULTRA-FINE GARIMA PEN NEEDLE) 32 gauge x 5/32" Ndle Uses 2 daily, on multiple daily insulin injections 200 each 3    TOUJEO MAX U-300 SOLOSTAR 300 unit/mL (3 mL) insulin pen Inject 80 Units into the skin once daily. (Patient taking differently: Inject 80 Units into the skin once daily. 40 units in the AM, 40 units at PM) 12 mL 11     No current facility-administered medications for this visit.       Review of patient's allergies indicates:   Allergen Reactions "    Codeine Hives and Rash         Review of Systems   Constitutional: Negative for chills and fever.   Skin:  Positive for color change, nail changes and poor wound healing.   Gastrointestinal:  Negative for nausea and vomiting.   Neurological:  Positive for numbness.   Psychiatric/Behavioral:  Negative for altered mental status.            Objective:      Physical Exam  Constitutional:       Appearance: Normal appearance. She is not ill-appearing.   Cardiovascular:      Pulses:           Dorsalis pedis pulses are 2+ on the right side and 2+ on the left side.        Posterior tibial pulses are 2+ on the right side and 2+ on the left side.      Comments: CFT is < 3 seconds bilateral.  Pedal hair growth is decreased bilateral.  Mild edema noted to the Rt. Lateral forefoot.  Toes are warm to touch bilateral.    Musculoskeletal:         General: No tenderness or signs of injury.      Right lower leg: No edema.      Left lower leg: No edema.      Comments: Muscle strength 5/5 in all muscle groups bilateral.  No tenderness nor crepitation with ROM of foot/ankle joints bilateral.  Slight dorsal contracture of the Lt. Hallux.  No gross deformity noted to said digit.  (-) for pain with palpation of the Rt. Lateral 5th mtp joint wound.     Skin:     General: Skin is warm and dry.      Capillary Refill: Capillary refill takes 2 to 3 seconds.      Findings: Wound present. No bruising, ecchymosis, erythema, signs of injury, laceration, lesion, petechiae or rash.      Comments: Location: Surgical wound noted to the Rt. Lateral sub 5th met head.    Base: Down to dermis and comprised of fibrin.      Drainage: None  Malinda wound: Devoid of edema, erythema, malodor, fluctuance and purulence.    Pre-debridement measurement:  0.1 x 0.1 x 0.1cm   Post-debridement measurement: 0.3 x 0.3 x 0.1cm      Mature epithelium noted to the Lt. Sub 2nd met head.     Neurological:      Mental Status: She is alert.      Sensory: Sensory deficit  present.      Motor: No weakness.      Comments: Decreased protective sensation noted bilateral.  Light touch is absent bilateral.               Assessment:       Encounter Diagnoses   Name Primary?    Diabetic ulcer of other part of right foot associated with type 2 diabetes mellitus, with fat layer exposed Yes    Healed ulcer of left foot     Diabetic polyneuropathy associated with type 2 diabetes mellitus                          Plan:       Keerthi was seen today for wound care.    Diagnoses and all orders for this visit:    Diabetic ulcer of other part of right foot associated with type 2 diabetes mellitus, with fat layer exposed  -     Wound Debridement    Healed ulcer of left foot    Diabetic polyneuropathy associated with type 2 diabetes mellitus              I counseled the patient on her conditions, their implications and medical management.      Performed a selective excisional debridement of the Rt. foot/ankle.  See attached procedure note.       The Rt. Lateral forefoot wound was covered with silver alginate, and a football dressing was then applied bilateral.     To keep bilateral football dressings CDI x 1 week.     To continue wearing shoe gear that minimizes pressure to the site.     RTC in 1 week for wound check.     Jacob Bernal DPM

## 2025-03-17 ENCOUNTER — PATIENT MESSAGE (OUTPATIENT)
Dept: CARDIOLOGY | Facility: CLINIC | Age: 50
End: 2025-03-17
Payer: MEDICARE

## 2025-03-17 DIAGNOSIS — R94.39 POSITIVE CARDIAC STRESS TEST: ICD-10-CM

## 2025-03-17 DIAGNOSIS — I25.10 CORONARY ARTERY DISEASE, UNSPECIFIED VESSEL OR LESION TYPE, UNSPECIFIED WHETHER ANGINA PRESENT, UNSPECIFIED WHETHER NATIVE OR TRANSPLANTED HEART: Primary | ICD-10-CM

## 2025-03-17 DIAGNOSIS — I73.9 PAD (PERIPHERAL ARTERY DISEASE): ICD-10-CM

## 2025-03-17 RX ORDER — SODIUM CHLORIDE 0.9 % (FLUSH) 0.9 %
10 SYRINGE (ML) INJECTION
Status: SHIPPED | OUTPATIENT
Start: 2025-03-17

## 2025-03-17 RX ORDER — SODIUM CHLORIDE 9 MG/ML
INJECTION, SOLUTION INTRAVENOUS ONCE
OUTPATIENT
Start: 2025-03-17 | End: 2025-03-17

## 2025-03-17 NOTE — PROGRESS NOTES
Angiogram    Arrive for procedure at: [facility] on 4/3/25 at 9 am. Your procedure is scheduled for 11 am with Dr aKleb Cage.    You will receive a phone call from [facility] Pre-Op Department with further instructions and exact arrival time prior to your scheduled procedure.    Notify the nurse if you are ALLERGIC TO IODINE.    FASTING: You MAY NOT have anything to eat or drink AFTER MIDNIGHT the day of your procedure.     MEDICATIONS: You may take your regular morning medications with water. If there are any medications that you should not take, you will be instructed to hold them for that morning.    CARDIOLOGY PRE-PROCEDURE MEDICATION ORDERS:  ** Please hold any medications that are checked below:    HOLD   # OF DAYS TO HOLD    Please hold GLP-1 Drugs such as Semiglutide, Ozempic, Wegovy, and Mounjaro 8 days prior to procedure.  Jardiance                    3 days prior to the procedure    CONTINUE the Following Medications   Please continue all your other medications.      WHAT TO EXPECT:    How long will the procedure take?  The procedure will take an average of 1 - 2 hours to perform.  After the procedure, you will need to lay flat for around 4 - 6 hours to minimize bleeding from the puncture site. If the wrist is accessed you will need to keep your arm still as instructed by the nurse.    When can I go home?  You may be able to be discharged home that same afternoon if there were no complications.  If you have one of the following: balloon; stent; pacemaker or defibrillator procedures, you may spend one night for observation.  Your doctor will determine your discharge based upon your progress.  The results of your procedure will be discussed with you before you are discharged.  Any further testing or procedures will be scheduled for you either before you leave or you will be instructed to call for a future appointment.      TRANSPORTATION:  PLEASE ARRANGE TO HAVE SOMEONE DRIVE YOU HOME  FOLLOWING YOUR PROCEDURE, YOU WILL NOT BE ALLOWED TO DRIVE.

## 2025-03-19 ENCOUNTER — OFFICE VISIT (OUTPATIENT)
Dept: PODIATRY | Facility: CLINIC | Age: 50
End: 2025-03-19
Payer: MEDICARE

## 2025-03-19 VITALS — WEIGHT: 278 LBS | HEIGHT: 68 IN | BODY MASS INDEX: 42.13 KG/M2

## 2025-03-19 DIAGNOSIS — Z87.2 HEALED ULCER OF LEFT FOOT: ICD-10-CM

## 2025-03-19 DIAGNOSIS — E11.42 DIABETIC POLYNEUROPATHY ASSOCIATED WITH TYPE 2 DIABETES MELLITUS: ICD-10-CM

## 2025-03-19 DIAGNOSIS — Z87.2 HEALED ULCER OF RIGHT FOOT: Primary | ICD-10-CM

## 2025-03-19 PROCEDURE — 99999 PR PBB SHADOW E&M-EST. PATIENT-LVL II: CPT | Mod: PBBFAC,,, | Performed by: PODIATRIST

## 2025-03-19 NOTE — PROGRESS NOTES
Subjective:      Patient ID: Keerthi Chase is a 49 y.o. female.    Chief Complaint: Wound Care  Patient presents to clinic for a 1 week wound check of the Rt. Forefoot.   She has kept the prior football dressings CDI for the past week.  Continues weightbearing only in the [employer] shoes.   Denies experiencing N/V/F/C/D.  Denies any additional pedal complaints.       Hemoglobin A1C   Date Value Ref Range Status   2025 6.7 (H) 4.0 - 5.6 % Final     Comment:     ADA Screening Guidelines:  5.7-6.4%  Consistent with prediabetes  >or=6.5%  Consistent with diabetes    High levels of fetal hemoglobin interfere with the HbA1C  assay. Heterozygous hemoglobin variants (HbS, HgC, etc)do  not significantly interfere with this assay.   However, presence of multiple variants may affect accuracy.     2024 10.0 (H) 4.0 - 5.6 % Final     Comment:     Reference Interval:  5.0 - 5.6 Normal   5.7 - 6.4 High Risk   > 6.5 Diabetic      Hgb A1c results are standardized based on the (NGSP) National   Glycohemoglobin Standardization Program.      Hemoglobin A1C levels are related to mean serum/plasma glucose   during the preceding 2-3 months.        2022 10.5 (H) 0.0 - 5.6 % Final     Comment:     Reference Interval:  5.0 - 5.6 Normal   5.7 - 6.4 High Risk   > 6.5 Diabetic      Hgb A1c results are standardized based on the (NGSP) National   Glycohemoglobin Standardization Program.      Hemoglobin A1C levels are related to mean serum/plasma glucose   during the preceding 2-3 months.              Past Medical History:   Diagnosis Date    Bell's palsy     Coronary artery disease     Pt states Dr. Virk said she did not have a blockage after a stress test.    Diabetes mellitus     GERD (gastroesophageal reflux disease)     Hypertension     Obesity        Past Surgical History:   Procedure Laterality Date     SECTION      x 2    CHOLECYSTECTOMY      ESOPHAGOGASTRODUODENOSCOPY      FOOT AMPUTATION THROUGH METATARSAL  Right 12/10/2024    Procedure: resection, FOOT, TRANSMETATARSAL, 5th;  Surgeon: Jacob Bernal DPM;  Location: [facility] OR;  Service: Podiatry;  Laterality: Right;    PERIPHERALLY INSERTED CENTRAL CATHETER INSERTION N/A 10/11/2021    Procedure: INSERTION, PICC;  Surgeon: PICC, [facility];  Location: [facility];  Service: Cardiology;  Laterality: N/A;  Dr Aiken    PERIPHERALLY INSERTED CENTRAL CATHETER INSERTION N/A 7/21/2022    Procedure: INSERTION, PICC;  Surgeon: PICC, [facility];  Location: [facility];  Service: Cardiology;  Laterality: N/A;   [facility]    SESAMOIDECTOMY Left 11/11/2021    Procedure: SESAMOIDECTOMY;  Surgeon: Jacob Bernal DPM;  Location: [facility] OR;  Service: Podiatry;  Laterality: Left;       Family History   Problem Relation Name Age of Onset    Hypertension Mother      Diabetes Maternal Aunt         Social History     Socioeconomic History    Marital status:    Tobacco Use    Smoking status: Never    Smokeless tobacco: Never   Substance and Sexual Activity    Alcohol use: No    Drug use: No     Social Drivers of Health     Financial Resource Strain: Medium Risk (2/21/2025)    Overall Financial Resource Strain (CARDIA)     Difficulty of Paying Living Expenses: Somewhat hard   Food Insecurity: Food Insecurity Present (2/21/2025)    Hunger Vital Sign     Worried About Running Out of Food in the Last Year: Sometimes true     Ran Out of Food in the Last Year: Sometimes true   Transportation Needs: No Transportation Needs (2/21/2025)    PRAPARE - Transportation     Lack of Transportation (Medical): No     Lack of Transportation (Non-Medical): No   Physical Activity: Insufficiently Active (2/21/2025)    Exercise Vital Sign     Days of Exercise per Week: 2 days     Minutes of Exercise per Session: 10 min   Stress: No Stress Concern Present (2/21/2025)    [place] of [employer]ational Health - Occupational Stress Questionnaire     Feeling of Stress : Only a little   Recent Concern: Stress - Stress Concern  "Present (12/9/2024)    [place] of Occupational Health - Occupational Stress Questionnaire     Feeling of Stress : To some extent   Housing Stability: High Risk (2/21/2025)    Housing Stability Vital Sign     Unable to Pay for Housing in the Last Year: Yes     Number of Times Moved in the Last Year: 0     Homeless in the Last Year: No       Current Outpatient Medications   Medication Sig Dispense Refill    alprazolam (XANAX) 1 MG tablet Take 1 mg by mouth 2 (two) times daily as needed for Anxiety.      atorvastatin (LIPITOR) 20 MG tablet Take 1 tablet (20 mg total) by mouth once daily. 90 tablet 3    blood sugar diagnostic Strp To check BG 3 times daily, to use with insurance preferred meter 100 strip 1    blood-glucose meter kit To check BG 3 times daily, to use with insurance preferred meter 1 each 1    blood-glucose sensor (DEXCOM G7 SENSOR) Malou Ramos every 10 days 9 each 3    empagliflozin (JARDIANCE) 10 mg tablet Take 1 tablet (10 mg total) by mouth once daily. 30 tablet 6    hydrocortisone 2.5 % cream Apply topically. (Patient not taking: Reported on 2/17/2025)      ibuprofen (ADVIL,MOTRIN) 800 MG tablet Take 800 mg by mouth every 6 (six) hours as needed.      lancets Misc To check BG 3 times daily, to use with insurance preferred meter 100 each 1    losartan (COZAAR) 25 MG tablet Take 1 tablet (25 mg total) by mouth once daily. 30 tablet 0    MOUNJARO 7.5 mg/0.5 mL PnIj Inject 7.5 mg into the skin every 7 days. 2 mL 6    pen needle, diabetic (BD ULTRA-FINE GARIMA PEN NEEDLE) 32 gauge x 5/32" Ndle Uses 2 daily, on multiple daily insulin injections 200 each 3    TOUJEO MAX U-300 SOLOSTAR 300 unit/mL (3 mL) insulin pen Inject 80 Units into the skin once daily. (Patient taking differently: Inject 80 Units into the skin once daily. 40 units in the AM, 40 units at PM) 12 mL 11     Current Facility-Administered Medications   Medication Dose Route Frequency Provider Last Rate Last Admin    sodium chloride " 0.9% flush 10 mL  10 mL Intravenous PRN Kaleb Cage MD           Review of patient's allergies indicates:   Allergen Reactions    Codeine Hives and Rash         Review of Systems   Constitutional: Negative for chills and fever.   Skin:  Positive for color change, nail changes and poor wound healing.   Gastrointestinal:  Negative for nausea and vomiting.   Neurological:  Positive for numbness.   Psychiatric/Behavioral:  Negative for altered mental status.            Objective:      Physical Exam  Constitutional:       Appearance: Normal appearance. She is not ill-appearing.   Cardiovascular:      Pulses:           Dorsalis pedis pulses are 2+ on the right side and 2+ on the left side.        Posterior tibial pulses are 2+ on the right side and 2+ on the left side.      Comments: CFT is < 3 seconds bilateral.  Pedal hair growth is decreased bilateral.  Mild edema noted to the Rt. Lateral forefoot.  Toes are warm to touch bilateral.    Musculoskeletal:         General: No tenderness or signs of injury.      Right lower leg: No edema.      Left lower leg: No edema.      Comments: Muscle strength 5/5 in all muscle groups bilateral.  No tenderness nor crepitation with ROM of foot/ankle joints bilateral.  Slight dorsal contracture of the Lt. Hallux.  No gross deformity noted to said digit.  (-) for pain with palpation of the Rt. Lateral 5th mtp joint wound.     Skin:     General: Skin is warm and dry.      Capillary Refill: Capillary refill takes 2 to 3 seconds.      Findings: No bruising, ecchymosis, erythema, signs of injury, laceration, lesion, petechiae, rash or wound.      Comments: Mature epithelium noted to the Rt. Lateral sub 5th met head.      Mature epithelium noted to the Lt. Sub 2nd met head.     Neurological:      Mental Status: She is alert.      Sensory: Sensory deficit present.      Motor: No weakness.      Comments: Decreased protective sensation noted bilateral.  Light touch is absent bilateral.                Assessment:       Encounter Diagnoses   Name Primary?    Healed ulcer of right foot Yes    Healed ulcer of left foot     Diabetic polyneuropathy associated with type 2 diabetes mellitus                            Plan:       Keerthi was seen today for wound care.    Diagnoses and all orders for this visit:    Healed ulcer of right foot    Healed ulcer of left foot    Diabetic polyneuropathy associated with type 2 diabetes mellitus      .    I counseled the patient on her conditions, their implications and medical management.      No wound debridement performed, as the site is fully epithelialized.     The prior site of the Rt. Lateral forefoot wound was painted with betadine, and a football dressing was then applied bilateral.     To keep bilateral football dressings CDI x 1 week.     To continue wearing the DARCO shoes with all weight bearing.     RTC in 1 week for a follow up.     Jacob Bernal DPM

## 2025-03-24 ENCOUNTER — TELEPHONE (OUTPATIENT)
Dept: VASCULAR SURGERY | Facility: CLINIC | Age: 50
End: 2025-03-24
Payer: MEDICARE

## 2025-03-24 NOTE — TELEPHONE ENCOUNTER
Spoke w/ pt. Scheduled pt per Dr. Archuleta. Pt states to schedule appt but will have to make sure insurance will accept. Pt is unable to pay out of pocket. Pt stated if insurance does not cover she will call back and cancel appt.

## 2025-03-26 ENCOUNTER — TELEPHONE (OUTPATIENT)
Dept: PODIATRY | Facility: CLINIC | Age: 50
End: 2025-03-26

## 2025-03-26 ENCOUNTER — OFFICE VISIT (OUTPATIENT)
Dept: PODIATRY | Facility: CLINIC | Age: 50
End: 2025-03-26
Payer: MEDICARE

## 2025-03-26 VITALS — WEIGHT: 278 LBS | BODY MASS INDEX: 42.13 KG/M2 | HEIGHT: 68 IN

## 2025-03-26 DIAGNOSIS — Z87.2 HEALED ULCER OF RIGHT FOOT: Primary | ICD-10-CM

## 2025-03-26 DIAGNOSIS — Z87.2 HEALED ULCER OF LEFT FOOT: ICD-10-CM

## 2025-03-26 DIAGNOSIS — E11.42 DIABETIC POLYNEUROPATHY ASSOCIATED WITH TYPE 2 DIABETES MELLITUS: ICD-10-CM

## 2025-03-26 PROCEDURE — 99999 PR PBB SHADOW E&M-EST. PATIENT-LVL III: CPT | Mod: PBBFAC,,, | Performed by: PODIATRIST

## 2025-03-26 RX ORDER — PRAMIPEXOLE DIHYDROCHLORIDE 0.12 MG/1
0.12 TABLET ORAL
COMMUNITY
Start: 2025-03-18

## 2025-03-26 RX ORDER — FLUCONAZOLE 200 MG/1
200 TABLET ORAL
COMMUNITY
Start: 2025-03-07

## 2025-03-26 RX ORDER — HYDROCODONE BITARTRATE AND ACETAMINOPHEN 10; 325 MG/1; MG/1
TABLET ORAL
COMMUNITY
Start: 2025-03-21

## 2025-03-26 RX ORDER — CYCLOSPORINE 0 G/ML
1 SOLUTION/ DROPS OPHTHALMIC; TOPICAL 2 TIMES DAILY
COMMUNITY
Start: 2025-03-18

## 2025-03-26 RX ORDER — OMEPRAZOLE 40 MG/1
40 CAPSULE, DELAYED RELEASE ORAL
COMMUNITY
Start: 2025-03-07

## 2025-03-26 NOTE — TELEPHONE ENCOUNTER
----- Message from Yamilex sent at 3/26/2025  8:15 AM CDT -----  Contact: Patient  Type:  Needs Medical AdviceWho Called:   PatientWould the patient rather a call back or a response via [facility]?   Call Stevan Call Back Number:   [phone] Information:   States she would like to speak with the nurse about the diabetic shoes - please call - thank you

## 2025-03-26 NOTE — PROGRESS NOTES
Subjective:      Patient ID: Keerthi Chase is a 49 y.o. female.    Chief Complaint: No chief complaint on file.  Patient presents to clinic for a 1 week wound check of the Rt. Forefoot.   She has kept the prior football dressings CDI for the past week.  Continues weightbearing only in the DARCO shoes.   Denies experiencing N/V/F/C/D.  Denies any additional pedal complaints.       Hemoglobin A1C   Date Value Ref Range Status   2025 6.7 (H) 4.0 - 5.6 % Final     Comment:     ADA Screening Guidelines:  5.7-6.4%  Consistent with prediabetes  >or=6.5%  Consistent with diabetes    High levels of fetal hemoglobin interfere with the HbA1C  assay. Heterozygous hemoglobin variants (HbS, HgC, etc)do  not significantly interfere with this assay.   However, presence of multiple variants may affect accuracy.     2024 10.0 (H) 4.0 - 5.6 % Final     Comment:     Reference Interval:  5.0 - 5.6 Normal   5.7 - 6.4 High Risk   > 6.5 Diabetic      Hgb A1c results are standardized based on the (NGSP) National   Glycohemoglobin Standardization Program.      Hemoglobin A1C levels are related to mean serum/plasma glucose   during the preceding 2-3 months.        2022 10.5 (H) 0.0 - 5.6 % Final     Comment:     Reference Interval:  5.0 - 5.6 Normal   5.7 - 6.4 High Risk   > 6.5 Diabetic      Hgb A1c results are standardized based on the (NGSP) National   Glycohemoglobin Standardization Program.      Hemoglobin A1C levels are related to mean serum/plasma glucose   during the preceding 2-3 months.              Past Medical History:   Diagnosis Date    Bell's palsy     Coronary artery disease     Pt states Dr. Virk said she did not have a blockage after a stress test.    Diabetes mellitus     GERD (gastroesophageal reflux disease)     Hypertension     Obesity        Past Surgical History:   Procedure Laterality Date     SECTION      x 2    CHOLECYSTECTOMY      ESOPHAGOGASTRODUODENOSCOPY      FOOT AMPUTATION  THROUGH METATARSAL Right 12/10/2024    Procedure: resection, FOOT, TRANSMETATARSAL, 5th;  Surgeon: Jacob Bernal DPM;  Location: [facility] OR;  Service: Podiatry;  Laterality: Right;    PERIPHERALLY INSERTED CENTRAL CATHETER INSERTION N/A 10/11/2021    Procedure: INSERTION, PICC;  Surgeon: PICC, [facility];  Location: [facility];  Service: Cardiology;  Laterality: N/A;  Dr Aiken    PERIPHERALLY INSERTED CENTRAL CATHETER INSERTION N/A 7/21/2022    Procedure: INSERTION, PICC;  Surgeon: PICC, [facility];  Location: [facility];  Service: Cardiology;  Laterality: N/A;  Dr Dubose    SESAMOIDECTOMY Left 11/11/2021    Procedure: SESAMOIDECTOMY;  Surgeon: Jacob Bernal DPM;  Location: [facility] OR;  Service: Podiatry;  Laterality: Left;       Family History   Problem Relation Name Age of Onset    Hypertension Mother      Diabetes Maternal Aunt         Social History     Socioeconomic History    Marital status:    Tobacco Use    Smoking status: Never    Smokeless tobacco: Never   Substance and Sexual Activity    Alcohol use: No    Drug use: No     Social Drivers of Health     Financial Resource Strain: Medium Risk (2/21/2025)    Overall Financial Resource Strain (CARDIA)     Difficulty of Paying Living Expenses: Somewhat hard   Food Insecurity: Food Insecurity Present (2/21/2025)    Hunger Vital Sign     Worried About Running Out of Food in the Last Year: Sometimes true     Ran Out of Food in the Last Year: Sometimes true   Transportation Needs: No Transportation Needs (2/21/2025)    PRAPARE - Transportation     Lack of Transportation (Medical): No     Lack of Transportation (Non-Medical): No   Physical Activity: Insufficiently Active (2/21/2025)    Exercise Vital Sign     Days of Exercise per Week: 2 days     Minutes of Exercise per Session: 10 min   Stress: No Stress Concern Present (2/21/2025)    [employer] [employer] - Occupational Stress Questionnaire     Feeling of Stress : Only a little   Recent Concern: Stress -  "Stress Concern Present (12/9/2024)    [place] [employer] - Occupational Stress Questionnaire     Feeling of Stress : To some extent   Housing Stability: High Risk (2/21/2025)    Housing Stability Vital Sign     Unable to Pay for Housing in the Last Year: Yes     Number of Times Moved in the Last Year: 0     Homeless in the Last Year: No       Current Outpatient Medications   Medication Sig Dispense Refill    alprazolam (XANAX) 1 MG tablet Take 1 mg by mouth 2 (two) times daily as needed for Anxiety.      atorvastatin (LIPITOR) 20 MG tablet Take 1 tablet (20 mg total) by mouth once daily. 90 tablet 3    blood sugar diagnostic Strp To check BG 3 times daily, to use with insurance preferred meter 100 strip 1    blood-glucose meter kit To check BG 3 times daily, to use with insurance preferred meter 1 each 1    blood-glucose sensor (DEXCOM G7 SENSOR) Malou Ramos every 10 days 9 each 3    empagliflozin (JARDIANCE) 10 mg tablet Take 1 tablet (10 mg total) by mouth once daily. 30 tablet 6    hydrocortisone 2.5 % cream Apply topically. (Patient not taking: Reported on 2/17/2025)      ibuprofen (ADVIL,MOTRIN) 800 MG tablet Take 800 mg by mouth every 6 (six) hours as needed.      lancets Misc To check BG 3 times daily, to use with insurance preferred meter 100 each 1    losartan (COZAAR) 25 MG tablet Take 1 tablet (25 mg total) by mouth once daily. 30 tablet 0    MOUNJARO 7.5 mg/0.5 mL PnIj Inject 7.5 mg into the skin every 7 days. 2 mL 6    pen needle, diabetic (BD ULTRA-FINE GARIMA PEN NEEDLE) 32 gauge x 5/32" Ndle Uses 2 daily, on multiple daily insulin injections 200 each 3    TOUJEO MAX U-300 SOLOSTAR 300 unit/mL (3 mL) insulin pen Inject 80 Units into the skin once daily. (Patient taking differently: Inject 80 Units into the skin once daily. 40 units in the AM, 40 units at PM) 12 mL 11     Current Facility-Administered Medications   Medication Dose Route Frequency Provider Last Rate Last Admin    " sodium chloride 0.9% flush 10 mL  10 mL Intravenous PRN Kaleb Cage MD           Review of patient's allergies indicates:   Allergen Reactions    Codeine Hives and Rash         Review of Systems   Constitutional: Negative for chills and fever.   Skin:  Positive for color change, nail changes and poor wound healing.   Gastrointestinal:  Negative for nausea and vomiting.   Neurological:  Positive for numbness.   Psychiatric/Behavioral:  Negative for altered mental status.            Objective:      Physical Exam  Constitutional:       Appearance: Normal appearance. She is not ill-appearing.   Cardiovascular:      Pulses:           Dorsalis pedis pulses are 2+ on the right side and 2+ on the left side.        Posterior tibial pulses are 2+ on the right side and 2+ on the left side.      Comments: CFT is < 3 seconds bilateral.  Pedal hair growth is decreased bilateral.  Mild edema noted to the Rt. Lateral forefoot.  Toes are warm to touch bilateral.    Musculoskeletal:         General: No tenderness or signs of injury.      Right lower leg: No edema.      Left lower leg: No edema.      Comments: Muscle strength 5/5 in all muscle groups bilateral.  No tenderness nor crepitation with ROM of foot/ankle joints bilateral.  Slight dorsal contracture of the Lt. Hallux.  No gross deformity noted to said digit.  (-) for pain with palpation of the Rt. Lateral 5th mtp joint wound.     Skin:     General: Skin is warm and dry.      Capillary Refill: Capillary refill takes 2 to 3 seconds.      Findings: No bruising, ecchymosis, erythema, signs of injury, laceration, lesion, petechiae, rash or wound.      Comments: Mature epithelium noted to the Rt. Lateral sub 5th met head.      Mature epithelium noted to the Lt. Sub 2nd met head.     Neurological:      Mental Status: She is alert.      Sensory: Sensory deficit present.      Motor: No weakness.      Comments: Decreased protective sensation noted bilateral.  Light touch is  absent bilateral.               Assessment:       Encounter Diagnoses   Name Primary?    Healed ulcer of right foot Yes    Healed ulcer of left foot     Diabetic polyneuropathy associated with type 2 diabetes mellitus                Plan:       Diagnoses and all orders for this visit:    Healed ulcer of right foot  -     ORTHOTIC DEVICE (DME)    Healed ulcer of left foot  -     ORTHOTIC DEVICE (DME)    Diabetic polyneuropathy associated with type 2 diabetes mellitus  -     ORTHOTIC DEVICE (DME)        .    I counseled the patient on her conditions, their implications and medical management.      No wound debridement performed, as the site remains fully epithelialized.     The prior site of the Rt. Lateral forefoot wound was painted with betadine, and a football dressing was then applied bilateral.     To keep bilateral football dressings CDI x 1 week.     To continue wearing the DARCO shoes with all weight bearing.     Rx written for custom molded orthotics to specifically offload the Lt. Forefoot with an aggressive metatarsal pad to prevent recurrent ulceration.    RTC in 1 week for a follow up.     Jacob Bernal DPM

## 2025-04-02 ENCOUNTER — OFFICE VISIT (OUTPATIENT)
Dept: PODIATRY | Facility: CLINIC | Age: 50
End: 2025-04-02
Payer: MEDICARE

## 2025-04-02 VITALS — HEIGHT: 68 IN | WEIGHT: 278 LBS | BODY MASS INDEX: 42.13 KG/M2

## 2025-04-02 DIAGNOSIS — E11.42 DIABETIC POLYNEUROPATHY ASSOCIATED WITH TYPE 2 DIABETES MELLITUS: ICD-10-CM

## 2025-04-02 DIAGNOSIS — Z87.2 HEALED ULCER OF LEFT FOOT: ICD-10-CM

## 2025-04-02 DIAGNOSIS — Z87.2 HEALED ULCER OF RIGHT FOOT: Primary | ICD-10-CM

## 2025-04-02 PROCEDURE — 99999 PR PBB SHADOW E&M-EST. PATIENT-LVL III: CPT | Mod: PBBFAC,,, | Performed by: PODIATRIST

## 2025-04-02 RX ORDER — OXYCODONE AND ACETAMINOPHEN 5; 325 MG/1; MG/1
TABLET ORAL
COMMUNITY
Start: 2025-04-01

## 2025-04-02 NOTE — PROGRESS NOTES
Subjective:      Patient ID: Keerthi Chase is a 49 y.o. female.    Chief Complaint: No chief complaint on file.  Patient presents to clinic for a 1 week wound check of the Rt. Forefoot.   She has kept the prior football dressings CDI for the past week.  Continues weightbearing only in the DARCO shoes.   Denies experiencing N/V/F/C/D.  Denies any additional pedal complaints.       Hemoglobin A1C   Date Value Ref Range Status   2025 6.7 (H) 4.0 - 5.6 % Final     Comment:     ADA Screening Guidelines:  5.7-6.4%  Consistent with prediabetes  >or=6.5%  Consistent with diabetes    High levels of fetal hemoglobin interfere with the HbA1C  assay. Heterozygous hemoglobin variants (HbS, HgC, etc)do  not significantly interfere with this assay.   However, presence of multiple variants may affect accuracy.     2024 10.0 (H) 4.0 - 5.6 % Final     Comment:     Reference Interval:  5.0 - 5.6 Normal   5.7 - 6.4 High Risk   > 6.5 Diabetic      Hgb A1c results are standardized based on the (NGSP) National   Glycohemoglobin Standardization Program.      Hemoglobin A1C levels are related to mean serum/plasma glucose   during the preceding 2-3 months.        2022 10.5 (H) 0.0 - 5.6 % Final     Comment:     Reference Interval:  5.0 - 5.6 Normal   5.7 - 6.4 High Risk   > 6.5 Diabetic      Hgb A1c results are standardized based on the (NGSP) National   Glycohemoglobin Standardization Program.      Hemoglobin A1C levels are related to mean serum/plasma glucose   during the preceding 2-3 months.              Past Medical History:   Diagnosis Date    Arthritis     Bell's palsy     Coronary artery disease     Pt states Dr. Virk said she did not have a blockage after a stress test.    Diabetes mellitus     GERD (gastroesophageal reflux disease)     Hypertension     Obesity     Sleep apnea     no cpap       Past Surgical History:   Procedure Laterality Date     SECTION      x 2    CHOLECYSTECTOMY       ESOPHAGOGASTRODUODENOSCOPY      FOOT AMPUTATION THROUGH METATARSAL Right 12/10/2024    Procedure: resection, FOOT, TRANSMETATARSAL, 5th;  Surgeon: Jacob Bernal DPM;  Location: [facility] OR;  Service: Podiatry;  Laterality: Right;    PERIPHERALLY INSERTED CENTRAL CATHETER INSERTION N/A 10/11/2021    Procedure: INSERTION, PICC;  Surgeon: PICC, [facility];  Location: [facility];  Service: Cardiology;  Laterality: N/A;  Dr Aiken    PERIPHERALLY INSERTED CENTRAL CATHETER INSERTION N/A 7/21/2022    Procedure: INSERTION, PICC;  Surgeon: PICC, [facility];  Location: [facility];  Service: Cardiology;  Laterality: N/A;  Dr Dubose    SESAMOIDECTOMY Left 11/11/2021    Procedure: SESAMOIDECTOMY;  Surgeon: Jacob Bernal DPM;  Location: [facility] OR;  Service: Podiatry;  Laterality: Left;       Family History   Problem Relation Name Age of Onset    Hypertension Mother      Diabetes Maternal Aunt         Social History     Socioeconomic History    Marital status:    Tobacco Use    Smoking status: Never    Smokeless tobacco: Never   Substance and Sexual Activity    Alcohol use: No    Drug use: No     Social Drivers of Health     Financial Resource Strain: Medium Risk (2/21/2025)    Overall Financial Resource Strain (CARDIA)     Difficulty of Paying Living Expenses: Somewhat hard   Food Insecurity: Food Insecurity Present (2/21/2025)    Hunger Vital Sign     Worried About Running Out of Food in the Last Year: Sometimes true     Ran Out of Food in the Last Year: Sometimes true   Transportation Needs: No Transportation Needs (2/21/2025)    PRAPARE - Transportation     Lack of Transportation (Medical): No     Lack of Transportation (Non-Medical): No   Physical Activity: Insufficiently Active (2/21/2025)    Exercise Vital Sign     Days of Exercise per Week: 2 days     Minutes of Exercise per Session: 10 min   Stress: No Stress Concern Present (2/21/2025)    [place] [employer] - Occupational Stress Questionnaire     Feeling of  "Stress : Only a little   Recent Concern: Stress - Stress Concern Present (12/9/2024)    [place] [employer] - Occupational Stress Questionnaire     Feeling of Stress : To some extent   Housing Stability: High Risk (2/21/2025)    Housing Stability Vital Sign     Unable to Pay for Housing in the Last Year: Yes     Number of Times Moved in the Last Year: 0     Homeless in the Last Year: No       Current Outpatient Medications   Medication Sig Dispense Refill    alprazolam (XANAX) 1 MG tablet Take 1 mg by mouth 2 (two) times daily as needed for Anxiety.      ascorbic acid, vitamin C, (VITAMIN C) 100 MG tablet Take 100 mg by mouth once daily.      atorvastatin (LIPITOR) 20 MG tablet Take 1 tablet (20 mg total) by mouth once daily. 90 tablet 3    b complex vitamins tablet Take 1 tablet by mouth once daily.      blood-glucose sensor (DEXCOM G7 SENSOR) Malou Ramos every 10 days 9 each 3    calcium HMB/vitamin D3 (HMB PRO ORAL) Take by mouth.      CEQUA 0.09 % Dpet Place 1 drop into both eyes 2 (two) times daily.      cyanocobalamin (VITAMIN B-12) 100 MCG tablet Take 100 mcg by mouth once daily.      empagliflozin (JARDIANCE) 10 mg tablet Take 1 tablet (10 mg total) by mouth once daily. 30 tablet 6    fluconazole (DIFLUCAN) 200 MG Tab Take 200 mg by mouth.      HYDROcodone-acetaminophen (NORCO)  mg per tablet Take 1 tablet by mouth every 6 (six) hours as needed.      hydrocortisone 2.5 % cream Apply topically.      lancets Misc To check BG 3 times daily, to use with insurance preferred meter 100 each 1    MOUNJARO 7.5 mg/0.5 mL PnIj Inject 7.5 mg into the skin every 7 days. 2 mL 6    omega-3 fatty acids/fish oil (FISH OIL-OMEGA-3 FATTY ACIDS) 300-1,000 mg capsule Take by mouth once daily.      omeprazole (PRILOSEC) 40 MG capsule Take 40 mg by mouth.      pen needle, diabetic (BD ULTRA-FINE GARIMA PEN NEEDLE) 32 gauge x 5/32" Ndle Uses 2 daily, on multiple daily insulin injections 200 each 3    " pramipexole (MIRAPEX) 0.125 MG tablet Take 0.125 mg by mouth.      TOUJEO MAX U-300 SOLOSTAR 300 unit/mL (3 mL) insulin pen Inject 80 Units into the skin once daily. (Patient taking differently: Inject 80 Units into the skin once daily. 40 units in the AM, 40 units at PM) 12 mL 11    valsartan (DIOVAN) 80 MG tablet Take 1 tablet (80 mg total) by mouth once daily. Stop losartan 90 tablet 0    vitamin D (VITAMIN D3) 1000 units Tab Take 1,000 Units by mouth once daily.      zinc gluconate 50 mg tablet Take 50 mg by mouth once daily.       Current Facility-Administered Medications   Medication Dose Route Frequency Provider Last Rate Last Admin    sodium chloride 0.9% flush 10 mL  10 mL Intravenous PRN Kaleb Cage MD           Review of patient's allergies indicates:   Allergen Reactions    Codeine Hives and Rash         Review of Systems   Constitutional: Negative for chills and fever.   Skin:  Positive for color change, nail changes and poor wound healing.   Gastrointestinal:  Negative for nausea and vomiting.   Neurological:  Positive for numbness.   Psychiatric/Behavioral:  Negative for altered mental status.            Objective:      Physical Exam  Constitutional:       Appearance: Normal appearance. She is not ill-appearing.   Cardiovascular:      Pulses:           Dorsalis pedis pulses are 2+ on the right side and 2+ on the left side.        Posterior tibial pulses are 2+ on the right side and 2+ on the left side.      Comments: CFT is < 3 seconds bilateral.  Pedal hair growth is decreased bilateral.  Mild edema noted to the Rt. Lateral forefoot.  Toes are warm to touch bilateral.    Musculoskeletal:         General: No tenderness or signs of injury.      Right lower leg: No edema.      Left lower leg: No edema.      Comments: Muscle strength 5/5 in all muscle groups bilateral.  No tenderness nor crepitation with ROM of foot/ankle joints bilateral.  Slight dorsal contracture of the Lt. Hallux.  No gross  deformity noted to said digit.  (-) for pain with palpation of the Rt. Lateral 5th mtp joint wound.     Skin:     General: Skin is warm and dry.      Capillary Refill: Capillary refill takes 2 to 3 seconds.      Findings: No bruising, ecchymosis, erythema, signs of injury, laceration, lesion, petechiae, rash or wound.      Comments: Mature epithelium noted to the Rt. Lateral sub 5th met head.      Mature epithelium noted to the Lt. Sub 2nd met head.     Neurological:      Mental Status: She is alert.      Sensory: Sensory deficit present.      Motor: No weakness.      Comments: Decreased protective sensation noted bilateral.  Light touch is absent bilateral.               Assessment:       Encounter Diagnoses   Name Primary?    Healed ulcer of right foot Yes    Healed ulcer of left foot     Diabetic polyneuropathy associated with type 2 diabetes mellitus                Plan:       Diagnoses and all orders for this visit:    Healed ulcer of right foot    Healed ulcer of left foot    Diabetic polyneuropathy associated with type 2 diabetes mellitus          I counseled the patient on her conditions, their implications and medical management.      No wound debridement performed, as the sites remain fully epithelialized.     Prior areas of ulceration were painted with betadine, and a football dressing was then applied bilateral.     To keep bilateral football dressings CDI x 2 weeks.     To continue wearing the DARCO shoes with all weight bearing.    Will continue serially offloading until orthotics have been dispensed.      RTC in 2 weeks for a follow up.     Jacob Bernal DPM

## 2025-04-10 ENCOUNTER — INITIAL CONSULT (OUTPATIENT)
Dept: VASCULAR SURGERY | Facility: CLINIC | Age: 50
End: 2025-04-10
Payer: MEDICARE

## 2025-04-10 VITALS
HEART RATE: 62 BPM | HEIGHT: 68 IN | DIASTOLIC BLOOD PRESSURE: 67 MMHG | BODY MASS INDEX: 41.26 KG/M2 | WEIGHT: 272.25 LBS | SYSTOLIC BLOOD PRESSURE: 125 MMHG

## 2025-04-10 DIAGNOSIS — I73.9 PAD (PERIPHERAL ARTERY DISEASE): ICD-10-CM

## 2025-04-10 PROCEDURE — 99999 PR PBB SHADOW E&M-EST. PATIENT-LVL IV: CPT | Mod: PBBFAC,,, | Performed by: STUDENT IN AN ORGANIZED HEALTH CARE EDUCATION/TRAINING PROGRAM

## 2025-04-10 RX ORDER — BACLOFEN 10 MG/1
10 TABLET ORAL 3 TIMES DAILY
COMMUNITY

## 2025-04-10 RX ORDER — NAPROXEN 500 MG/1
500 TABLET ORAL 2 TIMES DAILY
COMMUNITY

## 2025-04-10 NOTE — PROGRESS NOTES
[facility]  [facility]  History & Physical    PATIENT NAME: Keerthi Chase  MRN: [id number]  TODAY'S DATE: 04/10/2025    SUBJECTIVE:     Chief Complaint:   Chief Complaint   Patient presents with    Peripheral Arterial Disease         History of Present Illness:  Keerthi Chase is a 49 y.o. female presents as a referral for bilateral foot wounds. This has been off and on for 5-7years. She does see Dr. Medrano and he did a procedure on 25 on her left foot for a bone infection. Her wounds have completely healed bilaterally.    She has never smoked. She has DM2 and her A1C was 11.8 and now  6.7.      Review of patient's allergies indicates:   Allergen Reactions    Codeine Hives and Rash       Past Medical History:   Diagnosis Date    Arthritis     Bell's palsy     Coronary artery disease     Pt states Dr. Virk said she did not have a blockage after a stress test.    Diabetes mellitus     GERD (gastroesophageal reflux disease)     Hypertension     Obesity     Sleep apnea     no cpap     Past Surgical History:   Procedure Laterality Date    ANGIOGRAM, CORONARY, WITH LEFT HEART CATHETERIZATION  4/3/2025    Procedure: Left Heart Cath;  Surgeon: Kaleb Cage MD;  Location: [facility] CATH;  Service: Cardiology;;     SECTION      x 2    CHOLECYSTECTOMY      ESOPHAGOGASTRODUODENOSCOPY      FOOT AMPUTATION THROUGH METATARSAL Right 12/10/2024    Procedure: resection, FOOT, TRANSMETATARSAL, 5th;  Surgeon: Jacob Bernal DPM;  Location: [facility] OR;  Service: Podiatry;  Laterality: Right;    PERIPHERALLY INSERTED CENTRAL CATHETER INSERTION N/A 10/11/2021    Procedure: INSERTION, PICC;  Surgeon: IGOR, STPH;  Location: [facility] ENDO;  Service: Cardiology;  Laterality: N/A;  Dr Aiken    PERIPHERALLY INSERTED CENTRAL CATHETER INSERTION N/A 2022    Procedure: INSERTION, PICC;  Surgeon: PICC, STPH;  Location: [facility] ENDO;  Service: Cardiology;  Laterality: N/A;  Dr Dubose     SESAMOIDECTOMY Left 11/11/2021    Procedure: SESAMOIDECTOMY;  Surgeon: Jacob Bernal DPM;  Location: [facility];  Service: Podiatry;  Laterality: Left;     Family History   Problem Relation Name Age of Onset    Hypertension Mother      Diabetes Maternal Aunt       Social History[1]     Review of Systems:  ROS    OBJECTIVE:     Vital Signs (Most Recent):  Pulse: 62 (04/10/25 0824)  BP: 125/67 (04/10/25 0824)      Physical Exam:  Physical Exam  Constitutional: Awake and oriented to person, place, and time. Appears well-developed and well-nourished. In no apparent distress.  HEENT: Normocephalic. Pupils normal and conjunctivae normal. Mucous membranes normal, no cyanosis or icterus, trachea central, no pallor or icterus is noted.  Neck: Normal range of motion. Neck supple. No JVD present. No bruit present.   Cardiovascular: Normal rate, regular rhythm and normal heart sounds. S1, S2. Exam reveals no gallop, clicks, or friction rub. No murmur noted.  Pulmonary/Chest: Effort normal and breath sounds clear to auscultation. No respiratory distress. No wheezes, rales, or coughing present.   Abdominal: Soft. Bowel sounds are normal. There is no tenderness. No rebound tenderness or guarding present. No abdomen pulsations, bruits, or masses noted.   Urinary: No ellison catheter present  Skin: Skin is warm and dry.  Extremities: No cyanosis, erythema, clubbing or edema noted at this time. No calf tenderness bilaterally.   Peripheral vascular system: Good palpable distal pulses.       Laboratory:  Lab Results   Component Value Date    WBC 14.50 (H) 04/03/2025    HGB 13.4 04/03/2025    HCT 41.6 04/03/2025     04/03/2025    CHOL 142 01/24/2025    TRIG 233 (H) 01/24/2025    HDL 35 (L) 01/24/2025    ALT 28 04/03/2025    AST 24 04/03/2025     04/03/2025    K 4.2 04/03/2025     04/03/2025    CREATININE 0.51 04/03/2025    BUN 25 (H) 04/03/2025    CO2 25 04/03/2025    TSH 2.580 01/24/2025    HGBA1C 6.7 (H) 01/24/2025          Diagnostic Results:  Cardiac catheterization    The ejection fraction was calculated to be 65%.    The pre-procedure left ventricular end diastolic pressure was 12.    The post-procedure left ventricular end diastolic pressure was 12.    The estimated blood loss was none.    The coronary arteries were normal..    The procedure log was documented by Documenter: Kandace Quiñones RN and   verified by Kaleb Cage MD.    Date: 4/3/2025  Time: 2:13 PM    No results found for this or any previous visit (from the past 2160 hours).       ASSESSMENT/PLAN:     49-year-old female who presents with chronic history of bilateral foot wounds that have been off and on now for 5-7 years.    She did have recent procedure with Dr. Medrano, her podiatrist, and she has since healed all her wounds on her feet.  I did personally visualize and review her arterial ultrasound.  She does have multiphasic waveform throughout the distal tibial vessels bilaterally with no significant disease.    Her diabetes historically has been uncontrolled with A1c is as high as 11.8.  He is now worked on getting her diabetes under control with the A1c now under 7.  I do not think that she has any substantial peripheral arterial disease that has contributing to her foot ulcers based on the ultrasound and my exam.  I do think with better medical control of her diabetes and adequate foot and wound care she should be able to heal her wounds as well as prevent future wounds.    Plan for her to follow up as needed    PAD (peripheral artery disease)  -     Ambulatory referral/consult to Vascular Surgery                  Bella Sinha M.D.   [facility]   Date of Service: 04/10/2025             [1]   Social History  Tobacco Use    Smoking status: Never    Smokeless tobacco: Never   Substance Use Topics    Alcohol use: No    Drug use: No

## 2025-04-16 ENCOUNTER — OFFICE VISIT (OUTPATIENT)
Dept: PODIATRY | Facility: CLINIC | Age: 50
End: 2025-04-16
Payer: MEDICARE

## 2025-04-16 VITALS — HEIGHT: 68 IN | WEIGHT: 272.25 LBS | BODY MASS INDEX: 41.26 KG/M2

## 2025-04-16 DIAGNOSIS — E11.42 DIABETIC POLYNEUROPATHY ASSOCIATED WITH TYPE 2 DIABETES MELLITUS: ICD-10-CM

## 2025-04-16 DIAGNOSIS — Z87.2 HEALED ULCER OF LEFT FOOT: ICD-10-CM

## 2025-04-16 DIAGNOSIS — Z87.2 HEALED ULCER OF RIGHT FOOT: Primary | ICD-10-CM

## 2025-04-16 PROCEDURE — 99999 PR PBB SHADOW E&M-EST. PATIENT-LVL II: CPT | Mod: PBBFAC,,, | Performed by: PODIATRIST

## 2025-04-16 RX ORDER — DOXYCYCLINE 100 MG/1
100 CAPSULE ORAL 2 TIMES DAILY
COMMUNITY
Start: 2025-04-11

## 2025-04-16 RX ORDER — SULFAMETHOXAZOLE AND TRIMETHOPRIM 800; 160 MG/1; MG/1
1 TABLET ORAL 2 TIMES DAILY
COMMUNITY
Start: 2025-04-05

## 2025-04-16 NOTE — PROGRESS NOTES
Subjective:      Patient ID: Keerthi Chase is a 49 y.o. female.    Chief Complaint: Wound Care  Patient presents to clinic for a 1 week wound check of bilateral foot.  She has kept the prior football dressings CDI for the past week.  Continues weightbearing only in the DAR[employer] shoes.   Denies experiencing N/V/F/C/D.  Denies any additional pedal complaints.       Hemoglobin A1C   Date Value Ref Range Status   01/24/2025 6.7 (H) 4.0 - 5.6 % Final     Comment:     ADA Screening Guidelines:  5.7-6.4%  Consistent with prediabetes  >or=6.5%  Consistent with diabetes    High levels of fetal hemoglobin interfere with the HbA1C  assay. Heterozygous hemoglobin variants (HbS, HgC, etc)do  not significantly interfere with this assay.   However, presence of multiple variants may affect accuracy.     12/09/2024 10.0 (H) 4.0 - 5.6 % Final     Comment:     Reference Interval:  5.0 - 5.6 Normal   5.7 - 6.4 High Risk   > 6.5 Diabetic      Hgb A1c results are standardized based on the (NGSP) National   Glycohemoglobin Standardization Program.      Hemoglobin A1C levels are related to mean serum/plasma glucose   during the preceding 2-3 months.        06/03/2022 10.5 (H) 0.0 - 5.6 % Final     Comment:     Reference Interval:  5.0 - 5.6 Normal   5.7 - 6.4 High Risk   > 6.5 Diabetic      Hgb A1c results are standardized based on the (NGSP) National   Glycohemoglobin Standardization Program.      Hemoglobin A1C levels are related to mean serum/plasma glucose   during the preceding 2-3 months.              Past Medical History:   Diagnosis Date    Arthritis     Bell's palsy     Coronary artery disease     Pt states Dr. Virk said she did not have a blockage after a stress test.    Diabetes mellitus     GERD (gastroesophageal reflux disease)     Hypertension     Obesity     Sleep apnea     no cpap       Past Surgical History:   Procedure Laterality Date    ANGIOGRAM, CORONARY, WITH LEFT HEART CATHETERIZATION  4/3/2025    Procedure: Left  Heart Cath;  Surgeon: Kaleb Cage MD;  Location: [facility] CATH;  Service: Cardiology;;     SECTION      x 2    CHOLECYSTECTOMY      ESOPHAGOGASTRODUODENOSCOPY      FOOT AMPUTATION THROUGH METATARSAL Right 12/10/2024    Procedure: resection, FOOT, TRANSMETATARSAL, 5th;  Surgeon: Jacob Bernal DPM;  Location: [facility] OR;  Service: Podiatry;  Laterality: Right;    PERIPHERALLY INSERTED CENTRAL CATHETER INSERTION N/A 10/11/2021    Procedure: INSERTION, PICC;  Surgeon: PICC, STPH;  Location: [facility] ENDO;  Service: Cardiology;  Laterality: N/A;  Dr Aiken    PERIPHERALLY INSERTED CENTRAL CATHETER INSERTION N/A 2022    Procedure: INSERTION, PICC;  Surgeon: PICC, STPH;  Location: [facility] ENDO;  Service: Cardiology;  Laterality: N/A;  Dr Dubose    SESAMOIDECTOMY Left 2021    Procedure: SESAMOIDECTOMY;  Surgeon: Jacob Bernal DPM;  Location: [facility] OR;  Service: Podiatry;  Laterality: Left;       Family History   Problem Relation Name Age of Onset    Hypertension Mother      Diabetes Maternal Aunt         Social History     Socioeconomic History    Marital status:    Tobacco Use    Smoking status: Never    Smokeless tobacco: Never   Substance and Sexual Activity    Alcohol use: No    Drug use: No     Social Drivers of Health     Financial Resource Strain: Medium Risk (2025)    Overall Financial Resource Strain (CARDIA)     Difficulty of Paying Living Expenses: Somewhat hard   Food Insecurity: Food Insecurity Present (2025)    Hunger Vital Sign     Worried About Running Out of Food in the Last Year: Sometimes true     Ran Out of Food in the Last Year: Sometimes true   Transportation Needs: No Transportation Needs (2025)    PRAPARE - Transportation     Lack of Transportation (Medical): No     Lack of Transportation (Non-Medical): No   Physical Activity: Insufficiently Active (2025)    Exercise Vital Sign     Days of Exercise per Week: 2 days     Minutes of Exercise per Session: 10 min    Stress: No Stress Concern Present (2/21/2025)    [employer] - Occupational Stress Questionnaire     Feeling of Stress : Only a little   Recent Concern: Stress - Stress Concern Present (12/9/2024)    [employer] - Occupational Stress Questionnaire     Feeling of Stress : To some extent   Housing Stability: High Risk (2/21/2025)    Housing Stability Vital Sign     Unable to Pay for Housing in the Last Year: Yes     Number of Times Moved in the Last Year: 0     Homeless in the Last Year: No       Current Outpatient Medications   Medication Sig Dispense Refill    alprazolam (XANAX) 1 MG tablet Take 1 mg by mouth 2 (two) times daily as needed for Anxiety.      ascorbic acid, vitamin C, (VITAMIN C) 100 MG tablet Take 100 mg by mouth once daily.      atorvastatin (LIPITOR) 20 MG tablet Take 1 tablet (20 mg total) by mouth once daily. 90 tablet 3    b complex vitamins tablet Take 1 tablet by mouth once daily.      baclofen (LIORESAL) 10 MG tablet Take 10 mg by mouth 3 (three) times daily.      blood-glucose sensor (DEXCOM G7 SENSOR) Malou Ramos every 10 days 9 each 3    calcium HMB/vitamin D3 (HMB PRO ORAL) Take by mouth.      CEQUA 0.09 % Dpet Place 1 drop into both eyes 2 (two) times daily.      cyanocobalamin (VITAMIN B-12) 100 MCG tablet Take 100 mcg by mouth once daily.      doxycycline (VIBRAMYCIN) 100 MG Cap Take 100 mg by mouth 2 (two) times daily.      empagliflozin (JARDIANCE) 10 mg tablet Take 1 tablet (10 mg total) by mouth once daily. 30 tablet 6    fluconazole (DIFLUCAN) 200 MG Tab Take 200 mg by mouth.      HYDROcodone-acetaminophen (NORCO)  mg per tablet Take 1 tablet by mouth every 6 (six) hours as needed.      hydrocortisone 2.5 % cream Apply topically.      lancets Misc To check BG 3 times daily, to use with insurance preferred meter 100 each 1    MOUNJARO 7.5 mg/0.5 mL PnIj Inject 7.5 mg into the skin every 7 days. 2 mL 6    naproxen  "(NAPROSYN) 500 MG tablet Take 500 mg by mouth 2 (two) times daily.      omega-3 fatty acids/fish oil (FISH OIL-OMEGA-3 FATTY ACIDS) 300-1,000 mg capsule Take by mouth once daily.      omeprazole (PRILOSEC) 40 MG capsule Take 40 mg by mouth.      oxyCODONE-acetaminophen (PERCOCET) 5-325 mg per tablet Take by mouth.      pen needle, diabetic (BD ULTRA-FINE GARIMA PEN NEEDLE) 32 gauge x 5/32" Ndle Uses 2 daily, on multiple daily insulin injections 200 each 3    pramipexole (MIRAPEX) 0.125 MG tablet Take 0.125 mg by mouth.      sulfamethoxazole-trimethoprim 800-160mg (BACTRIM DS) 800-160 mg Tab Take 1 tablet by mouth 2 (two) times daily.      TOUJEO MAX U-300 SOLOSTAR 300 unit/mL (3 mL) insulin pen Inject 80 Units into the skin once daily. 12 mL 11    valsartan (DIOVAN) 80 MG tablet Take 1 tablet (80 mg total) by mouth once daily. Stop losartan 90 tablet 0    vitamin D (VITAMIN D3) 1000 units Tab Take 1,000 Units by mouth once daily.      zinc gluconate 50 mg tablet Take 50 mg by mouth once daily.       Current Facility-Administered Medications   Medication Dose Route Frequency Provider Last Rate Last Admin    sodium chloride 0.9% flush 10 mL  10 mL Intravenous PRN Kaleb Cage MD           Review of patient's allergies indicates:   Allergen Reactions    Codeine Hives and Rash         Review of Systems   Constitutional: Negative for chills and fever.   Skin:  Positive for color change, nail changes and poor wound healing.   Gastrointestinal:  Negative for nausea and vomiting.   Neurological:  Positive for numbness.   Psychiatric/Behavioral:  Negative for altered mental status.            Objective:      Physical Exam  Constitutional:       Appearance: Normal appearance. She is not ill-appearing.   Cardiovascular:      Pulses:           Dorsalis pedis pulses are 2+ on the right side and 2+ on the left side.        Posterior tibial pulses are 2+ on the right side and 2+ on the left side.      Comments: CFT is < 3 " seconds bilateral.  Pedal hair growth is decreased bilateral.  Mild edema noted to the Rt. Lateral forefoot.  Toes are warm to touch bilateral.    Musculoskeletal:         General: No tenderness or signs of injury.      Right lower leg: No edema.      Left lower leg: No edema.      Comments: Muscle strength 5/5 in all muscle groups bilateral.  No tenderness nor crepitation with ROM of foot/ankle joints bilateral.  Slight dorsal contracture of the Lt. Hallux.  No gross deformity noted to said digit.  (-) for pain with palpation of the Rt. Lateral 5th mtp joint wound.     Skin:     General: Skin is warm and dry.      Capillary Refill: Capillary refill takes 2 to 3 seconds.      Findings: No bruising, ecchymosis, erythema, signs of injury, laceration, lesion, petechiae, rash or wound.      Comments: Mature epithelium noted to the Rt. Lateral sub 5th met head.      Mature epithelium noted to the Lt. Sub 2nd met head.     Neurological:      Mental Status: She is alert.      Sensory: Sensory deficit present.      Motor: No weakness.      Comments: Decreased protective sensation noted bilateral.  Light touch is absent bilateral.               Assessment:       Encounter Diagnoses   Name Primary?    Healed ulcer of right foot Yes    Healed ulcer of left foot     Diabetic polyneuropathy associated with type 2 diabetes mellitus                Plan:       Keerthi was seen today for wound care.    Diagnoses and all orders for this visit:    Healed ulcer of right foot    Healed ulcer of left foot    Diabetic polyneuropathy associated with type 2 diabetes mellitus          I counseled the patient on her conditions, their implications and medical management.      No wound debridement performed, as the sites remain fully epithelialized.     Prior areas of ulceration were painted with betadine, and a football dressing was then applied bilateral.     To keep bilateral football dressings CDI x 2 weeks.     To continue wearing the  [employer] shoes with all weight bearing.    Will continue serially offloading until orthotics have been dispensed.   Patient must have custom molded orthotics, for offloading purposes, or she will run the risk of re-ulceration with the possibility of limb loss.    RTC in 2 weeks for a follow up.     Jacob Bernal DPM

## 2025-04-17 ENCOUNTER — PATIENT MESSAGE (OUTPATIENT)
Dept: INTERNAL MEDICINE | Facility: CLINIC | Age: 50
End: 2025-04-17
Payer: MEDICARE

## 2025-04-23 ENCOUNTER — PATIENT MESSAGE (OUTPATIENT)
Dept: PODIATRY | Facility: CLINIC | Age: 50
End: 2025-04-23
Payer: MEDICARE

## 2025-04-25 ENCOUNTER — OFFICE VISIT (OUTPATIENT)
Dept: CARDIOLOGY | Facility: CLINIC | Age: 50
End: 2025-04-25
Payer: MEDICARE

## 2025-04-25 VITALS
SYSTOLIC BLOOD PRESSURE: 110 MMHG | HEIGHT: 68 IN | WEIGHT: 273.56 LBS | DIASTOLIC BLOOD PRESSURE: 71 MMHG | HEART RATE: 73 BPM | BODY MASS INDEX: 41.46 KG/M2

## 2025-04-25 DIAGNOSIS — E66.01 MORBID (SEVERE) OBESITY DUE TO EXCESS CALORIES: ICD-10-CM

## 2025-04-25 DIAGNOSIS — I10 PRIMARY HYPERTENSION: Primary | ICD-10-CM

## 2025-04-25 LAB
OHS QRS DURATION: 90 MS
OHS QTC CALCULATION: 435 MS

## 2025-04-25 PROCEDURE — 99999 PR PBB SHADOW E&M-EST. PATIENT-LVL IV: CPT | Mod: PBBFAC,,, | Performed by: INTERNAL MEDICINE

## 2025-04-25 PROCEDURE — 93005 ELECTROCARDIOGRAM TRACING: CPT | Mod: PO

## 2025-04-25 NOTE — PROGRESS NOTES
SUBJECTIVE:    Patient ID:  Keerthi Chase is a 49 y.o. female who presents for follow of HTN.    Medical history:  HTN  DM2 (historically poorly controlled)  Osteomyelitis s/p resection of 5th MTP joint 12/10/24  Morbid obesity  GERD     Referred by Jacob Bernal with podiatry for HTN management.   She was previously seen by Dr. Hernandez at [facility] 9/2019  Previous coronary angiogram 2016 which was reportedly negative. She has a long history of poorly controlled diabetes which most recently appears better controlled with an A1c 6.7.     She has a longstanding documentation of CAD; however, she has not undergone PCI.  She has several risk factors for CAD.     Septic arthritis of 5th MTP, osteomyelitis with extensor tendo tenosynovitis. Was on Dapto x 6 weeks (EOC 1/13/25)     TTE 10/2019: EF 60%.   All ECGs in EMR personally reviewed which show sinus rhythm    04/25/2025  Echocardiogram with grossly normal biventricular function.  She underwent a SPECT which showed a moderate to severe intensity, medium size, reversible perfusion abnormality in the basal to apical inferoseptal walls.  Subsequent LHC showed normal coronaries.  She has evaluate weighted by vascular surgery (Dr. Sinha) for abnormal arterial ultrasounds.  No clinical evidence of significant PAD.  LDL at goal. On lipitor.   Taking Mounjaro for weight loss.    I personally reviewed the ECG/telemetry strip today. My interpretation is sinus rhythm 72 beats per minute.  QRS 90 milliseconds.   milliseconds.    Past Medical History:   Diagnosis Date    Arthritis     Bell's palsy     Coronary artery disease     Pt states Dr. Virk said she did not have a blockage after a stress test.    Diabetes mellitus     GERD (gastroesophageal reflux disease)     Hypertension     Obesity     Sleep apnea     no cpap       Past Surgical History:   Procedure Laterality Date    ANGIOGRAM, CORONARY, WITH LEFT HEART CATHETERIZATION  4/3/2025    Procedure: Left Heart  Cath;  Surgeon: Kaleb Cage MD;  Location: [facility] CATH;  Service: Cardiology;;     SECTION      x 2    CHOLECYSTECTOMY      ESOPHAGOGASTRODUODENOSCOPY      FOOT AMPUTATION THROUGH METATARSAL Right 12/10/2024    Procedure: resection, FOOT, TRANSMETATARSAL, 5th;  Surgeon: Jacob Bernal DPM;  Location: [facility] OR;  Service: Podiatry;  Laterality: Right;    PERIPHERALLY INSERTED CENTRAL CATHETER INSERTION N/A 10/11/2021    Procedure: INSERTION, PICC;  Surgeon: PICC, ST;  Location: [facility] ENDO;  Service: Cardiology;  Laterality: N/A;  Dr Aiken    PERIPHERALLY INSERTED CENTRAL CATHETER INSERTION N/A 2022    Procedure: INSERTION, PICC;  Surgeon: PICC, [facility];  Location: [facility] ENDO;  Service: Cardiology;  Laterality: N/A;  Dr Dubose    SESAMOIDECTOMY Left 2021    Procedure: SESAMOIDECTOMY;  Surgeon: Jacob Bernal DPM;  Location: [facility] OR;  Service: Podiatry;  Laterality: Left;       Family History   Problem Relation Name Age of Onset    Hypertension Mother      Diabetes Maternal Aunt         Social History     Socioeconomic History    Marital status:    Tobacco Use    Smoking status: Never    Smokeless tobacco: Never   Substance and Sexual Activity    Alcohol use: No    Drug use: No     Social Drivers of Health     Financial Resource Strain: Medium Risk (2025)    Overall Financial Resource Strain (CARDIA)     Difficulty of Paying Living Expenses: Somewhat hard   Food Insecurity: Food Insecurity Present (2025)    Hunger Vital Sign     Worried About Running Out of Food in the Last Year: Sometimes true     Ran Out of Food in the Last Year: Sometimes true   Transportation Needs: No Transportation Needs (2025)    PRAPARE - Transportation     Lack of Transportation (Medical): No     Lack of Transportation (Non-Medical): No   Physical Activity: Insufficiently Active (2025)    Exercise Vital Sign     Days of Exercise per Week: 2 days     Minutes of Exercise per Session: 10 min    Stress: No Stress Concern Present (2/21/2025)    [employer] [place] [employer] - Occupational Stress Questionnaire     Feeling of Stress : Only a little   Recent Concern: Stress - Stress Concern Present (12/9/2024)    [employer] [place] [employer] - Occupational Stress Questionnaire     Feeling of Stress : To some extent   Housing Stability: High Risk (2/21/2025)    Housing Stability Vital Sign     Unable to Pay for Housing in the Last Year: Yes     Number of Times Moved in the Last Year: 0     Homeless in the Last Year: No       Review of patient's allergies indicates:   Allergen Reactions    Codeine Hives and Rash       Review of Systems   All other systems reviewed and are negative.         OBJECTIVE:     Vitals:    04/25/25 1352   BP: 110/71   Pulse: 73         BP Readings from Last 5 Encounters:   04/10/25 125/67   04/03/25 (!) 166/95   02/24/25 120/80   02/17/25 (!) 150/78   12/13/24 (!) 152/62        Physical Exam  Vitals reviewed.   Constitutional:       General: She is not in acute distress.     Appearance: She is obese. She is not ill-appearing.   HENT:      Head: Normocephalic and atraumatic.   Eyes:      Extraocular Movements: Extraocular movements intact.      Conjunctiva/sclera: Conjunctivae normal.   Cardiovascular:      Rate and Rhythm: Normal rate and regular rhythm.   Pulmonary:      Effort: Pulmonary effort is normal. No respiratory distress.   Musculoskeletal:         General: No swelling or deformity.      Comments: Wearing lower extremity boots   Skin:     Findings: No erythema or rash.   Neurological:      Mental Status: She is alert.             Current Outpatient Medications   Medication Instructions    ALPRAZolam (XANAX) 1 mg, 2 times daily PRN    ascorbic acid (vitamin C) (VITAMIN C) 100 mg, Daily    atorvastatin (LIPITOR) 20 mg, Oral, Daily    b complex vitamins tablet 1 tablet, Daily    baclofen (LIORESAL) 10 mg, 3 times daily    blood-glucose sensor (DEXCOM G7  "SENSOR) Malou Ramos every 10 days    calcium HMB/vitamin D3 (HMB PRO ORAL) Take by mouth.    CEQUA 0.09 % Dpet 1 drop, 2 times daily    cyanocobalamin (VITAMIN B-12) 100 mcg, Daily    doxycycline (VIBRAMYCIN) 100 mg, 2 times daily    empagliflozin (JARDIANCE) 10 mg, Oral, Daily    fluconazole (DIFLUCAN) 200 mg    HYDROcodone-acetaminophen (NORCO)  mg per tablet 1 tablet, Every 6 hours PRN    hydrocortisone 2.5 % cream Apply topically.    lancets Misc To check BG 3 times daily, to use with insurance preferred meter    naproxen (NAPROSYN) 500 mg, 2 times daily    omega-3 fatty acids/fish oil (FISH OIL-OMEGA-3 FATTY ACIDS) 300-1,000 mg capsule Daily    omeprazole (PRILOSEC) 40 mg    oxyCODONE-acetaminophen (PERCOCET) 5-325 mg per tablet Take by mouth.    pen needle, diabetic (BD ULTRA-FINE GARIMA PEN NEEDLE) 32 gauge x 5/32" Ndle Uses 2 daily, on multiple daily insulin injections    pramipexole (MIRAPEX) 0.125 mg    sulfamethoxazole-trimethoprim 800-160mg (BACTRIM DS) 800-160 mg Tab 1 tablet, 2 times daily    tirzepatide 10 mg, Subcutaneous, Every 7 days    TOUJEO MAX U-300 SOLOSTAR 80 Units, Subcutaneous, Daily    valsartan (DIOVAN) 80 mg, Oral, Daily, Stop losartan    vitamin D (VITAMIN D3) 1,000 Units, Daily    zinc gluconate 50 mg, Daily       Lipid Panel:   Lab Results   Component Value Date    CHOL 142 01/24/2025    HDL 35 (L) 01/24/2025    LDLCALC 60.4 (L) 01/24/2025    TRIG 233 (H) 01/24/2025    CHOLHDL 24.6 01/24/2025       The 10-year ASCVD risk score (Abril DK, et al., 2019) is: 3.4%    Values used to calculate the score:      Age: 49 years      Sex: Female      Is Non- : No      Diabetic: Yes      Tobacco smoker: No      Systolic Blood Pressure: 125 mmHg      Is BP treated: Yes      HDL Cholesterol: 35 mg/dL      Total Cholesterol: 142 mg/dL    Most Recent EKG Results  Results for orders placed or performed during the hospital encounter of 04/03/25   EKG 12-lead    Collection " Time: 04/03/25  9:27 AM   Result Value Ref Range    QRS Duration 90 ms    OHS QTC Calculation 429 ms    Narrative    Test Reason : I25.10,    Vent. Rate :  67 BPM     Atrial Rate :  67 BPM     P-R Int : 152 ms          QRS Dur :  90 ms      QT Int : 406 ms       P-R-T Axes :  53  -1  51 degrees    QTcB Int : 429 ms    Normal sinus rhythm  Normal ECG  When compared with ECG of 05-Mar-2025 13:39,  No significant change was found  Confirmed by Luisito Barrios ([id number]) on 4/12/2025 12:08:54 AM    Referred By: MARIZOL KRUGER           Confirmed By: Luisito Barrios       Most Recent Echocardiogram Results  Results for orders placed during the hospital encounter of 03/11/25    Echo    Interpretation Summary    Techically challenging study    LV function appears grossly normal    Right Ventricle: Systolic function is normal.    IVC/SVC: Normal venous pressure at 3 mmHg.    Pericardium: There is no pericardial effusion.      Most Recent Nuclear Stress Test Results  Results for orders placed during the hospital encounter of 03/05/25    Nuclear Stress - Cardiology Interpreted    Interpretation Summary    Abnormal myocardial perfusion scan.    There is a moderate to severe intensity, medium sized, reversible perfusion abnormality that is consistent with ischemia in the basal to apical inferoseptal wall(s).    There are no other significant perfusion abnormalities.    The gated perfusion images showed an ejection fraction of 61% post stress.    LV cavity size is normal at rest and normal at post-stress.    The ECG portion of the study is negative for ischemia.    The patient reported no chest pain during the stress test.      Most Recent Cardiac PET Stress Test Results  No results found for this or any previous visit.      Most Recent Cardiovascular Angiogram results  Results for orders placed during the hospital encounter of 04/03/25    Cardiac catheterization    Conclusion    The ejection fraction was calculated to be 65%.     The pre-procedure left ventricular end diastolic pressure was 12.    The post-procedure left ventricular end diastolic pressure was 12.    The estimated blood loss was none.    The coronary arteries were normal..    The procedure log was documented by Documenter: Kandace Quiñones RN and verified by Kaleb Cage MD.    Date: 4/3/2025  Time: 2:13 PM      Other Most Recent Cardiology Results  Results for orders placed during the hospital encounter of 03/11/25    CV Ultrasound doppler arterial legs bilat    Interpretation Summary    Normal arterial Doppler of the lower extremities  with no significant focal stenosis at any level in either lower extremity, three-vessel runoff bilaterally with normal bilateral ABIs.        All pertinent data including labs, imaging, EKGs, and studies listed above were reviewed.  All of the patients ECG tracings since most recent visit were personally interpreted by this provider    ASSESSMENT:   Keerthi Chase is a 49 y.o. female who presents for follow of hypertension.  Blood pressure well controlled.  Working on weight loss.  No evidence of CAD by recent LHC.  No evidence of significant PAD.  We discussed the importance of risk factor modifications.    1. Primary hypertension        2. Morbid (severe) obesity due to excess calories          PLAN FOR TREATMENT OF ABOVE DIAGNOSES:     Continue Lipitor 20 mg q.d.   Continue Jardiance 10 mg daily   We discussed the importance of weight loss for overall health  Recommend 150 minutes of moderate intensity exercise per week   Low-sodium diet   Continue monitoring with digital hypertension        F/u 1 year      Gordo Archuleta MD  Cardiac Electrophysiology    This note was partially generated using voice recognition and generative artificial intelligence software. While every effort has been made to ensure its accuracy, transcription errors may exist. Please reach out to me with any questions or if clarification is needed.

## 2025-04-30 ENCOUNTER — CLINICAL SUPPORT (OUTPATIENT)
Dept: PODIATRY | Facility: CLINIC | Age: 50
End: 2025-04-30
Payer: MEDICARE

## 2025-04-30 VITALS — BODY MASS INDEX: 41.46 KG/M2 | HEIGHT: 68 IN | WEIGHT: 273.56 LBS

## 2025-04-30 DIAGNOSIS — Z48.00 DRESSING CHANGE: Primary | ICD-10-CM

## 2025-04-30 PROCEDURE — 99999 PR PBB SHADOW E&M-EST. PATIENT-LVL III: CPT | Mod: PBBFAC,,,

## 2025-04-30 NOTE — PROGRESS NOTES
Saw pt today for a dressing change. Per Dr. Bernal's instructions, prior areas of ulceration were painted with betadine, and a football dressing was then applied bilateral. Pt tolerated dressing change well. Pt will RTC in 1 week to see Dr. Bernal.

## 2025-05-07 ENCOUNTER — OFFICE VISIT (OUTPATIENT)
Dept: PODIATRY | Facility: CLINIC | Age: 50
End: 2025-05-07
Payer: MEDICARE

## 2025-05-07 VITALS — HEIGHT: 68 IN | BODY MASS INDEX: 41.46 KG/M2 | WEIGHT: 273.56 LBS

## 2025-05-07 DIAGNOSIS — Z87.2 HEALED ULCER OF RIGHT FOOT: Primary | ICD-10-CM

## 2025-05-07 DIAGNOSIS — Z87.2 HEALED ULCER OF LEFT FOOT: ICD-10-CM

## 2025-05-07 DIAGNOSIS — E11.42 DIABETIC POLYNEUROPATHY ASSOCIATED WITH TYPE 2 DIABETES MELLITUS: ICD-10-CM

## 2025-05-07 PROCEDURE — 99999 PR PBB SHADOW E&M-EST. PATIENT-LVL II: CPT | Mod: PBBFAC,,, | Performed by: PODIATRIST

## 2025-05-07 NOTE — PROGRESS NOTES
Subjective:      Patient ID: Keerthi Chase is a 49 y.o. female.    Chief Complaint: Wound Care  Patient presents to clinic for a 1 week wound check of bilateral foot.  Notes developing a blood blister to the Lt. Foot, last week, on account of wearing the football wrap in a Croc.  She has since returned to use of the DARCO shoe. Denies experiencing N/V/F/C/D.  Denies any additional pedal complaints.       Hemoglobin A1C   Date Value Ref Range Status   01/24/2025 6.7 (H) 4.0 - 5.6 % Final     Comment:     ADA Screening Guidelines:  5.7-6.4%  Consistent with prediabetes  >or=6.5%  Consistent with diabetes    High levels of fetal hemoglobin interfere with the HbA1C  assay. Heterozygous hemoglobin variants (HbS, HgC, etc)do  not significantly interfere with this assay.   However, presence of multiple variants may affect accuracy.     12/09/2024 10.0 (H) 4.0 - 5.6 % Final     Comment:     Reference Interval:  5.0 - 5.6 Normal   5.7 - 6.4 High Risk   > 6.5 Diabetic      Hgb A1c results are standardized based on the (NGSP) National   Glycohemoglobin Standardization Program.      Hemoglobin A1C levels are related to mean serum/plasma glucose   during the preceding 2-3 months.        06/03/2022 10.5 (H) 0.0 - 5.6 % Final     Comment:     Reference Interval:  5.0 - 5.6 Normal   5.7 - 6.4 High Risk   > 6.5 Diabetic      Hgb A1c results are standardized based on the (NGSP) National   Glycohemoglobin Standardization Program.      Hemoglobin A1C levels are related to mean serum/plasma glucose   during the preceding 2-3 months.              Past Medical History:   Diagnosis Date    Arthritis     Bell's palsy     Coronary artery disease     Pt states Dr. Virk said she did not have a blockage after a stress test.    Diabetes mellitus     GERD (gastroesophageal reflux disease)     Hypertension     Obesity     Sleep apnea     no cpap       Past Surgical History:   Procedure Laterality Date    ANGIOGRAM, CORONARY, WITH LEFT HEART  CATHETERIZATION  4/3/2025    Procedure: Left Heart Cath;  Surgeon: Kaleb Cage MD;  Location: [facility] CATH;  Service: Cardiology;;     SECTION      x 2    CHOLECYSTECTOMY      ESOPHAGOGASTRODUODENOSCOPY      FOOT AMPUTATION THROUGH METATARSAL Right 12/10/2024    Procedure: resection, FOOT, TRANSMETATARSAL, 5th;  Surgeon: Jacob Bernal DPM;  Location: [facility] OR;  Service: Podiatry;  Laterality: Right;    PERIPHERALLY INSERTED CENTRAL CATHETER INSERTION N/A 10/11/2021    Procedure: INSERTION, PICC;  Surgeon: PICC, [facility];  Location: [facility] ENDO;  Service: Cardiology;  Laterality: N/A;  Dr Aiken    PERIPHERALLY INSERTED CENTRAL CATHETER INSERTION N/A 2022    Procedure: INSERTION, PICC;  Surgeon: PICC, [facility];  Location: [facility] ENDO;  Service: Cardiology;  Laterality: N/A;  Dr Dubose    SESAMOIDECTOMY Left 2021    Procedure: SESAMOIDECTOMY;  Surgeon: Jacob Bernal DPM;  Location: [facility] OR;  Service: Podiatry;  Laterality: Left;       Family History   Problem Relation Name Age of Onset    Hypertension Mother      Diabetes Maternal Aunt         Social History     Socioeconomic History    Marital status:    Tobacco Use    Smoking status: Never    Smokeless tobacco: Never   Substance and Sexual Activity    Alcohol use: No    Drug use: No     Social Drivers of Health     Financial Resource Strain: Medium Risk (2025)    Overall Financial Resource Strain (CARDIA)     Difficulty of Paying Living Expenses: Somewhat hard   Food Insecurity: Food Insecurity Present (2025)    Hunger Vital Sign     Worried About Running Out of Food in the Last Year: Sometimes true     Ran Out of Food in the Last Year: Sometimes true   Transportation Needs: No Transportation Needs (2025)    PRAPARE - Transportation     Lack of Transportation (Medical): No     Lack of Transportation (Non-Medical): No   Physical Activity: Insufficiently Active (2025)    Exercise Vital Sign     Days of Exercise per Week: 2 days      Minutes of Exercise per Session: 10 min   Stress: No Stress Concern Present (2/21/2025)    [employer] [place] [employer] - Occupational Stress Questionnaire     Feeling of Stress : Only a little   Recent Concern: Stress - Stress Concern Present (12/9/2024)    [employer] [place] [employer] - Occupational Stress Questionnaire     Feeling of Stress : To some extent   Housing Stability: High Risk (2/21/2025)    Housing Stability Vital Sign     Unable to Pay for Housing in the Last Year: Yes     Number of Times Moved in the Last Year: 0     Homeless in the Last Year: No       Current Outpatient Medications   Medication Sig Dispense Refill    alprazolam (XANAX) 1 MG tablet Take 1 mg by mouth 2 (two) times daily as needed for Anxiety.      ascorbic acid, vitamin C, (VITAMIN C) 100 MG tablet Take 100 mg by mouth once daily.      atorvastatin (LIPITOR) 20 MG tablet Take 1 tablet (20 mg total) by mouth once daily. 90 tablet 3    b complex vitamins tablet Take 1 tablet by mouth once daily.      baclofen (LIORESAL) 10 MG tablet Take 10 mg by mouth 3 (three) times daily.      blood-glucose sensor (DEXCOM G7 SENSOR) Malou Ramos every 10 days 9 each 3    calcium HMB/vitamin D3 (HMB PRO ORAL) Take by mouth.      CEQUA 0.09 % Dpet Place 1 drop into both eyes 2 (two) times daily.      cyanocobalamin (VITAMIN B-12) 100 MCG tablet Take 100 mcg by mouth once daily.      doxycycline (VIBRAMYCIN) 100 MG Cap Take 100 mg by mouth 2 (two) times daily.      empagliflozin (JARDIANCE) 10 mg tablet Take 1 tablet (10 mg total) by mouth once daily. 30 tablet 6    fluconazole (DIFLUCAN) 200 MG Tab Take 200 mg by mouth.      HYDROcodone-acetaminophen (NORCO)  mg per tablet Take 1 tablet by mouth every 6 (six) hours as needed.      hydrocortisone 2.5 % cream Apply topically.      lancets Misc To check BG 3 times daily, to use with insurance preferred meter 100 each 1    naproxen (NAPROSYN) 500 MG tablet Take 500 mg by  "mouth 2 (two) times daily.      omega-3 fatty acids/fish oil (FISH OIL-OMEGA-3 FATTY ACIDS) 300-1,000 mg capsule Take by mouth once daily.      omeprazole (PRILOSEC) 40 MG capsule Take 40 mg by mouth.      pen needle, diabetic (BD ULTRA-FINE GARIMA PEN NEEDLE) 32 gauge x 5/32" Ndle Uses 2 daily, on multiple daily insulin injections 200 each 3    pramipexole (MIRAPEX) 0.125 MG tablet Take 0.125 mg by mouth.      sulfamethoxazole-trimethoprim 800-160mg (BACTRIM DS) 800-160 mg Tab Take 1 tablet by mouth 2 (two) times daily.      tirzepatide 10 mg/0.5 mL PnIj Inject 10 mg into the skin every 7 days. 2 mL 2    TOUJEO MAX U-300 SOLOSTAR 300 unit/mL (3 mL) insulin pen Inject 80 Units into the skin once daily. 12 mL 11    valsartan (DIOVAN) 80 MG tablet Take 1 tablet (80 mg total) by mouth once daily. Stop losartan 90 tablet 0    vitamin D (VITAMIN D3) 1000 units Tab Take 1,000 Units by mouth once daily.      zinc gluconate 50 mg tablet Take 50 mg by mouth once daily.       Current Facility-Administered Medications   Medication Dose Route Frequency Provider Last Rate Last Admin    sodium chloride 0.9% flush 10 mL  10 mL Intravenous PRN Kaleb Cage MD           Review of patient's allergies indicates:   Allergen Reactions    Codeine Hives and Rash         Review of Systems   Constitutional: Negative for chills and fever.   Skin:  Positive for color change and nail changes. Negative for poor wound healing.   Gastrointestinal:  Negative for nausea and vomiting.   Neurological:  Positive for numbness.   Psychiatric/Behavioral:  Negative for altered mental status.            Objective:      Physical Exam  Constitutional:       Appearance: Normal appearance. She is not ill-appearing.   Cardiovascular:      Pulses:           Dorsalis pedis pulses are 2+ on the right side and 2+ on the left side.        Posterior tibial pulses are 2+ on the right side and 2+ on the left side.      Comments: CFT is < 3 seconds bilateral.  Pedal " hair growth is decreased bilateral.  Mild edema noted to the Rt. Lateral forefoot.  Toes are warm to touch bilateral.    Musculoskeletal:         General: No tenderness or signs of injury.      Right lower leg: No edema.      Left lower leg: No edema.      Comments: Muscle strength 5/5 in all muscle groups bilateral.  No tenderness nor crepitation with ROM of foot/ankle joints bilateral.  Slight dorsal contracture of the Lt. [place].  No gross deformity noted to said digit.  (-) for pain with palpation of the Rt. Lateral 5th mtp joint wound.     Skin:     General: Skin is warm and dry.      Capillary Refill: Capillary refill takes 2 to 3 seconds.      Findings: Lesion present. No bruising, ecchymosis, erythema, signs of injury, laceration, petechiae, rash or wound.      Comments: Mature epithelium noted to the Rt. Lateral sub 5th met head.      Mature epithelium noted to the Lt. Sub 2nd met head.      Dried blood blister noted to the Lt. Sub 1st met head with underlying healthy epithelium.     Neurological:      Mental Status: She is alert.      Sensory: Sensory deficit present.      Motor: No weakness.      Comments: Decreased protective sensation noted bilateral.  Light touch is absent bilateral.               Assessment:       Encounter Diagnoses   Name Primary?    Healed ulcer of right foot Yes    Healed ulcer of left foot     Diabetic polyneuropathy associated with type 2 diabetes mellitus                Plan:       Keerthi was seen today for wound care.    Diagnoses and all orders for this visit:    Healed ulcer of right foot    Healed ulcer of left foot    Diabetic polyneuropathy associated with type 2 diabetes mellitus          I counseled the patient on her conditions, their implications and medical management.      No wound debridement performed, as the sites remain fully epithelialized.     Prior areas of ulceration were painted with betadine, and a football dressing was then applied bilateral.     To keep  bilateral football dressings CDI x 2 weeks.     To continue wearing the DARCO shoes with all weight bearing.    Will continue serially offloading until orthotics have been dispensed.   Patient must have custom molded orthotics, for offloading purposes, or she will run the risk of re-ulceration with the possibility of limb loss.    RTC in 1 week for a follow up.     Jacob Bernal DPM

## 2025-05-16 DIAGNOSIS — E11.65 TYPE 2 DIABETES MELLITUS WITH HYPERGLYCEMIA, WITH LONG-TERM CURRENT USE OF INSULIN: ICD-10-CM

## 2025-05-16 DIAGNOSIS — Z79.4 TYPE 2 DIABETES MELLITUS WITH HYPERGLYCEMIA, WITH LONG-TERM CURRENT USE OF INSULIN: ICD-10-CM

## 2025-05-16 RX ORDER — EMPAGLIFLOZIN 10 MG/1
10 TABLET, FILM COATED ORAL
Qty: 30 TABLET | Refills: 6 | Status: SHIPPED | OUTPATIENT
Start: 2025-05-16

## 2025-05-21 ENCOUNTER — TELEPHONE (OUTPATIENT)
Dept: PODIATRY | Facility: CLINIC | Age: 50
End: 2025-05-21

## 2025-05-21 ENCOUNTER — OFFICE VISIT (OUTPATIENT)
Dept: PODIATRY | Facility: CLINIC | Age: 50
End: 2025-05-21
Payer: MEDICARE

## 2025-05-21 VITALS — BODY MASS INDEX: 41.46 KG/M2 | HEIGHT: 68 IN | WEIGHT: 273.56 LBS

## 2025-05-21 DIAGNOSIS — Z87.2 HEALED ULCER OF LEFT FOOT: ICD-10-CM

## 2025-05-21 DIAGNOSIS — Z87.2 HEALED ULCER OF RIGHT FOOT: Primary | ICD-10-CM

## 2025-05-21 DIAGNOSIS — E11.42 DIABETIC POLYNEUROPATHY ASSOCIATED WITH TYPE 2 DIABETES MELLITUS: ICD-10-CM

## 2025-05-21 PROCEDURE — 99999 PR PBB SHADOW E&M-EST. PATIENT-LVL III: CPT | Mod: PBBFAC,,, | Performed by: PODIATRIST

## 2025-05-21 NOTE — TELEPHONE ENCOUNTER
----- Message from Jacob Bernal DPM sent at 5/21/2025  7:25 AM CDT -----  We need to contact [employer], as they are denying coverage for orthotics.

## 2025-05-21 NOTE — PROGRESS NOTES
Subjective:      Patient ID: Keerthi Chase is a 49 y.o. female.    Chief Complaint: No chief complaint on file.  Patient presents to clinic for a 2 week wound check of bilateral foot.  Denies pedal pain with today's exam.  Has kept the previous football wraps CDI since the last exam.  Has been ambulating to tolerance in the [employer] shoes. Denies experiencing N/V/F/C/D.  Denies any additional pedal complaints.       Hemoglobin A1C   Date Value Ref Range Status   01/24/2025 6.7 (H) 4.0 - 5.6 % Final     Comment:     ADA Screening Guidelines:  5.7-6.4%  Consistent with prediabetes  >or=6.5%  Consistent with diabetes    High levels of fetal hemoglobin interfere with the HbA1C  assay. Heterozygous hemoglobin variants (HbS, HgC, etc)do  not significantly interfere with this assay.   However, presence of multiple variants may affect accuracy.     12/09/2024 10.0 (H) 4.0 - 5.6 % Final     Comment:     Reference Interval:  5.0 - 5.6 Normal   5.7 - 6.4 High Risk   > 6.5 Diabetic      Hgb A1c results are standardized based on the (NGSP) National   Glycohemoglobin Standardization Program.      Hemoglobin A1C levels are related to mean serum/plasma glucose   during the preceding 2-3 months.        06/03/2022 10.5 (H) 0.0 - 5.6 % Final     Comment:     Reference Interval:  5.0 - 5.6 Normal   5.7 - 6.4 High Risk   > 6.5 Diabetic      Hgb A1c results are standardized based on the (NGSP) National   Glycohemoglobin Standardization Program.      Hemoglobin A1C levels are related to mean serum/plasma glucose   during the preceding 2-3 months.              Past Medical History:   Diagnosis Date    Arthritis     Bell's palsy     Coronary artery disease     Pt states Dr. Virk said she did not have a blockage after a stress test.    Diabetes mellitus     GERD (gastroesophageal reflux disease)     Hypertension     Obesity     Sleep apnea     no cpap       Past Surgical History:   Procedure Laterality Date    ANGIOGRAM, CORONARY, WITH  LEFT HEART CATHETERIZATION  4/3/2025    Procedure: Left Heart Cath;  Surgeon: Kaleb Cage MD;  Location: [facility] CATH;  Service: Cardiology;;     SECTION      x 2    CHOLECYSTECTOMY      ESOPHAGOGASTRODUODENOSCOPY      FOOT AMPUTATION THROUGH METATARSAL Right 12/10/2024    Procedure: resection, FOOT, TRANSMETATARSAL, 5th;  Surgeon: Jacob Bernal DPM;  Location: [facility] OR;  Service: Podiatry;  Laterality: Right;    PERIPHERALLY INSERTED CENTRAL CATHETER INSERTION N/A 10/11/2021    Procedure: INSERTION, PICC;  Surgeon: PICC, [facility];  Location: [facility] ENDO;  Service: Cardiology;  Laterality: N/A;  Dr Aiken    PERIPHERALLY INSERTED CENTRAL CATHETER INSERTION N/A 2022    Procedure: INSERTION, PICC;  Surgeon: PICC, [facility];  Location: [facility] END[facility];  Service: Cardiology;  Laterality: N/A;  Dr Dubose    SESAMOIDECTOMY Left 2021    Procedure: SESAMOIDECTOMY;  Surgeon: Jacob Bernal DPM;  Location: [facility] OR;  Service: Podiatry;  Laterality: Left;       Family History   Problem Relation Name Age of Onset    Hypertension Mother      Diabetes Maternal Aunt         Social History     Socioeconomic History    Marital status:    Tobacco Use    Smoking status: Never    Smokeless tobacco: Never   Substance and Sexual Activity    Alcohol use: No    Drug use: No     Social Drivers of Health     Financial Resource Strain: Medium Risk (2025)    Overall Financial Resource Strain (CARDIA)     Difficulty of Paying Living Expenses: Somewhat hard   Food Insecurity: Food Insecurity Present (2025)    Hunger Vital Sign     Worried About Running Out of Food in the Last Year: Sometimes true     Ran Out of Food in the Last Year: Sometimes true   Transportation Needs: No Transportation Needs (2025)    PRAPARE - Transportation     Lack of Transportation (Medical): No     Lack of Transportation (Non-Medical): No   Physical Activity: Insufficiently Active (2025)    Exercise Vital Sign     Days of Exercise per  Week: 2 days     Minutes of Exercise per Session: 10 min   Stress: No Stress Concern Present (2/21/2025)    [employer] - Occupational Stress Questionnaire     Feeling of Stress : Only a little   Recent Concern: Stress - Stress Concern Present (12/9/2024)    [employer] - Occupational Stress Questionnaire     Feeling of Stress : To some extent   Housing Stability: High Risk (2/21/2025)    Housing Stability Vital Sign     Unable to Pay for Housing in the Last Year: Yes     Number of Times Moved in the Last Year: 0     Homeless in the Last Year: No       Current Outpatient Medications   Medication Sig Dispense Refill    alprazolam (XANAX) 1 MG tablet Take 1 mg by mouth 2 (two) times daily as needed for Anxiety.      ascorbic acid, vitamin C, (VITAMIN C) 100 MG tablet Take 100 mg by mouth once daily.      atorvastatin (LIPITOR) 20 MG tablet Take 1 tablet (20 mg total) by mouth once daily. 90 tablet 3    b complex vitamins tablet Take 1 tablet by mouth once daily.      baclofen (LIORESAL) 10 MG tablet Take 10 mg by mouth 3 (three) times daily.      blood-glucose sensor (DEXCOM G7 SENSOR) Malou Ramos every 10 days 9 each 3    calcium HMB/vitamin D3 (HMB PRO ORAL) Take by mouth.      CEQUA 0.09 % Dpet Place 1 drop into both eyes 2 (two) times daily.      cyanocobalamin (VITAMIN B-12) 100 MCG tablet Take 100 mcg by mouth once daily.      doxycycline (VIBRAMYCIN) 100 MG Cap Take 100 mg by mouth 2 (two) times daily.      fluconazole (DIFLUCAN) 200 MG Tab Take 200 mg by mouth.      HYDROcodone-acetaminophen (NORCO)  mg per tablet Take 1 tablet by mouth every 6 (six) hours as needed.      hydrocortisone 2.5 % cream Apply topically.      JARDIANCE 10 mg tablet TAKE 1 TABLET BY MOUTH DAILY 30 tablet 6    lancets Misc To check BG 3 times daily, to use with insurance preferred meter 100 each 1    naproxen (NAPROSYN) 500 MG tablet Take 500 mg by mouth 2 (two) times  "daily.      omega-3 fatty acids/fish oil (FISH OIL-OMEGA-3 FATTY ACIDS) 300-1,000 mg capsule Take by mouth once daily.      omeprazole (PRILOSEC) 40 MG capsule Take 40 mg by mouth.      pen needle, diabetic (BD ULTRA-FINE GARIMA PEN NEEDLE) 32 gauge x 5/32" Ndle Uses 2 daily, on multiple daily insulin injections 200 each 3    pramipexole (MIRAPEX) 0.125 MG tablet Take 0.125 mg by mouth.      sulfamethoxazole-trimethoprim 800-160mg (BACTRIM DS) 800-160 mg Tab Take 1 tablet by mouth 2 (two) times daily.      tirzepatide 10 mg/0.5 mL PnIj Inject 10 mg into the skin every 7 days. 2 mL 2    TOUJEO MAX U-300 SOLOSTAR 300 unit/mL (3 mL) insulin pen Inject 80 Units into the skin once daily. 12 mL 11    valsartan (DIOVAN) 80 MG tablet Take 1 tablet (80 mg total) by mouth once daily. Stop losartan 90 tablet 0    vitamin D (VITAMIN D3) 1000 units Tab Take 1,000 Units by mouth once daily.      zinc gluconate 50 mg tablet Take 50 mg by mouth once daily.       Current Facility-Administered Medications   Medication Dose Route Frequency Provider Last Rate Last Admin    sodium chloride 0.9% flush 10 mL  10 mL Intravenous PRN Kaleb Cage MD           Review of patient's allergies indicates:   Allergen Reactions    Codeine Hives and Rash         Review of Systems   Constitutional: Negative for chills and fever.   Skin:  Positive for color change and nail changes. Negative for poor wound healing.   Gastrointestinal:  Negative for nausea and vomiting.   Neurological:  Positive for numbness.   Psychiatric/Behavioral:  Negative for altered mental status.            Objective:      Physical Exam  Constitutional:       Appearance: Normal appearance. She is not ill-appearing.   Cardiovascular:      Pulses:           Dorsalis pedis pulses are 2+ on the right side and 2+ on the left side.        Posterior tibial pulses are 2+ on the right side and 2+ on the left side.      Comments: CFT is < 3 seconds bilateral.  Pedal hair growth is " decreased bilateral.  Mild edema noted to the Rt. Lateral forefoot.  Toes are warm to touch bilateral.    Musculoskeletal:         General: No tenderness or signs of injury.      Right lower leg: No edema.      Left lower leg: No edema.      Comments: Muscle strength 5/5 in all muscle groups bilateral.  No tenderness nor crepitation with ROM of foot/ankle joints bilateral.  Slight dorsal contracture of the Lt. Hallux.  No gross deformity noted to said digit.  (-) for pain with palpation of the Rt. Lateral 5th mtp joint wound.     Skin:     General: Skin is warm and dry.      Capillary Refill: Capillary refill takes 2 to 3 seconds.      Findings: No bruising, ecchymosis, erythema, signs of injury, laceration, lesion, petechiae, rash or wound.      Comments: Mature epithelium noted to the Rt. Lateral sub 5th met head.      Mature epithelium noted to the Lt. Sub 2nd met head.       Neurological:      Mental Status: She is alert.      Sensory: Sensory deficit present.      Motor: No weakness.      Comments: Decreased protective sensation noted bilateral.  Light touch is absent bilateral.               Assessment:       Encounter Diagnoses   Name Primary?    Healed ulcer of right foot Yes    Healed ulcer of left foot     Diabetic polyneuropathy associated with type 2 diabetes mellitus                Plan:       Diagnoses and all orders for this visit:    Healed ulcer of right foot    Healed ulcer of left foot    Diabetic polyneuropathy associated with type 2 diabetes mellitus          I counseled the patient on her conditions, their implications and medical management.      No wound debridement performed, as the sites remain fully epithelialized.     A football dressing was then applied bilateral.     To keep bilateral football dressings CDI x 2 weeks.     To continue wearing the [employer] shoes with all weight bearing.    Will continue serially offloading until orthotics have been dispensed.   Patient must have custom  molded orthotics, for offloading purposes, or she will run the risk of re-ulceration with the possibility of limb loss.    RTC in 2 weeks for a follow up.     Jacob Bernal DPM

## 2025-06-04 ENCOUNTER — OFFICE VISIT (OUTPATIENT)
Dept: PODIATRY | Facility: CLINIC | Age: 50
End: 2025-06-04
Payer: MEDICARE

## 2025-06-04 VITALS — HEIGHT: 68 IN | WEIGHT: 273.56 LBS | BODY MASS INDEX: 41.46 KG/M2

## 2025-06-04 DIAGNOSIS — E11.42 DIABETIC POLYNEUROPATHY ASSOCIATED WITH TYPE 2 DIABETES MELLITUS: ICD-10-CM

## 2025-06-04 DIAGNOSIS — Z87.2 HEALED ULCER OF LEFT FOOT: ICD-10-CM

## 2025-06-04 DIAGNOSIS — Z87.2 HEALED ULCER OF RIGHT FOOT: Primary | ICD-10-CM

## 2025-06-04 PROCEDURE — 99999 PR PBB SHADOW E&M-EST. PATIENT-LVL I: CPT | Mod: PBBFAC,,, | Performed by: PODIATRIST

## 2025-06-18 ENCOUNTER — OFFICE VISIT (OUTPATIENT)
Dept: PODIATRY | Facility: CLINIC | Age: 50
End: 2025-06-18
Payer: MEDICARE

## 2025-06-18 VITALS — BODY MASS INDEX: 41.46 KG/M2 | WEIGHT: 273.56 LBS | HEIGHT: 68 IN

## 2025-06-18 DIAGNOSIS — Z87.2 HEALED ULCER OF RIGHT FOOT: ICD-10-CM

## 2025-06-18 DIAGNOSIS — Z87.2 HEALED ULCER OF LEFT FOOT: ICD-10-CM

## 2025-06-18 DIAGNOSIS — T14.8XXA BLOOD BLISTER: Primary | ICD-10-CM

## 2025-06-18 DIAGNOSIS — E11.42 DIABETIC POLYNEUROPATHY ASSOCIATED WITH TYPE 2 DIABETES MELLITUS: ICD-10-CM

## 2025-06-18 PROCEDURE — 3061F NEG MICROALBUMINURIA REV: CPT | Mod: CPTII,S$GLB,, | Performed by: PODIATRIST

## 2025-06-18 PROCEDURE — 3008F BODY MASS INDEX DOCD: CPT | Mod: CPTII,S$GLB,, | Performed by: PODIATRIST

## 2025-06-18 PROCEDURE — 99999 PR PBB SHADOW E&M-EST. PATIENT-LVL III: CPT | Mod: PBBFAC,,, | Performed by: PODIATRIST

## 2025-06-18 PROCEDURE — 3044F HG A1C LEVEL LT 7.0%: CPT | Mod: CPTII,S$GLB,, | Performed by: PODIATRIST

## 2025-06-18 PROCEDURE — 1159F MED LIST DOCD IN RCRD: CPT | Mod: CPTII,S$GLB,, | Performed by: PODIATRIST

## 2025-06-18 PROCEDURE — 99213 OFFICE O/P EST LOW 20 MIN: CPT | Mod: S$GLB,,, | Performed by: PODIATRIST

## 2025-06-18 PROCEDURE — 4010F ACE/ARB THERAPY RXD/TAKEN: CPT | Mod: CPTII,S$GLB,, | Performed by: PODIATRIST

## 2025-06-18 PROCEDURE — 3066F NEPHROPATHY DOC TX: CPT | Mod: CPTII,S$GLB,, | Performed by: PODIATRIST

## 2025-06-18 RX ORDER — PEN NEEDLE, DIABETIC 32 GX 1/4"
NEEDLE, DISPOSABLE MISCELLANEOUS
COMMUNITY
Start: 2025-06-12

## 2025-06-18 NOTE — PROGRESS NOTES
Subjective:      Patient ID: Keerthi Chase is a 50 y.o. female.    Chief Complaint: Wound Care  Patient presents to clinic for a 2 week wound check of bilateral foot.  Denies pedal pain with today's exam.  Relates noticing a small blood blister that developed along the Lt. Forefoot.  She has since been padding the site and is applying mupirocin QD.  Denies noticing drainage nor pain from the site.  She is currently utilizing the custom molded orthotics in walking shoes.  Denies experiencing N/V/F/C/D.  Denies any additional pedal complaints.       Hemoglobin A1C   Date Value Ref Range Status   01/24/2025 6.7 (H) 4.0 - 5.6 % Final     Comment:     ADA Screening Guidelines:  5.7-6.4%  Consistent with prediabetes  >or=6.5%  Consistent with diabetes    High levels of fetal hemoglobin interfere with the HbA1C  assay. Heterozygous hemoglobin variants (HbS, HgC, etc)do  not significantly interfere with this assay.   However, presence of multiple variants may affect accuracy.     12/09/2024 10.0 (H) 4.0 - 5.6 % Final     Comment:     Reference Interval:  5.0 - 5.6 Normal   5.7 - 6.4 High Risk   > 6.5 Diabetic      Hgb A1c results are standardized based on the (NGSP) National   Glycohemoglobin Standardization Program.      Hemoglobin A1C levels are related to mean serum/plasma glucose   during the preceding 2-3 months.        06/03/2022 10.5 (H) 0.0 - 5.6 % Final     Comment:     Reference Interval:  5.0 - 5.6 Normal   5.7 - 6.4 High Risk   > 6.5 Diabetic      Hgb A1c results are standardized based on the (NGSP) National   Glycohemoglobin Standardization Program.      Hemoglobin A1C levels are related to mean serum/plasma glucose   during the preceding 2-3 months.              Past Medical History:   Diagnosis Date    Arthritis     Bell's palsy     Coronary artery disease     Pt states Dr. Virk said she did not have a blockage after a stress test.    Diabetes mellitus     GERD (gastroesophageal reflux disease)      Hypertension     Obesity     Sleep apnea     no cpap       Past Surgical History:   Procedure Laterality Date    ANGIOGRAM, CORONARY, WITH LEFT HEART CATHETERIZATION  4/3/2025    Procedure: Left Heart Cath;  Surgeon: Kaleb Cage MD;  Location: [facility] CATH;  Service: Cardiology;;     SECTION      x 2    CHOLECYSTECTOMY      ESOPHAGOGASTRODUODENOSCOPY      FOOT AMPUTATION THROUGH METATARSAL Right 12/10/2024    Procedure: resection, FOOT, TRANSMETATARSAL, 5th;  Surgeon: Jacob Bernal DPM;  Location: [facility] OR;  Service: Podiatry;  Laterality: Right;    PERIPHERALLY INSERTED CENTRAL CATHETER INSERTION N/A 10/11/2021    Procedure: INSERTION, PICC;  Surgeon: PICC, [facility];  [facility];  Service: Cardiology;  Laterality: N/A;  Dr Aiken    PERIPHERALLY INSERTED CENTRAL CATHETER INSERTION N/A 2022    Procedure: INSERTION, PICC;  Surgeon: PICC, [facility];  Location: [facility];  Service: Cardiology;  Laterality: N/A;  Dr Dubose    SESAMOIDECTOMY Left 2021    Procedure: SESAMOIDECTOMY;  Surgeon: Jacob Bernal DPM;  Location: [facility] OR;  Service: Podiatry;  Laterality: Left;       Family History   Problem Relation Name Age of Onset    Hypertension Mother      Diabetes Maternal Aunt         Social History     Socioeconomic History    Marital status:    Tobacco Use    Smoking status: Never    Smokeless tobacco: Never   Substance and Sexual Activity    Alcohol use: No    Drug use: No     Social Drivers of Health     Financial Resource Strain: Medium Risk (2025)    Overall Financial Resource Strain (CARDIA)     Difficulty of Paying Living Expenses: Somewhat hard   Food Insecurity: Food Insecurity Present (2025)    Hunger Vital Sign     Worried About Running Out of Food in the Last Year: Sometimes true     Ran Out of Food in the Last Year: Sometimes true   Transportation Needs: No Transportation Needs (2025)    PRAPARE - Transportation     Lack of Transportation (Medical): No     Lack of  "Transportation (Non-Medical): No   Physical Activity: Insufficiently Active (2/21/2025)    Exercise Vital Sign     Days of Exercise per Week: 2 days     Minutes of Exercise per Session: 10 min   Stress: No Stress Concern Present (2/21/2025)    [employer] - Occupational Stress Questionnaire     Feeling of Stress : Only a little   Recent Concern: Stress - Stress Concern Present (12/9/2024)    [employer] - Occupational Stress Questionnaire     Feeling of Stress : To some extent   Housing Stability: High Risk (2/21/2025)    Housing Stability Vital Sign     Unable to Pay for Housing in the Last Year: Yes     Number of Times Moved in the Last Year: 0     Homeless in the Last Year: No       Current Outpatient Medications   Medication Sig Dispense Refill    alprazolam (XANAX) 1 MG tablet Take 1 mg by mouth 2 (two) times daily as needed for Anxiety.      ascorbic acid, vitamin C, (VITAMIN C) 100 MG tablet Take 100 mg by mouth once daily.      atorvastatin (LIPITOR) 20 MG tablet Take 1 tablet (20 mg total) by mouth once daily. 90 tablet 3    b complex vitamins tablet Take 1 tablet by mouth once daily.      blood-glucose sensor (DEXCOM G7 SENSOR) Malou Ramos every 10 days 9 each 3    calcium HMB/vitamin D3 (HMB PRO ORAL) Take by mouth.      CEQUA 0.09 % Dpet Place 1 drop into both eyes 2 (two) times daily.      cyanocobalamin (VITAMIN B-12) 100 MCG tablet Take 100 mcg by mouth once daily.      fluconazole (DIFLUCAN) 200 MG Tab Take 200 mg by mouth.      HYDROcodone-acetaminophen (NORCO)  mg per tablet Take 1 tablet by mouth every 6 (six) hours as needed.      hydrocortisone 2.5 % cream Apply topically.      INSUPEN PEN NEEDLE 31 gauge x 3/16" Ndle       JARDIANCE 10 mg tablet TAKE 1 TABLET BY MOUTH DAILY 30 tablet 6    lancets Misc To check BG 3 times daily, to use with insurance preferred meter 100 each 1    omega-3 fatty acids/fish oil (FISH OIL-OMEGA-3 FATTY " "ACIDS) 300-1,000 mg capsule Take by mouth once daily.      omeprazole (PRILOSEC) 40 MG capsule Take 40 mg by mouth.      pen needle, diabetic (BD ULTRA-FINE GARIMA PEN NEEDLE) 32 gauge x 5/32" Ndle Uses 2 daily, on multiple daily insulin injections 200 each 3    pramipexole (MIRAPEX) 0.125 MG tablet Take 0.125 mg by mouth.      tirzepatide (MOUNJARO) 12.5 mg/0.5 mL PnIj Inject 12.5 mg into the skin every 7 days. 2 mL 2    TOUJEO MAX U-300 SOLOSTAR 300 unit/mL (3 mL) insulin pen Inject 80 Units into the skin once daily. 12 mL 11    valsartan (DIOVAN) 80 MG tablet Take 1 tablet (80 mg total) by mouth once daily. Stop losartan 90 tablet 0    vitamin D (VITAMIN D3) 1000 units Tab Take 1,000 Units by mouth once daily.      zinc gluconate 50 mg tablet Take 50 mg by mouth once daily.       Current Facility-Administered Medications   Medication Dose Route Frequency Provider Last Rate Last Admin    sodium chloride 0.9% flush 10 mL  10 mL Intravenous PRN Kaleb Cage MD           Review of patient's allergies indicates:   Allergen Reactions    Codeine Hives and Rash         Review of Systems   Constitutional: Negative for chills and fever.   Skin:  Positive for color change and nail changes. Negative for poor wound healing.   Gastrointestinal:  Negative for nausea and vomiting.   Neurological:  Positive for numbness.   Psychiatric/Behavioral:  Negative for altered mental status.            Objective:      Physical Exam  Constitutional:       Appearance: Normal appearance. She is not ill-appearing.   Cardiovascular:      Pulses:           Dorsalis pedis pulses are 2+ on the right side and 2+ on the left side.        Posterior tibial pulses are 2+ on the right side and 2+ on the left side.      Comments: CFT is < 3 seconds bilateral.  Pedal hair growth is decreased bilateral.  Mild edema noted to the Rt. Lateral forefoot.  Toes are warm to touch bilateral.    Musculoskeletal:         General: No tenderness or signs of " injury.      Right lower leg: No edema.      Left lower leg: No edema.      Comments: Muscle strength 5/5 in all muscle groups bilateral.  No tenderness nor crepitation with ROM of foot/ankle joints bilateral.  Slight dorsal contracture of the Lt. Hallux.  No gross deformity noted to said digit.  (-) for pain with palpation of the Rt. Lateral 5th mtp joint wound.     Skin:     General: Skin is warm and dry.      Capillary Refill: Capillary refill takes 2 to 3 seconds.      Findings: No bruising, ecchymosis, erythema, signs of injury, laceration, lesion, petechiae, rash or wound.      Comments: Mature epithelium noted to the Rt. Lateral sub 5th met head.      Mature epithelium noted to the Lt. Sub 2nd met head.  Dried blood blister noted to the site with no underlying wound present.  No localized sign of infection noted.     Neurological:      Mental Status: She is alert.      Sensory: Sensory deficit present.      Motor: No weakness.      Comments: Decreased protective sensation noted bilateral.  Light touch is absent bilateral.               Assessment:       Encounter Diagnoses   Name Primary?    Blood blister Yes    Healed ulcer of right foot     Healed ulcer of left foot     Diabetic polyneuropathy associated with type 2 diabetes mellitus                    Plan:       Keerthi was seen today for wound care.    Diagnoses and all orders for this visit:    Blood blister    Healed ulcer of right foot    Healed ulcer of left foot    Diabetic polyneuropathy associated with type 2 diabetes mellitus              I counseled the patient on her conditions, their implications and medical management.      No wound debridement performed, as the sites remain fully epithelialized.  There is a small, dried blood blister about the Lt. Sub 2nd met head.      Patient to continue utilizing custom molded orthotics.  To be worn with all weight bearing.  An oval shaped aperture was fashioned about the Lt. Sub  2nd met head to prevent  future blistering.    Advised to apply betadine to the site of blistering BID x 2 weeks.  To moisturizer the area prior to going to bed.    Advised to inspect the prior areas of ulceration for any new signs of breakdown.    May continue with her normal showering routine.    RTC in 2 weeks for a follow up.     Jacob Bernal DPM

## 2025-06-19 ENCOUNTER — PATIENT MESSAGE (OUTPATIENT)
Dept: OTHER | Facility: OTHER | Age: 50
End: 2025-06-19
Payer: MEDICARE

## 2025-06-19 ENCOUNTER — PATIENT MESSAGE (OUTPATIENT)
Dept: ENDOCRINOLOGY | Facility: CLINIC | Age: 50
End: 2025-06-19
Payer: MEDICARE

## 2025-07-02 ENCOUNTER — OFFICE VISIT (OUTPATIENT)
Dept: PODIATRY | Facility: CLINIC | Age: 50
End: 2025-07-02
Payer: MEDICARE

## 2025-07-02 ENCOUNTER — PATIENT MESSAGE (OUTPATIENT)
Dept: PODIATRY | Facility: CLINIC | Age: 50
End: 2025-07-02

## 2025-07-02 VITALS — WEIGHT: 273.56 LBS | BODY MASS INDEX: 41.46 KG/M2 | HEIGHT: 68 IN

## 2025-07-02 DIAGNOSIS — E11.42 DIABETIC POLYNEUROPATHY ASSOCIATED WITH TYPE 2 DIABETES MELLITUS: ICD-10-CM

## 2025-07-02 DIAGNOSIS — Z87.2 HEALED ULCER OF LEFT FOOT: ICD-10-CM

## 2025-07-02 DIAGNOSIS — Z87.2 HEALED ULCER OF RIGHT FOOT: Primary | ICD-10-CM

## 2025-07-02 PROCEDURE — 99999 PR PBB SHADOW E&M-EST. PATIENT-LVL I: CPT | Mod: PBBFAC,,, | Performed by: PODIATRIST

## 2025-07-02 NOTE — PROGRESS NOTES
Subjective:      Patient ID: Keerthi Chase is a 50 y.o. female.    Chief Complaint: No chief complaint on file.  Patient presents to clinic for a 2 week wound check of bilateral foot.  Denies pedal pain with today's exam.  Has been wearing the modified orthotic with no new areas of blistering noted.  She continues padding the site and is applying betadine during the day and moisturizer at night.   Denies noticing drainage nor pain from the site.  Denies experiencing N/V/F/C/D.  Denies any additional pedal complaints.       Hemoglobin A1C   Date Value Ref Range Status   01/24/2025 6.7 (H) 4.0 - 5.6 % Final     Comment:     ADA Screening Guidelines:  5.7-6.4%  Consistent with prediabetes  >or=6.5%  Consistent with diabetes    High levels of fetal hemoglobin interfere with the HbA1C  assay. Heterozygous hemoglobin variants (HbS, HgC, etc)do  not significantly interfere with this assay.   However, presence of multiple variants may affect accuracy.     12/09/2024 10.0 (H) 4.0 - 5.6 % Final     Comment:     Reference Interval:  5.0 - 5.6 Normal   5.7 - 6.4 High Risk   > 6.5 Diabetic      Hgb A1c results are standardized based on the (NGSP) National   Glycohemoglobin Standardization Program.      Hemoglobin A1C levels are related to mean serum/plasma glucose   during the preceding 2-3 months.        06/03/2022 10.5 (H) 0.0 - 5.6 % Final     Comment:     Reference Interval:  5.0 - 5.6 Normal   5.7 - 6.4 High Risk   > 6.5 Diabetic      Hgb A1c results are standardized based on the (NGSP) National   Glycohemoglobin Standardization Program.      Hemoglobin A1C levels are related to mean serum/plasma glucose   during the preceding 2-3 months.              Past Medical History:   Diagnosis Date    Arthritis     Bell's palsy     Coronary artery disease     Pt states Dr. Virk said she did not have a blockage after a stress test.    Diabetes mellitus     GERD (gastroesophageal reflux disease)     Hypertension     Obesity      Sleep apnea     no cpap       Past Surgical History:   Procedure Laterality Date    ANGIOGRAM, CORONARY, WITH LEFT HEART CATHETERIZATION  4/3/2025    Procedure: Left Heart Cath;  Surgeon: Kaleb Cage MD;  Location: [facility] CATH;  Service: Cardiology;;     SECTION      x 2    CHOLECYSTECTOMY      ESOPHAGOGASTRODUODENOSCOPY      FOOT AMPUTATION THROUGH METATARSAL Right 12/10/2024    Procedure: resection, FOOT, TRANSMETATARSAL, 5th;  Surgeon: Jacob Bernal DPM;  Location: [facility] OR;  Service: Podiatry;  Laterality: Right;    PERIPHERALLY INSERTED CENTRAL CATHETER INSERTION N/A 10/11/2021    Procedure: INSERTION, PICC;  Surgeon: PICC, [facility];  [facility];  Service: Cardiology;  Laterality: N/A;  Dr Aiken    PERIPHERALLY INSERTED CENTRAL CATHETER INSERTION N/A 2022    Procedure: INSERTION, PICC;  Surgeon: PICC, [facility];  Location: [facility];  Service: Cardiology;  Laterality: N/A;  Dr Dubose    SESAMOIDECTOMY Left 2021    Procedure: SESAMOIDECTOMY;  Surgeon: Jacob Bernal DPM;  Location: [facility] OR;  Service: Podiatry;  Laterality: Left;       Family History   Problem Relation Name Age of Onset    Hypertension Mother      Diabetes Maternal Aunt         Social History     Socioeconomic History    Marital status:    Tobacco Use    Smoking status: Never    Smokeless tobacco: Never   Substance and Sexual Activity    Alcohol use: No    Drug use: No     Social Drivers of Health     Financial Resource Strain: Medium Risk (2025)    Overall Financial Resource Strain (CARDIA)     Difficulty of Paying Living Expenses: Somewhat hard   Food Insecurity: Food Insecurity Present (2025)    Hunger Vital Sign     Worried About Running Out of Food in the Last Year: Sometimes true     Ran Out of Food in the Last Year: Sometimes true   Transportation Needs: No Transportation Needs (2025)    PRAPARE - Transportation     Lack of Transportation (Medical): No     Lack of Transportation (Non-Medical):  "No   Physical Activity: Insufficiently Active (2/21/2025)    Exercise Vital Sign     Days of Exercise per Week: 2 days     Minutes of Exercise per Session: 10 min   Stress: No Stress Concern Present (2/21/2025)    [employer] [place] [employer] - Occupational Stress Questionnaire     Feeling of Stress : Only a little   Recent Concern: Stress - Stress Concern Present (12/9/2024)    [employer] - Occupational Stress Questionnaire     Feeling of Stress : To some extent   Housing Stability: High Risk (2/21/2025)    Housing Stability Vital Sign     Unable to Pay for Housing in the Last Year: Yes     Number of Times Moved in the Last Year: 0     Homeless in the Last Year: No       Current Outpatient Medications   Medication Sig Dispense Refill    alprazolam (XANAX) 1 MG tablet Take 1 mg by mouth 2 (two) times daily as needed for Anxiety.      ascorbic acid, vitamin C, (VITAMIN C) 100 MG tablet Take 100 mg by mouth once daily.      atorvastatin (LIPITOR) 20 MG tablet Take 1 tablet (20 mg total) by mouth once daily. 90 tablet 3    b complex vitamins tablet Take 1 tablet by mouth once daily.      blood-glucose sensor (DEXCOM G7 SENSOR) Malou Ramos every 10 days 9 each 3    calcium HMB/vitamin D3 (HMB PRO ORAL) Take by mouth.      CEQUA 0.09 % Dpet Place 1 drop into both eyes 2 (two) times daily.      cyanocobalamin (VITAMIN B-12) 100 MCG tablet Take 100 mcg by mouth once daily.      fluconazole (DIFLUCAN) 200 MG Tab Take 200 mg by mouth.      HYDROcodone-acetaminophen (NORCO)  mg per tablet Take 1 tablet by mouth every 6 (six) hours as needed.      hydrocortisone 2.5 % cream Apply topically.      INSUPEN PEN NEEDLE 31 gauge x 3/16" Ndle       JARDIANCE 10 mg tablet TAKE 1 TABLET BY MOUTH DAILY 30 tablet 6    lancets Misc To check BG 3 times daily, to use with insurance preferred meter 100 each 1    omega-3 fatty acids/fish oil (FISH OIL-OMEGA-3 FATTY ACIDS) 300-1,000 mg capsule " "Take by mouth once daily.      omeprazole (PRILOSEC) 40 MG capsule Take 40 mg by mouth.      pen needle, diabetic (BD ULTRA-FINE GARIMA PEN NEEDLE) 32 gauge x 5/32" Ndle Uses 2 daily, on multiple daily insulin injections 200 each 3    pramipexole (MIRAPEX) 0.125 MG tablet Take 0.125 mg by mouth.      tirzepatide (MOUNJARO) 12.5 mg/0.5 mL PnIj Inject 12.5 mg into the skin every 7 days. 2 mL 2    TOUJEO MAX U-300 SOLOSTAR 300 unit/mL (3 mL) insulin pen Inject 80 Units into the skin once daily. 12 mL 11    valsartan (DIOVAN) 80 MG tablet Take 1 tablet (80 mg total) by mouth once daily. 90 tablet 1    vitamin D (VITAMIN D3) 1000 units Tab Take 1,000 Units by mouth once daily.      zinc gluconate 50 mg tablet Take 50 mg by mouth once daily.       Current Facility-Administered Medications   Medication Dose Route Frequency Provider Last Rate Last Admin    sodium chloride 0.9% flush 10 mL  10 mL Intravenous PRN Kaleb Cage MD           Review of patient's allergies indicates:   Allergen Reactions    Codeine Hives and Rash         Review of Systems   Constitutional: Negative for chills and fever.   Skin:  Positive for color change and nail changes. Negative for poor wound healing.   Gastrointestinal:  Negative for nausea and vomiting.   Neurological:  Positive for numbness.   Psychiatric/Behavioral:  Negative for altered mental status.            Objective:      Physical Exam  Constitutional:       Appearance: Normal appearance. She is not ill-appearing.   Cardiovascular:      Pulses:           Dorsalis pedis pulses are 2+ on the right side and 2+ on the left side.        Posterior tibial pulses are 2+ on the right side and 2+ on the left side.      Comments: CFT is < 3 seconds bilateral.  Pedal hair growth is decreased bilateral.  Mild edema noted to the Rt. Lateral forefoot.  Toes are warm to touch bilateral.    Musculoskeletal:         General: No tenderness or signs of injury.      Right lower leg: No edema.      " Left lower leg: No edema.      Comments: Muscle strength 5/5 in all muscle groups bilateral.  No tenderness nor crepitation with ROM of foot/ankle joints bilateral.  Slight dorsal contracture of the Lt. [place].  No gross deformity noted to said digit.  (-) for pain with palpation of the Rt. Lateral 5th mtp joint wound.     Skin:     General: Skin is warm and dry.      Capillary Refill: Capillary refill takes 2 to 3 seconds.      Findings: No bruising, ecchymosis, erythema, signs of injury, laceration, lesion, petechiae, rash or wound.      Comments: Mature epithelium noted to the Rt. Lateral sub 5th met head.      Mature epithelium noted to the Lt. Sub 2nd met head.  Small remaining area of blood blistering.  No localized sign of infection.     Neurological:      Mental Status: She is alert.      Sensory: Sensory deficit present.      Motor: No weakness.      Comments: Decreased protective sensation noted bilateral.  Light touch is absent bilateral.               Assessment:       Encounter Diagnoses   Name Primary?    Healed ulcer of right foot Yes    Healed ulcer of left foot     Diabetic polyneuropathy associated with type 2 diabetes mellitus                    Plan:       Diagnoses and all orders for this visit:    Healed ulcer of right foot    Healed ulcer of left foot    Diabetic polyneuropathy associated with type 2 diabetes mellitus              I counseled the patient on her conditions, their implications and medical management.      No wound debridement performed, as the sites remain fully epithelialized.  Residual blood blister remaining.    Patient to continue utilizing custom molded orthotics.  To be worn with all weight bearing.    Advised to apply betadine to the site of blistering BID x 1 final week.  To moisturizer the area prior to going to bed.    Advised to inspect the prior areas of ulceration for any new signs of breakdown.    May continue with her normal showering routine.    RTC in 4 weeks  for a follow up.     Jacob Bernal DPM

## 2025-07-09 ENCOUNTER — LAB VISIT (OUTPATIENT)
Dept: LAB | Facility: HOSPITAL | Age: 50
End: 2025-07-09
Attending: NURSE PRACTITIONER
Payer: MEDICARE

## 2025-07-09 DIAGNOSIS — Z79.4 TYPE 2 DIABETES MELLITUS WITHOUT COMPLICATION, WITH LONG-TERM CURRENT USE OF INSULIN: ICD-10-CM

## 2025-07-09 DIAGNOSIS — E11.9 TYPE 2 DIABETES MELLITUS WITHOUT COMPLICATION, WITH LONG-TERM CURRENT USE OF INSULIN: ICD-10-CM

## 2025-07-09 LAB
ANION GAP (OHS): 7 MMOL/L (ref 8–16)
BUN SERPL-MCNC: 21 MG/DL (ref 6–20)
CALCIUM SERPL-MCNC: 8.9 MG/DL (ref 8.7–10.5)
CHLORIDE SERPL-SCNC: 105 MMOL/L (ref 95–110)
CO2 SERPL-SCNC: 29 MMOL/L (ref 23–29)
CREAT SERPL-MCNC: 0.6 MG/DL (ref 0.5–1.4)
EAG (OHS): 126 MG/DL (ref 68–131)
GFR SERPLBLD CREATININE-BSD FMLA CKD-EPI: >60 ML/MIN/1.73/M2
GLUCOSE SERPL-MCNC: 143 MG/DL (ref 70–110)
HBA1C MFR BLD: 6 % (ref 4–5.6)
POTASSIUM SERPL-SCNC: 4 MMOL/L (ref 3.5–5.1)
SODIUM SERPL-SCNC: 141 MMOL/L (ref 136–145)

## 2025-07-09 PROCEDURE — 36415 COLL VENOUS BLD VENIPUNCTURE: CPT | Mod: PO

## 2025-07-09 PROCEDURE — 82310 ASSAY OF CALCIUM: CPT

## 2025-07-09 PROCEDURE — 83036 HEMOGLOBIN GLYCOSYLATED A1C: CPT

## 2025-07-10 ENCOUNTER — PATIENT MESSAGE (OUTPATIENT)
Dept: OTHER | Facility: OTHER | Age: 50
End: 2025-07-10
Payer: MEDICARE

## 2025-07-14 ENCOUNTER — OFFICE VISIT (OUTPATIENT)
Dept: ENDOCRINOLOGY | Facility: CLINIC | Age: 50
End: 2025-07-14
Payer: MEDICARE

## 2025-07-14 VITALS
BODY MASS INDEX: 39.68 KG/M2 | WEIGHT: 261.81 LBS | HEIGHT: 68 IN | DIASTOLIC BLOOD PRESSURE: 70 MMHG | SYSTOLIC BLOOD PRESSURE: 110 MMHG | HEART RATE: 67 BPM

## 2025-07-14 DIAGNOSIS — E11.9 TYPE 2 DIABETES MELLITUS WITHOUT COMPLICATION, WITH LONG-TERM CURRENT USE OF INSULIN: Primary | ICD-10-CM

## 2025-07-14 DIAGNOSIS — I10 PRIMARY HYPERTENSION: ICD-10-CM

## 2025-07-14 DIAGNOSIS — E66.9 OBESITY (BMI 30-39.9): ICD-10-CM

## 2025-07-14 DIAGNOSIS — Z79.4 TYPE 2 DIABETES MELLITUS WITHOUT COMPLICATION, WITH LONG-TERM CURRENT USE OF INSULIN: Primary | ICD-10-CM

## 2025-07-14 LAB — GLUCOSE SERPL-MCNC: 98 MG/DL (ref 70–110)

## 2025-07-14 PROCEDURE — G2211 COMPLEX E/M VISIT ADD ON: HCPCS | Mod: S$GLB,,, | Performed by: NURSE PRACTITIONER

## 2025-07-14 PROCEDURE — 3008F BODY MASS INDEX DOCD: CPT | Mod: CPTII,S$GLB,, | Performed by: NURSE PRACTITIONER

## 2025-07-14 PROCEDURE — 1159F MED LIST DOCD IN RCRD: CPT | Mod: CPTII,S$GLB,, | Performed by: NURSE PRACTITIONER

## 2025-07-14 PROCEDURE — 95251 CONT GLUC MNTR ANALYSIS I&R: CPT | Mod: S$GLB,,, | Performed by: NURSE PRACTITIONER

## 2025-07-14 PROCEDURE — 3061F NEG MICROALBUMINURIA REV: CPT | Mod: CPTII,S$GLB,, | Performed by: NURSE PRACTITIONER

## 2025-07-14 PROCEDURE — 3066F NEPHROPATHY DOC TX: CPT | Mod: CPTII,S$GLB,, | Performed by: NURSE PRACTITIONER

## 2025-07-14 PROCEDURE — 3074F SYST BP LT 130 MM HG: CPT | Mod: CPTII,S$GLB,, | Performed by: NURSE PRACTITIONER

## 2025-07-14 PROCEDURE — 82962 GLUCOSE BLOOD TEST: CPT | Mod: S$GLB,,, | Performed by: NURSE PRACTITIONER

## 2025-07-14 PROCEDURE — 99214 OFFICE O/P EST MOD 30 MIN: CPT | Mod: S$GLB,,, | Performed by: NURSE PRACTITIONER

## 2025-07-14 PROCEDURE — 4010F ACE/ARB THERAPY RXD/TAKEN: CPT | Mod: CPTII,S$GLB,, | Performed by: NURSE PRACTITIONER

## 2025-07-14 PROCEDURE — 1160F RVW MEDS BY RX/DR IN RCRD: CPT | Mod: CPTII,S$GLB,, | Performed by: NURSE PRACTITIONER

## 2025-07-14 PROCEDURE — 3078F DIAST BP <80 MM HG: CPT | Mod: CPTII,S$GLB,, | Performed by: NURSE PRACTITIONER

## 2025-07-14 PROCEDURE — 99999 PR PBB SHADOW E&M-EST. PATIENT-LVL IV: CPT | Mod: PBBFAC,,, | Performed by: NURSE PRACTITIONER

## 2025-07-14 PROCEDURE — 3044F HG A1C LEVEL LT 7.0%: CPT | Mod: CPTII,S$GLB,, | Performed by: NURSE PRACTITIONER

## 2025-07-14 RX ORDER — BLOOD-GLUCOSE SENSOR
EACH MISCELLANEOUS
Qty: 2 EACH | Refills: 6 | Status: SHIPPED | OUTPATIENT
Start: 2025-07-14 | End: 2025-07-15 | Stop reason: SDUPTHER

## 2025-07-14 RX ORDER — EMPAGLIFLOZIN 25 MG/1
25 TABLET, FILM COATED ORAL DAILY
Qty: 90 TABLET | Refills: 3 | Status: SHIPPED | OUTPATIENT
Start: 2025-07-14

## 2025-07-14 NOTE — PROGRESS NOTES
"CC: Ms. Keerthi Chase arrives today for management of Type 2 DM and review of chronic medical conditions, as listed in the Visit Diagnosis section of this encounter.       HPI: Ms. Keerthi Chase was diagnosed with Type 2 DM in 2006, immediately following GDM. She was diagnosed based on lab work. Initial treatment consisted of insulin. + FH of DM in maternal aunt. Denies hospitalizations due to DM.     Patient was last seen by me in February. At that time, Mounjaro dose was increased.    She is also following with [facility]. Mounjaro dose was increased ~ 6 weeks ago. She is tolerating well.     BG monitoring per Dexcom G7. She reports BG are consistently higher on CGM than with her [employer] meter.     Hypoglycemia: Rare  Symptoms: none  Treatment: PB crackers    Missing Insulin/PO medication doses: Rare    Exercise: No    Dietary Habits: She eats 3 meals/day. Rare snack. Has cut out sodas.    Last DM education appointment:      CURRENT DIABETIC MEDS: Jardiance 10 mg daily, Mounjaro 12.5 mg weekly, Toujeo Max 40 units QAM, 20 units QPM (pt's choice to take twice daily)  Vial or pen: pen  Glucometer type:     Previous DM treatments:  Metformin   Prandial insulin  Jardiance - unsure why stopped  Ozempic - nausea, belching      [facility] [place]  Last Podiatry Exam: 7/2025    REVIEW OF SYSTEMS  Constitutional: no c/o fatigue, weakness. + 23# weight loss since starting Mounjaro in 11/2024.  Cardiac: no palpitations or chest pain.  Respiratory: no cough or dyspnea.  GI: no c/o abdominal pain or nausea. Denies h/o pancreatitis.   Skin: no lesions or rashes. + scratches on arms from pets  Neuro: + chronic numbness, tingling in feet.  Endocrine: denies polyphagia, polydipsia, polyuria      Personally reviewed Past Medical, Surgical, Social History.    Vital Signs  /70   Pulse 67   Ht 5' 8" (1.727 m)   Wt 118.8 kg (261 lb 12.7 oz)   BMI 39.81 kg/m²     Personally " reviewed the below labs:    Hemoglobin A1C   Date Value Ref Range Status   01/24/2025 6.7 (H) 4.0 - 5.6 % Final     Comment:     ADA Screening Guidelines:  5.7-6.4%  Consistent with prediabetes  >or=6.5%  Consistent with diabetes    High levels of fetal hemoglobin interfere with the HbA1C  assay. Heterozygous hemoglobin variants (HbS, HgC, etc)do  not significantly interfere with this assay.   However, presence of multiple variants may affect accuracy.     12/09/2024 10.0 (H) 4.0 - 5.6 % Final     Comment:     Reference Interval:  5.0 - 5.6 Normal   5.7 - 6.4 High Risk   > 6.5 Diabetic      Hgb A1c results are standardized based on the (NGSP) National   Glycohemoglobin Standardization Program.      Hemoglobin A1C levels are related to mean serum/plasma glucose   during the preceding 2-3 months.        06/03/2022 10.5 (H) 0.0 - 5.6 % Final     Comment:     Reference Interval:  5.0 - 5.6 Normal   5.7 - 6.4 High Risk   > 6.5 Diabetic      Hgb A1c results are standardized based on the (NGSP) National   Glycohemoglobin Standardization Program.      Hemoglobin A1C levels are related to mean serum/plasma glucose   during the preceding 2-3 months.          Hemoglobin A1c   Date Value Ref Range Status   07/09/2025 6.0 (H) 4.0 - 5.6 % Final     Comment:     ADA Screening Guidelines:  5.7-6.4%  Consistent with prediabetes  >=6.5%  Consistent with diabetes    High levels of fetal hemoglobin interfere with the HbA1C  assay. Heterozygous hemoglobin variants (HbS, HgC, etc)do  not significantly interfere with this assay.   However, presence of multiple variants may affect accuracy.       Chemistry        Component Value Date/Time     07/09/2025 0754     04/03/2025 0925    K 4.0 07/09/2025 0754    K 4.2 04/03/2025 0925     07/09/2025 0754     04/03/2025 0925    CO2 29 07/09/2025 0754    CO2 25 04/03/2025 0925    BUN 21 (H) 07/09/2025 0754    CREATININE 0.6 07/09/2025 0754     (H) 07/09/2025 0754     " (H) 04/03/2025 0925        Component Value Date/Time    CALCIUM 8.9 07/09/2025 0754    CALCIUM 9.2 04/03/2025 0925    ALKPHOS 94 04/03/2025 0925    AST 24 04/03/2025 0925    ALT 28 04/03/2025 0925    BILITOT 0.6 04/03/2025 0925    ESTGFRAFRICA >60 07/19/2022 1040    EGFRNONAA >60 07/19/2022 1040          Lab Results   Component Value Date    CHOL 142 01/24/2025     Lab Results   Component Value Date    HDL 35 (L) 01/24/2025     Lab Results   Component Value Date    LDLCALC 60.4 (L) 01/24/2025     Lab Results   Component Value Date    TRIG 233 (H) 01/24/2025     Lab Results   Component Value Date    CHOLHDL 24.6 01/24/2025       Lab Results   Component Value Date    MICALBCREAT 6.7 01/24/2025     Lab Results   Component Value Date    TSH 2.580 01/24/2025       CrCl cannot be calculated (Unknown ideal weight.).    No results found for: "[id number]"      PHYSICAL EXAMINATION  Constitutional: Appears well, no distress  Respiratory: CTA, even and unlabored.  Cardiovascular: RRR, no murmurs.  GI: bowel sounds active, no hernia noted  Skin: warm and dry. A few minor lacerations on forearms without swelling or drainage.  Neuro: oriented to person, place, time  Feet: appropriate footwear    DEXCOM G7 DOWNLOAD: Fasting glucoses are acceptable. Occasional prandial excursions noted. Rare hypoglycemia.          Goals         80 <= Glucose <= 180 (pt-stated)       Blood Pressure < 130/80       Exercise at least 150 minutes per week.       HEMOGLOBIN A1C < 7       Take at least one BP reading per week at various times of the day               Assessment/Plan  1. Type 2 diabetes mellitus without complication, with long-term current use of insulin  -- Controlled but CGM reading higher than A1c, glucometer.  POCT 98 in clinic, Pt's [facility] glucometer is 100, and Dexcom G7 is 122.   -- increase Jardiance to 25 mg daily  -- continue Mounjaro, Toujeo Max at current doses.  -- change to Freestyle Lesly 3 PLUS    -- Discussed " diagnosis of DM, A1c goals, progression of disease, long term complications and tx options.  -- Reviewed hypoglycemia management: treat with 4 oz of juice, 4 oz regular soda, or 4 glucose tablets. Monitor and repeat treatment every 15 minutes until BG is >70 Then have a snack, which includes 15 grams of complex carbohydrates and protein.   Advised patient to check BG before activities, such as driving or exercise.  -- takes arb, statin   2. Primary hypertension  -- stable  -- continue current meds   3. Obesity -- improving   Body mass index is 39.81 kg/m².       FOLLOW UP  Follow up in about 6 months (around 1/14/2026).   Patient instructed to bring BG logs to each follow up   Patient encouraged to call for any BG/medication issues, concerns, or questions.    Orders Placed This Encounter   Procedures    Hemoglobin A1C    Lipid Panel    Comprehensive Metabolic Panel    Microalbumin/Creatinine Ratio, Urine    POCT Glucose, Hand-Held Device

## 2025-07-15 ENCOUNTER — PATIENT MESSAGE (OUTPATIENT)
Dept: ENDOCRINOLOGY | Facility: CLINIC | Age: 50
End: 2025-07-15
Payer: MEDICARE

## 2025-07-15 DIAGNOSIS — E11.9 TYPE 2 DIABETES MELLITUS WITHOUT COMPLICATION, WITH LONG-TERM CURRENT USE OF INSULIN: ICD-10-CM

## 2025-07-15 DIAGNOSIS — Z79.4 TYPE 2 DIABETES MELLITUS WITHOUT COMPLICATION, WITH LONG-TERM CURRENT USE OF INSULIN: ICD-10-CM

## 2025-07-15 RX ORDER — BLOOD-GLUCOSE SENSOR
EACH MISCELLANEOUS
Qty: 2 EACH | Refills: 6 | Status: SHIPPED | OUTPATIENT
Start: 2025-07-15

## 2025-07-15 RX ORDER — BLOOD-GLUCOSE,RECEIVER,CONT
EACH MISCELLANEOUS
Qty: 1 EACH | Refills: 0 | Status: SHIPPED | OUTPATIENT
Start: 2025-07-15

## 2025-07-31 ENCOUNTER — TELEPHONE (OUTPATIENT)
Dept: DIABETES | Facility: CLINIC | Age: 50
End: 2025-07-31
Payer: MEDICARE

## 2025-07-31 ENCOUNTER — PATIENT MESSAGE (OUTPATIENT)
Dept: ENDOCRINOLOGY | Facility: CLINIC | Age: 50
End: 2025-07-31
Payer: MEDICARE

## 2025-07-31 DIAGNOSIS — Z79.4 UNCONTROLLED DIABETES MELLITUS WITH HYPERGLYCEMIA, WITH LONG-TERM CURRENT USE OF INSULIN: Primary | ICD-10-CM

## 2025-07-31 DIAGNOSIS — E11.65 UNCONTROLLED DIABETES MELLITUS WITH HYPERGLYCEMIA, WITH LONG-TERM CURRENT USE OF INSULIN: Primary | ICD-10-CM

## 2025-07-31 RX ORDER — BLOOD-GLUCOSE SENSOR
1 EACH MISCELLANEOUS
Qty: 3 EACH | Refills: 6 | Status: SHIPPED | OUTPATIENT
Start: 2025-07-31 | End: 2026-07-31

## 2025-07-31 NOTE — TELEPHONE ENCOUNTER
[facility] for [facility]

## 2025-07-31 NOTE — TELEPHONE ENCOUNTER
Martin Sethi, patient called in asking to speak with the staff about her Lesly.  Since two sensors failed, she asked for a prescription for Dexcom to be written again.  I explained we are also having issues with Dexcom so it could happen with both CGMs.  She says she would feel better continuing on with something she is already comfortable with.  Made her aware the prescription will not be able to be filled for a month. She had extra Dexcom she started this morning.     Lesly prescription sent to   [facility] #[id number] - [address]     [place]

## 2025-08-04 ENCOUNTER — PATIENT MESSAGE (OUTPATIENT)
Dept: DIABETES | Facility: CLINIC | Age: 50
End: 2025-08-04
Payer: MEDICARE

## 2025-08-06 ENCOUNTER — OFFICE VISIT (OUTPATIENT)
Dept: PODIATRY | Facility: CLINIC | Age: 50
End: 2025-08-06
Payer: MEDICARE

## 2025-08-06 VITALS — WEIGHT: 261.94 LBS | HEIGHT: 68 IN | BODY MASS INDEX: 39.7 KG/M2

## 2025-08-06 DIAGNOSIS — E11.42 DIABETIC POLYNEUROPATHY ASSOCIATED WITH TYPE 2 DIABETES MELLITUS: Primary | ICD-10-CM

## 2025-08-06 DIAGNOSIS — B35.1 ONYCHOMYCOSIS DUE TO DERMATOPHYTE: ICD-10-CM

## 2025-08-06 PROCEDURE — 99999 PR PBB SHADOW E&M-EST. PATIENT-LVL III: CPT | Mod: PBBFAC,,, | Performed by: PODIATRIST

## 2025-08-06 NOTE — PROGRESS NOTES
Subjective:      Patient ID: Keerthi Chase is a 50 y.o. female.    Chief Complaint: Diabetic Foot Exam (Endo 7/14/25)  Patient presents to clinic for a 1 month follow up/diabetic foot exam.  Notes having residual scab-like tissue to the Lt. Plantar forefoot.  She has been applying betadine to the site QD since our last exam.  Relates wearing shoes at all times with the modified custom molded orthotics.  Denies pain in the feet with today's exam.    Denies any additional pedal complaints.     Endocrinology: JOLANTA Matias  Last visit: 7/25  Hemoglobin A1C   Date Value Ref Range Status   01/24/2025 6.7 (H) 4.0 - 5.6 % Final     Comment:     ADA Screening Guidelines:  5.7-6.4%  Consistent with prediabetes  >or=6.5%  Consistent with diabetes    High levels of fetal hemoglobin interfere with the HbA1C  assay. Heterozygous hemoglobin variants (HbS, HgC, etc)do  not significantly interfere with this assay.   However, presence of multiple variants may affect accuracy.     12/09/2024 10.0 (H) 4.0 - 5.6 % Final     Comment:     Reference Interval:  5.0 - 5.6 Normal   5.7 - 6.4 High Risk   > 6.5 Diabetic      Hgb A1c results are standardized based on the (NGSP) National   Glycohemoglobin Standardization Program.      Hemoglobin A1C levels are related to mean serum/plasma glucose   during the preceding 2-3 months.        06/03/2022 10.5 (H) 0.0 - 5.6 % Final     Comment:     Reference Interval:  5.0 - 5.6 Normal   5.7 - 6.4 High Risk   > 6.5 Diabetic      Hgb A1c results are standardized based on the (NGSP) National   Glycohemoglobin Standardization Program.      Hemoglobin A1C levels are related to mean serum/plasma glucose   during the preceding 2-3 months.          Hemoglobin A1c   Date Value Ref Range Status   07/09/2025 6.0 (H) 4.0 - 5.6 % Final     Comment:     ADA Screening Guidelines:  5.7-6.4%  Consistent with prediabetes  >=6.5%  Consistent with diabetes    High levels of fetal hemoglobin interfere with the  HbA1C  assay. Heterozygous hemoglobin variants (HbS, HgC, etc)do  not significantly interfere with this assay.   However, presence of multiple variants may affect accuracy.         Past Medical History:   Diagnosis Date    Arthritis     Bell's palsy     Coronary artery disease     Pt states Dr. Virk said she did not have a blockage after a stress test.    Diabetes mellitus     GERD (gastroesophageal reflux disease)     Hypertension     Obesity     Sleep apnea     no cpap       Past Surgical History:   Procedure Laterality Date    ANGIOGRAM, CORONARY, WITH LEFT HEART CATHETERIZATION  4/3/2025    Procedure: Left Heart Cath;  Surgeon: Kaleb Cage MD;  Location: [facility] CATH;  Service: Cardiology;;     SECTION      x 2    CHOLECYSTECTOMY      ESOPHAGOGASTRODUODENOSCOPY      FOOT AMPUTATION THROUGH METATARSAL Right 12/10/2024    Procedure: resection, FOOT, TRANSMETATARSAL, 5th;  Surgeon: Jacob Bernal DPM;  Location: [facility] OR;  Service: Podiatry;  Laterality: Right;    PERIPHERALLY INSERTED CENTRAL CATHETER INSERTION N/A 10/11/2021    Procedure: INSERTION, PICC;  Surgeon: PICC, ST;  Location: [facility] ENDO;  Service: Cardiology;  Laterality: N/A;  Dr Aiken    PERIPHERALLY INSERTED CENTRAL CATHETER INSERTION N/A 2022    Procedure: INSERTION, PICC;  Surgeon: PICC, [facility];  Location: [facility] ENDO;  Service: Cardiology;  Laterality: N/A;  Dr Dubose    SESAMOIDECTOMY Left 2021    Procedure: SESAMOIDECTOMY;  Surgeon: Jacob Bernal DPM;  Location: [facility] OR;  Service: Podiatry;  Laterality: Left;       Family History   Problem Relation Name Age of Onset    Hypertension Mother      Diabetes Maternal Aunt         Social History     Socioeconomic History    Marital status:    Tobacco Use    Smoking status: Never    Smokeless tobacco: Never   Substance and Sexual Activity    Alcohol use: No    Drug use: No     Social Drivers of Health     Financial Resource Strain: Medium Risk (2025)    Overall  Financial Resource Strain (CARDIA)     Difficulty of Paying Living Expenses: Somewhat hard   Food Insecurity: Food Insecurity Present (2/21/2025)    Hunger Vital Sign     Worried About Running Out of Food in the Last Year: Sometimes true     Ran Out of Food in the Last Year: Sometimes true   Transportation Needs: No Transportation Needs (2/21/2025)    PRAPARE - Transportation     Lack of Transportation (Medical): No     Lack of Transportation (Non-Medical): No   Physical Activity: Insufficiently Active (2/21/2025)    Exercise Vital Sign     Days of Exercise per Week: 2 days     Minutes of Exercise per Session: 10 min   Stress: No Stress Concern Present (2/21/2025)    [employer] [place] [employer] - Occupational Stress Questionnaire     Feeling of Stress : Only a little   Recent Concern: Stress - Stress Concern Present (12/9/2024)    [place] [employer] - Occupational Stress Questionnaire     Feeling of Stress : To some extent   Housing Stability: High Risk (2/21/2025)    Housing Stability Vital Sign     Unable to Pay for Housing in the Last Year: Yes     Number of Times Moved in the Last Year: 0     Homeless in the Last Year: No       Current Outpatient Medications   Medication Sig Dispense Refill    alprazolam (XANAX) 1 MG tablet Take 1 mg by mouth 2 (two) times daily as needed for Anxiety.      ascorbic acid, vitamin C, (VITAMIN C) 100 MG tablet Take 100 mg by mouth once daily.      atorvastatin (LIPITOR) 20 MG tablet Take 1 tablet (20 mg total) by mouth once daily. 90 tablet 3    b complex vitamins tablet Take 1 tablet by mouth once daily.      blood-glucose,,cont (FREESTYLE LIDA 3 READER) Misc To be use with Freestyle lida 3 plus sensors 1 each 0    calcium HMB/vitamin D3 (HMB PRO ORAL) Take by mouth.      CEQUA 0.09 % Dpet Place 1 drop into both eyes 2 (two) times daily.      cyanocobalamin (VITAMIN B-12) 100 MCG tablet Take 100 mcg by mouth once daily.      DEXCOM G7  "SENSOR [place] 1 Device by Misc.(Non-Drug; Combo Route) route every 10 days. 3 each 6    fluconazole (DIFLUCAN) 200 MG Tab Take 200 mg by mouth.      HYDROcodone-acetaminophen (NORCO)  mg per tablet Take 1 tablet by mouth every 6 (six) hours as needed.      hydrocortisone 2.5 % cream Apply topically.      INSUPEN PEN NEEDLE 31 gauge x 3/16" Ndle       JARDIANCE 25 mg tablet Take 1 tablet (25 mg total) by mouth once daily. 90 tablet 3    lancets Misc To check BG 3 times daily, to use with insurance preferred meter 100 each 1    omega-3 fatty acids/fish oil (FISH OIL-OMEGA-3 FATTY ACIDS) 300-1,000 mg capsule Take by mouth once daily.      omeprazole (PRILOSEC) 40 MG capsule Take 40 mg by mouth.      pen needle, diabetic (BD ULTRA-FINE GARIMA PEN NEEDLE) 32 gauge x 5/32" Ndle Uses 2 daily, on multiple daily insulin injections 200 each 3    pramipexole (MIRAPEX) 0.125 MG tablet Take 0.125 mg by mouth.      tirzepatide (MOUNJARO) 15 mg/0.5 mL PnIj Inject 15 mg into the skin every 7 days. 2 mL 5    TOUJEO MAX U-300 SOLOSTAR 300 unit/mL (3 mL) insulin pen Inject 80 Units into the skin once daily. 12 mL 11    valsartan (DIOVAN) 80 MG tablet Take 1 tablet (80 mg total) by mouth once daily. 90 tablet 1    vitamin D (VITAMIN D3) 1000 units Tab Take 1,000 Units by mouth once daily.      zinc gluconate 50 mg tablet Take 50 mg by mouth once daily.       Current Facility-Administered Medications   Medication Dose Route Frequency Provider Last Rate Last Admin    sodium chloride 0.9% flush 10 mL  10 mL Intravenous PRN Kaleb Cage MD           Review of patient's allergies indicates:   Allergen Reactions    Codeine Hives and Rash         Review of Systems   Constitutional: Negative for chills and fever.   Skin:  Positive for color change and nail changes. Negative for poor wound healing.   Gastrointestinal:  Negative for nausea and vomiting.   Neurological:  Positive for numbness.   Psychiatric/Behavioral:  Negative for " altered mental status.            Objective:      Physical Exam  Constitutional:       Appearance: Normal appearance. She is not ill-appearing.   Cardiovascular:      Pulses:           Dorsalis pedis pulses are 2+ on the right side and 2+ on the left side.        Posterior tibial pulses are 2+ on the right side and 2+ on the left side.      Comments: CFT is < 3 seconds bilateral.  Pedal hair growth is decreased bilateral.  Mild edema noted to the Rt. Lateral forefoot.  Toes are warm to touch bilateral.    Musculoskeletal:         General: No tenderness or signs of injury.      Right lower leg: No edema.      Left lower leg: No edema.      Comments: Muscle strength 5/5 in all muscle groups bilateral.  No tenderness nor crepitation with ROM of foot/ankle joints bilateral.  Slight dorsal contracture of the Lt. Hallux.  No gross deformity noted to said digit.  (-) for pain with palpation of the Rt. Lateral 5th mtp joint wound.     Skin:     General: Skin is warm and dry.      Capillary Refill: Capillary refill takes 2 to 3 seconds.      Findings: No bruising, ecchymosis, erythema, signs of injury, laceration, lesion, petechiae, rash or wound.      Comments: Mature epithelium noted to the Rt. Lateral sub 5th met head.      Mature epithelium noted to the Lt. Sub 2nd met head, with superficial and loosely adhering eschar.    Toenails x 10 appear thickened by 2mm, elongated by 5mm, and discolored with subungual debris.     Neurological:      Mental Status: She is alert.      Sensory: Sensory deficit present.      Motor: No weakness.      Comments: Decreased protective sensation noted bilateral.  Light touch is absent bilateral.               Assessment:       Encounter Diagnoses   Name Primary?    Diabetic polyneuropathy associated with type 2 diabetes mellitus Yes    Onychomycosis due to dermatophyte                      Plan:       Keerthi was seen today for diabetic foot exam.    Diagnoses and all orders for this  visit:    Diabetic polyneuropathy associated with type 2 diabetes mellitus    Onychomycosis due to dermatophyte                I counseled the patient on her conditions, their implications and medical management.      Shoe inspection. Diabetic Foot Education. Patient reminded of the importance of good nutrition and blood sugar control to help prevent podiatric complications of diabetes. Patient instructed on proper foot hygeine. We discussed wearing proper shoe gear, daily foot inspections, never walking without protective shoe gear, never putting sharp instruments to feet    With patient's permission, nails were aggressively reduced and debrided x 10 to their soft tissue attachment mechanically and with electric , removing all offending nail and debris. Patient relates relief following the procedure. She will continue to monitor the areas daily, inspect her feet, wear protective shoe gear when ambulatory, moisturizer to maintain skin integrity.    Patient to continue utilizing custom molded orthotics.  To be worn with all weight bearing.    Advised to inspect the prior areas of ulceration for any new signs of breakdown.    RTC in 2 months for a routine diabetic foot exam.     Jacob Bernal DPM

## 2025-08-27 ENCOUNTER — PATIENT MESSAGE (OUTPATIENT)
Dept: PODIATRY | Facility: CLINIC | Age: 50
End: 2025-08-27
Payer: MEDICARE

## 2025-09-02 ENCOUNTER — OFFICE VISIT (OUTPATIENT)
Dept: PODIATRY | Facility: CLINIC | Age: 50
End: 2025-09-02
Payer: MEDICARE

## 2025-09-02 VITALS — BODY MASS INDEX: 39.7 KG/M2 | HEIGHT: 68 IN | WEIGHT: 261.94 LBS

## 2025-09-02 DIAGNOSIS — E11.621 DIABETIC ULCER OF OTHER PART OF LEFT FOOT ASSOCIATED WITH TYPE 2 DIABETES MELLITUS, LIMITED TO BREAKDOWN OF SKIN: Primary | ICD-10-CM

## 2025-09-02 DIAGNOSIS — L97.521 DIABETIC ULCER OF OTHER PART OF LEFT FOOT ASSOCIATED WITH TYPE 2 DIABETES MELLITUS, LIMITED TO BREAKDOWN OF SKIN: Primary | ICD-10-CM

## 2025-09-02 DIAGNOSIS — E11.42 DIABETIC POLYNEUROPATHY ASSOCIATED WITH TYPE 2 DIABETES MELLITUS: ICD-10-CM

## 2025-09-02 PROCEDURE — 3044F HG A1C LEVEL LT 7.0%: CPT | Mod: CPTII,S$GLB,, | Performed by: PODIATRIST

## 2025-09-02 PROCEDURE — 3061F NEG MICROALBUMINURIA REV: CPT | Mod: CPTII,S$GLB,, | Performed by: PODIATRIST

## 2025-09-02 PROCEDURE — 1159F MED LIST DOCD IN RCRD: CPT | Mod: CPTII,S$GLB,, | Performed by: PODIATRIST

## 2025-09-02 PROCEDURE — 99213 OFFICE O/P EST LOW 20 MIN: CPT | Mod: 25,S$GLB,, | Performed by: PODIATRIST

## 2025-09-02 PROCEDURE — 4010F ACE/ARB THERAPY RXD/TAKEN: CPT | Mod: CPTII,S$GLB,, | Performed by: PODIATRIST

## 2025-09-02 PROCEDURE — 99999 PR PBB SHADOW E&M-EST. PATIENT-LVL III: CPT | Mod: PBBFAC,,, | Performed by: PODIATRIST

## 2025-09-02 PROCEDURE — 97597 DBRDMT OPN WND 1ST 20 CM/<: CPT | Mod: S$GLB,,, | Performed by: PODIATRIST

## 2025-09-02 PROCEDURE — 3008F BODY MASS INDEX DOCD: CPT | Mod: CPTII,S$GLB,, | Performed by: PODIATRIST

## 2025-09-02 PROCEDURE — 3066F NEPHROPATHY DOC TX: CPT | Mod: CPTII,S$GLB,, | Performed by: PODIATRIST

## (undated) DEVICE — DRAPE STERI-DRAPE 1000 17X11IN

## (undated) DEVICE — SYR 10CC LUER LOCK

## (undated) DEVICE — LINER GLOVE POWDERFREE 8

## (undated) DEVICE — PENCIL ROCKER SWITCH 10FT CORD

## (undated) DEVICE — STRIP WOUND PK BIGUANIDE 36X.5

## (undated) DEVICE — SEE MEDLINE ITEM 157128

## (undated) DEVICE — SHOE DARCO POST OP MALE LRGE

## (undated) DEVICE — Device

## (undated) DEVICE — ELECTRODE REM PLYHSV RETURN 9

## (undated) DEVICE — NDL HYPO REG 25G X 1 1/2

## (undated) DEVICE — DRAPE EXTREMITY W/ABC NON-SLIP

## (undated) DEVICE — GLOVE SURGICAL LATEX SZ 8

## (undated) DEVICE — GAUZE SPONGE 4X4 12PLY

## (undated) DEVICE — SUT ETHILON 3-0 PS2 18 BLK

## (undated) DEVICE — SUT MONOCYRL 4-0 PS2 UND

## (undated) DEVICE — BANDAGE ESMARK LATEX FREE 4INX

## (undated) DEVICE — INTERPULSE SET

## (undated) DEVICE — BLADE #15 STERILE CARBON

## (undated) DEVICE — STOCKINET 4INX48

## (undated) DEVICE — SUT MONOCRYL 3-0 PS-2 UND

## (undated) DEVICE — PAD CAST SPECIALIST STRL 4

## (undated) DEVICE — SEE MEDLINE ITEM 157131

## (undated) DEVICE — APPLICATOR CHLORAPREP ORN 26ML

## (undated) DEVICE — SEE L#120831

## (undated) DEVICE — BNDG COFLEX FOAM LF2 ST 4X5YD